# Patient Record
Sex: FEMALE | Race: WHITE | Employment: OTHER
[De-identification: names, ages, dates, MRNs, and addresses within clinical notes are randomized per-mention and may not be internally consistent; named-entity substitution may affect disease eponyms.]

---

## 2017-02-24 ENCOUNTER — HOSPITAL ENCOUNTER (OUTPATIENT)
Facility: MEDICAL CENTER | Age: 67
End: 2017-02-24
Payer: MEDICARE

## 2017-02-25 LAB
CHOLESTEROL, TOTAL: 167 MG/DL
CHOLESTEROL/HDL RATIO: 4.8
HBA1C MFR BLD: 9 %
HDLC SERPL-MCNC: 35 MG/DL (ref 35–70)
LDL CHOLESTEROL CALCULATED: 91 MG/DL (ref 0–160)
TRIGL SERPL-MCNC: 207 MG/DL
VLDLC SERPL CALC-MCNC: NORMAL MG/DL

## 2017-02-27 ENCOUNTER — OFFICE VISIT (OUTPATIENT)
Dept: ONCOLOGY | Facility: CLINIC | Age: 67
End: 2017-02-27

## 2017-02-27 ENCOUNTER — HOSPITAL ENCOUNTER (OUTPATIENT)
Age: 67
Discharge: HOME | End: 2017-02-27
Payer: MEDICARE

## 2017-02-27 ENCOUNTER — TELEPHONE (OUTPATIENT)
Dept: ONCOLOGY | Facility: CLINIC | Age: 67
End: 2017-02-27

## 2017-02-27 VITALS
TEMPERATURE: 98.2 F | RESPIRATION RATE: 20 BRPM | SYSTOLIC BLOOD PRESSURE: 135 MMHG | BODY MASS INDEX: 26.1 KG/M2 | HEART RATE: 81 BPM | DIASTOLIC BLOOD PRESSURE: 67 MMHG | WEIGHT: 174.2 LBS

## 2017-02-27 DIAGNOSIS — C50.411 MALIGNANT NEOPLASM OF UPPER-OUTER QUADRANT OF RIGHT FEMALE BREAST (HCC): Primary | ICD-10-CM

## 2017-02-27 PROCEDURE — 99212 OFFICE O/P EST SF 10 MIN: CPT | Performed by: INTERNAL MEDICINE

## 2017-02-27 PROCEDURE — 3017F COLORECTAL CA SCREEN DOC REV: CPT | Performed by: INTERNAL MEDICINE

## 2017-02-27 PROCEDURE — 1123F ACP DISCUSS/DSCN MKR DOCD: CPT | Performed by: INTERNAL MEDICINE

## 2017-02-27 PROCEDURE — 99214 OFFICE O/P EST MOD 30 MIN: CPT | Performed by: INTERNAL MEDICINE

## 2017-02-27 PROCEDURE — 1036F TOBACCO NON-USER: CPT | Performed by: INTERNAL MEDICINE

## 2017-02-27 PROCEDURE — 4040F PNEUMOC VAC/ADMIN/RCVD: CPT | Performed by: INTERNAL MEDICINE

## 2017-02-27 PROCEDURE — 3014F SCREEN MAMMO DOC REV: CPT | Performed by: INTERNAL MEDICINE

## 2017-02-27 RX ORDER — LETROZOLE 2.5 MG/1
2.5 TABLET, FILM COATED ORAL DAILY
Qty: 90 TABLET | Refills: 3 | Status: SHIPPED | OUTPATIENT
Start: 2017-02-27 | End: 2018-02-26 | Stop reason: SDUPTHER

## 2017-02-27 RX ORDER — LETROZOLE 2.5 MG/1
2.5 TABLET, FILM COATED ORAL DAILY
Qty: 90 TABLET | Refills: 3 | Status: SHIPPED | OUTPATIENT
Start: 2017-02-27 | End: 2017-02-27 | Stop reason: SDUPTHER

## 2017-03-27 ENCOUNTER — OFFICE VISIT (OUTPATIENT)
Dept: FAMILY MEDICINE CLINIC | Age: 67
End: 2017-03-27
Payer: MEDICARE

## 2017-03-27 VITALS
RESPIRATION RATE: 18 BRPM | SYSTOLIC BLOOD PRESSURE: 133 MMHG | HEART RATE: 83 BPM | HEIGHT: 68 IN | BODY MASS INDEX: 26.16 KG/M2 | WEIGHT: 172.6 LBS | DIASTOLIC BLOOD PRESSURE: 73 MMHG | OXYGEN SATURATION: 97 %

## 2017-03-27 DIAGNOSIS — Z12.11 SCREENING FOR COLON CANCER: ICD-10-CM

## 2017-03-27 DIAGNOSIS — G89.29 CHRONIC LEFT SHOULDER PAIN: Primary | ICD-10-CM

## 2017-03-27 DIAGNOSIS — E11.9 DIABETES MELLITUS TYPE 2 IN NONOBESE (HCC): ICD-10-CM

## 2017-03-27 DIAGNOSIS — M25.512 CHRONIC LEFT SHOULDER PAIN: Primary | ICD-10-CM

## 2017-03-27 PROCEDURE — 3046F HEMOGLOBIN A1C LEVEL >9.0%: CPT | Performed by: FAMILY MEDICINE

## 2017-03-27 PROCEDURE — G8427 DOCREV CUR MEDS BY ELIG CLIN: HCPCS | Performed by: FAMILY MEDICINE

## 2017-03-27 PROCEDURE — 3017F COLORECTAL CA SCREEN DOC REV: CPT | Performed by: FAMILY MEDICINE

## 2017-03-27 PROCEDURE — 99203 OFFICE O/P NEW LOW 30 MIN: CPT | Performed by: FAMILY MEDICINE

## 2017-03-27 PROCEDURE — G8399 PT W/DXA RESULTS DOCUMENT: HCPCS | Performed by: FAMILY MEDICINE

## 2017-03-27 PROCEDURE — G8420 CALC BMI NORM PARAMETERS: HCPCS | Performed by: FAMILY MEDICINE

## 2017-03-27 PROCEDURE — 4040F PNEUMOC VAC/ADMIN/RCVD: CPT | Performed by: FAMILY MEDICINE

## 2017-03-27 PROCEDURE — 1036F TOBACCO NON-USER: CPT | Performed by: FAMILY MEDICINE

## 2017-03-27 PROCEDURE — 3014F SCREEN MAMMO DOC REV: CPT | Performed by: FAMILY MEDICINE

## 2017-03-27 PROCEDURE — 1123F ACP DISCUSS/DSCN MKR DOCD: CPT | Performed by: FAMILY MEDICINE

## 2017-03-27 PROCEDURE — 1090F PRES/ABSN URINE INCON ASSESS: CPT | Performed by: FAMILY MEDICINE

## 2017-03-27 PROCEDURE — G8484 FLU IMMUNIZE NO ADMIN: HCPCS | Performed by: FAMILY MEDICINE

## 2017-03-27 RX ORDER — LIDOCAINE 50 MG/G
OINTMENT TOPICAL
Qty: 50 G | Refills: 3 | Status: SHIPPED | OUTPATIENT
Start: 2017-03-27 | End: 2019-08-30 | Stop reason: ALTCHOICE

## 2017-04-04 ENCOUNTER — TELEPHONE (OUTPATIENT)
Dept: FAMILY MEDICINE CLINIC | Age: 67
End: 2017-04-04

## 2017-04-25 ENCOUNTER — TELEPHONE (OUTPATIENT)
Dept: FAMILY MEDICINE CLINIC | Age: 67
End: 2017-04-25

## 2017-06-07 DIAGNOSIS — D05.12 DCIS (DUCTAL CARCINOMA IN SITU), LEFT: ICD-10-CM

## 2017-06-07 DIAGNOSIS — C50.411 MALIGNANT NEOPLASM OF UPPER-OUTER QUADRANT OF RIGHT FEMALE BREAST (HCC): Primary | ICD-10-CM

## 2017-06-13 ENCOUNTER — HOSPITAL ENCOUNTER (OUTPATIENT)
Dept: MRI IMAGING | Age: 67
Discharge: HOME | End: 2017-06-13
Payer: MEDICARE

## 2017-06-13 DIAGNOSIS — C50.411 MALIGNANT NEOPLASM OF UPPER-OUTER QUADRANT OF RIGHT FEMALE BREAST (HCC): ICD-10-CM

## 2017-06-13 LAB
BUN BLDV-MCNC: 15 MG/DL (ref 8–23)
CREAT SERPL-MCNC: 0.82 MG/DL (ref 0.5–0.9)
GFR AFRICAN AMERICAN: >60 ML/MIN
GFR NON-AFRICAN AMERICAN: >60 ML/MIN
GFR SERPL CREATININE-BSD FRML MDRD: NORMAL ML/MIN/{1.73_M2}
GFR SERPL CREATININE-BSD FRML MDRD: NORMAL ML/MIN/{1.73_M2}

## 2017-06-13 PROCEDURE — 84520 ASSAY OF UREA NITROGEN: CPT

## 2017-06-13 PROCEDURE — 82565 ASSAY OF CREATININE: CPT

## 2017-06-13 PROCEDURE — A9579 GAD-BASE MR CONTRAST NOS,1ML: HCPCS | Performed by: INTERNAL MEDICINE

## 2017-06-13 PROCEDURE — C8908 MRI W/O FOL W/CONT, BREAST,: HCPCS

## 2017-06-13 PROCEDURE — 6360000004 HC RX CONTRAST MEDICATION: Performed by: INTERNAL MEDICINE

## 2017-06-13 PROCEDURE — 36415 COLL VENOUS BLD VENIPUNCTURE: CPT

## 2017-06-13 RX ADMIN — GADOPENTETATE DIMEGLUMINE 20 ML: 469.01 INJECTION INTRAVENOUS at 11:03

## 2017-08-08 ENCOUNTER — HOSPITAL ENCOUNTER (OUTPATIENT)
Dept: NUCLEAR MEDICINE | Age: 67
Discharge: HOME | End: 2017-08-08
Payer: MEDICARE

## 2017-08-08 DIAGNOSIS — Z85.3 PERSONAL HISTORY OF MALIGNANT NEOPLASM OF BREAST: ICD-10-CM

## 2017-08-08 PROCEDURE — A9503 TC99M MEDRONATE: HCPCS | Performed by: SURGERY

## 2017-08-08 PROCEDURE — 78306 BONE IMAGING WHOLE BODY: CPT

## 2017-08-08 PROCEDURE — 3430000000 HC RX DIAGNOSTIC RADIOPHARMACEUTICAL: Performed by: SURGERY

## 2017-08-08 RX ORDER — TC 99M MEDRONATE 20 MG/10ML
23.9 INJECTION, POWDER, LYOPHILIZED, FOR SOLUTION INTRAVENOUS
Status: COMPLETED | OUTPATIENT
Start: 2017-08-08 | End: 2017-08-08

## 2017-08-08 RX ADMIN — Medication 23.9 MILLICURIE: at 08:15

## 2017-08-25 ENCOUNTER — HOSPITAL ENCOUNTER (OUTPATIENT)
Facility: MEDICAL CENTER | Age: 67
End: 2017-08-25
Payer: MEDICARE

## 2017-08-28 ENCOUNTER — TELEPHONE (OUTPATIENT)
Dept: ONCOLOGY | Age: 67
End: 2017-08-28

## 2017-08-28 ENCOUNTER — OFFICE VISIT (OUTPATIENT)
Dept: ONCOLOGY | Age: 67
End: 2017-08-28
Payer: MEDICARE

## 2017-08-28 ENCOUNTER — OFFICE VISIT (OUTPATIENT)
Dept: FAMILY MEDICINE CLINIC | Age: 67
End: 2017-08-28
Payer: MEDICARE

## 2017-08-28 ENCOUNTER — HOSPITAL ENCOUNTER (OUTPATIENT)
Dept: GENERAL RADIOLOGY | Age: 67
Discharge: HOME | End: 2017-08-28
Payer: MEDICARE

## 2017-08-28 ENCOUNTER — HOSPITAL ENCOUNTER (OUTPATIENT)
Age: 67
Discharge: HOME | End: 2017-08-28
Payer: MEDICARE

## 2017-08-28 VITALS
HEART RATE: 81 BPM | OXYGEN SATURATION: 97 % | DIASTOLIC BLOOD PRESSURE: 68 MMHG | SYSTOLIC BLOOD PRESSURE: 114 MMHG | HEIGHT: 68 IN | WEIGHT: 172 LBS | BODY MASS INDEX: 26.07 KG/M2

## 2017-08-28 VITALS
WEIGHT: 172.8 LBS | DIASTOLIC BLOOD PRESSURE: 68 MMHG | BODY MASS INDEX: 26.27 KG/M2 | TEMPERATURE: 98.4 F | SYSTOLIC BLOOD PRESSURE: 133 MMHG | RESPIRATION RATE: 18 BRPM | HEART RATE: 86 BPM

## 2017-08-28 DIAGNOSIS — Z12.11 COLON CANCER SCREENING: ICD-10-CM

## 2017-08-28 DIAGNOSIS — C50.411 MALIGNANT NEOPLASM OF UPPER-OUTER QUADRANT OF RIGHT FEMALE BREAST (HCC): Primary | ICD-10-CM

## 2017-08-28 DIAGNOSIS — Z11.59 NEED FOR HEPATITIS C SCREENING TEST: ICD-10-CM

## 2017-08-28 DIAGNOSIS — Z00.00 HEALTHCARE MAINTENANCE: ICD-10-CM

## 2017-08-28 DIAGNOSIS — G89.29 CHRONIC LEFT SHOULDER PAIN: ICD-10-CM

## 2017-08-28 DIAGNOSIS — M25.512 CHRONIC LEFT SHOULDER PAIN: ICD-10-CM

## 2017-08-28 DIAGNOSIS — M89.9 DISORDER OF BONE: ICD-10-CM

## 2017-08-28 DIAGNOSIS — Z23 NEED FOR PROPHYLACTIC VACCINATION AND INOCULATION AGAINST VARICELLA: ICD-10-CM

## 2017-08-28 DIAGNOSIS — E11.9 TYPE 2 DIABETES MELLITUS WITHOUT COMPLICATION, WITHOUT LONG-TERM CURRENT USE OF INSULIN (HCC): ICD-10-CM

## 2017-08-28 DIAGNOSIS — E11.9 DIABETES MELLITUS TYPE 2 IN NONOBESE (HCC): Primary | ICD-10-CM

## 2017-08-28 DIAGNOSIS — M67.912 DYSFUNCTION OF LEFT ROTATOR CUFF: ICD-10-CM

## 2017-08-28 LAB — HBA1C MFR BLD: 6.8 %

## 2017-08-28 PROCEDURE — 99214 OFFICE O/P EST MOD 30 MIN: CPT | Performed by: INTERNAL MEDICINE

## 2017-08-28 PROCEDURE — 3046F HEMOGLOBIN A1C LEVEL >9.0%: CPT | Performed by: INTERNAL MEDICINE

## 2017-08-28 PROCEDURE — 99214 OFFICE O/P EST MOD 30 MIN: CPT | Performed by: FAMILY MEDICINE

## 2017-08-28 PROCEDURE — 3014F SCREEN MAMMO DOC REV: CPT | Performed by: FAMILY MEDICINE

## 2017-08-28 PROCEDURE — G8399 PT W/DXA RESULTS DOCUMENT: HCPCS | Performed by: INTERNAL MEDICINE

## 2017-08-28 PROCEDURE — 1090F PRES/ABSN URINE INCON ASSESS: CPT | Performed by: INTERNAL MEDICINE

## 2017-08-28 PROCEDURE — G8419 CALC BMI OUT NRM PARAM NOF/U: HCPCS | Performed by: FAMILY MEDICINE

## 2017-08-28 PROCEDURE — 3017F COLORECTAL CA SCREEN DOC REV: CPT | Performed by: FAMILY MEDICINE

## 2017-08-28 PROCEDURE — 4040F PNEUMOC VAC/ADMIN/RCVD: CPT | Performed by: INTERNAL MEDICINE

## 2017-08-28 PROCEDURE — 83036 HEMOGLOBIN GLYCOSYLATED A1C: CPT | Performed by: FAMILY MEDICINE

## 2017-08-28 PROCEDURE — 1036F TOBACCO NON-USER: CPT | Performed by: FAMILY MEDICINE

## 2017-08-28 PROCEDURE — G8419 CALC BMI OUT NRM PARAM NOF/U: HCPCS | Performed by: INTERNAL MEDICINE

## 2017-08-28 PROCEDURE — G8427 DOCREV CUR MEDS BY ELIG CLIN: HCPCS | Performed by: FAMILY MEDICINE

## 2017-08-28 PROCEDURE — 1123F ACP DISCUSS/DSCN MKR DOCD: CPT | Performed by: INTERNAL MEDICINE

## 2017-08-28 PROCEDURE — 1090F PRES/ABSN URINE INCON ASSESS: CPT | Performed by: FAMILY MEDICINE

## 2017-08-28 PROCEDURE — 3017F COLORECTAL CA SCREEN DOC REV: CPT | Performed by: INTERNAL MEDICINE

## 2017-08-28 PROCEDURE — G8399 PT W/DXA RESULTS DOCUMENT: HCPCS | Performed by: FAMILY MEDICINE

## 2017-08-28 PROCEDURE — 3046F HEMOGLOBIN A1C LEVEL >9.0%: CPT | Performed by: FAMILY MEDICINE

## 2017-08-28 PROCEDURE — 4040F PNEUMOC VAC/ADMIN/RCVD: CPT | Performed by: FAMILY MEDICINE

## 2017-08-28 PROCEDURE — 3014F SCREEN MAMMO DOC REV: CPT | Performed by: INTERNAL MEDICINE

## 2017-08-28 PROCEDURE — 1123F ACP DISCUSS/DSCN MKR DOCD: CPT | Performed by: FAMILY MEDICINE

## 2017-08-28 PROCEDURE — 99211 OFF/OP EST MAY X REQ PHY/QHP: CPT

## 2017-08-28 PROCEDURE — 73030 X-RAY EXAM OF SHOULDER: CPT

## 2017-08-28 PROCEDURE — 1036F TOBACCO NON-USER: CPT | Performed by: INTERNAL MEDICINE

## 2017-08-28 PROCEDURE — G8427 DOCREV CUR MEDS BY ELIG CLIN: HCPCS | Performed by: INTERNAL MEDICINE

## 2017-08-28 ASSESSMENT — PATIENT HEALTH QUESTIONNAIRE - PHQ9
SUM OF ALL RESPONSES TO PHQ9 QUESTIONS 1 & 2: 0
SUM OF ALL RESPONSES TO PHQ QUESTIONS 1-9: 0
2. FEELING DOWN, DEPRESSED OR HOPELESS: 0
1. LITTLE INTEREST OR PLEASURE IN DOING THINGS: 0

## 2017-08-29 ENCOUNTER — TELEPHONE (OUTPATIENT)
Dept: ONCOLOGY | Age: 67
End: 2017-08-29

## 2017-09-01 ENCOUNTER — TELEPHONE (OUTPATIENT)
Dept: ONCOLOGY | Age: 67
End: 2017-09-01

## 2017-09-01 ENCOUNTER — HOSPITAL ENCOUNTER (OUTPATIENT)
Dept: MAMMOGRAPHY | Age: 67
Discharge: HOME | End: 2017-09-01
Payer: MEDICARE

## 2017-09-01 DIAGNOSIS — M89.9 DISORDER OF BONE: ICD-10-CM

## 2017-09-01 DIAGNOSIS — M25.512 CHRONIC LEFT SHOULDER PAIN: ICD-10-CM

## 2017-09-01 DIAGNOSIS — G89.29 CHRONIC LEFT SHOULDER PAIN: ICD-10-CM

## 2017-09-01 DIAGNOSIS — E11.9 TYPE 2 DIABETES MELLITUS WITHOUT COMPLICATION, WITHOUT LONG-TERM CURRENT USE OF INSULIN (HCC): ICD-10-CM

## 2017-09-01 DIAGNOSIS — C50.411 MALIGNANT NEOPLASM OF UPPER-OUTER QUADRANT OF RIGHT FEMALE BREAST (HCC): ICD-10-CM

## 2017-09-01 PROCEDURE — 77080 DXA BONE DENSITY AXIAL: CPT

## 2017-11-13 LAB
ANTIBODY: NORMAL
BILIRUBIN, URINE: NORMAL
BLOOD, URINE: NORMAL
CHOLESTEROL, TOTAL: 190 MG/DL
CHOLESTEROL/HDL RATIO: 4.6
CLARITY: NORMAL
COLOR: NORMAL
CREATININE URINE: NORMAL MG/DL
GLUCOSE URINE: NORMAL
HDLC SERPL-MCNC: 41 MG/DL (ref 35–70)
KETONES, URINE: NORMAL
LDL CHOLESTEROL CALCULATED: 96 MG/DL (ref 0–160)
LEUKOCYTE ESTERASE, URINE: NORMAL
MICROALBUMIN/CREAT 24H UR: NORMAL MG/G{CREAT}
NITRITE, URINE: NORMAL
PH UA: NORMAL (ref 4.5–8)
PROTEIN UA: NORMAL
SPECIFIC GRAVITY, URINE: NORMAL
T4 FREE: NORMAL
TRIGL SERPL-MCNC: 264 MG/DL
TSH SERPL DL<=0.05 MIU/L-ACNC: NORMAL UIU/ML
UROBILINOGEN, URINE: NORMAL
VLDLC SERPL CALC-MCNC: 53 MG/DL

## 2017-11-16 DIAGNOSIS — Z11.59 NEED FOR HEPATITIS C SCREENING TEST: ICD-10-CM

## 2017-11-16 DIAGNOSIS — E11.9 DIABETES MELLITUS TYPE 2 IN NONOBESE (HCC): ICD-10-CM

## 2017-11-16 DIAGNOSIS — Z00.00 HEALTHCARE MAINTENANCE: ICD-10-CM

## 2017-11-20 ENCOUNTER — OFFICE VISIT (OUTPATIENT)
Dept: FAMILY MEDICINE CLINIC | Age: 67
End: 2017-11-20
Payer: MEDICARE

## 2017-11-20 VITALS
DIASTOLIC BLOOD PRESSURE: 67 MMHG | WEIGHT: 170 LBS | HEIGHT: 68 IN | HEART RATE: 80 BPM | OXYGEN SATURATION: 98 % | RESPIRATION RATE: 12 BRPM | BODY MASS INDEX: 25.76 KG/M2 | SYSTOLIC BLOOD PRESSURE: 126 MMHG

## 2017-11-20 DIAGNOSIS — M25.512 CHRONIC LEFT SHOULDER PAIN: ICD-10-CM

## 2017-11-20 DIAGNOSIS — G89.29 CHRONIC LEFT SHOULDER PAIN: ICD-10-CM

## 2017-11-20 DIAGNOSIS — M67.912 ROTATOR CUFF DYSFUNCTION, LEFT: Primary | ICD-10-CM

## 2017-11-20 PROCEDURE — 99213 OFFICE O/P EST LOW 20 MIN: CPT | Performed by: FAMILY MEDICINE

## 2017-11-20 PROCEDURE — G8399 PT W/DXA RESULTS DOCUMENT: HCPCS | Performed by: FAMILY MEDICINE

## 2017-11-20 PROCEDURE — 3017F COLORECTAL CA SCREEN DOC REV: CPT | Performed by: FAMILY MEDICINE

## 2017-11-20 PROCEDURE — 1090F PRES/ABSN URINE INCON ASSESS: CPT | Performed by: FAMILY MEDICINE

## 2017-11-20 PROCEDURE — G8427 DOCREV CUR MEDS BY ELIG CLIN: HCPCS | Performed by: FAMILY MEDICINE

## 2017-11-20 PROCEDURE — 1036F TOBACCO NON-USER: CPT | Performed by: FAMILY MEDICINE

## 2017-11-20 PROCEDURE — G8482 FLU IMMUNIZE ORDER/ADMIN: HCPCS | Performed by: FAMILY MEDICINE

## 2017-11-20 PROCEDURE — 4040F PNEUMOC VAC/ADMIN/RCVD: CPT | Performed by: FAMILY MEDICINE

## 2017-11-20 PROCEDURE — G8419 CALC BMI OUT NRM PARAM NOF/U: HCPCS | Performed by: FAMILY MEDICINE

## 2017-11-20 PROCEDURE — 1123F ACP DISCUSS/DSCN MKR DOCD: CPT | Performed by: FAMILY MEDICINE

## 2017-11-20 PROCEDURE — 3044F HG A1C LEVEL LT 7.0%: CPT | Performed by: FAMILY MEDICINE

## 2017-11-20 PROCEDURE — 3014F SCREEN MAMMO DOC REV: CPT | Performed by: FAMILY MEDICINE

## 2017-11-20 RX ORDER — CALCIUM CITRATE/VITAMIN D3 200MG-6.25
TABLET ORAL
Refills: 3 | COMMUNITY
Start: 2017-10-28 | End: 2018-02-15 | Stop reason: SDUPTHER

## 2017-11-20 RX ORDER — PRAVASTATIN SODIUM 20 MG
20 TABLET ORAL DAILY
Qty: 30 TABLET | Refills: 3 | Status: SHIPPED | OUTPATIENT
Start: 2017-11-20 | End: 2019-02-27

## 2017-11-20 RX ORDER — INFLUENZA VIRUS VACCINE 15; 15; 15; 15 UG/.5ML; UG/.5ML; UG/.5ML; UG/.5ML
SUSPENSION INTRAMUSCULAR
Refills: 0 | COMMUNITY
Start: 2017-11-11 | End: 2019-08-30 | Stop reason: ALTCHOICE

## 2017-11-20 NOTE — PROGRESS NOTES
General FM note    Dayanna Paula is a 69 y.o. female who presents today for follow up on her  medical conditions as noted below.   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 ([facility]     Chemotherapy-induced neuropathy ([facility]     Paronychia of fifth toe, left     Chronic left shoulder pain     Past Medical History:   Diagnosis Date    Allergic rhinitis     Asthma     exercise induced    Breast cancer ([facility] 12/1993    Dr Ilana Mcfarland Lumpectomy and lymph nodes excised    Chemotherapy-induced neuropathy (HCC)     Frequent UTI     GERD (gastroesophageal reflux disease)     Heavy menstrual bleeding     History of bone density study 11/12    normal    Pap test, as part of routine gynecological examination 8/12    peds speculum    Type II or unspecified type diabetes mellitus without mention of complication, not stated as uncontrolled 2007    Urinary tract infection       Past Surgical History:   Procedure Laterality Date    BREAST LUMPECTOMY  1/1994    rt breast, cancer    BREAST RECONSTRUCTION Bilateral 06/30/15    BREAST RECONSTRUCTION Bilateral 4/21/16    expander removal, implants placed, fat injected, lipo    BREAST SURGERY Bilateral 04/21/2016    implants    COLONOSCOPY      DILATION AND CURETTAGE OF UTERUS      ENDOSCOPY, COLON, DIAGNOSTIC      HYSTERECTOMY, TOTAL ABDOMINAL      with BSO    MASTECTOMY Bilateral     OTHER SURGICAL HISTORY  09/27/2016    chest port removal    SINUS SURGERY      polyp    TONSILLECTOMY      TUNNELED VENOUS PORT PLACEMENT Left 7/30/2015     Family History   Problem Relation Age of Onset    Breast Cancer Mother     Other Mother      heart disease, pacemaker    Other Father      heart disease, thyroid Smokeless tobacco: Never Used      Comment: in 20's once a week    Alcohol use Yes      Comment: rare social drink      Body mass index is 25.76 kg/m². /67   Pulse 80   Resp 12   Ht 5' 8.11\" (1.73 m)   Wt 170 lb (77.1 kg)   SpO2 98%   BMI 25.76 kg/m²     Subjective:      HPI  68-year-old female coming today for recheck. Patient states that she did start taking farxiga just half a pill. Over the last week she continued to normal dosage. She also feels that the physical therapy, which was prescribed for left shoulder discomfort did not help. It made it actually worse. She has been in physical therapy now 6 weeks. She is not able to touch her back at all with her left arm. She feels discomfort, especially during the night, but now also during the day. The pain is more located at her lateral area of her shoulder. Review of Systems   Constitutional: Negative for fever and unexpected weight change. Musculoskeletal: Negative for back pain and gait problem. positive for progressing increased left shoulder pain. Skin: Negative for color change and rash. Objective:   Physical Exam  Constitutional: VS (see above). General appearance: normal development, habitus and attention, no deformities. Eyes: normal conjunctiva and lids. Skin: no rashes, lesions or ulcers. Musculoskeletal: normal gait. Nails: no clubbing or cyanosis. Decreased range of motion left shoulder. tenderness with internal rotation, abduction. Tenderness at her acromial humeral area. Psychiatric: alert and oriented to place, time and person. Normal mood and affect. Assessment:      1. Rotator cuff dysfunction, left  MRI SHOULDER LEFT WO CONTRAST   2. Uncontrolled type 2 diabetes mellitus without complication, without long-term current use of insulin ([facility])  TRUE METRIX BLOOD GLUCOSE TEST strip    pravastatin (PRAVACHOL) 20 MG tablet   3. Chronic left shoulder pain  MRI SHOULDER LEFT WO CONTRAST       Plan:   MRI ordered. Patient had 6 weeks of physical therapy. Did not improve her pain at all. Patient will continue current medication. Call or return to clinic prn if these symptoms worsen or fail to improve as anticipated. I have reviewed the instructions with the patient, answering all questions to her satisfaction. Return in about 6 months (around 5/20/2018), or if symptoms worsen or fail to improve, for DM II.   Orders Placed This Encounter   Procedures    MRI SHOULDER LEFT WO CONTRAST     Standing Status:   Future     Standing Expiration Date:   11/20/2018     Orders Placed This Encounter   Medications    pravastatin (PRAVACHOL) 20 MG tablet     Sig: Take 1 tablet by mouth daily     Dispense:  30 tablet     Refill:  3       Electronically signed by Guillermo Bojorquez MD on 11/20/2017 at 2:55 PM       (Please note that portions of this note were completed with a voice recognition program. Efforts were made to edit the dictations but occasionally words are mis-transcribed.)

## 2017-12-02 ENCOUNTER — HOSPITAL ENCOUNTER (OUTPATIENT)
Dept: MRI IMAGING | Age: 67
Discharge: HOME | End: 2017-12-02
Payer: MEDICARE

## 2017-12-02 DIAGNOSIS — G89.29 CHRONIC LEFT SHOULDER PAIN: ICD-10-CM

## 2017-12-02 DIAGNOSIS — M25.512 CHRONIC LEFT SHOULDER PAIN: ICD-10-CM

## 2017-12-02 DIAGNOSIS — M67.912 ROTATOR CUFF DYSFUNCTION, LEFT: ICD-10-CM

## 2017-12-02 PROCEDURE — 73221 MRI JOINT UPR EXTREM W/O DYE: CPT

## 2017-12-06 ENCOUNTER — HOSPITAL ENCOUNTER (OUTPATIENT)
Age: 67
Setting detail: SPECIMEN
Discharge: HOME | End: 2017-12-06
Payer: MEDICARE

## 2017-12-06 ENCOUNTER — OFFICE VISIT (OUTPATIENT)
Dept: FAMILY MEDICINE CLINIC | Age: 67
End: 2017-12-06
Payer: MEDICARE

## 2017-12-06 VITALS
HEART RATE: 78 BPM | BODY MASS INDEX: 25.73 KG/M2 | OXYGEN SATURATION: 98 % | WEIGHT: 169.75 LBS | TEMPERATURE: 98.1 F | SYSTOLIC BLOOD PRESSURE: 128 MMHG | HEIGHT: 68 IN | RESPIRATION RATE: 16 BRPM | DIASTOLIC BLOOD PRESSURE: 70 MMHG

## 2017-12-06 DIAGNOSIS — M67.912 TENDINOPATHY OF LEFT ROTATOR CUFF: ICD-10-CM

## 2017-12-06 DIAGNOSIS — E11.9 TYPE 2 DIABETES MELLITUS WITHOUT COMPLICATION, WITHOUT LONG-TERM CURRENT USE OF INSULIN (HCC): ICD-10-CM

## 2017-12-06 DIAGNOSIS — E11.9 TYPE 2 DIABETES MELLITUS WITHOUT COMPLICATION, WITHOUT LONG-TERM CURRENT USE OF INSULIN (HCC): Primary | ICD-10-CM

## 2017-12-06 LAB
ALBUMIN SERPL-MCNC: 4.2 G/DL (ref 3.5–5.2)
ALBUMIN/GLOBULIN RATIO: 1.4 (ref 1–2.5)
ALP BLD-CCNC: 91 U/L (ref 35–104)
ALT SERPL-CCNC: 25 U/L (ref 5–33)
ANION GAP SERPL CALCULATED.3IONS-SCNC: 18 MMOL/L (ref 9–17)
AST SERPL-CCNC: 22 U/L
BILIRUB SERPL-MCNC: 0.42 MG/DL (ref 0.3–1.2)
BUN BLDV-MCNC: 12 MG/DL (ref 8–23)
BUN/CREAT BLD: ABNORMAL (ref 9–20)
CALCIUM SERPL-MCNC: 9 MG/DL (ref 8.6–10.4)
CHLORIDE BLD-SCNC: 103 MMOL/L (ref 98–107)
CO2: 22 MMOL/L (ref 20–31)
CREAT SERPL-MCNC: 0.67 MG/DL (ref 0.5–0.9)
GFR AFRICAN AMERICAN: >60 ML/MIN
GFR NON-AFRICAN AMERICAN: >60 ML/MIN
GFR SERPL CREATININE-BSD FRML MDRD: ABNORMAL ML/MIN/{1.73_M2}
GFR SERPL CREATININE-BSD FRML MDRD: ABNORMAL ML/MIN/{1.73_M2}
GLUCOSE BLD-MCNC: 145 MG/DL (ref 70–99)
HBA1C MFR BLD: 7.5 %
POTASSIUM SERPL-SCNC: 4.1 MMOL/L (ref 3.7–5.3)
SODIUM BLD-SCNC: 143 MMOL/L (ref 135–144)
TOTAL PROTEIN: 7.2 G/DL (ref 6.4–8.3)

## 2017-12-06 PROCEDURE — 1036F TOBACCO NON-USER: CPT | Performed by: FAMILY MEDICINE

## 2017-12-06 PROCEDURE — 1090F PRES/ABSN URINE INCON ASSESS: CPT | Performed by: FAMILY MEDICINE

## 2017-12-06 PROCEDURE — 3017F COLORECTAL CA SCREEN DOC REV: CPT | Performed by: FAMILY MEDICINE

## 2017-12-06 PROCEDURE — G8482 FLU IMMUNIZE ORDER/ADMIN: HCPCS | Performed by: FAMILY MEDICINE

## 2017-12-06 PROCEDURE — 1123F ACP DISCUSS/DSCN MKR DOCD: CPT | Performed by: FAMILY MEDICINE

## 2017-12-06 PROCEDURE — G8399 PT W/DXA RESULTS DOCUMENT: HCPCS | Performed by: FAMILY MEDICINE

## 2017-12-06 PROCEDURE — 3044F HG A1C LEVEL LT 7.0%: CPT | Performed by: FAMILY MEDICINE

## 2017-12-06 PROCEDURE — 3014F SCREEN MAMMO DOC REV: CPT | Performed by: FAMILY MEDICINE

## 2017-12-06 PROCEDURE — 4040F PNEUMOC VAC/ADMIN/RCVD: CPT | Performed by: FAMILY MEDICINE

## 2017-12-06 PROCEDURE — 99213 OFFICE O/P EST LOW 20 MIN: CPT | Performed by: FAMILY MEDICINE

## 2017-12-06 PROCEDURE — 83036 HEMOGLOBIN GLYCOSYLATED A1C: CPT | Performed by: FAMILY MEDICINE

## 2017-12-06 PROCEDURE — G8419 CALC BMI OUT NRM PARAM NOF/U: HCPCS | Performed by: FAMILY MEDICINE

## 2017-12-06 PROCEDURE — G8427 DOCREV CUR MEDS BY ELIG CLIN: HCPCS | Performed by: FAMILY MEDICINE

## 2017-12-06 RX ORDER — GLIMEPIRIDE 4 MG/1
4 TABLET ORAL DAILY
Qty: 90 TABLET | Refills: 0 | Status: SHIPPED | OUTPATIENT
Start: 2017-12-06 | End: 2018-04-04 | Stop reason: SDUPTHER

## 2017-12-06 RX ORDER — METFORMIN HYDROCHLORIDE 500 MG/1
500 TABLET, EXTENDED RELEASE ORAL 4 TIMES DAILY
Qty: 360 TABLET | Refills: 0 | Status: SHIPPED | OUTPATIENT
Start: 2017-12-06 | End: 2018-04-04 | Stop reason: SDUPTHER

## 2017-12-06 NOTE — PROGRESS NOTES
General FM note    Antonietta Henson is a 69 y.o. female who presents today for follow up on her  medical conditions as noted below.   Antonietta Henson is c/o of   Chief Complaint   Patient presents with    Results     blood work, mri results       Patient Active Problem List:     Diabetes mellitus (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 ([address]     Chemotherapy-induced neuropathy ([address]     Paronychia of fifth toe, left     Chronic left shoulder pain     Past Medical History:   Diagnosis Date    Allergic rhinitis     Asthma     exercise induced    Breast cancer ([address] 12/1993    Dr Marly Barrientos Lumpectomy and lymph nodes excised    Chemotherapy-induced neuropathy ([address]     Frequent UTI     GERD (gastroesophageal reflux disease)     Heavy menstrual bleeding     History of bone density study 11/12    normal    Pap test, as part of routine gynecological examination 8/12    peds speculum    Type II or unspecified type diabetes mellitus without mention of complication, not stated as uncontrolled 2007    Urinary tract infection       Past Surgical History:   Procedure Laterality Date    BREAST LUMPECTOMY  1/1994    rt breast, cancer    BREAST RECONSTRUCTION Bilateral 06/30/15    BREAST RECONSTRUCTION Bilateral 4/21/16    expander removal, implants placed, fat injected, lipo    BREAST SURGERY Bilateral 04/21/2016    implants    COLONOSCOPY      DILATION AND CURETTAGE OF UTERUS      ENDOSCOPY, COLON, DIAGNOSTIC      HYSTERECTOMY, TOTAL ABDOMINAL      with BSO    MASTECTOMY Bilateral     OTHER SURGICAL HISTORY  09/27/2016    chest port removal    SINUS SURGERY      polyp    TONSILLECTOMY      TUNNELED VENOUS PORT PLACEMENT Left 7/30/2015     Family History   Problem Relation Age of Onset    Breast Cancer Mother     Other Mother      heart disease, pacemaker    Other Father      heart disease, thyroid disease    Breast Cancer Maternal Aunt     Breast Cancer Paternal Grandmother     Breast Cancer Maternal Aunt     Asthma Maternal Grandmother      Current Outpatient Prescriptions   Medication Sig Dispense Refill    dapagliflozin (FARXIGA) 10 MG tablet Take 1 tablet by mouth daily 90 tablet 0    glimepiride (AMARYL) 4 MG tablet Take 1 tablet by mouth daily 90 tablet 0    metFORMIN (GLUCOPHAGE-XR) 500 MG extended release tablet Take 1 tablet by mouth 4 times daily 360 tablet 0    TRUE METRIX BLOOD GLUCOSE TEST strip USE UTD TO TEST BLOOD SUGAR ONCE DAILY  3    FLUARIX QUADRIVALENT 0.5 ML injection ADM 0.5ML IM UTD  0    pravastatin (PRAVACHOL) 20 MG tablet Take 1 tablet by mouth daily 30 tablet 3    mupirocin (BACTROBAN) 2 % ointment Apply topically as needed Apply topically 3 times daily.  lidocaine (XYLOCAINE) 5 % ointment Apply topically as needed. 50 g 3    ranitidine (ZANTAC) 150 MG tablet Take 150 mg by mouth nightly      metFORMIN (GLUCOPHAGE) 500 MG tablet Take 500 mg by mouth 2 times daily (with meals) Indications: 2 PO BID ER      fluticasone (FLONASE) 50 MCG/ACT nasal spray 1 spray by Nasal route 2 times daily       montelukast (SINGULAIR) 10 MG tablet Take 10 mg by mouth nightly.  cetirizine (ZYRTEC) 10 MG tablet Take 10 mg by mouth daily.  lansoprazole (PREVACID) 30 MG capsule Take 15 mg by mouth daily as needed (15 MG daily)       budesonide-formoterol (SYMBICORT) 160-4.5 MCG/ACT AERO Inhale 2 puffs into the lungs 2 times daily.  albuterol (PROVENTIL HFA;VENTOLIN HFA) 108 (90 BASE) MCG/ACT inhaler Inhale 2 puffs into the lungs every 4 hours as needed       clobetasol (TEMOVATE) 0.05 % cream Apply topically 2 times daily as needed Apply topically 2 times daily.  letrozole (FEMARA) 2.5 MG tablet Take 1 tablet by mouth daily 90 tablet 3     No current facility-administered medications for this visit.       ALLERGIES:    Allergies   Allergen Reactions    Tape Julia Rudolph Tape]     Sulfa Antibiotics Itching and Rash       Social History   Substance Use Topics    Smoking status: Former Smoker    Smokeless tobacco: Never Used      Comment: in 20's once a week    Alcohol use Yes      Comment: rare social drink      Body mass index is 25.73 kg/m². /70   Pulse 78   Temp 98.1 °F (36.7 °C)   Resp 16   Ht 5' 8.11\" (1.73 m)   Wt 169 lb 12.1 oz (77 kg)   SpO2 98%   BMI 25.73 kg/m²     Subjective:      HPI  70-year-old female coming today for follow-up on her recent MRI of her left shoulder and also her A1c. A1c is worse from her last time. Patient states that she has been eating sure that she did not watch her glucose levels. That she did not take her medication, but now she is back to normal.  She did bring in the paperwork. Her fasting glucose readings which are around 130-140s. Review of Systems   Constitutional: Negative for fever and unexpected weight change. Musculoskeletal: Negative for back pain and gait problem. positive for acute on chronic left shoulder pains. Objective:   Physical Exam  Constitutional: VS (see above). General appearance: normal development, habitus and attention, no deformities. Eyes: normal conjunctiva and lids. CAV: RRR, no RMG. No edema lower extremities. Pulmo: CTA bilateral, no CWR. Skin: no rashes, lesions or ulcers. Musculoskeletal: normal gait. Nails: no clubbing or cyanosis. Psychiatric: alert and oriented to place, time and person. Normal mood and affect. Assessment:      1. Type 2 diabetes mellitus without complication, without long-term current use of insulin ([facility])  POCT glycosylated hemoglobin (Hb A1C)    Comprehensive Metabolic Panel    dapagliflozin (FARXIGA) 10 MG tablet    glimepiride (AMARYL) 4 MG tablet    metFORMIN (GLUCOPHAGE-XR) 500 MG extended release tablet   2. Tendinopathy of left rotator cuff  [facility] [address] [address]       Plan:   Patient will start watching her diet.   She will

## 2018-01-11 ENCOUNTER — HOSPITAL ENCOUNTER (OUTPATIENT)
Dept: PHYSICAL THERAPY | Facility: CLINIC | Age: 68
Setting detail: THERAPIES SERIES
Discharge: HOME | End: 2018-01-11
Payer: MEDICARE

## 2018-01-11 PROCEDURE — 97161 PT EVAL LOW COMPLEX 20 MIN: CPT

## 2018-01-11 PROCEDURE — G8984 CARRY CURRENT STATUS: HCPCS

## 2018-01-11 PROCEDURE — G8985 CARRY GOAL STATUS: HCPCS

## 2018-01-11 PROCEDURE — 97110 THERAPEUTIC EXERCISES: CPT

## 2018-01-11 NOTE — CONSULTS
[facility] Fall Risk Assessment    Patient Name:  Moose Zaragoza  : 1950        Risk Factor Scale  Score   History of Falls [] Yes  [x] No 25  0 0   Secondary Diagnosis [] Yes  [x] No 15  0 0   Ambulatory Aid [] Furniture  [] Crutches/cane/walker  [x] None/bedrest/wheelchair/nurse 30  15  0 0   IV/Heparin Lock [] Yes  [x] No 20  0 0   Gait/Transferring [] Impaired  [] Weak  [x] Normal/bedrest/immobile 20  10  0 0   Mental Status [] Forgets limitations  [x] Oriented to own ability 15  0 0      Total:0     Based on the Assessment score: check the appropriate box.     [x]  No intervention needed   Low =   Score of 0-24    []  Use standard prevention interventions Moderate =  Score of 24-44   [] Give patient handout and discuss fall prevention strategies   [] Establish goal of education for patient/family RE: fall prevention strategies    []  Use high risk prevention interventions High = Score of 45 and higher   [] Give patient handout and discuss fall prevention strategies   [] Establish goal of education for patient/family Re: fall prevention strategies   [] Discuss lifeline / other resources    Electronically signed by:   Elicia Spencer PT  Date: 2018
[] [facility]        Outpatient Physical                Therapy       [address]       Phone: [phone]       Fax: [phone] [x] [facility]       Promotion at [address] [facility]       Phone: [phone]       Fax: [phone] [] [facility]      for Health Promotion     [address] [facility]      Phone: [phone]      Fax:  [phone]     Physical Therapy Upper Extremity Evaluation    Date:  2018  Patient: Yamileth Miranda  : 1950  MRN: [id number]  Physician: Kimberly Hernandez D.O. Insurance: [employer]  Medical Diagnosis: Left shoulder adhesive capsulitis  Rehab Codes: M25.512, [id number], [id number]  Onset Date: 2016   Next 's appt: 2/15/2018    Subjective:   CC:Pt reports stiffness and pain in Left shoulder; she had previous physical therapy with limited success and was then  seen by Dr Leticia Ruggiero. She noted slight improvement from injection initially but states it did not last more than a few days. HPI: (onset date): Pt is a 69 y.o. female who reports a 2 year history Left shoulder dysfunction; PSHx s significant for double mastectomy in .      PMHx: [] Unremarkable [x] Diabetes [] HTN  [] Pacemaker   [] MI/Heart Problems [x] Cancer-breast  [] Arthritis [] Other:              [] Refer to full medical chart  In EPIC   Tests: [x] X-Ray: [] MRI:  [] Other:    Medications: [x] Refer to full medical record [] None [] Other:  Allergies:      [x] Refer to full medical record [] None [] Other:     Function:  Hand Dominance  [x] Right  [] Left  Working:  [] Normal Duty  [] Light Duty  [] Off D/T Condition  [x] Retired    [] Not Employed    []  Disability  [] Other:             Return to work:   Job/ADL Description: [employer]     Pain:  [x] Yes  [] No Location: Left shoulder Pain Rating: (0-10 scale)   4/10  Pain altered Tx:  [] Yes  [] No  Action:  Symptoms:  [] Improving [] Worsening [x] Same  Better:  []
be met in 16  treatments)  1. Improve overall Upper Extremity score to 90% max function or greater  2. Improve seated elevation ROM to 155°      Treatment Plan:  [x] Therapeutic Exercise    [] Modalities:  [] Therapeutic Activity     [] Ultrasound  [] Electrical Stimulation  [] Gait Training     [] Massage       [] Lumbar/Cervical Traction  [] Neuromuscular Re-education [x] Cold/hotpack [] Iontophoresis: 4 mg/mL  [x] Instruction in HEP             Dexamethasone Sodium  [x] Manual Therapy             Phosphate 40-80 mAmin  [] Aquatic Therapy    [] Other:     [x] Vasocompression/       Game Ready      []  Medication allergies reviewed for use of    Dexamethasone Sodium Phosphate 4mg/ml     with iontophoresis treatments. Pt is not allergic. Frequency:  3 x/week for 16 visits      Electronically signed by: Tadeo Baker PT        Physician Signature:________________________________Date:__________________  By signing above or cosigning this note, I have reviewed this plan of care and certify a need for medically necessary rehabilitation services.      *PLEASE SIGN ABOVE AND FAX BACK ALL PAGES*

## 2018-01-16 ENCOUNTER — HOSPITAL ENCOUNTER (OUTPATIENT)
Dept: PHYSICAL THERAPY | Facility: CLINIC | Age: 68
Setting detail: THERAPIES SERIES
Discharge: HOME | End: 2018-01-16
Payer: MEDICARE

## 2018-01-16 PROCEDURE — 97110 THERAPEUTIC EXERCISES: CPT

## 2018-01-16 PROCEDURE — 97016 VASOPNEUMATIC DEVICE THERAPY: CPT

## 2018-01-18 ENCOUNTER — HOSPITAL ENCOUNTER (OUTPATIENT)
Dept: PHYSICAL THERAPY | Facility: CLINIC | Age: 68
Setting detail: THERAPIES SERIES
Discharge: HOME | End: 2018-01-18
Payer: MEDICARE

## 2018-01-18 PROCEDURE — 97016 VASOPNEUMATIC DEVICE THERAPY: CPT

## 2018-01-18 PROCEDURE — 97110 THERAPEUTIC EXERCISES: CPT

## 2018-01-18 NOTE — FLOWSHEET NOTE
[] [facility]       Outpatient Physical        Therapy       [address]       Phone: [phone]       Fax: [phone] [x] [facility] at [address]       Phone: [phone]       Fax: [phone] [] [facility] [address] [place] [address] Promotion  [address] [place] [address]   Phone: [phone]   Fax:  [phone]     Physical Therapy Daily Treatment Note    Date:  2018  Patient Name:  Farhad Morgan    :  1950  MRN: [id number]  Physician: Rogerio Trammell D.O. Insurance: [employer]  Medical Diagnosis: Left shoulder adhesive capsulitis                     Rehab Codes: Q41.637, [id number], [id number]  Onset Date: 2016                        Next 's appt: 2/15/2018  Visit# / total visits: 3/16  Cancels/No Shows: 0/0    Subjective:    Pain:  [x] Yes  [] No Location: L shoulder  Pain Rating: (0-10 scale) aches/10  Pain altered Tx:  [x] No  [] Yes  Action:    Comments: pt reports Left shoulder remains sore    Objective:   Modalities: HP to R shoulder prior to stretching, vasocompression after exercises, min compression, 34 degrees, seated. Precautions:  Exercises:  Exercise Reps/Time Weight/Level Comments          UBE 3 fwd,  3 retro L2          SUPINE      PROM stretching with mobs x  With therapist    Flexion  20x Cane     Protraction 20x Cane     Horiz. Add 20x Cane     Triceps press             SIDE LYING      Abduction       ER      Horiz. Abd            PRONE      Retraction      Extension       Horiz.  Abd             STANDING      Extension  20x 1#Cane     IR/ADD 20x 1#Cane     Flexion       Abduction  20x 1#Cane     scaption  x15 1#    Biceps curls            T-Band      Extension  2x10 red    Depression       Retraction  2x10     IR      ER                   Other:    Specific Instructions for next treatment: ROM Ex's; capsular stretching; shoulder mob's;     Treatment Charges: Mins Units   [x]

## 2018-01-19 ENCOUNTER — HOSPITAL ENCOUNTER (OUTPATIENT)
Dept: PHYSICAL THERAPY | Facility: CLINIC | Age: 68
Setting detail: THERAPIES SERIES
Discharge: HOME | End: 2018-01-19
Payer: MEDICARE

## 2018-01-19 PROCEDURE — 97110 THERAPEUTIC EXERCISES: CPT

## 2018-01-19 PROCEDURE — 97016 VASOPNEUMATIC DEVICE THERAPY: CPT

## 2018-01-19 NOTE — FLOWSHEET NOTE
[] Esdras Recinos       Outpatient Physical        Therapy       [address]       Phone: [phone]       Fax: [phone] [x] [facility] at [address]       Phone: [phone]       Fax: [phone] [] [employer] [address] [place] [address] Health Promotion  [address] [place] [address]   Phone: [phone]   Fax:  [phone]     Physical Therapy Daily Treatment Note    Date:  2018  Patient Name:  Laci Reardon    :  1950  MRN: [id number]  Physician: Jaylan Hawk D.O. Insurance: [employer]  Medical Diagnosis: Left shoulder adhesive capsulitis                     Rehab Codes: I76.725, [id number], [id number]  Onset Date: 2016                        Next 's appt: 2/15/2018  Visit# / total visits:   Cancels/No Shows: 0/0    Subjective:    Pain:  [x] Yes  [] No Location: L shoulder  Pain Rating: (0-10 scale) aches/10  Pain altered Tx:  [x] No  [] Yes  Action:    Comments: pt reports mild soreness following last treatment    Objective:   Modalities: HP to R shoulder prior to stretching, vasocompression after exercises, min compression, 34 degrees, seated. Precautions:  Exercises: bolded  Exercise Reps/Time Weight/Level Comments          UBE 3.5fwd,  3.5 retro L3          SUPINE      PROM stretching with mobs x  With therapist emphasis on flex/scaption/ER   Flexion  20x Cane     Protraction 20x Cane     Horiz. Add 20x Cane     Triceps press             SIDE LYING      Abduction       ER      Horiz. Abd            PRONE      Retraction      Extension       Horiz.  Abd             STANDING      Extension  20x 1#Cane     Flexion       Abduction  20x 1#Cane     scaption  2x15 1#    Biceps curls            Towel stretch (IR/ADD) x5 10\" hold                T-Band      Extension  2x10 red    Depression       Retraction  2x10     IR      ER                   Other:    Specific Instructions for next treatment: ROM Ex's;

## 2018-01-22 ENCOUNTER — HOSPITAL ENCOUNTER (OUTPATIENT)
Dept: PHYSICAL THERAPY | Facility: CLINIC | Age: 68
Setting detail: THERAPIES SERIES
Discharge: HOME | End: 2018-01-22
Payer: MEDICARE

## 2018-01-22 PROCEDURE — 97110 THERAPEUTIC EXERCISES: CPT

## 2018-01-22 PROCEDURE — 97016 VASOPNEUMATIC DEVICE THERAPY: CPT

## 2018-01-22 NOTE — FLOWSHEET NOTE
[] Pam Marker       Outpatient Physical        Therapy       [address]       Phone: [phone]       Fax: [phone] [x] [facility] for [facility] Promotion at [address] [facility]       Phone: [phone]       Fax: [phone] [] [facility] for Health Promotion  [address] [place] [address]   Phone: [phone]   Fax:  [phone]      Physical Therapy Daily Treatment Note    Date:  2018  Patient Name:  Kalyani Vasquez    :    MRN: [id number]  Physician: Dyan Garcia D.O. Insurance: [employer]  Medical Diagnosis: Left shoulder adhesive capsulitis                     Rehab Codes: H98.404, [id number], [id number]  Onset Date: 2016                        Next 's appt: 2/15/2018  Visit# / total visits:   Cancels/No Shows: 0/0    Subjective:    Pain:  [x] Yes  [] No Location: L shoulder  Pain Rating: (0-10 scale) aches/10  Pain altered Tx:  [x] No  [] Yes  Action:    Comments: pt states she is sore today. There is a spot on the upper arm that is sore and gets more sore with activities. Objective:   Modalities: HP to R shoulder prior to stretching, vasocompression after exercises, min compression, 34 degrees, seated. Precautions:  Exercises: bolded  Exercise Reps/Time Weight/Level Comments          UBE 3.5fwd,  3.5 retro L3          SUPINE      PROM stretching with mobs x  With therapist emphasis on flex/scaption/ER   Flexion  30x Cane     Protraction 30x Cane     Horiz. Add 20x Cane     ER 20x Cane     Triceps press             SIDE LYING      Abduction  20x 1#    ER 20x 1#    Horiz. Abd 20x 1#          PRONE      Retraction      Extension       Horiz.  Abd             STANDING      Extension  20x 1#Cane     Flexion       Abduction  20x 1#Cane     scaption  2x15 1#    Biceps curls            Towel stretch (IR/ADD) x5 10\" hold                T-Band      Extension  20x red    Depression       Retraction  20x

## 2018-01-24 ENCOUNTER — HOSPITAL ENCOUNTER (OUTPATIENT)
Dept: PHYSICAL THERAPY | Facility: CLINIC | Age: 68
Setting detail: THERAPIES SERIES
Discharge: HOME | End: 2018-01-24
Payer: MEDICARE

## 2018-01-24 PROCEDURE — 97016 VASOPNEUMATIC DEVICE THERAPY: CPT

## 2018-01-24 PROCEDURE — 97110 THERAPEUTIC EXERCISES: CPT

## 2018-01-24 NOTE — FLOWSHEET NOTE
[] Pam Marker       Outpatient Physical        Therapy       [address]       Phone: [phone]       Fax: [phone] [x] [facility] for Health Promotion at [address] [facility]       Phone: [phone]       Fax: [phone] [] [facility] for Health Promotion  [address] [place] [address]   Phone: [phone]   Fax:  [phone]      Physical Therapy Daily Treatment Note    Date:  2018  Patient Name:  Kalyani Vasquez    :    MRN: [id number]  Physician: Dyan Garcia D.O. Insurance: [employer]  Medical Diagnosis: Left shoulder adhesive capsulitis                     Rehab Codes: S81.603, [id number], [id number]  Onset Date: 2016                        Next 's appt: 2/15/2018  Visit# / total visits:   Cancels/No Shows: 0/0    Subjective:    Pain:  [x] Yes  [] No Location: L shoulder  Pain Rating: (0-10 scale) aches/10  Pain altered Tx:  [x] No  [] Yes  Action:    Comments: Pt notes morning stiffness    Objective:   Modalities: HP to R shoulder prior to stretching, vasocompression after exercises, min compression, 34 degrees, seated. Precautions:  Exercises: bolded  Exercise Reps/Time Weight/Level Comments          UBE 3.5fwd,  3.5 retro L3          SUPINE      PROM stretching with mobs x  With therapist emphasis on flex/scaption/ER   Flexion  30x 1#Cane     Protraction 30x Cane     Horiz. Add 20x Cane     ER 20x 1#Cane     Triceps press             SIDE LYING      Abduction  20x 1#    ER 20x 1#    Horiz. Abd 20x 1#          PRONE      Retraction      Extension       Horiz.  Abd             STANDING      Extension  20x 1#Cane     Flexion       Abduction  20x 1#Cane     scaption  2x15 1#    Biceps curls            Towel stretch (IR/ADD)  x5 10\" hold                T-Band      Extension  20x green    Depression       Retraction  20x green    IR      ER  20x Yellow     scaption 20x yellow

## 2018-01-25 ENCOUNTER — HOSPITAL ENCOUNTER (OUTPATIENT)
Dept: PHYSICAL THERAPY | Facility: CLINIC | Age: 68
Setting detail: THERAPIES SERIES
Discharge: HOME | End: 2018-01-25
Payer: MEDICARE

## 2018-01-25 PROCEDURE — 97110 THERAPEUTIC EXERCISES: CPT

## 2018-01-25 PROCEDURE — 97016 VASOPNEUMATIC DEVICE THERAPY: CPT

## 2018-01-25 NOTE — FLOWSHEET NOTE
[] Dyan Chun       Outpatient Physical        Therapy       [address]       Phone: [phone]       Fax: [phone] [x] [facility] at [address] [facility]       Phone: [phone]       Fax: [phone] [] Kariecriss. [address] [place] [address] Health Promotion  [address] [place] [address]   Phone: [phone]   Fax:  [phone]      Physical Therapy Daily Treatment Note    Date:  2018  Patient Name:  Miguel Ángel Rogel    :    MRN: [id number]  Physician: Mati Mathis D.O. Insurance: [employer]  Medical Diagnosis: Left shoulder adhesive capsulitis                     Rehab Codes: H39.241, [id number], Q33.020  Onset Date: 2016                        Next 's appt: 2/15/2018  Visit# / total visits:   Cancels/No Shows: 0/0    Subjective:    Pain:  [x] Yes  [] No Location: L shoulder  Pain Rating: (0-10 scale)   2/10  Pain altered Tx:  [x] No  [] Yes  Action:    Comments: notes she had some posterior shoulder pain when getting up this morning    Objective:   Modalities: HP to R shoulder prior to stretching, vasocompression after exercises, min compression, 34 degrees, seated. Precautions:  Exercises: bolded  Exercise Reps/Time Weight/Level Comments          UBE 3.5fwd,  3.5 retro L3          SUPINE      PROM stretching with mobs x  With therapist emphasis on flex/scaption/ER   Flexion  30x 2#Cane     Protraction 30x 2#Cane     Horiz. Add 20x 2#Cane     ER 20x 2#Cane     Triceps press             SIDE LYING      Abduction  20x 1#    ER 20x 1#    Horiz. Abd 20x 1#          PRONE      Retraction      Extension       Horiz.  Abd             STANDING      Extension  20x 2#Cane     Flexion  20x 2#Cane    Abduction  20x 2#Cane    scaption  2x10 1#    Biceps curls            Towel stretch (IR/ADD)  x5 10\"-20\"hold                T-Band      Extension  20x green    Depression       Retraction  20x green    IR      ER

## 2018-01-29 ENCOUNTER — HOSPITAL ENCOUNTER (OUTPATIENT)
Dept: PHYSICAL THERAPY | Facility: CLINIC | Age: 68
Setting detail: THERAPIES SERIES
Discharge: HOME | End: 2018-01-29
Payer: MEDICARE

## 2018-01-29 PROCEDURE — 97530 THERAPEUTIC ACTIVITIES: CPT

## 2018-01-29 PROCEDURE — 97016 VASOPNEUMATIC DEVICE THERAPY: CPT

## 2018-01-29 NOTE — FLOWSHEET NOTE
[] Carmen Gonsales       Outpatient Physical        Therapy       [address]       Phone: [phone]       Fax: [phone] [x] [facility] at [address] [facility]       Phone: [phone]       Fax: [phone] [] Dianne. [address] [place] [address] Promotion  [address] [place] [address]   Phone: [phone]   Fax:  [phone]      Physical Therapy Daily Treatment Note    Date:  2018  Patient Name:  Tad Soto      :  1950  MRN: [id number]  Physician: Niurka Blanchard D.O. Insurance: [employer]  Medical Diagnosis: Left shoulder adhesive capsulitis                     Rehab Codes: N03.104, [id number], V32.825  Onset Date: 2016                             Next 's appt: 2/15/2018  Visit# / total visits:       Cancels/No Shows: 0/0    Subjective:    Pain:  [x] Yes  [] No Location: L shoulder  Pain Rating: (0-10 scale)    4-5/10             7-8/10 with exercises  Pain altered Tx:  [x] No  [] Yes  Action:    Comments: Patient reports right sided upper trapezius pain for 3 days from neck to superior scapula, left shoulder \"sore\"     Objective:   Modalities: HP to R shoulder prior to stretching, vasocompression after exercises, min compression, 34 degrees, seated. Precautions:  Exercises: bolded  Exercise Reps/Time Weight/Level Comments          UBE 3.5fwd,  3.5 retro L3          SUPINE      PROM stretching with mobs x  With therapist emphasis on flex/scaption/ER   Flexion  30x 2#Cane     Protraction 30x 2#Cane     Horiz. Add 20x 2#Cane     ER 20x 2#Cane     Triceps press             SIDE LYING      Abduction  20x 1#    ER 20x 1#    Horiz. Abd 20x 1#          PRONE      Retraction      Extension       Horiz.  Abd             STANDING      Extension  20x 2#Cane     Flexion  20x 2#Cane    Abduction  20x 2#Cane    scaption  2x10 1# hand wt    Biceps curls            Towel stretch (IR/ADD)  x5 10\"-20\"hold

## 2018-01-31 ENCOUNTER — HOSPITAL ENCOUNTER (OUTPATIENT)
Dept: PHYSICAL THERAPY | Facility: CLINIC | Age: 68
Setting detail: THERAPIES SERIES
Discharge: HOME | End: 2018-01-31
Payer: MEDICARE

## 2018-01-31 PROCEDURE — 97016 VASOPNEUMATIC DEVICE THERAPY: CPT

## 2018-01-31 PROCEDURE — 97110 THERAPEUTIC EXERCISES: CPT

## 2018-01-31 NOTE — FLOWSHEET NOTE
Assessment Used:  Upper extremity functional scale/clinical judgement  Current Status Modifier: CJ  Goal Status Modifier: [address]    Pt. Education:  [] Yes  [x] No  [] Reviewed Prior HEP/Ed  Method of Education: [] Verbal  [] Demo for new exercises  [] Written  Comprehension of Education:  [] Verbalizes understanding. [] Demonstrates understanding. [] Needs review. [] Demonstrates/verbalizes HEP/Ed previously given. Plan: [x] Continue per plan of care.    [] Other:      Time In: 10:15 pm           Time Out: 11:17 am    Electronically signed by:  Emily Du, PT

## 2018-02-02 ENCOUNTER — HOSPITAL ENCOUNTER (OUTPATIENT)
Dept: PHYSICAL THERAPY | Facility: CLINIC | Age: 68
Setting detail: THERAPIES SERIES
Discharge: HOME | End: 2018-02-02
Payer: MEDICARE

## 2018-02-02 PROCEDURE — 97110 THERAPEUTIC EXERCISES: CPT

## 2018-02-02 PROCEDURE — 97016 VASOPNEUMATIC DEVICE THERAPY: CPT

## 2018-02-02 NOTE — FLOWSHEET NOTE
[] [facility]       Outpatient Physical        Therapy       [address]       Phone: [phone]       Fax: [phone] [x] [facility] for [facility] at [address] [facility]       Phone: [phone]       Fax: [phone] [] [facility] for [facility] Promotion  [address] [facility]   Phone: [phone]   Fax:  [phone]      Physical Therapy Daily Treatment Note     Date:  2018  Patient Name:  Becky Elise      :    MRN: [id number]  Physician: Jewel Kim D.O. Insurance: [employer]  Medical Diagnosis: Left shoulder adhesive capsulitis                     Rehab Codes: K61.509, [id number], [id number]  Onset Date: 2016                             Next 's appt: 2/15/2018  Visit# / total visits: 10/16      Cancels/No Shows: 0/0    Subjective:    Pain:  [x] Yes  [] No Location: L shoulder  Pain Rating: (0-10 scale)    3/10  Pain altered Tx:  [x] No  [] Yes  Action:    Comments: Patient reports pain level of 3/10; pt. notes reaching behind her back has improved. Objective:   Modalities: HP to R shoulder prior to stretching, vasocompression after exercises, min compression, 34 degrees, seated. Precautions:  Exercises: bolded  Exercise Reps/Time Weight/Level Comments          UBE 3.5fwd,  3.5 retro L3          SUPINE      PROM stretching with mobs x  With therapist emphasis on flex/scaption/ER   Flexion  30x 2#Cane     Protraction 30x 2#Cane     Horiz. Add 20x 2#Cane     ER 20x 2#Cane     Triceps press             SIDE LYING      Abduction  20x 1#    ER 20x 1#    Horiz. Abd 20x 1#          PRONE      Retraction      Extension       Horiz.  Abd             STANDING      Extension  20x 2#Cane     Flexion  20x 2#Cane    Abduction  20x 2#Cane    scaption  2x10 1# hand wt    Biceps curls            Towel stretch (IR/ADD)  x5 10\"-20\"hold                T-Band      Extension  20x green    Depression Retraction  20x green    IR      ER  20x Yellow     scaption 20x yellow          Other:    Specific Instructions for next treatment: ROM Ex's; capsular stretching; shoulder mob's    Treatment Charges: Mins Units   [x]  Modalities HP 10 0   [x]  Ther Exercise 40 3   []  Manual Therapy     []  Ther Activities     []  Aquatics     [x]  Vasocompression 15 1   []  Other     Total Treatment time 55 4   [employer] cap total used to date: $ 877.80 (updated 2/2/18)    Assessment: [x] Progressing toward goals. IR/ADD (behind back) improved to 16 cm T7   [] No change. [] Other:    Problems:    [x] ? Pain: Left shoulder 4/10 at rest; up to 8/10 with movement  [x] ? ROM: capsular pattern of restriction  [x] ? Strength:mild weakness of scapular and RTC musculature  [x] ? Function: difficulty reaching above shoulder height and behind back; difficulty with some ADL's     STG: (to be met in 8 treatments)  1. ? Pain: reduce Left shoulder pain to 3/10 with movement to allow completion of ADL's, 1/29 if reaches up - only for a few seconds, \"aches\" more than \"hurts\", sometimes sitting will get a \"jab\" in her shoulder short duration  2. ? ROM:imporve sitting elevation to 140 with improved scapulohumeral rhythm, 114 AROM 1/29, continued tightness in scapula moving with shoulder  3. ? Strength:initiate RTC and scapular strengthening Ex's, met  4. ? Function: improve Upper extremity functional scale score for dressing (item j.) and grooming hair (item e.), met 1-29-18  1. 1/29 better but still difficulty getting coat on, less pain getting shirts up and over her head, pain reaching back with extension motions  5. Independent with Home Exercise Program     LTG: (to be met in 16  treatments)  1. Improve overall Upper Extremity score to 90% max function or greater  2.  Improve seated elevation ROM to 155°                    Patient goals:remove pain and return full range of motion       G-CODE  Functional Limitation:

## 2018-02-05 ENCOUNTER — HOSPITAL ENCOUNTER (OUTPATIENT)
Dept: PHYSICAL THERAPY | Facility: CLINIC | Age: 68
Setting detail: THERAPIES SERIES
Discharge: HOME | End: 2018-02-05
Payer: MEDICARE

## 2018-02-05 PROCEDURE — 97016 VASOPNEUMATIC DEVICE THERAPY: CPT

## 2018-02-05 PROCEDURE — 97110 THERAPEUTIC EXERCISES: CPT

## 2018-02-07 ENCOUNTER — HOSPITAL ENCOUNTER (OUTPATIENT)
Dept: PHYSICAL THERAPY | Facility: CLINIC | Age: 68
Setting detail: THERAPIES SERIES
Discharge: HOME | End: 2018-02-07
Payer: MEDICARE

## 2018-02-07 PROCEDURE — G8984 CARRY CURRENT STATUS: HCPCS

## 2018-02-07 PROCEDURE — 97110 THERAPEUTIC EXERCISES: CPT

## 2018-02-07 PROCEDURE — 97016 VASOPNEUMATIC DEVICE THERAPY: CPT

## 2018-02-07 PROCEDURE — G8985 CARRY GOAL STATUS: HCPCS

## 2018-02-07 NOTE — PROGRESS NOTES
[] Grant Baptiste       Outpatient Physical                Therapy         [address]       Phone: [phone]       Fax: [phone] [x] [facility]       Promotion at [address] [facility]       Phone: [phone]       Fax: [phone] [] [facility]      for Health Promotion     [address] [place] [address]     Phone: [phone]     Fax:  [phone]     Physical Therapy Progress Note    Date: 2018      Patient: Berny Hogue  : 1950  MRN: [id number]    Physician: TATO Osorio                                                      Insurance: [employer]  Medical Diagnosis: Left shoulder adhesive capsulitis           Rehab Codes: M25.512, [id number], [id number]  Onset Date: 2016                                                                 Next 's appt: 2/15/2018  Visit# / total visits:                                                             Cancels/No Shows: 0/0      Subjective:  Pain:  [x] Yes  [] No  Location: Left shoulder Pain Rating: (0-10 scale) 410  Pain altered Tx:  [] No  [] Yes  Action:    Comments: Pt notes moderate Left shoulder soreness from shoveling snow    Objective:   Test Measurements/Function:seated elevation improved to 127°(+12°); supine elevation 150°(+26°); External rotation improved to 60° (+25°); Pt demonstrates improved tolerance to reaching overhead and behind her back (IR/ADD 14cm from T7);  Upper extremity functional scale improved to 87.5% max function    Assessment:  STG: (to be met in 8 treatments)  Recheck 18  1. ? Pain: reduce Left shoulder pain to 3/10 with movement to allow completion of ADL's,  if reaches up - only for a few seconds, \"aches\" more than \"hurts\", sometimes sitting will get a \"jab\" in her shoulder short duration  18- decreased pain at end range elevation  2. ? ROM:improve  sitting elevation to 140 with improved scapulohumeral rhythm, 114 AROM , continued tightness in scapula moving with shoulder  2/7/18-seated elevation improved to 127°-however patient continues to have excessive trunk lean with poor SH rhythm  3. ? Strength:initiate RTC and scapular strengthening Ex's, met 2/7/18-progress made with RTC/periscapular strengthening  4. ? Function: improve Upper extremity functional scale score for dressing (item j.) and grooming hair (item e.), met 1-29-18 2/7/18- UEFS improved to 87.5%  1. 1/29 better but still difficulty getting coat on, less pain getting shirts up and over her head, pain reaching back with extension motions  5. Independent with Home Exercise Program  2/7/18-      LTG: (to be met in 16  treatments)  1. Improve overall Upper Extremity score to 90% max function or greater  2/7/18- progress made-UEFS improved to 87.5%  2. Improve seated elevation ROM to 155°  2/7/18- progress made-127°        Patient goals:remove pain and return full range of motion        G-CODE  Updated 2/7/18  Functional Limitation: lifting/carrying  Functional Assessment Used:  Upper extremity functional scale/clinical judgement  Current Status Modifier: CI  Goal Status Modifier: CH     Treatment to Date:  [x] Therapeutic Exercise    [] Modalities:  [] Therapeutic Activity    [] Ultrasound  [] Electrical Stimulation  [] Gait Training     [] Massage       [] Lumbar/Cervical Traction  [] Neuromuscular Re-education [] Cold/hotpack [] Iontophoresis: 4 mg/mL  [x] Instruction in Home Exercise Program                     Dexamethasone Sodium  [x] Manual Therapy             Phosphate 40-80 mAmin  [] Aquatic Therapy                   [x] Vasocompression/   [] Other:  [] Dry Needling                                           Game Ready        Patient Status:         [x] Additional visits necessary. Follow up with Dr Jaimie Varghese 2/15/18      Requested Frequency/Duration:   2-3times per week for 12 treatments.         Electronically signed by Emily Du PT on 2/7/2018 at 9:45 AM      If

## 2018-02-09 ENCOUNTER — HOSPITAL ENCOUNTER (OUTPATIENT)
Dept: PHYSICAL THERAPY | Facility: CLINIC | Age: 68
Setting detail: THERAPIES SERIES
Discharge: HOME | End: 2018-02-09
Payer: MEDICARE

## 2018-02-09 NOTE — FLOWSHEET NOTE
[] 57 [facility]        Outpatient Physical                Therapy       [address]       Phone: [phone]       Fax: [phone] [x] [facility]       Promotion at [address] [facility]       Phone: [phone]       Fax: [phone] [] KariecrissMeg  [place]      for Health Promotion     [address] [place] [address]      Phone: [phone]      Fax:  [phone]     Physical Therapy Cancel/No Show note    Date: 2018  Patient: Malgorzata De Los Santos  : 1950  MRN: [id number]    Cancels/No Shows to date:     For today's appointment patient:  [x]  Cancelled  []  Rescheduled appointment  []  No-show     Reason given by patient:  [x]  Patient ill  []  Conflicting appointment  []  No transportation    []  Conflict with work  []  No reason given  []  Weather related  []  Other:      Comments:      [x]  Next appointment was confirmed    Electronically signed by: Jonas Olszewski, PT

## 2018-02-12 ENCOUNTER — HOSPITAL ENCOUNTER (OUTPATIENT)
Dept: PHYSICAL THERAPY | Facility: CLINIC | Age: 68
Setting detail: THERAPIES SERIES
Discharge: HOME | End: 2018-02-12
Payer: MEDICARE

## 2018-02-12 PROCEDURE — 97016 VASOPNEUMATIC DEVICE THERAPY: CPT

## 2018-02-12 PROCEDURE — 97110 THERAPEUTIC EXERCISES: CPT

## 2018-02-12 NOTE — FLOWSHEET NOTE
RTC/periscapular strengthening  4. ? Function: improve Upper extremity functional scale score for dressing (item j.) and grooming hair (item e.), met 1-29-18 2/7/18- UEFS improved to 87.5%  1. 1/29 better but still difficulty getting coat on, less pain getting shirts up and over her head, pain reaching back with extension motions  5. Independent with Home Exercise Program  2/7/18-      LTG: (to be met in 16  treatments)  1. Improve overall Upper Extremity score to 90% max function or greater  2/7/18- progress made-UEFS improved to 87.5%  2. Improve seated elevation ROM to 155°  2/7/18- progress made-127°        Patient goals:remove pain and return full range of motion        G-CODE  Updated 2/7/18  Functional Limitation: lifting/carrying  Functional Assessment Used:  Upper extremity functional scale/clinical judgement  Current Status Modifier: CI  Goal Status Modifier: CH    Pt. Education:  [] Yes  [x] No  [] Reviewed Prior HEP/Ed  Method of Education: [] Verbal   [] Demo   [] Written  Comprehension of Education:  [] Verbalizes understanding. [] Demonstrates understanding. [] Needs review. [x] Demonstrates/verbalizes HEP/Ed previously given. Plan: [x] Continue per plan of care.    [] Other:      Time In: 12:55 pm           Time Out: 1:05 pm    Electronically signed by:  Paris Rice PTA

## 2018-02-14 ENCOUNTER — HOSPITAL ENCOUNTER (OUTPATIENT)
Dept: PHYSICAL THERAPY | Facility: CLINIC | Age: 68
Setting detail: THERAPIES SERIES
Discharge: HOME | End: 2018-02-14
Payer: MEDICARE

## 2018-02-14 PROCEDURE — 97110 THERAPEUTIC EXERCISES: CPT

## 2018-02-14 PROCEDURE — 97016 VASOPNEUMATIC DEVICE THERAPY: CPT

## 2018-02-14 NOTE — FLOWSHEET NOTE
[] [facility]       Outpatient Physical        Therapy       [address]       Phone: [phone]       Fax: [phone] [x] [facility] at [address]       Phone: [phone]       Fax: [phone] [] Dianne. [address] [place] [address] Health Promotion  [address] [place] [address]   Phone: [phone]   Fax:  [phone]      Physical Therapy Daily Treatment Note     Date:  2018  Patient Name:  Moose Zaragoza      :  1950  MRN: [id number]  Physician: Malena Choe D.O. Insurance: [employer]  Medical Diagnosis: Left shoulder adhesive capsulitis                     Rehab Codes: T04.462, [id number], [id number]  Onset Date: 2016                             Next 's appt: 2/15/2018  Visit# / total visits:       Cancels/No Shows: 1/0    Subjective:    Pain:  [x] Yes  [] No Location: L shoulder  Pain Rating: (0-10 scale)    Sore /10  Pain altered Tx:  [x] No  [] Yes  Action:    Comments: Patient reports some increased soreness from using the weights and shoveling snow. Objective:   Modalities: HP to R shoulder prior to stretching-not today 2/7, vasocompression after exercises, min compression, 34 degrees, seated  Precautions:  Exercises: bolded  Exercise Reps/Time Weight/Level Comments          UBE 3.5fwd,  3.5 retro L3          SUPINE      PROM stretching with mobs x  With therapist emphasis on flex/scaption/ER   Flexion  25x 2# DB     Protraction 25x 2# DB    Horiz. Add 25x 2# DB    ER 20x 2#Cane     Triceps press             SIDE LYING   Increased weight 2/5   Abduction  25x 2#    ER 25x 2#    Horiz. Abd 25x 2#          PRONE   Added 2/7   Retraction 25x 3#    Extension  25x 3#    Horiz.  Abd  25x 2#          STANDING      Extension  20x 2#Cane     Flexion  20x 2#Cane    Abduction  20x 2#Cane    scaption  2x10 2# hand wt    Abduction  20x 2# hand wt    Biceps curls            Towel stretch (IR/ADD)

## 2018-02-16 RX ORDER — CALCIUM CITRATE/VITAMIN D3 200MG-6.25
TABLET ORAL
Qty: 100 EACH | Refills: 3 | Status: SHIPPED | OUTPATIENT
Start: 2018-02-16 | End: 2018-10-31 | Stop reason: SDUPTHER

## 2018-02-19 ENCOUNTER — HOSPITAL ENCOUNTER (OUTPATIENT)
Dept: PHYSICAL THERAPY | Facility: CLINIC | Age: 68
Setting detail: THERAPIES SERIES
Discharge: HOME | End: 2018-02-19
Payer: MEDICARE

## 2018-02-19 PROCEDURE — 97110 THERAPEUTIC EXERCISES: CPT

## 2018-02-19 PROCEDURE — 97016 VASOPNEUMATIC DEVICE THERAPY: CPT

## 2018-02-19 NOTE — FLOWSHEET NOTE
RTC and scapular strengthening Ex's, met 2/7/18-progress made with RTC/periscapular strengthening  4. ? Function: improve Upper extremity functional scale score for dressing (item j.) and grooming hair (item e.), met 1-29-18 2/7/18- UEFS improved to 87.5%  1. 1/29 better but still difficulty getting coat on, less pain getting shirts up and over her head, pain reaching back with extension motions  5. Independent with Home Exercise Program  2/7/18-      LTG: (to be met in 16  treatments)  1. Improve overall Upper Extremity score to 90% max function or greater  2/7/18- progress made-UEFS improved to 87.5%  2. Improve seated elevation ROM to 155°  2/7/18- progress made-127°        Patient goals:remove pain and return full range of motion        G-CODE  Updated 2/7/18  Functional Limitation: lifting/carrying  Functional Assessment Used:  Upper extremity functional scale/clinical judgement  Current Status Modifier: CI  Goal Status Modifier: CH    Pt. Education:  [] Yes  [x] No  [] Reviewed Prior HEP/Ed  Method of Education: [] Verbal   [] Demo   [] Written  Comprehension of Education:  [] Verbalizes understanding. [] Demonstrates understanding. [] Needs review. [x] Demonstrates/verbalizes HEP/Ed previously given. Plan: [x] Continue per plan of care.    [] Other:      Time In: 11 am           Time Out: 12:10 pm    Electronically signed by:  Yasmin Lombardi, PT

## 2018-02-22 ENCOUNTER — HOSPITAL ENCOUNTER (OUTPATIENT)
Dept: PHYSICAL THERAPY | Facility: CLINIC | Age: 68
Setting detail: THERAPIES SERIES
Discharge: HOME | End: 2018-02-22
Payer: MEDICARE

## 2018-02-22 PROCEDURE — 97110 THERAPEUTIC EXERCISES: CPT

## 2018-02-22 PROCEDURE — 97016 VASOPNEUMATIC DEVICE THERAPY: CPT

## 2018-02-22 PROCEDURE — 97140 MANUAL THERAPY 1/> REGIONS: CPT

## 2018-02-22 NOTE — FLOWSHEET NOTE
T-Band      Extension  20x green    Depression       Retraction  20x green    IR 20x green    ER  20x green    scaption 20x green          Other: 2/7/18  Test Measurements/Function:seated elevation improved to 127°(+12°); supine elevation 150°(+26°); External rotation improved to 60° (+25°); Pt demonstrates improved tolerance to reaching overhead and behind her back (IR/ADD 14cm from T7); Upper extremity functional scale improved to 87.5% max function    Specific Instructions for next treatment: ROM Ex's; capsular stretching; shoulder mob's    Treatment Charges: Mins Units   []  Modalities HP     [x]  Ther Exercise 30 2   [x]  Manual Therapy 10 1   []  Ther Activities     []  Aquatics     [x]  Vasocompression 15 1   []  Other     Total Treatment time 55 4   [employer] cap total used to date: $ 1,329.03 (updated 2/22/18)    Assessment: [x] Progressing toward goals. Still has capsular tightness with ER but shoulder strength improving      [] No change. [] Other:    Problems:    [x] ? Pain: Left shoulder 4/10 at rest; up to 8/10 with movement  [x] ? ROM: capsular pattern of restriction  [x] ? Strength:mild weakness of scapular and RTC musculature  [x] ?  Function: difficulty reaching above shoulder height and behind back; difficulty with some ADL's     STG: (to be met in 8 treatments)  Recheck 2/7/18  1. ? Pain: reduce Left shoulder pain to 3/10 with movement to allow completion of ADL's, 1/29 if reaches up - only for a few seconds, \"aches\" more than \"hurts\", sometimes sitting will get a \"jab\" in her shoulder short duration  2/7/18- decreased pain at end range elevation  2. ? ROM:improve  sitting elevation to 140 with improved scapulohumeral rhythm, 114 AROM 1/29, continued tightness in scapula moving with shoulder  2/7/18-seated elevation improved to 127°-however patient continues to have excessive trunk lean with poor SH rhythm  3. ? Strength:initiate RTC and scapular strengthening Ex's, met 2/7/18-progress made with RTC/periscapular strengthening  4. ? Function: improve Upper extremity functional scale score for dressing (item j.) and grooming hair (item e.), met 1-29-18 2/7/18- UEFS improved to 87.5%  1. 1/29 better but still difficulty getting coat on, less pain getting shirts up and over her head, pain reaching back with extension motions  5. Independent with Home Exercise Program  2/7/18-      LTG: (to be met in 16  treatments)  1. Improve overall Upper Extremity score to 90% max function or greater  2/7/18- progress made-UEFS improved to 87.5%  2. Improve seated elevation ROM to 155°  2/7/18- progress made-127°        Patient goals:remove pain and return full range of motion        G-CODE  Updated 2/7/18  Functional Limitation: lifting/carrying  Functional Assessment Used:  Upper extremity functional scale/clinical judgement  Current Status Modifier: CI  Goal Status Modifier: CH    Pt. Education:  [] Yes  [x] No  [] Reviewed Prior HEP/Ed  Method of Education: [] Verbal   [] Demo   [] Written  Comprehension of Education:  [] Verbalizes understanding. [] Demonstrates understanding. [] Needs review. [x] Demonstrates/verbalizes HEP/Ed previously given. Plan: [x] Continue per plan of care.    [] Other:      Time In: 10:45  am           Time Out: 12:10 pm    Electronically signed by:  Nikhil Moreno, PT

## 2018-02-23 ENCOUNTER — HOSPITAL ENCOUNTER (OUTPATIENT)
Facility: MEDICAL CENTER | Age: 68
End: 2018-02-23
Payer: MEDICARE

## 2018-02-26 ENCOUNTER — TELEPHONE (OUTPATIENT)
Dept: ONCOLOGY | Age: 68
End: 2018-02-26

## 2018-02-26 ENCOUNTER — OFFICE VISIT (OUTPATIENT)
Dept: ONCOLOGY | Age: 68
End: 2018-02-26
Payer: MEDICARE

## 2018-02-26 ENCOUNTER — HOSPITAL ENCOUNTER (OUTPATIENT)
Age: 68
Discharge: HOME | End: 2018-02-26
Payer: MEDICARE

## 2018-02-26 VITALS
SYSTOLIC BLOOD PRESSURE: 128 MMHG | DIASTOLIC BLOOD PRESSURE: 65 MMHG | BODY MASS INDEX: 25.76 KG/M2 | TEMPERATURE: 97.7 F | WEIGHT: 170 LBS | HEART RATE: 76 BPM | RESPIRATION RATE: 18 BRPM

## 2018-02-26 DIAGNOSIS — Z17.0 MALIGNANT NEOPLASM OF UPPER-OUTER QUADRANT OF RIGHT BREAST IN FEMALE, ESTROGEN RECEPTOR POSITIVE (HCC): Primary | ICD-10-CM

## 2018-02-26 DIAGNOSIS — C50.411 MALIGNANT NEOPLASM OF UPPER-OUTER QUADRANT OF RIGHT BREAST IN FEMALE, ESTROGEN RECEPTOR POSITIVE (HCC): Primary | ICD-10-CM

## 2018-02-26 DIAGNOSIS — M25.512 CHRONIC LEFT SHOULDER PAIN: ICD-10-CM

## 2018-02-26 DIAGNOSIS — M89.9 DISORDER OF BONE: ICD-10-CM

## 2018-02-26 DIAGNOSIS — E11.9 TYPE 2 DIABETES MELLITUS WITHOUT COMPLICATION, WITHOUT LONG-TERM CURRENT USE OF INSULIN (HCC): ICD-10-CM

## 2018-02-26 DIAGNOSIS — C50.411 MALIGNANT NEOPLASM OF UPPER-OUTER QUADRANT OF RIGHT FEMALE BREAST (HCC): ICD-10-CM

## 2018-02-26 DIAGNOSIS — G89.29 CHRONIC LEFT SHOULDER PAIN: ICD-10-CM

## 2018-02-26 LAB
ABSOLUTE EOS #: 0 K/UL (ref 0–0.4)
ABSOLUTE IMMATURE GRANULOCYTE: ABNORMAL K/UL (ref 0–0.3)
ABSOLUTE LYMPH #: 0.8 K/UL (ref 1–4.8)
ABSOLUTE MONO #: 0.2 K/UL (ref 0.2–0.8)
ALBUMIN SERPL-MCNC: 4.2 G/DL (ref 3.5–5.2)
ALBUMIN/GLOBULIN RATIO: ABNORMAL (ref 1–2.5)
ALP BLD-CCNC: 96 U/L (ref 35–104)
ALT SERPL-CCNC: 23 U/L (ref 5–33)
ANION GAP SERPL CALCULATED.3IONS-SCNC: 12 MMOL/L (ref 9–17)
AST SERPL-CCNC: 16 U/L
BASOPHILS # BLD: 2 % (ref 0–2)
BASOPHILS ABSOLUTE: 0 K/UL (ref 0–0.2)
BILIRUB SERPL-MCNC: 0.45 MG/DL (ref 0.3–1.2)
BUN BLDV-MCNC: 13 MG/DL (ref 8–23)
BUN/CREAT BLD: 19 (ref 9–20)
CALCIUM SERPL-MCNC: 9.1 MG/DL (ref 8.6–10.4)
CHLORIDE BLD-SCNC: 104 MMOL/L (ref 98–107)
CO2: 26 MMOL/L (ref 20–31)
CREAT SERPL-MCNC: 0.7 MG/DL (ref 0.5–0.9)
DIFFERENTIAL TYPE: ABNORMAL
EOSINOPHILS RELATIVE PERCENT: 1 % (ref 1–4)
ESTIMATED AVERAGE GLUCOSE: 151 MG/DL
GFR AFRICAN AMERICAN: >60 ML/MIN
GFR NON-AFRICAN AMERICAN: >60 ML/MIN
GFR SERPL CREATININE-BSD FRML MDRD: ABNORMAL ML/MIN/{1.73_M2}
GFR SERPL CREATININE-BSD FRML MDRD: ABNORMAL ML/MIN/{1.73_M2}
GLUCOSE BLD-MCNC: 153 MG/DL (ref 70–99)
HBA1C MFR BLD: 6.9 % (ref 4–6)
HCT VFR BLD CALC: 37.5 % (ref 36–46)
HEMOGLOBIN: 12.7 G/DL (ref 12–16)
IMMATURE GRANULOCYTES: ABNORMAL %
LYMPHOCYTES # BLD: 24 % (ref 24–44)
MCH RBC QN AUTO: 28.7 PG (ref 26–34)
MCHC RBC AUTO-ENTMCNC: 33.9 G/DL (ref 31–37)
MCV RBC AUTO: 84.7 FL (ref 80–100)
MONOCYTES # BLD: 6 % (ref 1–7)
NRBC AUTOMATED: ABNORMAL PER 100 WBC
PDW BLD-RTO: 14.9 % (ref 11.5–14.5)
PLATELET # BLD: 116 K/UL (ref 130–400)
PLATELET ESTIMATE: ABNORMAL
PMV BLD AUTO: ABNORMAL FL (ref 6–12)
POTASSIUM SERPL-SCNC: 4.2 MMOL/L (ref 3.7–5.3)
RBC # BLD: 4.43 M/UL (ref 4–5.2)
RBC # BLD: ABNORMAL 10*6/UL
SEG NEUTROPHILS: 67 % (ref 36–66)
SEGMENTED NEUTROPHILS ABSOLUTE COUNT: 2.3 K/UL (ref 1.8–7.7)
SODIUM BLD-SCNC: 142 MMOL/L (ref 135–144)
TOTAL PROTEIN: 6.8 G/DL (ref 6.4–8.3)
WBC # BLD: 3.3 K/UL (ref 3.5–11)
WBC # BLD: ABNORMAL 10*3/UL

## 2018-02-26 PROCEDURE — G8427 DOCREV CUR MEDS BY ELIG CLIN: HCPCS | Performed by: INTERNAL MEDICINE

## 2018-02-26 PROCEDURE — 3014F SCREEN MAMMO DOC REV: CPT | Performed by: INTERNAL MEDICINE

## 2018-02-26 PROCEDURE — 99211 OFF/OP EST MAY X REQ PHY/QHP: CPT

## 2018-02-26 PROCEDURE — 1090F PRES/ABSN URINE INCON ASSESS: CPT | Performed by: INTERNAL MEDICINE

## 2018-02-26 PROCEDURE — G8399 PT W/DXA RESULTS DOCUMENT: HCPCS | Performed by: INTERNAL MEDICINE

## 2018-02-26 PROCEDURE — 1036F TOBACCO NON-USER: CPT | Performed by: INTERNAL MEDICINE

## 2018-02-26 PROCEDURE — G8482 FLU IMMUNIZE ORDER/ADMIN: HCPCS | Performed by: INTERNAL MEDICINE

## 2018-02-26 PROCEDURE — 80053 COMPREHEN METABOLIC PANEL: CPT

## 2018-02-26 PROCEDURE — 1123F ACP DISCUSS/DSCN MKR DOCD: CPT | Performed by: INTERNAL MEDICINE

## 2018-02-26 PROCEDURE — 99214 OFFICE O/P EST MOD 30 MIN: CPT | Performed by: INTERNAL MEDICINE

## 2018-02-26 PROCEDURE — G8419 CALC BMI OUT NRM PARAM NOF/U: HCPCS | Performed by: INTERNAL MEDICINE

## 2018-02-26 PROCEDURE — 36415 COLL VENOUS BLD VENIPUNCTURE: CPT

## 2018-02-26 PROCEDURE — 4040F PNEUMOC VAC/ADMIN/RCVD: CPT | Performed by: INTERNAL MEDICINE

## 2018-02-26 PROCEDURE — 3017F COLORECTAL CA SCREEN DOC REV: CPT | Performed by: INTERNAL MEDICINE

## 2018-02-26 PROCEDURE — 83036 HEMOGLOBIN GLYCOSYLATED A1C: CPT

## 2018-02-26 PROCEDURE — 85025 COMPLETE CBC W/AUTO DIFF WBC: CPT

## 2018-02-26 RX ORDER — LETROZOLE 2.5 MG/1
2.5 TABLET, FILM COATED ORAL DAILY
Qty: 90 TABLET | Refills: 3 | Status: SHIPPED | OUTPATIENT
Start: 2018-02-26 | End: 2019-02-27 | Stop reason: SDUPTHER

## 2018-02-26 NOTE — LETTER
Mega Escamilla MD    2/26/2018     Skylar Ledesma MD   [address] [facility] [address]    Dear Dino Chung : Thank you for referring Hossein Marino, 1950, to me for evaluation. Below are the relevant portions of my assessment and plan of care. Reason for the visit:   Chief Complaint   Patient presents with    Follow-up     Patient would like to review status of Disease    Medication Refill     Letrozole       DIAGNOSIS:   1- Right breast invasive ductal carcinoma Grade 2. ER/MA positive 95% and HER2 positive Ratio 3.5. Associated with high grade DCIS comedo type' biopsy 2/26/2015. 2- final pathology is T2 N0 M0 invasive ductal carcinoma with surrounding DCIS, margins were negative, tumor is triple positive. 3- h/o right breast DCIS in 1994 s/p BCS/EBRT and 7 years of Tamoxifen until 2001  4- incidentally, left-sided DCIS was found during bilateral mastectomy. Removed with negative margins. CURRENT THERAPY:  1. Right-sided lumpectomy, in 1994. followed by radiation and 7 years of tamoxifen, completed 2001  2. Underwent bilateral mastectomy   3. Adjuvant chemotherapy with [facility], started August, 2015    4. Maintenance hercptin , completed 7/2016   5. Adjuvant  AI     BRIEF CASE HISTORY:  This is a 69 y.o. woman with history of right breast DCIS in 1994 treated with BCS / EBRT and Tamoxifen for 7 years until 2001. In February 2015, she had a screening mammogram that showed suspicious findings in the right breast. She underwent a core biopsy that showed infiltrating ductal carcinoma, grade 2, ER/MA positive and HER-2/beth positive with fish ratio 3.5. Because of previous radiation, breast conservation is not an option so the patient underwent mastectomy and axillary sampling.   Surgery occurred on 6/30 and pathology showed 2.2 cm moderately differentiated carcinoma, associated with intermediate and high-grade heart disease, pacemaker    Other Father      heart disease, thyroid disease    Breast Cancer Maternal Aunt     Breast Cancer Paternal Grandmother     Breast Cancer Maternal Aunt     Asthma Maternal Grandmother        Home Medications  has a current medication list which includes the following prescription(s): true metrix blood glucose test, glimepiride, metformin, pravastatin, mupirocin, lidocaine, letrozole, ranitidine, fluticasone, montelukast, cetirizine, lansoprazole, budesonide-formoterol, albuterol sulfate hfa, clobetasol, dapagliflozin, fluarix quadrivalent, and metformin. Allergies:  Tape [adhesive tape] and Sulfa antibiotics        Review of Systems :    Constitutional: feels much better . No fever or chills. No night sweats, no weight loss   Eyes: No eye discharge, double vision, or eye pain   HEENT: negative for sore mouth or throat, no hoarseness and voice change . Respiratory: negative for cough , sputum, dyspnea, wheezing, hemoptysis, chest pain   Cardiovascular: negative for chest pain, dyspnea, palpitations, orthopnea, PND   Gastrointestinal: negative for nausea, vomiting, no diarrhea, no abdominal pain, dysphagia, hematemesis and hematochezia   Genitourinary: negative for frequency, dysuria, nocturia, urinary incontinence, and hematuria   Integument: negative for rash, skin lesions, bruises. Hematologic/Lymphatic: negative for easy bruising, bleeding, lymphadenopathy, petechiae and swelling/edema   Endocrine: negative for heat or cold intolerance, tremor, weight changes, change in bowel habits and hair loss   Musculoskeletal: negative for myalgias, arthralgias, pain, joint swelling,and bone pain.  Left shoulder pain - as noted above   Neurological: negative for headaches, dizziness, seizures, weakness, numbness      Physical Examination :       /65 (Site: Left Arm, Position: Sitting)   Pulse 76   Temp 97.7 °F (36.5 °C) (Oral)   Resp 18   Wt 170 lb (77.1 kg)   BMI 25.76 kg/m²      Performance Status:Estimated performance status is ECOG 0    General appearance - well appearing, no in pain or distress   Mental status - alert and cooperative   Eyes - pupils equal and reactive, extraocular eye movements intact   Ears - bilateral TM's and external ear canals normal   Mouth - mucous membranes moist, pharynx normal without lesions   Neck - supple, no significant adenopathy ,trach is central   Lymphatics - no palpable lymphadenopathy, no hepatosplenomegaly   Chest - clear to auscultation, no wheezes, rales or rhonchi, symmetric air entry   Heart - normal rate, regular rhythm, normal S1, S2, no murmurs, rubs, clicks or gallops   Abdomen - tender and swelling from surgery, nondistended, no masses or organomegaly   Neurological - alert, oriented, normal speech, no focal findings or movement disorder noted. Mild numbness on the bottom of foots bilaterally. Musculoskeletal - no joint tenderness, deformity or swelling   Extremities - peripheral pulses normal, trace pedal edema, no clubbing or cyanosis . Skin - normal coloration and turgor, no rashes, no suspicious skin lesions noted  Breast: S/P bilateral mastectomy, nicely healed, no lumps, no adenopathy; implants in place; Tissue thickening in medial aspect of right breast where soft tissue flap has been done, no masses.        REVIEW OF LABORATORY DATA:  Lab Results   Component Value Date    WBC 3.3 (L) 02/26/2018    HGB 12.7 02/26/2018    HCT 37.5 02/26/2018    MCV 84.7 02/26/2018     (L) 02/26/2018       Chemistry        Component Value Date/Time     02/26/2018 1101    K 4.2 02/26/2018 1101     02/26/2018 1101    CO2 26 02/26/2018 1101    BUN 13 02/26/2018 1101    CREATININE 0.70 02/26/2018 1101        Component Value Date/Time    CALCIUM 9.1 02/26/2018 1101    ALKPHOS 96 02/26/2018 1101    AST 16 02/26/2018 1101    ALT 23 02/26/2018 1101

## 2018-02-26 NOTE — PROGRESS NOTES
Reason for the visit:   Chief Complaint   Patient presents with    Follow-up     Patient would like to review status of Disease    Medication Refill     Letrozole       DIAGNOSIS:   1- Right breast invasive ductal carcinoma Grade 2. ER/SD positive 95% and HER2 positive Ratio 3.5. Associated with high grade DCIS comedo type' biopsy 2/26/2015. 2- final pathology is T2 N0 M0 invasive ductal carcinoma with surrounding DCIS, margins were negative, tumor is triple positive. 3- h/o right breast DCIS in 1994 s/p BCS/EBRT and 7 years of Tamoxifen until 2001  4- incidentally, left-sided DCIS was found during bilateral mastectomy. Removed with negative margins. CURRENT THERAPY:  1. Right-sided lumpectomy, in 1994. followed by radiation and 7 years of tamoxifen, completed 2001  2. Underwent bilateral mastectomy   3. Adjuvant chemotherapy with [facility], started August, 2015    4. Maintenance hercptin , completed 7/2016   5. Adjuvant  AI     BRIEF CASE HISTORY:  This is a 69 y.o. woman with history of right breast DCIS in 1994 treated with BCS / EBRT and Tamoxifen for 7 years until 2001. In February 2015, she had a screening mammogram that showed suspicious findings in the right breast. She underwent a core biopsy that showed infiltrating ductal carcinoma, grade 2, ER/SD positive and HER-2/beth positive with fish ratio 3.5. Because of previous radiation, breast conservation is not an option so the patient underwent mastectomy and axillary sampling. Surgery occurred on 6/30 and pathology showed 2.2 cm moderately differentiated carcinoma, associated with intermediate and high-grade ductal carcinoma in situ. Margins were negative. Attempt for axillary sampling was not successful because of previous surgery and radiation. No lymph nodes were appreciated in the specimen. Contralateral mastectomy was done at the same time and showed high-grade ductal carcinoma in situ, margins were negative.    Genetic testing was without lesions   Neck - supple, no significant adenopathy ,trach is central   Lymphatics - no palpable lymphadenopathy, no hepatosplenomegaly   Chest - clear to auscultation, no wheezes, rales or rhonchi, symmetric air entry   Heart - normal rate, regular rhythm, normal S1, S2, no murmurs, rubs, clicks or gallops   Abdomen - tender and swelling from surgery, nondistended, no masses or organomegaly   Neurological - alert, oriented, normal speech, no focal findings or movement disorder noted. Mild numbness on the bottom of foots bilaterally. Musculoskeletal - no joint tenderness, deformity or swelling   Extremities - peripheral pulses normal, trace pedal edema, no clubbing or cyanosis . Skin - normal coloration and turgor, no rashes, no suspicious skin lesions noted  Breast: S/P bilateral mastectomy, nicely healed, no lumps, no adenopathy; implants in place; Tissue thickening in medial aspect of right breast where soft tissue flap has been done, no masses. REVIEW OF LABORATORY DATA:  Lab Results   Component Value Date    WBC 3.3 (L) 02/26/2018    HGB 12.7 02/26/2018    HCT 37.5 02/26/2018    MCV 84.7 02/26/2018     (L) 02/26/2018       Chemistry        Component Value Date/Time     02/26/2018 1101    K 4.2 02/26/2018 1101     02/26/2018 1101    CO2 26 02/26/2018 1101    BUN 13 02/26/2018 1101    CREATININE 0.70 02/26/2018 1101        Component Value Date/Time    CALCIUM 9.1 02/26/2018 1101    ALKPHOS 96 02/26/2018 1101    AST 16 02/26/2018 1101    ALT 23 02/26/2018 1101    BILITOT 0.45 02/26/2018 1101        REVIEW OF RADIOLOGICAL RESULTS:            ASSESSMENT  1. h/o right breast DCIS in 1994 s/p BCS/EBRT and 7 years of Tamoxifen until 2001  2. Ipsilateral recurrence, invasive ductal carcinoma that's triple positive, pathological staging is T2 N0 M0.  3. Contralateral DCIS, found incidentally on bilateral mastectomy  4.  Received 6 cycles of adjuvant chemotherapy with [facility], well

## 2018-02-28 ENCOUNTER — HOSPITAL ENCOUNTER (OUTPATIENT)
Dept: PHYSICAL THERAPY | Facility: CLINIC | Age: 68
Setting detail: THERAPIES SERIES
Discharge: HOME | End: 2018-02-28
Payer: MEDICARE

## 2018-02-28 PROCEDURE — 97140 MANUAL THERAPY 1/> REGIONS: CPT

## 2018-02-28 PROCEDURE — 97016 VASOPNEUMATIC DEVICE THERAPY: CPT

## 2018-02-28 PROCEDURE — 97110 THERAPEUTIC EXERCISES: CPT

## 2018-02-28 NOTE — FLOWSHEET NOTE
2/7/18-progress made with RTC/periscapular strengthening  4. ? Function: improve Upper extremity functional scale score for dressing (item j.) and grooming hair (item e.), met 1-29-18 2/7/18- UEFS improved to 87.5%  1. 1/29 better but still difficulty getting coat on, less pain getting shirts up and over her head, pain reaching back with extension motions  5. Independent with Home Exercise Program  2/7/18-      LTG: (to be met in 16  treatments)  1. Improve overall Upper Extremity score to 90% max function or greater  2/7/18- progress made-UEFS improved to 87.5%  2. Improve seated elevation ROM to 155°  2/7/18- progress made-127°        Patient goals:remove pain and return full range of motion        G-CODE  Updated 2/7/18  Functional Limitation: lifting/carrying  Functional Assessment Used:  Upper extremity functional scale/clinical judgement  Current Status Modifier: CI  Goal Status Modifier: CH    Pt. Education:  [x] Yes  [] No  [] Reviewed Prior HEP/Ed  Method of Education: [x] Verbal  For charted exercises  [] Demo   [] Written  Comprehension of Education:  [x] Verbalizes understanding. [x] Demonstrates understanding. [] Needs review. [x] Demonstrates/verbalizes HEP/Ed previously given. Plan: [x] Continue per plan of care.    [] Other:      Time In: 1:00  pm           Time Out: 2:00 pm    Electronically signed by:  Johnie Langston PTA

## 2018-03-02 ENCOUNTER — HOSPITAL ENCOUNTER (OUTPATIENT)
Dept: PHYSICAL THERAPY | Facility: CLINIC | Age: 68
Setting detail: THERAPIES SERIES
Discharge: HOME | End: 2018-03-02
Payer: MEDICARE

## 2018-03-02 PROCEDURE — 97016 VASOPNEUMATIC DEVICE THERAPY: CPT

## 2018-03-02 PROCEDURE — 97140 MANUAL THERAPY 1/> REGIONS: CPT

## 2018-03-02 PROCEDURE — 97110 THERAPEUTIC EXERCISES: CPT

## 2018-03-02 NOTE — FLOWSHEET NOTE
rhythm  3. ? Strength:initiate RTC and scapular strengthening Ex's, met 2/7/18-progress made with RTC/periscapular strengthening  4. ? Function: improve Upper extremity functional scale score for dressing (item j.) and grooming hair (item e.), met 1-29-18 2/7/18- UEFS improved to 87.5%  1. 1/29 better but still difficulty getting coat on, less pain getting shirts up and over her head, pain reaching back with extension motions  5. Independent with Home Exercise Program  2/7/18-      LTG: (to be met in 16  treatments)  1. Improve overall Upper Extremity score to 90% max function or greater  2/7/18- progress made-UEFS improved to 87.5%  2. Improve seated elevation ROM to 155°  2/7/18- progress made-127°        Patient goals:remove pain and return full range of motion        G-CODE  Updated 2/7/18  Functional Limitation: lifting/carrying  Functional Assessment Used:  Upper extremity functional scale/clinical judgement  Current Status Modifier: CI  Goal Status Modifier: CH    Pt. Education:  [x] Yes  [] No  [] Reviewed Prior HEP/Ed  Method of Education: [x] Verbal  For charted exercises  [] Demo   [] Written  Comprehension of Education:  [x] Verbalizes understanding. [x] Demonstrates understanding. [] Needs review. [x] Demonstrates/verbalizes HEP/Ed previously given. Plan: [x] Continue per plan of care.    [] Other:      Time In: 10 a         Time Out: 11:15 a    Electronically signed by:  Jamey Peralta PT

## 2018-03-05 ENCOUNTER — HOSPITAL ENCOUNTER (OUTPATIENT)
Dept: PHYSICAL THERAPY | Facility: CLINIC | Age: 68
Setting detail: THERAPIES SERIES
Discharge: HOME | End: 2018-03-05
Payer: MEDICARE

## 2018-03-05 PROCEDURE — 97140 MANUAL THERAPY 1/> REGIONS: CPT

## 2018-03-05 PROCEDURE — 97016 VASOPNEUMATIC DEVICE THERAPY: CPT

## 2018-03-05 PROCEDURE — 97110 THERAPEUTIC EXERCISES: CPT

## 2018-03-05 NOTE — FLOWSHEET NOTE
Pulleys  x           T-Band                            Extension  25x green    Depression       Retraction  25x green    IR 25x green    ER  25x green    scaption 20x green          Other: 2/7/18  Test Measurements/Function:seated elevation improved to 127°(+12°); supine elevation 150°(+26°); External rotation improved to 60° (+25°); Pt demonstrates improved tolerance to reaching overhead and behind her back (IR/ADD 14cm from T7); Upper extremity functional scale improved to 87.5% max function    Specific Instructions for next treatment: ROM Ex's; capsular stretching; shoulder mob's    Treatment Charges: Mins Units   []  Modalities HP     [x]  Ther Exercise 30 2   [x]  Manual Therapy 10 1   []  Ther Activities     []  Aquatics     [x]  Vasocompression 15 1   []  Other     Total Treatment time 55 4   [employer] cap total used to date: $ 1,673.59  (updated 3/5/18)     Assessment: [x] Progressing toward goals. Good tolerance to exercises, able to increase weight as charted. Pt fatigued at the end.      [] No change. [] Other:  3/2- Supine ER: 65a/70p; supine elevation 150°a    Problems:    [x] ? Pain: Left shoulder 4/10 at rest; up to 8/10 with movement  [x] ? ROM: capsular pattern of restriction  [x] ? Strength:mild weakness of scapular and RTC musculature  [x] ?  Function: difficulty reaching above shoulder height and behind back; difficulty with some ADL's     STG: (to be met in 8 treatments)  Recheck 2/7/18  1. ? Pain: reduce Left shoulder pain to 3/10 with movement to allow completion of ADL's, 1/29 if reaches up - only for a few seconds, \"aches\" more than \"hurts\", sometimes sitting will get a \"jab\" in her shoulder short duration  2/7/18- decreased pain at end range elevation  2. ? ROM:improve  sitting elevation to 140 with improved scapulohumeral rhythm, 114 AROM 1/29, continued tightness in scapula moving with shoulder  2/7/18-seated elevation improved to 127°-however patient continues to have excessive

## 2018-03-07 ENCOUNTER — HOSPITAL ENCOUNTER (OUTPATIENT)
Dept: PHYSICAL THERAPY | Facility: CLINIC | Age: 68
Setting detail: THERAPIES SERIES
Discharge: HOME | End: 2018-03-07
Payer: MEDICARE

## 2018-03-07 PROCEDURE — 97110 THERAPEUTIC EXERCISES: CPT

## 2018-03-07 PROCEDURE — 97016 VASOPNEUMATIC DEVICE THERAPY: CPT

## 2018-03-07 PROCEDURE — 97140 MANUAL THERAPY 1/> REGIONS: CPT

## 2018-03-07 PROCEDURE — G8985 CARRY GOAL STATUS: HCPCS

## 2018-03-07 PROCEDURE — G8984 CARRY CURRENT STATUS: HCPCS

## 2018-03-07 NOTE — FLOWSHEET NOTE
Extension  25x green    Depression       Retraction  25x green    IR 25x green    ER  25x green    scaption 20x green          Other: 2/7/18  Test Measurements/Function:seated elevation improved to 127°(+12°); supine elevation 150°(+26°); External rotation improved to 60° (+25°); Pt demonstrates improved tolerance to reaching overhead and behind her back (IR/ADD 14cm from T7); Upper extremity functional scale improved to 87.5% max function    3/7/18- Upper extremity functional scale rated 78.75% max function (21.25% deficit)     Specific Instructions for next treatment: ROM Ex's; capsular stretching; shoulder mob's    Treatment Charges: Mins Units   []  Modalities HP     [x]  Ther Exercise 30 2   [x]  Manual Therapy 10 1   []  Ther Activities     []  Aquatics     [x]  Vasocompression 15 1   []  Other     Total Treatment time 55 4   [employer] cap total used to date: $ 1,759.73  (updated 3/7/18)     Assessment: [] Progressing toward goals. [x] No change. Pt had more difficulty with exercises today, states she is feeling weak for some reason. She was able to complete exercises as charted. [] Other:  3/2- Supine ER: 65a/70p; supine elevation 150°a    Problems:    [x] ? Pain: Left shoulder 4/10 at rest; up to 8/10 with movement  [x] ? ROM: capsular pattern of restriction  [x] ? Strength:mild weakness of scapular and RTC musculature  [x] ?  Function: difficulty reaching above shoulder height and behind back; difficulty with some ADL's     STG: (to be met in 8 treatments)  Recheck 2/7/18  1. ? Pain: reduce Left shoulder pain to 3/10 with movement to allow completion of ADL's, 1/29 if reaches up - only for a few seconds, \"aches\" more than \"hurts\", sometimes sitting will get a \"jab\" in her shoulder short duration  2/7/18- decreased pain at end range elevation  2. ? ROM:improve  sitting elevation to 140 with improved scapulohumeral rhythm, 114 AROM 1/29, continued tightness in scapula moving with shoulder  2/7/18-seated elevation improved to 127°-however patient continues to have excessive trunk lean with poor SH rhythm  3. ? Strength:initiate RTC and scapular strengthening Ex's, met 2/7/18-progress made with RTC/periscapular strengthening  4. ? Function: improve Upper extremity functional scale score for dressing (item j.) and grooming hair (item e.), met 1-29-18 2/7/18- UEFS improved to 87.5%  1. 1/29 better but still difficulty getting coat on, less pain getting shirts up and over her head, pain reaching back with extension motions  5. Independent with Home Exercise Program  2/7/18-      LTG: (to be met in 16  treatments)  1. Improve overall Upper Extremity score to 90% max function or greater  2/7/18- progress made-UEFS improved to 87.5%  2. Improve seated elevation ROM to 155°  2/7/18- progress made-127°        Patient goals:remove pain and return full range of motion        G-CODE  Updated 3/7/18  Functional Limitation: lifting/carrying  Functional Assessment Used:  Upper extremity functional scale/clinical judgement  Current Status Modifier: CJ  Goal Status Modifier: CH     Pt. Education:  [] Yes  [x] No  [] Reviewed Prior HEP/Ed  Method of Education: [] Verbal  For charted exercises  [] Demo   [] Written  Comprehension of Education:  [] Verbalizes understanding. [] Demonstrates understanding. [] Needs review. [x] Demonstrates/verbalizes HEP/Ed previously given. Plan: [x] Continue per plan of care.    [] Other:      Time In: 1:00p         Time Out: 2:05p    Electronically signed by:  Anurag Gregory PTA

## 2018-03-09 ENCOUNTER — HOSPITAL ENCOUNTER (OUTPATIENT)
Dept: PHYSICAL THERAPY | Facility: CLINIC | Age: 68
Setting detail: THERAPIES SERIES
Discharge: HOME | End: 2018-03-09
Payer: MEDICARE

## 2018-03-09 PROCEDURE — 97110 THERAPEUTIC EXERCISES: CPT

## 2018-03-09 PROCEDURE — 97016 VASOPNEUMATIC DEVICE THERAPY: CPT

## 2018-03-09 PROCEDURE — 97140 MANUAL THERAPY 1/> REGIONS: CPT

## 2018-03-12 ENCOUNTER — HOSPITAL ENCOUNTER (OUTPATIENT)
Dept: PHYSICAL THERAPY | Facility: CLINIC | Age: 68
Setting detail: THERAPIES SERIES
Discharge: HOME | End: 2018-03-12
Payer: MEDICARE

## 2018-03-12 PROCEDURE — 97016 VASOPNEUMATIC DEVICE THERAPY: CPT

## 2018-03-12 PROCEDURE — 97110 THERAPEUTIC EXERCISES: CPT

## 2018-03-12 PROCEDURE — 97140 MANUAL THERAPY 1/> REGIONS: CPT

## 2018-03-14 ENCOUNTER — HOSPITAL ENCOUNTER (OUTPATIENT)
Dept: PHYSICAL THERAPY | Facility: CLINIC | Age: 68
Setting detail: THERAPIES SERIES
Discharge: HOME | End: 2018-03-14
Payer: MEDICARE

## 2018-03-14 PROCEDURE — 97016 VASOPNEUMATIC DEVICE THERAPY: CPT

## 2018-03-14 PROCEDURE — 97110 THERAPEUTIC EXERCISES: CPT

## 2018-03-14 PROCEDURE — 97140 MANUAL THERAPY 1/> REGIONS: CPT

## 2018-03-14 NOTE — FLOWSHEET NOTE
[] Raissa Ashley       Outpatient Physical        Therapy       [address]       Phone: [phone]       Fax: [phone] [x] [facility] at [address]       Phone: [phone]       Fax: [phone] [] [facility] [address] [place] [address] [facility]  [address] [place] [address]   Phone: [phone]   Fax:  [phone]      Physical Therapy Daily Treatment Note     Date:  3/14/2018  Patient Name:  Khushbu Muniz      :  1950  MRN: [id number]  Physician: Lela Connor D.O. Insurance: [employer]  Medical Diagnosis: Left shoulder adhesive capsulitis                     Rehab Codes: L10.002, [id number], [id number]  Onset Date: 2016                             Next 's appt:   Visit# / total visits:  (new order-updated POC)   Cancels/No Shows: 1/0    Subjective:    Pain:  [x] Yes  [] No Location: L shoulder    Pain Rating: (0-10 scale)  Minimal/10  Pain altered Tx:  [x] No  [] Yes  Action:    Comments: Patient states she is a bit achy today, partly because she slept on the shoulder. Objective:   Modalities:  vasocompression after exercises, min compression, 34 degrees, seated  Precautions:  Exercises: bolded  Exercise Reps/Time Weight/Level Comments          UBE 4 fwd,  4 retro L3          SUPINE      PROM stretching with mobs x  With therapist emphasis on flex/scaption/ER   Flexion  25x 3# DB     Protraction 25x 3# DB    Horiz. Add 25x 3# DB    ER 20x 3#Cane     Triceps press             SIDE LYING   Increased weight 3/5   Abduction  25x 3#    ER 25x 3# Resume 3lb 3/9/18   Horiz. Abd 25x 3#          PRONE   Added 2/7   Retraction 25x 3#    Extension  25x 3#    Horiz.  Abd  25x 2#          STANDING      Extension  20x 3#Cane     Flexion  20x 3#Cane    Abduction  20x 3#Cane    scaption  2x10 2# hand wt    Abduction  20x 2# hand wt    Biceps curls            Towel stretch (IR/ADD)  11x 10\"-20\"hold          Pulleys  x           T-Band                            Extension  25x green    Depression       Retraction  25x green    IR 25x green    ER  25x green    scaption 20x green          Other:     Specific Instructions for next treatment: ROM Ex's; capsular stretching; shoulder mob's    Treatment Charges: Mins Units   []  Modalities HP     [x]  Ther Exercise 30 2   [x]  Manual Therapy 15 1   []  Ther Activities     []  Aquatics     [x]  Vasocompression 15 1   []  Other     Total Treatment time 60 4   [employer] cap total used to date: $ 2018.15 add KX (updated 3/14/18)     Assessment: [x] Progressing toward goals. Continued with charted exercises with good tolerance. [] No change. [] Other:      Problems:    [x] ? Pain: Left shoulder 4/10 at rest; up to 8/10 with movement  [x] ? ROM: capsular pattern of restriction  [x] ? Strength:mild weakness of scapular and RTC musculature  [x] ?  Function: difficulty reaching above shoulder height and behind back; difficulty with some ADL's     STG: (to be met in 8 treatments)  Recheck 2/7/18  1. ? Pain: reduce Left shoulder pain to 3/10 with movement to allow completion of ADL's, 1/29 if reaches up - only for a few seconds, \"aches\" more than \"hurts\", sometimes sitting will get a \"jab\" in her shoulder short duration  2/7/18- decreased pain at end range elevation  2. ? ROM:improve  sitting elevation to 140 with improved scapulohumeral rhythm, 114 AROM 1/29, continued tightness in scapula moving with shoulder  2/7/18-seated elevation improved to 127°-however patient continues to have excessive trunk lean with poor SH rhythm  3. ? Strength:initiate RTC and scapular strengthening Ex's, met 2/7/18-progress made with RTC/periscapular strengthening  4. ? Function: improve Upper extremity functional scale score for dressing (item j.) and grooming hair (item e.), met 1-29-18 2/7/18- UEFS improved to 87.5%  1. 1/29 better but still difficulty getting coat on,

## 2018-03-16 ENCOUNTER — HOSPITAL ENCOUNTER (OUTPATIENT)
Dept: PHYSICAL THERAPY | Facility: CLINIC | Age: 68
Setting detail: THERAPIES SERIES
Discharge: HOME | End: 2018-03-16
Payer: MEDICARE

## 2018-03-16 NOTE — FLOWSHEET NOTE
[] Blanquita Olson        Outpatient Physical                Therapy       [address]       Phone: [phone]       Fax: [phone] [x] [facility]       [facility] at [address] [facility]       Phone: [phone]       Fax: [phone] [] Deepika  [facility]     [address] [place] [address]      Phone: [phone]      Fax:  [phone]     Physical Therapy Cancel/No Show note    Date: 3/16/2018  Patient: Elda Hodges  : 1950  MRN: [id number]    Cancels/No Shows to date:     For today's appointment patient:  [x]  Cancelled  []  Rescheduled appointment  []  No-show     Reason given by patient:  [x]  Patient ill  []  Conflicting appointment  []  No transportation    []  Conflict with work  []  No reason given  []  Weather related  []  Other:      Comments:      [x]  Next appointment was confirmed    Electronically signed by: Alexandro Patterson PT

## 2018-03-19 ENCOUNTER — HOSPITAL ENCOUNTER (OUTPATIENT)
Dept: PHYSICAL THERAPY | Facility: CLINIC | Age: 68
Setting detail: THERAPIES SERIES
Discharge: HOME | End: 2018-03-19
Payer: MEDICARE

## 2018-03-19 PROCEDURE — 97140 MANUAL THERAPY 1/> REGIONS: CPT

## 2018-03-19 PROCEDURE — 97016 VASOPNEUMATIC DEVICE THERAPY: CPT

## 2018-03-19 PROCEDURE — 97110 THERAPEUTIC EXERCISES: CPT

## 2018-03-21 ENCOUNTER — HOSPITAL ENCOUNTER (OUTPATIENT)
Dept: PHYSICAL THERAPY | Facility: CLINIC | Age: 68
Setting detail: THERAPIES SERIES
Discharge: HOME | End: 2018-03-21
Payer: MEDICARE

## 2018-03-21 PROCEDURE — 97110 THERAPEUTIC EXERCISES: CPT

## 2018-03-21 PROCEDURE — 97016 VASOPNEUMATIC DEVICE THERAPY: CPT

## 2018-03-21 PROCEDURE — 97140 MANUAL THERAPY 1/> REGIONS: CPT

## 2018-03-21 NOTE — FLOWSHEET NOTE
better but still difficulty getting coat on, less pain getting shirts up and over her head, pain reaching back with extension motions  5. Independent with Home Exercise Program  2/7/18-      LTG: (to be met in 16  treatments)  1. Improve overall Upper Extremity score to 90% max function or greater  2/7/18- progress made-UEFS improved to 87.5%  2. Improve seated elevation ROM to 155°  2/7/18- progress made-127°        Patient goals:remove pain and return full range of motion        G-CODE  Updated 3/7/18  Functional Limitation: lifting/carrying  Functional Assessment Used:  Upper extremity functional scale/clinical judgement  Current Status Modifier: CJ  Goal Status Modifier: CH     Pt. Education:  [x] Yes  [] No  [] Reviewed Prior HEP/Ed  Method of Education: [x] Verbal  For charted exercises  [] Demo   [] Written  Comprehension of Education:  [x] Verbalizes understanding. [x] Demonstrates understanding. [] Needs review. [x] Demonstrates/verbalizes HEP/Ed previously given. Plan: [x] Continue per plan of care.    [] Other:      Time In: 7:55am        Time Out: 9:00am    Electronically signed by:  Kath Alvarez PTA

## 2018-03-23 ENCOUNTER — HOSPITAL ENCOUNTER (OUTPATIENT)
Dept: PHYSICAL THERAPY | Facility: CLINIC | Age: 68
Setting detail: THERAPIES SERIES
Discharge: HOME | End: 2018-03-23
Payer: MEDICARE

## 2018-03-23 PROCEDURE — 97140 MANUAL THERAPY 1/> REGIONS: CPT

## 2018-03-23 PROCEDURE — 97110 THERAPEUTIC EXERCISES: CPT

## 2018-03-23 PROCEDURE — 97016 VASOPNEUMATIC DEVICE THERAPY: CPT

## 2018-03-23 NOTE — FLOWSHEET NOTE
[] Nathaly Moseley       Outpatient Physical        Therapy       [address]       Phone: [phone]       Fax: [phone] [x] [facility] at [address]       Phone: [phone]       Fax: [phone] [] [place]  [address] [place] [address]   Phone: [phone]   Fax:  [phone]      Physical Therapy Daily Treatment Note     Date:  3/23/2018  Patient Name:  Vanessa Baig      :    MRN: [id number]  Physician: Heather Toledo D.O. Insurance: [employer]  Medical Diagnosis: Left shoulder adhesive capsulitis                     Rehab Codes: H30.826, [id number], [id number]  Onset Date: 2016                             Next 's appt:   Visit# / total visits: 34 (new order-updated POC)   Cancels/No Shows: 1/0    Subjective:    Pain:  [x] Yes  [] No Location: L shoulder    Pain Rating: (0-10 scale)  2/10  Pain altered Tx:  [x] No  [] Yes  Action:    Comments: Patient noting some increase in discomfort this morning, stating she feels she slept on it wrong. pn increased when waking up but has become achy since waking up. Pt stated she is struggling with reaching behind back and out the side. Objective:   Modalities:  vasocompression after exercises, min compression, 34 degrees, seated  Precautions:  Exercises: bolded  Exercise Reps/Time Weight/Level Comments          UBE 4 fwd,  4 retro L3          SUPINE      PROM stretching with mobs x  With therapist emphasis on flex/scaption/ER   Flexion  25x 3# DB     Protraction 25x 3# DB    Horiz. Add 25x 3# DB    ER 20x 3#Cane     Triceps press             SIDE LYING   Increased weight 3/5   On L, sleeper stretch  x  With therapist   Abduction  25x 3#    ER 25x 2# Pt struggling with 3# 3/23   Horiz. Abd 25x 3#    flexion 25x 2#          PRONE   Added 2/7   Retraction 25x 3#    Extension  25x 3#    Horiz.  Abd 25x 2#          STANDING      Extension  20x 3#Cane     Flexion  20x 3#Cane    Abduction  20x 3#Cane    scaption  2x10 2# hand wt    Abduction  20x 2# hand wt    Biceps curls            Towel stretch (IR/ADD)  11x   10\"-20\"hold          Pulleys  x           T-Band                            Extension  25x green    Depression       Retraction  25x green    IR 25x green    ER  25x green    scaption 20x green          Other:     Specific Instructions for next treatment: ROM Ex's; capsular stretching; shoulder mob's    Treatment Charges: Mins Units   []  Modalities HP     [x]  Ther Exercise 30 2   [x]  Manual Therapy 10 1   []  Ther Activities     []  Aquatics     [x]  Vasocompression 15 1   []  Other     Total Treatment time 55 4   [employer] cap total used to date: $ 2276.57 add KX (updated 3/23/18)     Assessment: [x] Progressing toward goals. Progressed with adding sidelying flexion with good joy. Continued with exercises as stated in chart, with decreasing weight for sidelying ER due to pt complaining of increased pn. Pt showing improvements in ROM with decreased motion in abduction, some guarding noted with manual therapy. [] No change. [] Other:      Problems:    [x] ? Pain: Left shoulder 4/10 at rest; up to 8/10 with movement  [x] ? ROM: capsular pattern of restriction  [x] ? Strength:mild weakness of scapular and RTC musculature  [x] ?  Function: difficulty reaching above shoulder height and behind back; difficulty with some ADL's     STG: (to be met in 8 treatments)  Recheck 2/7/18  1. ? Pain: reduce Left shoulder pain to 3/10 with movement to allow completion of ADL's, 1/29 if reaches up - only for a few seconds, \"aches\" more than \"hurts\", sometimes sitting will get a \"jab\" in her shoulder short duration  2/7/18- decreased pain at end range elevation  2. ? ROM:improve  sitting elevation to 140 with improved scapulohumeral rhythm, 114 AROM 1/29, continued tightness in scapula moving with shoulder 2/7/18-seated elevation improved to 127°-however patient continues to have excessive trunk lean with poor SH rhythm  3. ? Strength:initiate RTC and scapular strengthening Ex's, met 2/7/18-progress made with RTC/periscapular strengthening  4. ? Function: improve Upper extremity functional scale score for dressing (item j.) and grooming hair (item e.), met 1-29-18 2/7/18- UEFS improved to 87.5%  1. 1/29 better but still difficulty getting coat on, less pain getting shirts up and over her head, pain reaching back with extension motions  5. Independent with Home Exercise Program  2/7/18-      LTG: (to be met in 16  treatments)  1. Improve overall Upper Extremity score to 90% max function or greater  2/7/18- progress made-UEFS improved to 87.5%  2. Improve seated elevation ROM to 155°  2/7/18- progress made-127°        Patient goals:remove pain and return full range of motion        G-CODE  Updated 3/7/18  Functional Limitation: lifting/carrying   Functional Assessment Used:  Upper extremity functional scale/clinical judgement  Current Status Modifier: CJ  Goal Status Modifier: CH     Pt. Education:  [x] Yes  [] No  [x] Reviewed Prior HEP/Ed  Method of Education: [x] Verbal  For charted exercises  [] Demo   [] Written  Comprehension of Education:  [x] Verbalizes understanding. [] Demonstrates understanding. [] Needs review. [x] Demonstrates/verbalizes HEP/Ed previously given. Plan: [x] Continue per plan of care.    [] Other:      Time In: 9:47am        Time Out: 10:55am    Electronically signed by:  Francisco Bee PTA

## 2018-03-26 ENCOUNTER — HOSPITAL ENCOUNTER (OUTPATIENT)
Dept: PHYSICAL THERAPY | Facility: CLINIC | Age: 68
Setting detail: THERAPIES SERIES
Discharge: HOME | End: 2018-03-26
Payer: MEDICARE

## 2018-03-26 PROCEDURE — G8984 CARRY CURRENT STATUS: HCPCS

## 2018-03-26 PROCEDURE — 97016 VASOPNEUMATIC DEVICE THERAPY: CPT

## 2018-03-26 PROCEDURE — 97140 MANUAL THERAPY 1/> REGIONS: CPT

## 2018-03-26 PROCEDURE — 97110 THERAPEUTIC EXERCISES: CPT

## 2018-03-26 PROCEDURE — G8985 CARRY GOAL STATUS: HCPCS

## 2018-03-26 NOTE — PROGRESS NOTES
improve Upper extremity functional scale score for dressing (item j.) and grooming hair (item e.), 3/26-rated little difficulty and moderate difficulty respectively- overall score 76.25% max function  5. Independent with Home Exercise Program  3/26- met     LTG: (to be met in 16  treatments)  1. Improve overall Upper Extremity score to 90% max function or greater  3/26- not met-minimal change since last update on 2/7/18  2. Improve seated elevation ROM to 155°  3/26- progress made        Patient goals:remove pain and return full range of motion        G-CODE  Updated 3/26/18  Functional Limitation: lifting/carrying   Functional Assessment Used:  Upper extremity functional scale/clinical judgement  Current Status Modifier: CJ (61/80- 76.25% function)  Goal Status Modifier:     Treatment to Date:  [x] Therapeutic Exercise    [] Modalities:  [] Therapeutic Activity    [] Ultrasound  [] Electrical Stimulation  [] Gait Training     [] Massage       [] Lumbar/Cervical Traction  [] Neuromuscular Re-education [] Cold/hotpack [] Iontophoresis: 4 mg/mL  [x] Instruction in Home Exercise Program                     Dexamethasone Sodium  [x] Manual Therapy             Phosphate 40-80 mAmin  [] Aquatic Therapy                   [x] Vasocompression/   [] Other:  [] Dry Needling                                           Game Ready        Patient Status:       [x] Other:Follow up with Dr Mittie Collet 3/28; discuss additional treatment options     Electronically signed by Natalia Swanson PT on 3/26/2018 at 9:17 AM      If you have any questions or concerns, please don't hesitate to call. Thank you for your referral.    Physician Signature:________________________________Date:__________________  By signing above or cosigning this note, I have reviewed this plan of care and certify a need for medically necessary rehabilitation services.      *PLEASE SIGN ABOVE AND FAX BACK ALL PAGES*

## 2018-03-26 NOTE — FLOWSHEET NOTE
[] [facility]       Outpatient Physical        Therapy       [address]       Phone: [phone]       Fax: [phone] [x] [facility] at [address] [facility]       Phone: [phone]       Fax: [phone] [] [facility]  [address] [facility]   Phone: [phone]   Fax:  [phone]      Physical Therapy Daily Treatment Note     Date:  3/26/2018  Patient Name:  Jaylan Tijerina      :    MRN: [id number]  Physician: Nola Neal D.O. Insurance: [employer]  Medical Diagnosis: Left shoulder adhesive capsulitis                     Rehab Codes: Z20.401, J394481, [id number]  Onset Date: 2016                             Next 's appt:   Visit# / total visits:  (new order-updated POC)   Cancels/No Shows: 2/0    Subjective:    Pain:  [x] Yes  [] No Location: L shoulder    Pain Rating: (0-10 scale)  3-4/10  Pain altered Tx:  [x] No  [] Yes  Action:    Comments: Patient states she was laying on the shoulder so it is sore again today. Objective:   Modalities:  vasocompression after exercises, min compression, 34 degrees, seated  Precautions:  Exercises: bolded  Exercise Reps/Time Weight/Level Comments          UBE 4 fwd,  4 retro L3          SUPINE      PROM stretching with mobs x  With therapist emphasis on flex/scaption/ER   Flexion  25x 3# DB     Protraction 25x 3# DB    Horiz. Add 25x 3# DB    ER 20x 3#Cane     Triceps press             SIDE LYING   Increased weight 3/5   On L, sleeper stretch  x  With therapist   Abduction  25x 3#    ER 25x 3#    Horiz. Abd 25x 3#    flexion 25x 2#          PRONE   Added 2/7   Retraction 25x 3#    Extension  25x 3#    Horiz.  Abd  25x 2#          STANDING      Extension  20x 3#Cane     Flexion  20x 3#Cane    Abduction  20x 3#Cane    scaption  2x10 2# hand wt    Abduction  20x 2# hand wt    Biceps curls Towel stretch (IR/ADD)  11x   10\"-20\"hold          Pulleys  x           T-Band                         Not today 3/26   Extension  25x green    Depression       Retraction  25x green    IR 25x green    ER  25x green    scaption 20x green          Other:     Specific Instructions for next treatment: ROM Ex's; capsular stretching; shoulder mob's    Treatment Charges: Mins Units   []  Modalities HP     [x]  Ther Exercise 30 2   [x]  Manual Therapy 10 1   []  Ther Activities     []  Aquatics     [x]  Vasocompression 15 1   []  Other     Total Treatment time 55 4   [employer] cap total used to date: $ 2362.71 add KX (updated 3/26/18)     Assessment: [x] Progressing toward goals. Progressed with adding sidelying flexion with good joy. Continued with exercises as stated in chart, with decreasing weight for sidelying ER due to pt complaining of increased pn. Pt showing improvements in ROM with decreased motion in abduction, some guarding noted with manual therapy. [] No change. [] Other:      Problems:    [x] ? Pain: Left shoulder 4/10 at rest; up to 8/10 with movement  [x] ? ROM: capsular pattern of restriction  [x] ? Strength:mild weakness of scapular and RTC musculature  [x] ? Function: difficulty reaching above shoulder height and behind back; difficulty with some ADL's     STG: (to be met in 8 treatments)  Recheck 3/26  1. ? Pain: reduce Left shoulder pain to 3/10 with movement to allow completion of ADL's, 3/26- 3-4/10  2. ? ROM:improve  sitting elevation to 140 with improved scapulohumeral rhythm 3/26- seated elevation- 150 with improved scapulo-humeral rhythm; ER 57/62 capsular end feel-essentially unchanged from last upate; supine elevation 160/167;    3. ? Strength:initiate RTC and scapular strengthening Ex's, 3/26- shoulder elevation and ER  4/5  4. ? Function: improve Upper extremity functional scale score for dressing (item j.) and grooming hair (item e.), 3/26-rated little difficulty and

## 2018-03-29 ENCOUNTER — HOSPITAL ENCOUNTER (OUTPATIENT)
Dept: PHYSICAL THERAPY | Facility: CLINIC | Age: 68
Setting detail: THERAPIES SERIES
Discharge: HOME | End: 2018-03-29
Payer: MEDICARE

## 2018-03-29 PROCEDURE — 97110 THERAPEUTIC EXERCISES: CPT

## 2018-03-29 PROCEDURE — 97016 VASOPNEUMATIC DEVICE THERAPY: CPT

## 2018-03-29 PROCEDURE — 97140 MANUAL THERAPY 1/> REGIONS: CPT

## 2018-03-29 NOTE — FLOWSHEET NOTE
3#Cane    scaption  2x10 2# hand wt    Abduction  20x 2# hand wt    Biceps curls            Towel stretch (IR/ADD)  11x   10\"-20\"hold          Pulleys  x           T-Band                         Not today 3/26   Extension  25x green    Depression       Retraction  25x green    IR 25x green    ER  25x green    scaption 20x green          Other:     Specific Instructions for next treatment: ROM Ex's; capsular stretching; shoulder mob's    Treatment Charges: Mins Units   []  Modalities HP     [x]  Ther Exercise 30 2   [x]  Manual Therapy 10 1   []  Ther Activities     []  Aquatics     [x]  Vasocompression 15 1   []  Other     Total Treatment time 55 4   [employer] cap total used to date: $ 2448. 85 add KX (updated 3/29/18)     Assessment: [x] Progressing toward goals. Good tolerance to charted exercisers. Pt to do T-band at home. [] No change. [] Other:      Problems:    [x] ? Pain: Left shoulder 4/10 at rest; up to 8/10 with movement  [x] ? ROM: capsular pattern of restriction  [x] ? Strength:mild weakness of scapular and RTC musculature  [x] ? Function: difficulty reaching above shoulder height and behind back; difficulty with some ADL's     STG: (to be met in 8 treatments)  Recheck 3/26  1. ? Pain: reduce Left shoulder pain to 3/10 with movement to allow completion of ADL's, 3/26- 3-4/10  2. ? ROM:improve  sitting elevation to 140 with improved scapulohumeral rhythm 3/26- seated elevation- 150 with improved scapulo-humeral rhythm; ER 57/62 capsular end feel-essentially unchanged from last upate; supine elevation 160/167;   3. ? Strength:initiate RTC and scapular strengthening Ex's, 3/26- shoulder elevation and ER  4/5  4. ? Function: improve Upper extremity functional scale score for dressing (item j.) and grooming hair (item e.), 3/26-rated little difficulty and moderate difficulty respectively- overall score 76.25% max function  5.  Independent with Home Exercise Program  3/26- met     LTG: (to be met in 16  treatments)  1. Improve overall Upper Extremity score to 90% max function or greater  3/26- not met-minimal change since last update on 2/7/18  2. Improve seated elevation ROM to 155°  3/26- progress made        Patient goals:remove pain and return full range of motion        G-CODE  Updated 3/7/18  Functional Limitation: lifting/carrying   Functional Assessment Used:  Upper extremity functional scale/clinical judgement  Current Status Modifier:   Goal Status Modifier:      Pt. Education:  [x] Yes  [] No  [x] Reviewed Prior HEP/Ed  Method of Education: [x] Verbal  For charted exercises  [] Demo   [] Written  Comprehension of Education:  [x] Verbalizes understanding. [] Demonstrates understanding. [] Needs review. [x] Demonstrates/verbalizes HEP/Ed previously given. Plan: [x] Continue per plan of care.    [] Other:      Time In: 9:00am        Time Out: 10:05am    Electronically signed by:  Paulette Mckenna PTA

## 2018-04-02 ENCOUNTER — HOSPITAL ENCOUNTER (OUTPATIENT)
Dept: PHYSICAL THERAPY | Facility: CLINIC | Age: 68
Setting detail: THERAPIES SERIES
Discharge: HOME | End: 2018-04-02
Payer: MEDICARE

## 2018-04-02 PROCEDURE — 97016 VASOPNEUMATIC DEVICE THERAPY: CPT

## 2018-04-02 PROCEDURE — 97110 THERAPEUTIC EXERCISES: CPT

## 2018-04-04 ENCOUNTER — HOSPITAL ENCOUNTER (OUTPATIENT)
Age: 68
Setting detail: SPECIMEN
Discharge: HOME | End: 2018-04-04
Payer: MEDICARE

## 2018-04-04 DIAGNOSIS — E11.9 TYPE 2 DIABETES MELLITUS WITHOUT COMPLICATION, WITHOUT LONG-TERM CURRENT USE OF INSULIN (HCC): ICD-10-CM

## 2018-04-04 DIAGNOSIS — R35.0 URINARY FREQUENCY: ICD-10-CM

## 2018-04-04 LAB
-: ABNORMAL
AMORPHOUS: ABNORMAL
BACTERIA: ABNORMAL
BILIRUBIN URINE: NEGATIVE
CASTS UA: ABNORMAL /LPF (ref 0–2)
COLOR: YELLOW
COMMENT UA: ABNORMAL
CRYSTALS, UA: ABNORMAL /HPF
EPITHELIAL CELLS UA: ABNORMAL /HPF (ref 0–5)
GLUCOSE URINE: NEGATIVE
KETONES, URINE: NEGATIVE
LEUKOCYTE ESTERASE, URINE: ABNORMAL
MUCUS: ABNORMAL
NITRITE, URINE: NEGATIVE
OTHER OBSERVATIONS UA: ABNORMAL
PH UA: 5 (ref 5–8)
PROTEIN UA: NEGATIVE
RBC UA: ABNORMAL /HPF (ref 0–2)
RENAL EPITHELIAL, UA: ABNORMAL /HPF
SPECIFIC GRAVITY UA: 1.01 (ref 1–1.03)
TRICHOMONAS: ABNORMAL
TURBIDITY: CLEAR
URINE HGB: NEGATIVE
UROBILINOGEN, URINE: NORMAL
WBC UA: ABNORMAL /HPF (ref 0–5)
YEAST: ABNORMAL

## 2018-04-04 RX ORDER — METFORMIN HYDROCHLORIDE 500 MG/1
500 TABLET, EXTENDED RELEASE ORAL 4 TIMES DAILY
Qty: 360 TABLET | Refills: 0 | Status: CANCELLED | OUTPATIENT
Start: 2018-04-04 | End: 2018-07-03

## 2018-04-04 RX ORDER — METFORMIN HYDROCHLORIDE 500 MG/1
500 TABLET, EXTENDED RELEASE ORAL 4 TIMES DAILY
Qty: 360 TABLET | Refills: 0 | Status: SHIPPED | OUTPATIENT
Start: 2018-04-04 | End: 2018-09-19 | Stop reason: SDUPTHER

## 2018-04-04 RX ORDER — GLIMEPIRIDE 4 MG/1
4 TABLET ORAL DAILY
Qty: 90 TABLET | Refills: 0 | Status: SHIPPED | OUTPATIENT
Start: 2018-04-04 | End: 2018-06-22 | Stop reason: SDUPTHER

## 2018-04-05 ENCOUNTER — HOSPITAL ENCOUNTER (OUTPATIENT)
Dept: PHYSICAL THERAPY | Facility: CLINIC | Age: 68
Setting detail: THERAPIES SERIES
Discharge: HOME | End: 2018-04-05
Payer: MEDICARE

## 2018-04-05 PROCEDURE — 97110 THERAPEUTIC EXERCISES: CPT

## 2018-04-05 PROCEDURE — 97016 VASOPNEUMATIC DEVICE THERAPY: CPT

## 2018-04-06 LAB
CULTURE: NO GROWTH
CULTURE: NORMAL
Lab: NORMAL
SPECIMEN DESCRIPTION: NORMAL
STATUS: NORMAL

## 2018-04-09 ENCOUNTER — HOSPITAL ENCOUNTER (OUTPATIENT)
Dept: PHYSICAL THERAPY | Facility: CLINIC | Age: 68
Setting detail: THERAPIES SERIES
Discharge: HOME | End: 2018-04-09
Payer: MEDICARE

## 2018-04-09 PROCEDURE — 97110 THERAPEUTIC EXERCISES: CPT

## 2018-04-09 PROCEDURE — 97016 VASOPNEUMATIC DEVICE THERAPY: CPT

## 2018-04-11 ENCOUNTER — HOSPITAL ENCOUNTER (OUTPATIENT)
Dept: PHYSICAL THERAPY | Facility: CLINIC | Age: 68
Setting detail: THERAPIES SERIES
Discharge: HOME | End: 2018-04-11
Payer: MEDICARE

## 2018-04-11 PROCEDURE — 97110 THERAPEUTIC EXERCISES: CPT

## 2018-04-11 PROCEDURE — 97016 VASOPNEUMATIC DEVICE THERAPY: CPT

## 2018-04-19 ENCOUNTER — HOSPITAL ENCOUNTER (OUTPATIENT)
Dept: PHYSICAL THERAPY | Facility: CLINIC | Age: 68
Setting detail: THERAPIES SERIES
Discharge: HOME | End: 2018-04-19
Payer: MEDICARE

## 2018-04-19 PROCEDURE — 97016 VASOPNEUMATIC DEVICE THERAPY: CPT

## 2018-04-19 PROCEDURE — G8985 CARRY GOAL STATUS: HCPCS

## 2018-04-19 PROCEDURE — G8986 CARRY D/C STATUS: HCPCS

## 2018-04-19 PROCEDURE — 97110 THERAPEUTIC EXERCISES: CPT

## 2018-06-22 DIAGNOSIS — E11.9 TYPE 2 DIABETES MELLITUS WITHOUT COMPLICATION, WITHOUT LONG-TERM CURRENT USE OF INSULIN (HCC): ICD-10-CM

## 2018-06-22 RX ORDER — GLIMEPIRIDE 4 MG/1
TABLET ORAL
Qty: 90 TABLET | Refills: 0 | Status: SHIPPED | OUTPATIENT
Start: 2018-06-22 | End: 2018-10-26 | Stop reason: SDUPTHER

## 2018-06-27 ENCOUNTER — OFFICE VISIT (OUTPATIENT)
Dept: FAMILY MEDICINE CLINIC | Age: 68
End: 2018-06-27
Payer: MEDICARE

## 2018-06-27 ENCOUNTER — HOSPITAL ENCOUNTER (OUTPATIENT)
Age: 68
Setting detail: SPECIMEN
Discharge: HOME | End: 2018-06-27
Payer: MEDICARE

## 2018-06-27 VITALS
WEIGHT: 170 LBS | HEART RATE: 89 BPM | DIASTOLIC BLOOD PRESSURE: 74 MMHG | RESPIRATION RATE: 14 BRPM | SYSTOLIC BLOOD PRESSURE: 131 MMHG | HEIGHT: 68 IN | BODY MASS INDEX: 25.76 KG/M2 | OXYGEN SATURATION: 97 %

## 2018-06-27 DIAGNOSIS — E11.9 TYPE 2 DIABETES MELLITUS WITHOUT COMPLICATION, WITHOUT LONG-TERM CURRENT USE OF INSULIN (HCC): Primary | ICD-10-CM

## 2018-06-27 DIAGNOSIS — R30.0 DYSURIA: ICD-10-CM

## 2018-06-27 DIAGNOSIS — G62.0 CHEMOTHERAPY-INDUCED NEUROPATHY (HCC): ICD-10-CM

## 2018-06-27 DIAGNOSIS — R19.7 DIARRHEA, UNSPECIFIED TYPE: ICD-10-CM

## 2018-06-27 DIAGNOSIS — T45.1X5A CHEMOTHERAPY-INDUCED NEUROPATHY (HCC): ICD-10-CM

## 2018-06-27 LAB
BILIRUBIN, POC: NEGATIVE
BLOOD URINE, POC: ABNORMAL
CLARITY, POC: CLEAR
COLOR, POC: YELLOW
GLUCOSE URINE, POC: ABNORMAL
HBA1C MFR BLD: 7.6 %
KETONES, POC: NEGATIVE
LEUKOCYTE EST, POC: ABNORMAL
NITRITE, POC: NEGATIVE
PH, POC: 5
PROTEIN, POC: ABNORMAL
SPECIFIC GRAVITY, POC: 1.01
UROBILINOGEN, POC: 0.2

## 2018-06-27 PROCEDURE — 99213 OFFICE O/P EST LOW 20 MIN: CPT | Performed by: FAMILY MEDICINE

## 2018-06-27 PROCEDURE — 3045F PR MOST RECENT HEMOGLOBIN A1C LEVEL 7.0-9.0%: CPT | Performed by: FAMILY MEDICINE

## 2018-06-27 PROCEDURE — 81002 URINALYSIS NONAUTO W/O SCOPE: CPT | Performed by: FAMILY MEDICINE

## 2018-06-27 PROCEDURE — 2022F DILAT RTA XM EVC RTNOPTHY: CPT | Performed by: FAMILY MEDICINE

## 2018-06-27 PROCEDURE — 1123F ACP DISCUSS/DSCN MKR DOCD: CPT | Performed by: FAMILY MEDICINE

## 2018-06-27 PROCEDURE — 83036 HEMOGLOBIN GLYCOSYLATED A1C: CPT | Performed by: FAMILY MEDICINE

## 2018-06-27 PROCEDURE — 1036F TOBACCO NON-USER: CPT | Performed by: FAMILY MEDICINE

## 2018-06-27 PROCEDURE — G8427 DOCREV CUR MEDS BY ELIG CLIN: HCPCS | Performed by: FAMILY MEDICINE

## 2018-06-27 PROCEDURE — 4040F PNEUMOC VAC/ADMIN/RCVD: CPT | Performed by: FAMILY MEDICINE

## 2018-06-27 PROCEDURE — G8399 PT W/DXA RESULTS DOCUMENT: HCPCS | Performed by: FAMILY MEDICINE

## 2018-06-27 PROCEDURE — 1090F PRES/ABSN URINE INCON ASSESS: CPT | Performed by: FAMILY MEDICINE

## 2018-06-27 PROCEDURE — G8419 CALC BMI OUT NRM PARAM NOF/U: HCPCS | Performed by: FAMILY MEDICINE

## 2018-06-27 PROCEDURE — 3017F COLORECTAL CA SCREEN DOC REV: CPT | Performed by: FAMILY MEDICINE

## 2018-06-27 RX ORDER — POLYETHYLENE GLYCOL 3350 17 G/17G
17 POWDER, FOR SOLUTION ORAL DAILY
Qty: 510 G | Refills: 0 | Status: SHIPPED | OUTPATIENT
Start: 2018-06-27 | End: 2018-07-27

## 2018-06-27 RX ORDER — GREEN TEA/HOODIA GORDONII 315-12.5MG
1 CAPSULE ORAL DAILY
Qty: 90 TABLET | Refills: 1 | Status: SHIPPED | OUTPATIENT
Start: 2018-06-27 | End: 2018-10-31 | Stop reason: SDUPTHER

## 2018-06-27 RX ORDER — NITROFURANTOIN 25; 75 MG/1; MG/1
100 CAPSULE ORAL 2 TIMES DAILY
Qty: 20 CAPSULE | Refills: 0 | Status: SHIPPED | OUTPATIENT
Start: 2018-06-27 | End: 2018-07-07

## 2018-06-29 ENCOUNTER — HOSPITAL ENCOUNTER (OUTPATIENT)
Age: 68
Setting detail: SPECIMEN
Discharge: HOME | End: 2018-06-29
Payer: MEDICARE

## 2018-06-29 DIAGNOSIS — R19.7 DIARRHEA, UNSPECIFIED TYPE: ICD-10-CM

## 2018-06-29 LAB
CULTURE: ABNORMAL
Lab: ABNORMAL
Lab: NORMAL
MICRO OVA & PARASITES: NORMAL
ORGANISM: ABNORMAL
SPECIMEN DESCRIPTION: ABNORMAL
SPECIMEN DESCRIPTION: NORMAL
STATUS: ABNORMAL
STATUS: NORMAL

## 2018-06-30 LAB
C DIFFICILE TOXINS, PCR: NORMAL
DIRECT EXAM: NORMAL
Lab: NORMAL
SPECIMEN DESCRIPTION: NORMAL
SPECIMEN DESCRIPTION: NORMAL
STATUS: NORMAL

## 2018-07-02 ENCOUNTER — TELEPHONE (OUTPATIENT)
Dept: FAMILY MEDICINE CLINIC | Age: 68
End: 2018-07-02

## 2018-07-02 NOTE — TELEPHONE ENCOUNTER
gage has faxed over a diabetic supply form for her testing supplies. Please look to see if you received this form she is almost out of supplies.

## 2018-07-24 DIAGNOSIS — E11.9 TYPE 2 DIABETES MELLITUS WITHOUT COMPLICATION, WITHOUT LONG-TERM CURRENT USE OF INSULIN (HCC): ICD-10-CM

## 2018-07-24 NOTE — TELEPHONE ENCOUNTER
Next Visit Date:  Future Appointments  Date Time Provider Leatha Stuarti   8/27/2018 10:00 AM Morena Ramos MD [address] [facility] Maintenance   Topic Date Due    Diabetic foot exam  10/18/1960    Diabetic retinal exam  [birth year]18/1958    Shingles Vaccine (1 of 2 - 2 Dose Series) 10/18/1998    Flu vaccine (1) 09/01/2018    Diabetic microalbuminuria test  11/13/2018    Lipid screen  11/13/2018    A1C test (Diabetic or Prediabetic)  06/27/2019    Colon cancer screen colonoscopy  05/01/2023    DTaP/Tdap/Td vaccine (2 - Td) 02/27/2025    DEXA (modify frequency per FRAX score)  Completed    Pneumococcal low/med risk  Completed    Hepatitis C screen  Completed       Hemoglobin A1C (%)   Date Value   06/27/2018 7.6   02/26/2018 6.9 (H)   12/06/2017 7.5             ( goal A1C is < 7)   No results found for: LABMICR  LDL Cholesterol (mg/dL)   Date Value   07/18/2013 120 (H)   05/16/2013 119 (H)     LDL Calculated (mg/dL)   Date Value   11/13/2017 96   02/25/2017 91       (goal LDL is <100)   AST (U/L)   Date Value   02/26/2018 16     ALT (U/L)   Date Value   02/26/2018 23     BUN (mg/dL)   Date Value   02/26/2018 13     BP Readings from Last 3 Encounters:   06/27/18 131/74   02/26/18 128/65   12/06/17 128/70          (goal 120/80)    All Future Testing planned in [facility]  Lab Frequency Next Occurrence   CBC Auto Differential     Comprehensive Metabolic Panel     Hemoglobin A1C                 Patient Active Problem List:     Diabetes mellitus ([facility] [address]     HX: breast cancer     DCIS (ductal carcinoma in situ)     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

## 2018-08-21 ENCOUNTER — HOSPITAL ENCOUNTER (OUTPATIENT)
Facility: MEDICAL CENTER | Age: 68
End: 2018-08-21
Payer: MEDICARE

## 2018-08-27 ENCOUNTER — OFFICE VISIT (OUTPATIENT)
Dept: ONCOLOGY | Age: 68
End: 2018-08-27
Payer: MEDICARE

## 2018-08-27 ENCOUNTER — TELEPHONE (OUTPATIENT)
Dept: ONCOLOGY | Age: 68
End: 2018-08-27

## 2018-08-27 VITALS
HEART RATE: 80 BPM | WEIGHT: 170 LBS | RESPIRATION RATE: 18 BRPM | BODY MASS INDEX: 25.85 KG/M2 | DIASTOLIC BLOOD PRESSURE: 62 MMHG | TEMPERATURE: 98.5 F | SYSTOLIC BLOOD PRESSURE: 130 MMHG

## 2018-08-27 DIAGNOSIS — Z17.0 MALIGNANT NEOPLASM OF UPPER-OUTER QUADRANT OF RIGHT BREAST IN FEMALE, ESTROGEN RECEPTOR POSITIVE (HCC): Primary | ICD-10-CM

## 2018-08-27 DIAGNOSIS — C50.411 MALIGNANT NEOPLASM OF UPPER-OUTER QUADRANT OF RIGHT BREAST IN FEMALE, ESTROGEN RECEPTOR POSITIVE (HCC): Primary | ICD-10-CM

## 2018-08-27 PROCEDURE — 3017F COLORECTAL CA SCREEN DOC REV: CPT | Performed by: INTERNAL MEDICINE

## 2018-08-27 PROCEDURE — 1123F ACP DISCUSS/DSCN MKR DOCD: CPT | Performed by: INTERNAL MEDICINE

## 2018-08-27 PROCEDURE — G8427 DOCREV CUR MEDS BY ELIG CLIN: HCPCS | Performed by: INTERNAL MEDICINE

## 2018-08-27 PROCEDURE — 99211 OFF/OP EST MAY X REQ PHY/QHP: CPT | Performed by: INTERNAL MEDICINE

## 2018-08-27 PROCEDURE — G8399 PT W/DXA RESULTS DOCUMENT: HCPCS | Performed by: INTERNAL MEDICINE

## 2018-08-27 PROCEDURE — 1090F PRES/ABSN URINE INCON ASSESS: CPT | Performed by: INTERNAL MEDICINE

## 2018-08-27 PROCEDURE — 99214 OFFICE O/P EST MOD 30 MIN: CPT | Performed by: INTERNAL MEDICINE

## 2018-08-27 PROCEDURE — 1036F TOBACCO NON-USER: CPT | Performed by: INTERNAL MEDICINE

## 2018-08-27 PROCEDURE — 1101F PT FALLS ASSESS-DOCD LE1/YR: CPT | Performed by: INTERNAL MEDICINE

## 2018-08-27 PROCEDURE — G8419 CALC BMI OUT NRM PARAM NOF/U: HCPCS | Performed by: INTERNAL MEDICINE

## 2018-08-27 PROCEDURE — 4040F PNEUMOC VAC/ADMIN/RCVD: CPT | Performed by: INTERNAL MEDICINE

## 2018-08-27 NOTE — LETTER
Attempt for axillary sampling was not successful because of previous surgery and radiation. No lymph nodes were appreciated in the specimen. Contralateral mastectomy was done at the same time and showed high-grade ductal carcinoma in situ, margins were negative. Genetic testing was negative  We decided to proceed with adjuvant chemotherapy with TCH. She tolerated that fairly well. Plan 6 cycles of chemotherapy followed by maintenance Herceptin and hormone therapy. Completed 6 cycles of chemotherapy without problems  After that she finished one year of herceptin, completed in July/2016. Diagnosed with frozen shoulder, left, 12/2017, underwent PT, range of motion improved, pain persisted. INTERIM HISTORY: Patient presents today for follow up. She has rash on right shoulder - she reports it is recurrent eczema and has cream she uses to treat. She denies any lumps, masses. She has completed PT for frozen shoulder and injection, her range of motion is improved but pain persists. Past Medical History   has a past medical history of Allergic rhinitis; Asthma; Breast cancer ([address] Chemotherapy-induced neuropathy ([address] Frequent UTI; GERD (gastroesophageal reflux disease); Heavy menstrual bleeding; History of bone density study; Pap test, as part of routine gynecological examination; Type II or unspecified type diabetes mellitus without mention of complication, not stated as uncontrolled; and Urinary tract infection. Surgical History   has a past surgical history that includes Dilation and curettage of uterus; sinus surgery; Tonsillectomy; Hysterectomy, total abdominal; Tunneled venous port placement (Left, 7/30/2015); Mastectomy (Bilateral); Breast lumpectomy (1/1994); Breast reconstruction (Bilateral, 06/30/15); Breast reconstruction (Bilateral, 4/21/16); Breast surgery (Bilateral, 04/21/2016); Colonoscopy; Endoscopy, colon, diagnostic; and other surgical history (09/27/2016).     Family History /62 (Site: Left Arm, Position: Sitting)   Pulse 80   Temp 98.5 °F (36.9 °C) (Oral)   Resp 18   Wt 170 lb (77.1 kg)   BMI 25.85 kg/m²      Performance Status:Estimated performance status is ECOG 0    General appearance - well appearing, no in pain or distress   Mental status - alert and cooperative   Eyes - pupils equal and reactive, extraocular eye movements intact   Ears - bilateral TM's and external ear canals normal   Mouth - mucous membranes moist, pharynx normal without lesions   Neck - supple, no significant adenopathy ,trach is central   Lymphatics - no palpable lymphadenopathy, no hepatosplenomegaly   Chest - clear to auscultation, no wheezes, rales or rhonchi, symmetric air entry   Heart - normal rate, regular rhythm, normal S1, S2, no murmurs, rubs, clicks or gallops   Abdomen - tender and swelling from surgery, nondistended, no masses or organomegaly   Neurological - alert, oriented, normal speech, no focal findings or movement disorder noted. Mild numbness on the bottom of foots bilaterally. Musculoskeletal - no joint tenderness, deformity or swelling   Extremities - peripheral pulses normal, trace pedal edema, no clubbing or cyanosis . Skin - normal coloration and turgor, no rashes, no suspicious skin lesions noted  Breast: S/P bilateral mastectomy with reconstruction, nicely healed, no lumps, no adenopathy, no masses.        REVIEW OF LABORATORY DATA:  Lab Results   Component Value Date    WBC 3.3 (L) 02/26/2018    HGB 12.7 02/26/2018    HCT 37.5 02/26/2018    MCV 84.7 02/26/2018     (L) 02/26/2018       Chemistry        Component Value Date/Time     02/26/2018 1101    K 4.2 02/26/2018 1101     02/26/2018 1101    CO2 26 02/26/2018 1101    BUN 13 02/26/2018 1101    CREATININE 0.70 02/26/2018 1101        Component Value Date/Time    CALCIUM 9.1 02/26/2018 1101    ALKPHOS 96 02/26/2018 1101    AST 16 02/26/2018 1101    ALT 23 02/26/2018 1101

## 2018-08-27 NOTE — PROGRESS NOTES
adjuvant chemotherapy with TCH. She tolerated that fairly well. Plan 6 cycles of chemotherapy followed by maintenance Herceptin and hormone therapy. Completed 6 cycles of chemotherapy without problems  After that she finished one year of herceptin, completed in July/2016. Diagnosed with frozen shoulder, left, 12/2017, underwent PT, range of motion improved, pain persisted. INTERIM HISTORY: Patient presents today for follow up. She has rash on right shoulder - she reports it is recurrent eczema and has cream she uses to treat. She denies any lumps, masses. She has completed PT for frozen shoulder and injection, her range of motion is improved but pain persists. Past Medical History   has a past medical history of Allergic rhinitis; Asthma; Breast cancer ([address] Chemotherapy-induced neuropathy ([facility] [address] Frequent UTI; GERD (gastroesophageal reflux disease); Heavy menstrual bleeding; History of bone density study; Pap test, as part of routine gynecological examination; Type II or unspecified type diabetes mellitus without mention of complication, not stated as uncontrolled; and Urinary tract infection. Surgical History   has a past surgical history that includes Dilation and curettage of uterus; sinus surgery; Tonsillectomy; Hysterectomy, total abdominal; Tunneled venous port placement (Left, 7/30/2015); Mastectomy (Bilateral); Breast lumpectomy (1/1994); Breast reconstruction (Bilateral, 06/30/15); Breast reconstruction (Bilateral, 4/21/16); Breast surgery (Bilateral, 04/21/2016); Colonoscopy; Endoscopy, colon, diagnostic; and other surgical history (09/27/2016).     Family History  Family History   Problem Relation Age of Onset    Breast Cancer Mother     Other Mother         heart disease, pacemaker    Other Father         heart disease, thyroid disease    Breast Cancer Maternal Aunt     Breast Cancer Paternal Grandmother     Breast Cancer Maternal Aunt     Asthma Maternal Grandmother        Home Medications  has a current medication list which includes the following prescription(s): dapagliflozin, probiotic acidophilus, glimepiride, true metrix blood glucose test, fluarix quadrivalent, pravastatin, mupirocin, lidocaine, ranitidine, fluticasone, montelukast, cetirizine, lansoprazole, budesonide-formoterol, albuterol sulfate hfa, clobetasol, metformin, and letrozole. Allergies:  Tape [adhesive tape] and Sulfa antibiotics        Review of Systems :    Constitutional: feels much better . No fever or chills. No night sweats, no weight loss   Eyes: No eye discharge, double vision, or eye pain   HEENT: negative for sore mouth or throat, no hoarseness and voice change . Respiratory: negative for cough , sputum, dyspnea, wheezing, hemoptysis, chest pain   Cardiovascular: negative for chest pain, dyspnea, palpitations, orthopnea, PND   Gastrointestinal: negative for nausea, vomiting, no diarrhea, no abdominal pain, dysphagia, hematemesis and hematochezia   Genitourinary: negative for frequency, dysuria, nocturia, urinary incontinence, and hematuria   Integument: Eczema on right shoulder  Hematologic/Lymphatic: negative for easy bruising, bleeding, lymphadenopathy, petechiae and swelling/edema   Endocrine: negative for heat or cold intolerance, tremor, weight changes, change in bowel habits and hair loss   Musculoskeletal: negative for myalgias, arthralgias, pain, joint swelling,and bone pain.  Left shoulder pain - as noted above   Neurological: negative for headaches, dizziness, seizures, weakness, numbness      Physical Examination :       /62 (Site: Left Arm, Position: Sitting)   Pulse 80   Temp 98.5 °F (36.9 °C) (Oral)   Resp 18   Wt 170 lb (77.1 kg)   BMI 25.85 kg/m²     Performance Status:Estimated performance status is ECOG 0    General appearance - well appearing, no in pain or distress   Mental status - alert and cooperative   Eyes - pupils equal and reactive, extraocular eye movements intact Ears - bilateral TM's and external ear canals normal   Mouth - mucous membranes moist, pharynx normal without lesions   Neck - supple, no significant adenopathy ,trach is central   Lymphatics - no palpable lymphadenopathy, no hepatosplenomegaly   Chest - clear to auscultation, no wheezes, rales or rhonchi, symmetric air entry   Heart - normal rate, regular rhythm, normal S1, S2, no murmurs, rubs, clicks or gallops   Abdomen - tender and swelling from surgery, nondistended, no masses or organomegaly   Neurological - alert, oriented, normal speech, no focal findings or movement disorder noted. Mild numbness on the bottom of foots bilaterally. Musculoskeletal - no joint tenderness, deformity or swelling   Extremities - peripheral pulses normal, trace pedal edema, no clubbing or cyanosis . Skin - normal coloration and turgor, no rashes, no suspicious skin lesions noted  Breast: S/P bilateral mastectomy with reconstruction, nicely healed, no lumps, no adenopathy, no masses. REVIEW OF LABORATORY DATA:  Lab Results   Component Value Date    WBC 3.3 (L) 02/26/2018    HGB 12.7 02/26/2018    HCT 37.5 02/26/2018    MCV 84.7 02/26/2018     (L) 02/26/2018       Chemistry        Component Value Date/Time     02/26/2018 1101    K 4.2 02/26/2018 1101     02/26/2018 1101    CO2 26 02/26/2018 1101    BUN 13 02/26/2018 1101    CREATININE 0.70 02/26/2018 1101        Component Value Date/Time    CALCIUM 9.1 02/26/2018 1101    ALKPHOS 96 02/26/2018 1101    AST 16 02/26/2018 1101    ALT 23 02/26/2018 1101    BILITOT 0.45 02/26/2018 1101        REVIEW OF RADIOLOGICAL RESULTS:  09/01/2017 DEXA :  T-SCORE:  LUMBAR:   -0.1  LEFT HIP:  0.0  FEMORAL NECK:  -0.8        ASSESSMENT  1. h/o right breast DCIS in 1994 s/p BCS/EBRT and 7 years of Tamoxifen until 2001  2.  Ipsilateral recurrence, invasive ductal carcinoma that's triple positive, pathological staging is T2 N0 M0.  3. Contralateral DCIS, found incidentally

## 2018-09-19 DIAGNOSIS — E11.9 TYPE 2 DIABETES MELLITUS WITHOUT COMPLICATION, WITHOUT LONG-TERM CURRENT USE OF INSULIN (HCC): ICD-10-CM

## 2018-09-20 RX ORDER — METFORMIN HYDROCHLORIDE 500 MG/1
500 TABLET, EXTENDED RELEASE ORAL 4 TIMES DAILY
Qty: 360 TABLET | Refills: 0 | Status: SHIPPED | OUTPATIENT
Start: 2018-09-20 | End: 2018-12-20 | Stop reason: SDUPTHER

## 2018-09-20 RX ORDER — DAPAGLIFLOZIN 10 MG/1
TABLET, FILM COATED ORAL
Qty: 90 TABLET | Refills: 0 | Status: SHIPPED | OUTPATIENT
Start: 2018-09-20 | End: 2019-01-26 | Stop reason: SDUPTHER

## 2018-10-26 DIAGNOSIS — E11.9 TYPE 2 DIABETES MELLITUS WITHOUT COMPLICATION, WITHOUT LONG-TERM CURRENT USE OF INSULIN (HCC): ICD-10-CM

## 2018-10-30 RX ORDER — GLIMEPIRIDE 4 MG/1
TABLET ORAL
Qty: 90 TABLET | Refills: 0 | Status: SHIPPED | OUTPATIENT
Start: 2018-10-30 | End: 2019-01-26 | Stop reason: SDUPTHER

## 2018-10-31 ENCOUNTER — OFFICE VISIT (OUTPATIENT)
Dept: FAMILY MEDICINE CLINIC | Age: 68
End: 2018-10-31
Payer: MEDICARE

## 2018-10-31 VITALS
OXYGEN SATURATION: 94 % | DIASTOLIC BLOOD PRESSURE: 64 MMHG | RESPIRATION RATE: 14 BRPM | HEART RATE: 79 BPM | SYSTOLIC BLOOD PRESSURE: 119 MMHG | HEIGHT: 68 IN | WEIGHT: 174 LBS | TEMPERATURE: 97 F | BODY MASS INDEX: 26.37 KG/M2

## 2018-10-31 DIAGNOSIS — Z23 NEED FOR SHINGLES VACCINE: ICD-10-CM

## 2018-10-31 DIAGNOSIS — E11.9 TYPE 2 DIABETES MELLITUS WITHOUT COMPLICATION, WITHOUT LONG-TERM CURRENT USE OF INSULIN (HCC): Primary | ICD-10-CM

## 2018-10-31 PROCEDURE — 1101F PT FALLS ASSESS-DOCD LE1/YR: CPT | Performed by: FAMILY MEDICINE

## 2018-10-31 PROCEDURE — 3045F PR MOST RECENT HEMOGLOBIN A1C LEVEL 7.0-9.0%: CPT | Performed by: FAMILY MEDICINE

## 2018-10-31 PROCEDURE — G8427 DOCREV CUR MEDS BY ELIG CLIN: HCPCS | Performed by: FAMILY MEDICINE

## 2018-10-31 PROCEDURE — 3017F COLORECTAL CA SCREEN DOC REV: CPT | Performed by: FAMILY MEDICINE

## 2018-10-31 PROCEDURE — G8482 FLU IMMUNIZE ORDER/ADMIN: HCPCS | Performed by: FAMILY MEDICINE

## 2018-10-31 PROCEDURE — 90662 IIV NO PRSV INCREASED AG IM: CPT | Performed by: FAMILY MEDICINE

## 2018-10-31 PROCEDURE — 1036F TOBACCO NON-USER: CPT | Performed by: FAMILY MEDICINE

## 2018-10-31 PROCEDURE — 1123F ACP DISCUSS/DSCN MKR DOCD: CPT | Performed by: FAMILY MEDICINE

## 2018-10-31 PROCEDURE — G8419 CALC BMI OUT NRM PARAM NOF/U: HCPCS | Performed by: FAMILY MEDICINE

## 2018-10-31 PROCEDURE — G0008 ADMIN INFLUENZA VIRUS VAC: HCPCS | Performed by: FAMILY MEDICINE

## 2018-10-31 PROCEDURE — 4040F PNEUMOC VAC/ADMIN/RCVD: CPT | Performed by: FAMILY MEDICINE

## 2018-10-31 PROCEDURE — G8399 PT W/DXA RESULTS DOCUMENT: HCPCS | Performed by: FAMILY MEDICINE

## 2018-10-31 PROCEDURE — 2022F DILAT RTA XM EVC RTNOPTHY: CPT | Performed by: FAMILY MEDICINE

## 2018-10-31 PROCEDURE — 1090F PRES/ABSN URINE INCON ASSESS: CPT | Performed by: FAMILY MEDICINE

## 2018-10-31 PROCEDURE — 99213 OFFICE O/P EST LOW 20 MIN: CPT | Performed by: FAMILY MEDICINE

## 2018-10-31 RX ORDER — CALCIUM CITRATE/VITAMIN D3 200MG-6.25
TABLET ORAL
Qty: 200 EACH | Refills: 1 | Status: SHIPPED | OUTPATIENT
Start: 2018-10-31 | End: 2019-10-22 | Stop reason: SDUPTHER

## 2018-10-31 RX ORDER — GREEN TEA/HOODIA GORDONII 315-12.5MG
1 CAPSULE ORAL DAILY
Qty: 90 TABLET | Refills: 3 | Status: SHIPPED | OUTPATIENT
Start: 2018-10-31 | End: 2019-02-27

## 2018-10-31 ASSESSMENT — PATIENT HEALTH QUESTIONNAIRE - PHQ9
SUM OF ALL RESPONSES TO PHQ QUESTIONS 1-9: 0
2. FEELING DOWN, DEPRESSED OR HOPELESS: 0
1. LITTLE INTEREST OR PLEASURE IN DOING THINGS: 0
SUM OF ALL RESPONSES TO PHQ9 QUESTIONS 1 & 2: 0
SUM OF ALL RESPONSES TO PHQ QUESTIONS 1-9: 0

## 2018-10-31 NOTE — PATIENT INSTRUCTIONS
Patient Education          levocetirizine  Pronunciation:  MARILYN mcnally pato  Brand:  Xyzal  What is the most important information I should know about levocetirizine? Follow all directions on your medicine label and package. Tell each of your healthcare providers about all your medical conditions, allergies, and all medicines you use. What is levocetirizine? Levocetirizine is an antihistamine that reduces the effects of natural chemical histamine in the body. Histamine can produce symptoms of sneezing, itching, watery eyes, and runny nose. Levocetirizine is used to treat symptoms of year-round (perennial) allergies in children who are at least 7 months old. Levocetirizine is also used to treat itching and swelling caused by chronic urticaria (hives) in adults and children who are at least 6 months old. Levocetirizine may also be used for purposes not listed in this medication guide. What should I discuss with my healthcare provider before taking levocetirizine? You should not use this medicine if you are allergic to levocetirizine or cetirizine (Zyrtec). You should not take levocetirizine if you have end-stage kidney disease or if you are on dialysis. Any child younger than 15years old with kidney disease should not take levocetirizine. To make sure levocetirizine is safe for you, tell your doctor if you have:  · kidney disease;  · liver disease;  · urination problems (caused by conditions such as enlarged prostate or spinal cord lesion); or  · gallbladder problems. It is not known whether this medicine will harm an unborn baby. Tell your doctor if you are pregnant or plan to become pregnant. Levocetirizine can pass into breast milk and may harm a nursing baby. You should not breast-feed while using this medicine. Levocetirizine is not approved for use by anyone younger than 7 months old. How should I take levocetirizine? Follow the directions on your prescription label.  Do not take this effects may include:  · drowsiness, weakness;  · feeling tired;  · fever;  · stuffy nose, sinus pain, sore throat, cough;  · nosebleed;  · ear infection;  · vomiting, diarrhea, constipation;  · dry mouth; or  · weight gain. This is not a complete list of side effects and others may occur. Call your doctor for medical advice about side effects. You may report side effects to FDA at 2-080-LPZ-5788. What other drugs will affect levocetirizine? Taking this medicine with other drugs that make you sleepy can worsen this effect. Ask your doctor before taking levocetirizine with a sleeping pill, narcotic pain medicine, muscle relaxer, or medicine for anxiety, depression, or seizures. Tell your doctor about all your current medicines and any you start or stop using, especially:  · ritonavir (Norvir, Kaletra); or  · theophylline (Aquaphyllin, Asmalix, Elixophyllin, Theolair, Theosol). This list is not complete. Other drugs may interact with levocetirizine, including prescription and over-the-counter medicines, vitamins, and herbal products. Not all possible interactions are listed in this medication guide. Where can I get more information? Your pharmacist can provide more information about levocetirizine. Remember, keep this and all other medicines out of the reach of children, never share your medicines with others, and use this medication only for the indication prescribed. Every effort has been made to ensure that the information provided by [address] is accurate, up-to-date, and complete, but no guarantee is made to that effect. Drug information contained herein may be time sensitive. [facility] information has been compiled for use by healthcare practitioners and consumers in the [place] and therefore [facility] does not warrant that uses outside of the [place] are appropriate, unless specifically indicated otherwise.  [facility]'s drug information does not endorse drugs, diagnose patients or recommend therapy. [facility] drug information is an informational resource designed to assist licensed healthcare practitioners in caring for their patients and/or to serve consumers viewing this service as a supplement to, and not a substitute for, the expertise, skill, knowledge and judgment of healthcare practitioners. The absence of a warning for a given drug or drug combination in no way should be construed to indicate that the drug or drug combination is safe, effective or appropriate for any given patient. [facility] does not assume any responsibility for any aspect of healthcare administered with the aid of information [facility] provides. The information contained herein is not intended to cover all possible uses, directions, precautions, warnings, drug interactions, allergic reactions, or adverse effects. If you have questions about the drugs you are taking, check with your doctor, nurse or pharmacist.  Copyright 1010-1106 [address]. Version: 4.01. Revision date: 4/24/2017. Care instructions adapted under license by [facility] ([facility]). If you have questions about a medical condition or this instruction, always ask your healthcare professional. [place] [address] any warranty or liability for your use of this information. Patient Education        Ankle Sprain: Rehab Exercises  Your Care Instructions  Here are some examples of typical rehabilitation exercises for your condition. Start each exercise slowly. Ease off the exercise if you start to have pain. Your doctor or physical therapist will tell you when you can start these exercises and which ones will work best for you. How to do the exercises  \"Alphabet\" exercise    1. Trace the alphabet with your toe. This helps your ankle move in all directions. Side-to-side knee swing exercise    1. Sit in a chair with your foot flat on the floor. 2. Slowly move your knee from side to side. Keep your foot pressed flat.   3. Continue this exercise for 2 to 3 minutes. Towel curl    1. While sitting, place your foot on a towel on the floor. Scrunch the towel toward you with your toes. 2. Then use your toes to push the towel away from you. 3. To make this exercise more challenging you can put something on the other end of the towel. A can of soup is about the right weight for this. Towel stretch    1. Sit with your legs extended and knees straight. 2. Place a towel around your foot just under the toes. 3. Hold each end of the towel in each hand, with your hands above your knees. 4. Pull back with the towel so that your foot stretches toward you. 5. Hold the position for at least 15 to 30 seconds. 6. Repeat 2 to 4 times a session. Do up to 5 sessions a day. Ankle eversion exercise    1. Start by sitting with your foot flat on the floor. Push your foot outward against a wall or a piece of furniture that doesn't move. Hold for about 6 seconds, and relax. Repeat 8 to 12 times. 2. After you feel comfortable with this, try using rubber tubing looped around the outside of your feet for resistance. Push your foot out to the side against the tubing, and then count to 10 as you slowly bring your foot back to the middle. Repeat 8 to 12 times. Isometric opposition exercises    1. While sitting, put your feet together flat on the floor. 2. Press your injured foot inward against your other foot. Hold for about 6 seconds, and relax. Repeat 8 to 12 times. 3. Then place the heel of your other foot on top of the injured one. Push down with the top heel while trying to push up with your injured foot. Hold for about 6 seconds, and relax. Repeat 8 to 12 times. Resisted ankle inversion    1. Sit on the floor with your good leg crossed over your other leg. 2. Hold both ends of an exercise band and loop the band around the inside of your affected foot. Then press your other foot against the band.   3. Keeping your legs crossed, slowly push your affected foot against the good idea to know your test results and keep a list of the medicines you take. Where can you learn more? Go to [email or web address] and sign in to your [employer] account. Enter Lucie Francis in the [facility] box to learn more about \"Ankle Sprain: Rehab Exercises. \"     If you do not have an account, please click on the \"Sign Up Now\" link. Current as of: November 29, 2017  Content Version: 11.7  © 8790-6185 for[MD], Incorporated. Care instructions adapted under license by [facility] ([facility]). If you have questions about a medical condition or this instruction, always ask your healthcare professional. [address] any warranty or liability for your use of this information.

## 2018-12-20 DIAGNOSIS — E11.9 TYPE 2 DIABETES MELLITUS WITHOUT COMPLICATION, WITHOUT LONG-TERM CURRENT USE OF INSULIN (HCC): ICD-10-CM

## 2018-12-21 RX ORDER — METFORMIN HYDROCHLORIDE 500 MG/1
TABLET, EXTENDED RELEASE ORAL
Qty: 360 TABLET | Refills: 0 | Status: SHIPPED | OUTPATIENT
Start: 2018-12-21 | End: 2019-03-25 | Stop reason: SDUPTHER

## 2019-02-20 DIAGNOSIS — C50.419 MALIGNANT NEOPLASM OF UPPER-OUTER QUADRANT OF FEMALE BREAST, UNSPECIFIED ESTROGEN RECEPTOR STATUS, UNSPECIFIED LATERALITY (HCC): ICD-10-CM

## 2019-02-21 ENCOUNTER — HOSPITAL ENCOUNTER (OUTPATIENT)
Facility: MEDICAL CENTER | Age: 69
End: 2019-02-21
Payer: MEDICARE

## 2019-02-25 ENCOUNTER — HOSPITAL ENCOUNTER (OUTPATIENT)
Facility: MEDICAL CENTER | Age: 69
Discharge: HOME | End: 2019-02-25
Payer: MEDICARE

## 2019-02-25 DIAGNOSIS — C50.419 MALIGNANT NEOPLASM OF UPPER-OUTER QUADRANT OF FEMALE BREAST, UNSPECIFIED ESTROGEN RECEPTOR STATUS, UNSPECIFIED LATERALITY (HCC): ICD-10-CM

## 2019-02-25 LAB
ABSOLUTE EOS #: 0.1 K/UL (ref 0–0.4)
ABSOLUTE IMMATURE GRANULOCYTE: ABNORMAL K/UL (ref 0–0.3)
ABSOLUTE LYMPH #: 1 K/UL (ref 1–4.8)
ABSOLUTE MONO #: 0.2 K/UL (ref 0.2–0.8)
ALBUMIN SERPL-MCNC: 4.4 G/DL (ref 3.5–5.2)
ALBUMIN/GLOBULIN RATIO: ABNORMAL (ref 1–2.5)
ALP BLD-CCNC: 96 U/L (ref 35–104)
ALT SERPL-CCNC: 31 U/L (ref 5–33)
ANION GAP SERPL CALCULATED.3IONS-SCNC: 12 MMOL/L (ref 9–17)
AST SERPL-CCNC: 24 U/L
BASOPHILS # BLD: 1 % (ref 0–2)
BASOPHILS ABSOLUTE: 0 K/UL (ref 0–0.2)
BILIRUB SERPL-MCNC: 0.53 MG/DL (ref 0.3–1.2)
BUN BLDV-MCNC: 14 MG/DL (ref 8–23)
BUN/CREAT BLD: 18 (ref 9–20)
CALCIUM SERPL-MCNC: 9.2 MG/DL (ref 8.6–10.4)
CHLORIDE BLD-SCNC: 100 MMOL/L (ref 98–107)
CO2: 27 MMOL/L (ref 20–31)
CREAT SERPL-MCNC: 0.79 MG/DL (ref 0.5–0.9)
DIFFERENTIAL TYPE: ABNORMAL
EOSINOPHILS RELATIVE PERCENT: 3 % (ref 1–4)
ESTIMATED AVERAGE GLUCOSE: 146 MG/DL
GFR AFRICAN AMERICAN: >60 ML/MIN
GFR NON-AFRICAN AMERICAN: >60 ML/MIN
GFR SERPL CREATININE-BSD FRML MDRD: ABNORMAL ML/MIN/{1.73_M2}
GFR SERPL CREATININE-BSD FRML MDRD: ABNORMAL ML/MIN/{1.73_M2}
GLUCOSE BLD-MCNC: 158 MG/DL (ref 70–99)
HBA1C MFR BLD: 6.7 % (ref 4–6)
HCT VFR BLD CALC: 41.6 % (ref 36–46)
HEMOGLOBIN: 13.8 G/DL (ref 12–16)
IMMATURE GRANULOCYTES: ABNORMAL %
LYMPHOCYTES # BLD: 25 % (ref 24–44)
MCH RBC QN AUTO: 27.6 PG (ref 26–34)
MCHC RBC AUTO-ENTMCNC: 33.2 G/DL (ref 31–37)
MCV RBC AUTO: 83.1 FL (ref 80–100)
MONOCYTES # BLD: 6 % (ref 1–7)
NRBC AUTOMATED: ABNORMAL PER 100 WBC
PDW BLD-RTO: 15.1 % (ref 11.5–14.5)
PLATELET # BLD: 135 K/UL (ref 130–400)
PLATELET ESTIMATE: ABNORMAL
PMV BLD AUTO: 9.1 FL (ref 6–12)
POTASSIUM SERPL-SCNC: 4.3 MMOL/L (ref 3.7–5.3)
RBC # BLD: 5.01 M/UL (ref 4–5.2)
RBC # BLD: ABNORMAL 10*6/UL
SEG NEUTROPHILS: 65 % (ref 36–66)
SEGMENTED NEUTROPHILS ABSOLUTE COUNT: 2.5 K/UL (ref 1.8–7.7)
SODIUM BLD-SCNC: 139 MMOL/L (ref 135–144)
TOTAL PROTEIN: 7.1 G/DL (ref 6.4–8.3)
WBC # BLD: 3.9 K/UL (ref 3.5–11)
WBC # BLD: ABNORMAL 10*3/UL

## 2019-02-25 PROCEDURE — 80053 COMPREHEN METABOLIC PANEL: CPT

## 2019-02-25 PROCEDURE — 85025 COMPLETE CBC W/AUTO DIFF WBC: CPT

## 2019-02-25 PROCEDURE — 83036 HEMOGLOBIN GLYCOSYLATED A1C: CPT

## 2019-02-25 PROCEDURE — 36415 COLL VENOUS BLD VENIPUNCTURE: CPT

## 2019-02-27 ENCOUNTER — OFFICE VISIT (OUTPATIENT)
Dept: ONCOLOGY | Age: 69
End: 2019-02-27
Payer: MEDICARE

## 2019-02-27 ENCOUNTER — TELEPHONE (OUTPATIENT)
Dept: ONCOLOGY | Age: 69
End: 2019-02-27

## 2019-02-27 VITALS
RESPIRATION RATE: 18 BRPM | HEART RATE: 77 BPM | SYSTOLIC BLOOD PRESSURE: 113 MMHG | TEMPERATURE: 97.7 F | DIASTOLIC BLOOD PRESSURE: 59 MMHG | WEIGHT: 171.9 LBS | BODY MASS INDEX: 26.14 KG/M2

## 2019-02-27 DIAGNOSIS — D05.12 DUCTAL CARCINOMA IN SITU (DCIS) OF LEFT BREAST: ICD-10-CM

## 2019-02-27 DIAGNOSIS — C50.419 MALIGNANT NEOPLASM OF UPPER-OUTER QUADRANT OF FEMALE BREAST, UNSPECIFIED ESTROGEN RECEPTOR STATUS, UNSPECIFIED LATERALITY (HCC): Primary | ICD-10-CM

## 2019-02-27 PROCEDURE — 99212 OFFICE O/P EST SF 10 MIN: CPT

## 2019-02-27 PROCEDURE — 1101F PT FALLS ASSESS-DOCD LE1/YR: CPT | Performed by: INTERNAL MEDICINE

## 2019-02-27 PROCEDURE — 4040F PNEUMOC VAC/ADMIN/RCVD: CPT | Performed by: INTERNAL MEDICINE

## 2019-02-27 PROCEDURE — 99214 OFFICE O/P EST MOD 30 MIN: CPT | Performed by: INTERNAL MEDICINE

## 2019-02-27 PROCEDURE — 1090F PRES/ABSN URINE INCON ASSESS: CPT | Performed by: INTERNAL MEDICINE

## 2019-02-27 PROCEDURE — G8427 DOCREV CUR MEDS BY ELIG CLIN: HCPCS | Performed by: INTERNAL MEDICINE

## 2019-02-27 PROCEDURE — 1036F TOBACCO NON-USER: CPT | Performed by: INTERNAL MEDICINE

## 2019-02-27 PROCEDURE — G8419 CALC BMI OUT NRM PARAM NOF/U: HCPCS | Performed by: INTERNAL MEDICINE

## 2019-02-27 PROCEDURE — 1123F ACP DISCUSS/DSCN MKR DOCD: CPT | Performed by: INTERNAL MEDICINE

## 2019-02-27 PROCEDURE — G8482 FLU IMMUNIZE ORDER/ADMIN: HCPCS | Performed by: INTERNAL MEDICINE

## 2019-02-27 PROCEDURE — G8399 PT W/DXA RESULTS DOCUMENT: HCPCS | Performed by: INTERNAL MEDICINE

## 2019-02-27 PROCEDURE — 3017F COLORECTAL CA SCREEN DOC REV: CPT | Performed by: INTERNAL MEDICINE

## 2019-02-27 RX ORDER — LETROZOLE 2.5 MG/1
2.5 TABLET, FILM COATED ORAL DAILY
Qty: 90 TABLET | Refills: 3 | Status: SHIPPED | OUTPATIENT
Start: 2019-02-27 | End: 2020-02-28 | Stop reason: SDUPTHER

## 2019-03-25 DIAGNOSIS — E11.9 TYPE 2 DIABETES MELLITUS WITHOUT COMPLICATION, WITHOUT LONG-TERM CURRENT USE OF INSULIN (HCC): ICD-10-CM

## 2019-03-25 RX ORDER — METFORMIN HYDROCHLORIDE 500 MG/1
TABLET, EXTENDED RELEASE ORAL
Qty: 360 TABLET | Refills: 0 | Status: SHIPPED | OUTPATIENT
Start: 2019-03-25 | End: 2019-06-25 | Stop reason: SDUPTHER

## 2019-04-19 DIAGNOSIS — E11.9 TYPE 2 DIABETES MELLITUS WITHOUT COMPLICATION, WITHOUT LONG-TERM CURRENT USE OF INSULIN (HCC): ICD-10-CM

## 2019-04-22 RX ORDER — DAPAGLIFLOZIN 10 MG/1
TABLET, FILM COATED ORAL
Qty: 90 TABLET | Refills: 0 | Status: SHIPPED | OUTPATIENT
Start: 2019-04-22 | End: 2019-07-28 | Stop reason: SDUPTHER

## 2019-04-22 RX ORDER — GLIMEPIRIDE 4 MG/1
TABLET ORAL
Qty: 90 TABLET | Refills: 0 | Status: SHIPPED | OUTPATIENT
Start: 2019-04-22 | End: 2019-07-28 | Stop reason: SDUPTHER

## 2019-06-25 DIAGNOSIS — E11.9 TYPE 2 DIABETES MELLITUS WITHOUT COMPLICATION, WITHOUT LONG-TERM CURRENT USE OF INSULIN (HCC): ICD-10-CM

## 2019-06-25 RX ORDER — METFORMIN HYDROCHLORIDE 500 MG/1
TABLET, EXTENDED RELEASE ORAL
Qty: 360 TABLET | Refills: 0 | Status: SHIPPED | OUTPATIENT
Start: 2019-06-25 | End: 2019-09-24 | Stop reason: SDUPTHER

## 2019-07-28 DIAGNOSIS — E11.9 TYPE 2 DIABETES MELLITUS WITHOUT COMPLICATION, WITHOUT LONG-TERM CURRENT USE OF INSULIN (HCC): ICD-10-CM

## 2019-07-29 RX ORDER — DAPAGLIFLOZIN 10 MG/1
TABLET, FILM COATED ORAL
Qty: 90 TABLET | Refills: 0 | Status: SHIPPED | OUTPATIENT
Start: 2019-07-29 | End: 2019-11-26 | Stop reason: SDUPTHER

## 2019-07-29 RX ORDER — GLIMEPIRIDE 4 MG/1
TABLET ORAL
Qty: 90 TABLET | Refills: 0 | Status: SHIPPED | OUTPATIENT
Start: 2019-07-29 | End: 2019-10-28 | Stop reason: SDUPTHER

## 2019-08-07 ENCOUNTER — HOSPITAL ENCOUNTER (OUTPATIENT)
Facility: MEDICAL CENTER | Age: 69
Discharge: HOME | End: 2019-08-07
Payer: MEDICARE

## 2019-08-07 ENCOUNTER — HOSPITAL ENCOUNTER (OUTPATIENT)
Dept: MRI IMAGING | Age: 69
Discharge: HOME | End: 2019-08-09
Payer: MEDICARE

## 2019-08-07 DIAGNOSIS — C50.419 MALIGNANT NEOPLASM OF UPPER-OUTER QUADRANT OF FEMALE BREAST, UNSPECIFIED ESTROGEN RECEPTOR STATUS, UNSPECIFIED LATERALITY (HCC): ICD-10-CM

## 2019-08-07 DIAGNOSIS — D05.12 DUCTAL CARCINOMA IN SITU (DCIS) OF LEFT BREAST: ICD-10-CM

## 2019-08-07 LAB
ABSOLUTE EOS #: 0.13 K/UL (ref 0–0.44)
ABSOLUTE IMMATURE GRANULOCYTE: 0.01 K/UL (ref 0–0.3)
ABSOLUTE LYMPH #: 1.12 K/UL (ref 1.1–3.7)
ABSOLUTE MONO #: 0.24 K/UL (ref 0.1–1.2)
ALBUMIN SERPL-MCNC: 4.6 G/DL (ref 3.5–5.2)
ALBUMIN/GLOBULIN RATIO: ABNORMAL (ref 1–2.5)
ALP BLD-CCNC: 97 U/L (ref 35–104)
ALT SERPL-CCNC: 30 U/L (ref 5–33)
ANION GAP SERPL CALCULATED.3IONS-SCNC: 12 MMOL/L (ref 9–17)
AST SERPL-CCNC: 26 U/L
BASOPHILS # BLD: 1 % (ref 0–2)
BASOPHILS ABSOLUTE: <0.03 K/UL (ref 0–0.2)
BILIRUB SERPL-MCNC: 0.47 MG/DL (ref 0.3–1.2)
BUN BLDV-MCNC: 17 MG/DL (ref 8–23)
BUN/CREAT BLD: 20 (ref 9–20)
CALCIUM SERPL-MCNC: 9.8 MG/DL (ref 8.6–10.4)
CHLORIDE BLD-SCNC: 100 MMOL/L (ref 98–107)
CO2: 25 MMOL/L (ref 20–31)
CREAT SERPL-MCNC: 0.77 MG/DL (ref 0.5–0.9)
CREAT SERPL-MCNC: 0.83 MG/DL (ref 0.5–0.9)
DIFFERENTIAL TYPE: ABNORMAL
EOSINOPHILS RELATIVE PERCENT: 3 % (ref 1–4)
GFR AFRICAN AMERICAN: >60 ML/MIN
GFR AFRICAN AMERICAN: >60 ML/MIN
GFR NON-AFRICAN AMERICAN: >60 ML/MIN
GFR NON-AFRICAN AMERICAN: >60 ML/MIN
GFR SERPL CREATININE-BSD FRML MDRD: ABNORMAL ML/MIN/{1.73_M2}
GFR SERPL CREATININE-BSD FRML MDRD: ABNORMAL ML/MIN/{1.73_M2}
GFR SERPL CREATININE-BSD FRML MDRD: NORMAL ML/MIN/{1.73_M2}
GFR SERPL CREATININE-BSD FRML MDRD: NORMAL ML/MIN/{1.73_M2}
GLUCOSE BLD-MCNC: 152 MG/DL (ref 70–99)
HCT VFR BLD CALC: 45.5 % (ref 36.3–47.1)
HEMOGLOBIN: 14.2 G/DL (ref 11.9–15.1)
IMMATURE GRANULOCYTES: 0 %
LYMPHOCYTES # BLD: 27 % (ref 24–43)
MCH RBC QN AUTO: 27.4 PG (ref 25.2–33.5)
MCHC RBC AUTO-ENTMCNC: 31.2 G/DL (ref 28.4–34.8)
MCV RBC AUTO: 87.8 FL (ref 82.6–102.9)
MONOCYTES # BLD: 6 % (ref 3–12)
NRBC AUTOMATED: 0 PER 100 WBC
PDW BLD-RTO: 14.5 % (ref 11.8–14.4)
PLATELET # BLD: 142 K/UL (ref 138–453)
PLATELET ESTIMATE: ABNORMAL
PMV BLD AUTO: 11.3 FL (ref 8.1–13.5)
POTASSIUM SERPL-SCNC: 4.2 MMOL/L (ref 3.7–5.3)
RBC # BLD: 5.18 M/UL (ref 3.95–5.11)
RBC # BLD: ABNORMAL 10*6/UL
SEG NEUTROPHILS: 63 % (ref 36–65)
SEGMENTED NEUTROPHILS ABSOLUTE COUNT: 2.63 K/UL (ref 1.5–8.1)
SODIUM BLD-SCNC: 137 MMOL/L (ref 135–144)
TOTAL PROTEIN: 7.5 G/DL (ref 6.4–8.3)
WBC # BLD: 4.2 K/UL (ref 3.5–11.3)
WBC # BLD: ABNORMAL 10*3/UL

## 2019-08-07 PROCEDURE — 6360000004 HC RX CONTRAST MEDICATION: Performed by: INTERNAL MEDICINE

## 2019-08-07 PROCEDURE — 36415 COLL VENOUS BLD VENIPUNCTURE: CPT

## 2019-08-07 PROCEDURE — A9579 GAD-BASE MR CONTRAST NOS,1ML: HCPCS | Performed by: INTERNAL MEDICINE

## 2019-08-07 PROCEDURE — 77049 MRI BREAST C-+ W/CAD BI: CPT

## 2019-08-07 PROCEDURE — 85025 COMPLETE CBC W/AUTO DIFF WBC: CPT

## 2019-08-07 PROCEDURE — 80053 COMPREHEN METABOLIC PANEL: CPT

## 2019-08-07 PROCEDURE — 82565 ASSAY OF CREATININE: CPT

## 2019-08-07 PROCEDURE — 2580000003 HC RX 258: Performed by: INTERNAL MEDICINE

## 2019-08-07 PROCEDURE — 2500000003 HC RX 250 WO HCPCS: Performed by: INTERNAL MEDICINE

## 2019-08-07 RX ORDER — SODIUM CHLORIDE 0.9 % (FLUSH) 0.9 %
10 SYRINGE (ML) INJECTION
Status: COMPLETED | OUTPATIENT
Start: 2019-08-07 | End: 2019-08-07

## 2019-08-07 RX ORDER — BACTERIOSTATIC SODIUM CHLORIDE 0.9 %
20 VIAL (ML) INJECTION
Status: COMPLETED | OUTPATIENT
Start: 2019-08-07 | End: 2019-08-07

## 2019-08-07 RX ADMIN — SODIUM CHLORIDE 20 ML: 9 INJECTION INTRAMUSCULAR; INTRAVENOUS; SUBCUTANEOUS at 11:51

## 2019-08-07 RX ADMIN — GADOTERIDOL 17 ML: 279.3 INJECTION, SOLUTION INTRAVENOUS at 11:51

## 2019-08-07 RX ADMIN — Medication 10 ML: at 11:51

## 2019-08-22 DIAGNOSIS — M25.511 RIGHT SHOULDER PAIN, UNSPECIFIED CHRONICITY: Primary | ICD-10-CM

## 2019-08-26 ENCOUNTER — OFFICE VISIT (OUTPATIENT)
Dept: FAMILY MEDICINE CLINIC | Age: 69
End: 2019-08-26
Payer: MEDICARE

## 2019-08-26 ENCOUNTER — OFFICE VISIT (OUTPATIENT)
Dept: ORTHOPEDIC SURGERY | Age: 69
End: 2019-08-26
Payer: MEDICARE

## 2019-08-26 VITALS
SYSTOLIC BLOOD PRESSURE: 115 MMHG | WEIGHT: 166 LBS | HEART RATE: 81 BPM | HEIGHT: 68 IN | DIASTOLIC BLOOD PRESSURE: 69 MMHG | BODY MASS INDEX: 25.16 KG/M2

## 2019-08-26 VITALS
BODY MASS INDEX: 25.4 KG/M2 | OXYGEN SATURATION: 100 % | TEMPERATURE: 96.7 F | SYSTOLIC BLOOD PRESSURE: 129 MMHG | WEIGHT: 167 LBS | DIASTOLIC BLOOD PRESSURE: 75 MMHG | HEART RATE: 88 BPM

## 2019-08-26 DIAGNOSIS — E11.9 TYPE 2 DIABETES MELLITUS WITHOUT COMPLICATION, WITHOUT LONG-TERM CURRENT USE OF INSULIN (HCC): ICD-10-CM

## 2019-08-26 DIAGNOSIS — M75.42 ROTATOR CUFF IMPINGEMENT SYNDROME OF LEFT SHOULDER: Primary | ICD-10-CM

## 2019-08-26 DIAGNOSIS — M25.511 RIGHT SHOULDER PAIN, UNSPECIFIED CHRONICITY: Primary | ICD-10-CM

## 2019-08-26 DIAGNOSIS — G62.0 CHEMOTHERAPY-INDUCED NEUROPATHY (HCC): ICD-10-CM

## 2019-08-26 DIAGNOSIS — T45.1X5A CHEMOTHERAPY-INDUCED NEUROPATHY (HCC): ICD-10-CM

## 2019-08-26 DIAGNOSIS — Z23 NEED FOR SHINGLES VACCINE: ICD-10-CM

## 2019-08-26 DIAGNOSIS — R05.3 PERSISTENT COUGH FOR 3 WEEKS OR LONGER: Primary | ICD-10-CM

## 2019-08-26 PROCEDURE — 2022F DILAT RTA XM EVC RTNOPTHY: CPT | Performed by: FAMILY MEDICINE

## 2019-08-26 PROCEDURE — 99214 OFFICE O/P EST MOD 30 MIN: CPT | Performed by: FAMILY MEDICINE

## 2019-08-26 PROCEDURE — G8399 PT W/DXA RESULTS DOCUMENT: HCPCS | Performed by: FAMILY MEDICINE

## 2019-08-26 PROCEDURE — G8427 DOCREV CUR MEDS BY ELIG CLIN: HCPCS | Performed by: FAMILY MEDICINE

## 2019-08-26 PROCEDURE — 20610 DRAIN/INJ JOINT/BURSA W/O US: CPT | Performed by: PHYSICIAN ASSISTANT

## 2019-08-26 PROCEDURE — G8419 CALC BMI OUT NRM PARAM NOF/U: HCPCS | Performed by: FAMILY MEDICINE

## 2019-08-26 PROCEDURE — 99213 OFFICE O/P EST LOW 20 MIN: CPT | Performed by: PHYSICIAN ASSISTANT

## 2019-08-26 PROCEDURE — 1090F PRES/ABSN URINE INCON ASSESS: CPT | Performed by: FAMILY MEDICINE

## 2019-08-26 PROCEDURE — 3017F COLORECTAL CA SCREEN DOC REV: CPT | Performed by: FAMILY MEDICINE

## 2019-08-26 PROCEDURE — 3044F HG A1C LEVEL LT 7.0%: CPT | Performed by: FAMILY MEDICINE

## 2019-08-26 PROCEDURE — 1036F TOBACCO NON-USER: CPT | Performed by: FAMILY MEDICINE

## 2019-08-26 PROCEDURE — 4040F PNEUMOC VAC/ADMIN/RCVD: CPT | Performed by: FAMILY MEDICINE

## 2019-08-26 PROCEDURE — 1123F ACP DISCUSS/DSCN MKR DOCD: CPT | Performed by: FAMILY MEDICINE

## 2019-08-26 RX ORDER — LIDOCAINE HYDROCHLORIDE 10 MG/ML
4 INJECTION, SOLUTION INFILTRATION; PERINEURAL ONCE
Status: COMPLETED | OUTPATIENT
Start: 2019-08-26 | End: 2019-08-26

## 2019-08-26 RX ORDER — METHYLPREDNISOLONE ACETATE 40 MG/ML
40 INJECTION, SUSPENSION INTRA-ARTICULAR; INTRALESIONAL; INTRAMUSCULAR; SOFT TISSUE ONCE
Status: COMPLETED | OUTPATIENT
Start: 2019-08-26 | End: 2019-08-26

## 2019-08-26 RX ORDER — PREDNISONE 20 MG/1
20 TABLET ORAL DAILY
Qty: 3 TABLET | Refills: 0 | Status: SHIPPED | OUTPATIENT
Start: 2019-08-26 | End: 2019-08-29

## 2019-08-26 RX ORDER — CLARITHROMYCIN 500 MG/1
250 TABLET, COATED ORAL 2 TIMES DAILY
Qty: 10 TABLET | Refills: 0 | Status: SHIPPED | OUTPATIENT
Start: 2019-08-26 | End: 2019-09-05

## 2019-08-26 RX ADMIN — METHYLPREDNISOLONE ACETATE 40 MG: 40 INJECTION, SUSPENSION INTRA-ARTICULAR; INTRALESIONAL; INTRAMUSCULAR; SOFT TISSUE at 12:06

## 2019-08-26 RX ADMIN — LIDOCAINE HYDROCHLORIDE 4 ML: 10 INJECTION, SOLUTION INFILTRATION; PERINEURAL at 12:05

## 2019-08-26 ASSESSMENT — ENCOUNTER SYMPTOMS
VOMITING: 0
CONSTIPATION: 0
SHORTNESS OF BREATH: 0
ABDOMINAL PAIN: 0
CHEST TIGHTNESS: 0
DIARRHEA: 0
COLOR CHANGE: 0
NAUSEA: 0
ABDOMINAL DISTENTION: 0
APNEA: 0
COUGH: 0

## 2019-08-26 NOTE — PROGRESS NOTES
[address] [facility]  [address] [place] [address]  Dept: [phone]  Dept Fax: [phone]                          Left Shoulder - Follow-Up    Chief Complaint:     Chief Complaint   Patient presents with    Shoulder Pain     LT shoulder pain. Pt states that PT has helped her range of motion, but pain has continued. HPI:     Jose Krishnamurthy is a 77y.o. year old right hand dominant female that has had pain in the left shoulder for 5 years. The patient does not recall any direct injuries or trauma to the left shoulder. The pain is worse at night and when doing overhead activities. Weakness of the shoulder has been noted. The pain is now described as Achy. The pain restricts activities such as nothing. She is able to do everything that she wants to, but it just hurts. The pain does not seem to improve with time. The following medications have been tried Aspirin, Motrin, ice, heat, and physical therapy with mild improvement. The patient has had a corticosteroid injection. She reports that her last cortisone injection was sometimes in 2018. The patient has tried physical therapy. The patient has not has surgery. The opposite shoulder is okay. Neck pain has not been present. The patient was diagnosed with adhesive capsulitis of the left shoulder in 2017, and attended formal physical therapy which made her motion better. Review of Systems   Constitutional: Positive for activity change. Negative for appetite change, fatigue and fever. Respiratory: Negative for apnea, cough, chest tightness and shortness of breath. Cardiovascular: Negative for chest pain, palpitations and leg swelling. Gastrointestinal: Negative for abdominal distention, abdominal pain, constipation, diarrhea, nausea and vomiting. Genitourinary: Negative for difficulty urinating, dysuria and hematuria. Musculoskeletal: Positive for arthralgias. Negative for gait problem, joint swelling and myalgias. Skin: Negative for color change and rash. Neurological: Negative for dizziness, weakness, numbness and headaches. Psychiatric/Behavioral: Positive for sleep disturbance.        Past Medical History:    Past Medical History:   Diagnosis Date    Allergic rhinitis     Asthma     exercise induced    Breast cancer ([facility] [address] 12/1993    Dr Noah Cardenas Lumpectomy and lymph nodes excised    Chemotherapy-induced neuropathy (HCC)     Frequent UTI     GERD (gastroesophageal reflux disease)     Heavy menstrual bleeding     History of bone density study 11/12    normal    Pap test, as part of routine gynecological examination 8/12    peds speculum    Type II or unspecified type diabetes mellitus without mention of complication, not stated as uncontrolled 2007    Urinary tract infection        Past Surgical History:    Past Surgical History:   Procedure Laterality Date    BREAST LUMPECTOMY  1/1994    rt breast, cancer    BREAST RECONSTRUCTION Bilateral 06/30/15    BREAST RECONSTRUCTION Bilateral 4/21/16    expander removal, implants placed, fat injected, lipo    BREAST SURGERY Bilateral 04/21/2016    implants    COLONOSCOPY      DILATION AND CURETTAGE OF UTERUS      ENDOSCOPY, COLON, DIAGNOSTIC      HYSTERECTOMY, TOTAL ABDOMINAL      with BSO    MASTECTOMY Bilateral     OTHER SURGICAL HISTORY  09/27/2016    chest port removal    SINUS SURGERY      polyp    TONSILLECTOMY      TUNNELED VENOUS PORT PLACEMENT Left 7/30/2015       CurrentMedications:   Current Outpatient Medications   Medication Sig Dispense Refill    glimepiride (AMARYL) 4 MG tablet TAKE 1 TABLET BY MOUTH ONE TIME A DAY 90 tablet 0    FARXIGA 10 MG tablet TAKE 1 TABLET BY MOUTH ONE TIME A DAY 90 tablet 0    metFORMIN (GLUCOPHAGE-XR) 500 MG extended release tablet TAKE 1 TABLET BY MOUTH 4 TIMES A  tablet 0    Lactobacillus (FLORANEX PO) Floranex 1 million cell tablet   TK 1 T PO QD  letrozole (FEMARA) 2.5 MG tablet Take 1 tablet by mouth daily 90 tablet 3    TRUE METRIX BLOOD GLUCOSE TEST strip USE UTD TO TEST BLOOD SUGAR ONCE DAILY 200 each 1    FLUARIX QUADRIVALENT 0.5 ML injection ADM 0.5ML IM UTD  0    mupirocin (BACTROBAN) 2 % ointment Apply topically as needed Apply topically 3 times daily.  lidocaine (XYLOCAINE) 5 % ointment Apply topically as needed. 50 g 3    ranitidine (ZANTAC) 150 MG tablet Take 150 mg by mouth nightly      fluticasone (FLONASE) 50 MCG/ACT nasal spray 1 spray by Nasal route 2 times daily       montelukast (SINGULAIR) 10 MG tablet Take 10 mg by mouth nightly.  cetirizine (ZYRTEC) 10 MG tablet Take 10 mg by mouth daily.  lansoprazole (PREVACID) 30 MG capsule Take 15 mg by mouth daily as needed (15 MG daily)       budesonide-formoterol (SYMBICORT) 160-4.5 MCG/ACT AERO Inhale 2 puffs into the lungs 2 times daily.  albuterol (PROVENTIL HFA;VENTOLIN HFA) 108 (90 BASE) MCG/ACT inhaler Inhale 2 puffs into the lungs every 4 hours as needed       clobetasol (TEMOVATE) 0.05 % cream Apply topically 2 times daily as needed Apply topically 2 times daily. No current facility-administered medications for this visit.         Allergies:    Tape [adhesive tape] and Sulfa antibiotics    Social History:   Social History     Socioeconomic History    Marital status: Single     Spouse name: Not on file    Number of children: 0    Years of education: 12    Highest education level: Not on file   Occupational History    Not on file   Social Needs    Financial resource strain: Not on file    Food insecurity:     Worry: Not on file     Inability: Not on file    Transportation needs:     Medical: Not on file     Non-medical: Not on file   Tobacco Use    Smoking status: Former Smoker     Packs/day: 0.25     Years: 0.50     Pack years: 0.12    Smokeless tobacco: Never Used    Tobacco comment: in 20's once a week   Substance and Sexual Activity patient. Previous MRI of the left shoulder taken on 12/02/2017 was reviewed with the patient. Impression   Mild rotator cuff tendinopathy.  No discrete rotator cuff or labral tear. Plan:   Treatment : I reviewed the X-ray and MRI with the patient. We discussed the etiologies and natural histories of rotator cuff syndrome of the left shoulder. We discussed the various treatment alternatives including anti-inflammatory medications, physical therapy, injections, further imaging studies and as a last result surgery. During today's visit, I explained to the patient that I can do a subacromial bursa injection into the left shoulder to give her good relief. I explained to her that if the injection wears off in 6 weeks then we can do another injection into the [facility] joint. I discussed with the patient to continue her therapy exercises to maintain motion and strength, but I told her if there is no way she is going to do it on her own then I can write her a physical therapy script. The patient has opted and verbally consented to a subacromial bursa injection into the left shoulder, and going back to physical therapy since she does not have motivation to do the exercises on her own. The injection site should never get red, hot, or swollen and if it does the patient will contact our office right away. The patient may experience a increase in soreness the first 24-48 hours due to a cortisone flair and can take anti-inflammatories for a short period of time to reduce that soreness. The patient should not submerge the injection site in water for a minimum of 24 hours to avoid infection. This means no lakes, pools, ponds, or hot tubs for 24 hours. If the patient is diabetic the injection may increase their blood sugar for up to one week. The patient can do this cortisone injection once every 3 months as needed. A physical therapy prescription was given.  Patient should return to the clinic in 6 weeks to follow up with  [facility]

## 2019-08-27 ENCOUNTER — HOSPITAL ENCOUNTER (OUTPATIENT)
Dept: GENERAL RADIOLOGY | Age: 69
Discharge: HOME | End: 2019-08-29
Payer: MEDICARE

## 2019-08-27 ENCOUNTER — HOSPITAL ENCOUNTER (OUTPATIENT)
Age: 69
Discharge: HOME | End: 2019-08-29
Payer: MEDICARE

## 2019-08-27 DIAGNOSIS — R05.3 PERSISTENT COUGH FOR 3 WEEKS OR LONGER: ICD-10-CM

## 2019-08-27 PROCEDURE — 71046 X-RAY EXAM CHEST 2 VIEWS: CPT

## 2019-08-28 ENCOUNTER — HOSPITAL ENCOUNTER (OUTPATIENT)
Facility: MEDICAL CENTER | Age: 69
End: 2019-08-28
Payer: MEDICARE

## 2019-08-28 ENCOUNTER — HOSPITAL ENCOUNTER (OUTPATIENT)
Dept: PHYSICAL THERAPY | Facility: CLINIC | Age: 69
Setting detail: THERAPIES SERIES
Discharge: HOME | End: 2019-08-28
Payer: MEDICARE

## 2019-08-28 DIAGNOSIS — M25.512 LEFT SHOULDER PAIN, UNSPECIFIED CHRONICITY: Primary | ICD-10-CM

## 2019-08-28 PROCEDURE — 97161 PT EVAL LOW COMPLEX 20 MIN: CPT

## 2019-08-28 NOTE — CONSULTS
[] [facility]) [facility] &  Therapy  [address]  [phone]  F: [phone] [x] [address]  [place] [address]   [address]  P: [phone]  F: [phone] [] [address] &  Therapy  [address]  P: [phone]  F: [phone] [] [address]  [facility]   [address]   [place]: [phone]  F: [phone]     Physical Therapy Upper Extremity Evaluation     Date:  2019  Patient: Richard Hall  : [birth year]  MRN: [id number]  Physician: Cele Ordonez PA-C  Insurance: Medicare; [facility]  Medical Diagnosis: Left shoulder pain; impingement syndrome Rehab Codes: M25.512  Onset Date:      Next 's appt: 10/08/19    Subjective:   CC/HPI: (onset date): Pt is a 77 y.o female with a history of Left shoulder adhesive capsulitis; more recently she had noted pain in the Left shoulder that is described as a constant dull ache. She notes considerable decrease in shoulder pain since recent subacromial bursa injection on 19. Her current pain rating is 4/10; notes sleep is disturbed at times. Aggravating factors include reaching overhead and sleeping on the left side.      PMHx: [] Unremarkable [x] Diabetes [] HTN  [] Pacemaker   [] MI/Heart Problems [x] Cancer [x] Arthritis [x] Other: double mastectomy in .                  [x] Refer to full medical chart  In EPIC   Tests: [] X-Ray: [] MRI:  [] Other:    Medications: [x] Refer to full medical record [] None [] Other:  Allergies:      [x] Refer to full medical record [] None [] Other:    Function:  Hand Dominance  [x] Right  [] Left  Working:  [] Normal Duty  [] Light Duty  [] Off D/T Condition  [x] Retired    [] Not Employed    []  Disability  [] Other:             Return to work:   Job/ADL Description: [employer]    Pain:  [x] Yes visits    Todays Treatment:  Modalities:   Precautions:  Exercises:next session  Exercise Reps/ Time Weight/ Level Comments         UBE 4min  Warm up                     Seated scaption to 90      Wall walks-lateral      Seated inferior glide                        tband      ER      scap rows      scap pulldowns                        Other:    Specific Instructions for next treatment: see above      Evaluation Complexity:  History (Personal factors, comorbidities) [x] 0 [] 1-2 [] 3+   Exam (limitations, restrictions) [x] 1-2 [] 3 [] 4+   Clinical presentation (progression) [x] Stable [] Evolving  [] Unstable   Decision Making [x] Low [] Moderate [] High    [x] Low Complexity [] Moderate Complexity [] High Complexity          Treatment Charges: Mins Units   [x] Evaluation       [x]  Low       []  Moderate       []  High 50 1   []  Modalities     []  Ther Exercise     []  Manual Therapy     []  Ther Activities     []  Aquatics     []  Vasocompression     []  Other     Medicare cap used to date: $ 81.73    TOTAL TREATMENT TIME: 50min    Time in: 4:05 p    Time Out: 4:55 p    Electronically signed by: Nai Madrid PT        Physician Signature:________________________________Date:__________________  By signing above or cosigning this note, I have reviewed this plan of care and certify a need for medically necessary rehabilitation services.      *PLEASE SIGN ABOVE AND FAX BACK ALL PAGES*

## 2019-08-28 NOTE — CONSULTS
[facility] Fall Risk Assessment    Patient Name:  Antolin Everett  : 1950        Risk Factor Scale  Score   History of Falls [] Yes  [x] No 25  0 0   Secondary Diagnosis [] Yes  [x] No 15  0 0   Ambulatory Aid [] Furniture  [] Crutches/cane/walker  [x] None/bedrest/wheelchair/nurse 30  15  0 0   IV/Heparin Lock [] Yes  [x] No 20  0 0   Gait/Transferring [] Impaired  [] Weak  [x] Normal/bedrest/immobile 20  10  0 0   Mental Status [] Forgets limitations  [x] Oriented to own ability 15  0 0      Total:0     Based on the Assessment score: check the appropriate box.     []  No intervention needed   Low =   Score of 0-24    []  Use standard prevention interventions Moderate =  Score of 24-44   [] Give patient handout and discuss fall prevention strategies   [] Establish goal of education for patient/family RE: fall prevention strategies    []  Use high risk prevention interventions High = Score of 45 and higher   [] Give patient handout and discuss fall prevention strategies   [] Establish goal of education for patient/family Re: fall prevention strategies   [] Discuss lifeline / other resources    Electronically signed by:   Keane Ganser, PT  Date: 2019

## 2019-08-30 ENCOUNTER — TELEPHONE (OUTPATIENT)
Dept: ONCOLOGY | Age: 69
End: 2019-08-30

## 2019-08-30 ENCOUNTER — OFFICE VISIT (OUTPATIENT)
Dept: ONCOLOGY | Age: 69
End: 2019-08-30
Payer: MEDICARE

## 2019-08-30 VITALS
WEIGHT: 168.5 LBS | SYSTOLIC BLOOD PRESSURE: 102 MMHG | TEMPERATURE: 97.8 F | BODY MASS INDEX: 25.63 KG/M2 | DIASTOLIC BLOOD PRESSURE: 58 MMHG | HEART RATE: 65 BPM

## 2019-08-30 DIAGNOSIS — D05.10 DUCTAL CARCINOMA IN SITU (DCIS) OF BREAST, UNSPECIFIED LATERALITY: ICD-10-CM

## 2019-08-30 PROCEDURE — 1123F ACP DISCUSS/DSCN MKR DOCD: CPT | Performed by: INTERNAL MEDICINE

## 2019-08-30 PROCEDURE — 99211 OFF/OP EST MAY X REQ PHY/QHP: CPT | Performed by: INTERNAL MEDICINE

## 2019-08-30 PROCEDURE — 1090F PRES/ABSN URINE INCON ASSESS: CPT | Performed by: INTERNAL MEDICINE

## 2019-08-30 PROCEDURE — 3017F COLORECTAL CA SCREEN DOC REV: CPT | Performed by: INTERNAL MEDICINE

## 2019-08-30 PROCEDURE — G8419 CALC BMI OUT NRM PARAM NOF/U: HCPCS | Performed by: INTERNAL MEDICINE

## 2019-08-30 PROCEDURE — 1036F TOBACCO NON-USER: CPT | Performed by: INTERNAL MEDICINE

## 2019-08-30 PROCEDURE — 4040F PNEUMOC VAC/ADMIN/RCVD: CPT | Performed by: INTERNAL MEDICINE

## 2019-08-30 PROCEDURE — G8427 DOCREV CUR MEDS BY ELIG CLIN: HCPCS | Performed by: INTERNAL MEDICINE

## 2019-08-30 PROCEDURE — 99214 OFFICE O/P EST MOD 30 MIN: CPT | Performed by: INTERNAL MEDICINE

## 2019-08-30 PROCEDURE — G8399 PT W/DXA RESULTS DOCUMENT: HCPCS | Performed by: INTERNAL MEDICINE

## 2019-09-05 ENCOUNTER — HOSPITAL ENCOUNTER (OUTPATIENT)
Dept: PHYSICAL THERAPY | Facility: CLINIC | Age: 69
Setting detail: THERAPIES SERIES
Discharge: HOME | End: 2019-09-05
Payer: MEDICARE

## 2019-09-05 PROCEDURE — 97110 THERAPEUTIC EXERCISES: CPT

## 2019-09-09 ENCOUNTER — HOSPITAL ENCOUNTER (OUTPATIENT)
Dept: PHYSICAL THERAPY | Facility: CLINIC | Age: 69
Setting detail: THERAPIES SERIES
Discharge: HOME | End: 2019-09-09
Payer: MEDICARE

## 2019-09-09 PROCEDURE — 97110 THERAPEUTIC EXERCISES: CPT

## 2019-09-09 NOTE — FLOWSHEET NOTE
[] [facility]) [facility] &  Therapy  [address]  [phone]  F: [phone] [x] [address]  [place] [address]   [address]  P: [phone]  F: [phone] [] [address] &  Therapy  [address] [facility]  P: [phone]  F: [phone] [] [address]  [facility]   [address]  [place]: [phone]  F: [phone]     Physical Therapy Daily Treatment Note    Date:  2019  Patient Name:  Filiberto Everett    :  1950  MRN: [id number]  Physician: Venessa Landon PA-C                   Insurance: Medicare; [facility]  Medical Diagnosis: Left shoulder pain; impingement syndrome     Rehab Codes: M25.512  Onset Date:                                            Next 's appt: 10/08/19    Visit# / total visits: 3/8  Cancels/No Shows: 0    Subjective:    Pain:  [] Yes  [x] No Location: Left  shoulder Pain Rating: (0-10 scale) 0/10  Pain altered Tx:  [] No  [] Yes  Action:    Comments: Pt can tell a big difference since having the injection. Denies the need for medication for the shoulder. No issues without use of CP following her prior PT appointment. Had a \"jolt\" of pain last night when she changed position in bed. Pt had some wrist pain following her previous PT appointment and questioned if it was due to the use of a hand weight during mat activities.      Objective:  Modalities: none  Precautions:  Exercises:next session  Exercise Reps/ Time Weight/ Level Comments             UBE 5min  L2.5 Warm up                                 Seated scaption to 90  2x15  1#     Wall walks-scapular plane  x10    end range stretch   Seated inferior glide  x10    self mob         supine      SA \"punch\" 2x15 3#          Rhythmic stabs at 90 x3 15\" Holding 3# weight             side lying        ER 2x10 1# Used ankle weight around wrist   ABD  0-90 2x10 1#    horiz ABD 2x10 1#                    tband         IR 2x10 blue    ER  2x10  yellow     scap rows  2x15  blue     scap pulldowns  2x15  blue     scaption 0-90  2x10 blue                         Other: Pt demonstrates good understanding of exercise routine.     Specific Instructions for next treatment:       Treatment Charges: Mins Units   []  Modalities     [x]  Ther Exercise 35 2   []  Manual Therapy     []  Ther Activities     []  Aquatics     []  Vasocompression     []  Other     Total Treatment time 35 2    Medicare cap total used thru 9/9/19: $ 187.43    Assessment: [x] Progressing toward goals. [] No change. [] Other:    STG/LTG  STG: (to be met in 8 treatments)  1. ? Pain: reduce pain to 2/10 or less for ADL's  2. ? Strength: improve strength of infraspinatus and supraspinatus to 4+/5  3. ? Function: improve sleep per UEFS score; less pain with ADL's   4. Independent with Home Exercise Programs       Pt. Education:  [] Yes  [] No  [x] Reviewed Prior HEP/Ed  Method of Education: [] Verbal  [] Demo  [] Written  Comprehension of Education:  [] Verbalizes understanding. [] Demonstrates understanding. [] Needs review. [] Demonstrates/verbalizes HEP/Ed previously given. Plan: [x] Continue per plan of care.    [] Other:      Time In: 10:04 a            Time Out: 10:39 a    Electronically signed by:  Cosme Oconnell PTA

## 2019-09-11 ENCOUNTER — HOSPITAL ENCOUNTER (OUTPATIENT)
Dept: PHYSICAL THERAPY | Facility: CLINIC | Age: 69
Setting detail: THERAPIES SERIES
Discharge: HOME | End: 2019-09-11
Payer: MEDICARE

## 2019-09-11 PROCEDURE — 97110 THERAPEUTIC EXERCISES: CPT

## 2019-09-16 ENCOUNTER — HOSPITAL ENCOUNTER (OUTPATIENT)
Dept: PHYSICAL THERAPY | Facility: CLINIC | Age: 69
Setting detail: THERAPIES SERIES
Discharge: HOME | End: 2019-09-16
Payer: MEDICARE

## 2019-09-16 PROCEDURE — 97110 THERAPEUTIC EXERCISES: CPT

## 2019-09-16 NOTE — FLOWSHEET NOTE
plane 2x10 1# cuff                Wall walks w tband x5ea Yellow tband on forearm Flexion and lateral/medial 5x each  HELD 9/16                             Other: Pt demonstrates good understanding of exercise routine.     Specific Instructions for next treatment:       Treatment Charges: Mins Units   []  Modalities     [x]  Ther Exercise 28 2   []  Manual Therapy     []  Ther Activities     []  Aquatics                    []  Other     Total Treatment time 28 2    Medicare cap total used thru 9/16/19: $ 293.13    Assessment: [x] Progressing toward goals. Pt noted some mid back soreness with supine positioning and when doing wall crawls. She notes she has had this for quite some time following breast cancer surgery    [] No change. [] Other:    STG/LTG  STG: (to be met in 8 treatments)  1. ? Pain: reduce pain to 2/10 or less for ADL's  2. ? Strength: improve strength of infraspinatus and supraspinatus to 4+/5  3. ? Function: improve sleep per UEFS score; less pain with ADL's   4. Independent with Home Exercise Program 9/16 goal met       Pt. Education:  [] Yes  [] No  [x] Reviewed Prior HEP/Ed  Method of Education: [] Verbal  [] Demo  [] Written  Comprehension of Education:  [] Verbalizes understanding. [] Demonstrates understanding. [] Needs review. [] Demonstrates/verbalizes HEP/Ed previously given. Plan: [x] Continue per plan of care.    [] Other:      Time In: 10 a            Time Out: 10:40 a    Electronically signed by:  Mathew Reynolds, PT

## 2019-09-18 ENCOUNTER — HOSPITAL ENCOUNTER (OUTPATIENT)
Dept: PHYSICAL THERAPY | Facility: CLINIC | Age: 69
Setting detail: THERAPIES SERIES
Discharge: HOME | End: 2019-09-18
Payer: MEDICARE

## 2019-09-18 PROCEDURE — 97110 THERAPEUTIC EXERCISES: CPT

## 2019-09-23 ENCOUNTER — HOSPITAL ENCOUNTER (OUTPATIENT)
Dept: PHYSICAL THERAPY | Facility: CLINIC | Age: 69
Setting detail: THERAPIES SERIES
Discharge: HOME | End: 2019-09-23
Payer: MEDICARE

## 2019-09-23 PROCEDURE — 97110 THERAPEUTIC EXERCISES: CPT

## 2019-09-23 NOTE — FLOWSHEET NOTE
[] [facility]) - [facility] &  Therapy  [address]  [phone]  F: [phone] [x] [address]  [place] [address]   [address]  P: [phone]  F: [phone] [] [address] &  Therapy  [address]  P: [phone]  F: [phone] [] [address]  [facility]   [address]  [place]: [phone]  F: [phone]     Physical Therapy Daily Treatment Note    Date:  2019  Patient Name:  Rafaela Hnedricks    :  1950  MRN: [id number]  Physician: Bora Esteban PA-C                   Insurance: Medicare; [facility]  Medical Diagnosis: Left shoulder pain; impingement syndrome     Rehab Codes: M25.512  Onset Date:                                            Next 's appt: 10/08/19    Visit# / total visits: 7/8  Cancels/No Shows: 0    Subjective:    Pain:  [] Yes  [x] No Location: Left  shoulder Pain Rating: (0-10 scale) 1-2/10  Pain altered Tx:  [x] No  [] Yes  Action:    Comments: Pt reports she had a few episodes of pain (positioning arm on pew in [employer] and rolling over in bed) and wonders if this is a result of the injection wearing off.       Objective:  Modalities: none  Precautions:  Exercises:next session  Exercise Reps/ Time Weight/ Level Comments             UBE 5min  L2.5 Warm up                                 Seated scaption to 90  2x15  1#     Wall crawls-scapular plane  x10    end range stretch   Seated inferior glide  x10    self mob         supine      SA \"punch\" 2x15 3#          Rhythmic stabs at 90 x3 15\" Holding 3# weight             side lying        ER 2x10 2# Used ankle weight around wrist, increased weight 9/18   ABD  0-90 2x10 2# increased weight 9/18   horiz ABD 2x10 2# increased weight 9/18                   tband         IR 2x10 blue    ER 2x10 yellow     scap rows  2x15 blue

## 2019-09-24 DIAGNOSIS — E11.9 TYPE 2 DIABETES MELLITUS WITHOUT COMPLICATION, WITHOUT LONG-TERM CURRENT USE OF INSULIN (HCC): ICD-10-CM

## 2019-09-24 RX ORDER — METFORMIN HYDROCHLORIDE 500 MG/1
TABLET, EXTENDED RELEASE ORAL
Qty: 360 TABLET | Refills: 0 | Status: SHIPPED | OUTPATIENT
Start: 2019-09-24 | End: 2019-12-27

## 2019-09-25 ENCOUNTER — HOSPITAL ENCOUNTER (OUTPATIENT)
Dept: PHYSICAL THERAPY | Facility: CLINIC | Age: 69
Setting detail: THERAPIES SERIES
Discharge: HOME | End: 2019-09-25
Payer: MEDICARE

## 2019-09-25 PROCEDURE — 97110 THERAPEUTIC EXERCISES: CPT

## 2019-09-25 NOTE — FLOWSHEET NOTE
0-90  2x15 blue           Wall walks-scapular plane 2x10 2# cuff                Wall walks w tband x5ea Yellow tband on forearm Flexion and lateral/medial 5x each  HELD 9/25                             Other: Pt demonstrates good understanding of exercise routine.     Specific Instructions for next treatment: .       Treatment Charges: Mins Units   []  Modalities     [x]  Ther Exercise 30 2   []  Manual Therapy     []  Ther Activities     []  Aquatics                    []  OtherTotal Treatment time      30 2    Medicare cap total used thru 9/25/19: $451.68    Assessment: [x] Progressing toward goals. AROM Left shoulder: supine elevation:165°; ER 68°; standing elevation 140°; ER 4+/5; IR 5/5; All goals met per eval; Patient demonstrates good understanding of home exercise program    [] No change. [] Other:      STG: (to be met in 8 treatments) recheck on 9/25/19  1. ? Pain: reduce pain to 2/10 or less for ADL's goal met  2. ? Strength: improve strength of infraspinatus and supraspinatus to 4+/5 goal met: ER 4+/5; IR 5/5  3. ? Function: improve sleep per UEFS score; less pain with ADL's goal met per patient report  4. Independent with Home Exercise Program 9/16 goal met       Pt. Education:  [] Yes  [] No  [x] Reviewed Prior HEP/Ed  Method of Education: [] Verbal  [] Demo  [] Written  Comprehension of Education:  [] Verbalizes understanding. [] Demonstrates understanding. [] Needs review. [] Demonstrates/verbalizes HEP/Ed previously given. Plan: [] Continue per plan of care.    [x] Other: Discharge with continuation of home program      Time In: 9:50 a            Time Out:   10:30am    Electronically signed by:  Ailin Castro, PT

## 2019-09-30 ENCOUNTER — APPOINTMENT (OUTPATIENT)
Dept: PHYSICAL THERAPY | Facility: CLINIC | Age: 69
End: 2019-09-30
Payer: MEDICARE

## 2019-10-08 ENCOUNTER — OFFICE VISIT (OUTPATIENT)
Dept: ORTHOPEDIC SURGERY | Age: 69
End: 2019-10-08
Payer: MEDICARE

## 2019-10-08 VITALS
WEIGHT: 174 LBS | DIASTOLIC BLOOD PRESSURE: 75 MMHG | HEART RATE: 79 BPM | BODY MASS INDEX: 25.77 KG/M2 | HEIGHT: 69 IN | SYSTOLIC BLOOD PRESSURE: 137 MMHG

## 2019-10-08 DIAGNOSIS — M75.42 ROTATOR CUFF IMPINGEMENT SYNDROME OF LEFT SHOULDER: Primary | ICD-10-CM

## 2019-10-08 PROCEDURE — 20611 DRAIN/INJ JOINT/BURSA W/US: CPT | Performed by: PHYSICIAN ASSISTANT

## 2019-10-08 RX ORDER — METHYLPREDNISOLONE ACETATE 40 MG/ML
40 INJECTION, SUSPENSION INTRA-ARTICULAR; INTRALESIONAL; INTRAMUSCULAR; SOFT TISSUE ONCE
Status: COMPLETED | OUTPATIENT
Start: 2019-10-08 | End: 2019-10-08

## 2019-10-08 RX ORDER — LIDOCAINE HYDROCHLORIDE 10 MG/ML
4 INJECTION, SOLUTION INFILTRATION; PERINEURAL ONCE
Status: COMPLETED | OUTPATIENT
Start: 2019-10-08 | End: 2019-10-08

## 2019-10-08 RX ADMIN — METHYLPREDNISOLONE ACETATE 40 MG: 40 INJECTION, SUSPENSION INTRA-ARTICULAR; INTRALESIONAL; INTRAMUSCULAR; SOFT TISSUE at 13:29

## 2019-10-08 RX ADMIN — LIDOCAINE HYDROCHLORIDE 4 ML: 10 INJECTION, SOLUTION INFILTRATION; PERINEURAL at 13:28

## 2019-10-08 ASSESSMENT — ENCOUNTER SYMPTOMS
SHORTNESS OF BREATH: 0
ABDOMINAL DISTENTION: 0
COUGH: 0
CONSTIPATION: 0
COLOR CHANGE: 0
ABDOMINAL PAIN: 0
APNEA: 0
DIARRHEA: 0
NAUSEA: 0
VOMITING: 0
CHEST TIGHTNESS: 0

## 2019-10-23 RX ORDER — CALCIUM CITRATE/VITAMIN D3 200MG-6.25
TABLET ORAL
Qty: 200 STRIP | Refills: 0 | Status: SHIPPED | OUTPATIENT
Start: 2019-10-23 | End: 2020-04-21

## 2019-10-28 DIAGNOSIS — E11.9 TYPE 2 DIABETES MELLITUS WITHOUT COMPLICATION, WITHOUT LONG-TERM CURRENT USE OF INSULIN (HCC): ICD-10-CM

## 2019-10-29 RX ORDER — GLIMEPIRIDE 4 MG/1
TABLET ORAL
Qty: 90 TABLET | Refills: 0 | Status: SHIPPED | OUTPATIENT
Start: 2019-10-29 | End: 2020-02-10

## 2019-11-18 ENCOUNTER — OFFICE VISIT (OUTPATIENT)
Dept: ORTHOPEDIC SURGERY | Age: 69
End: 2019-11-18
Payer: MEDICARE

## 2019-11-18 VITALS
HEIGHT: 69 IN | SYSTOLIC BLOOD PRESSURE: 117 MMHG | DIASTOLIC BLOOD PRESSURE: 70 MMHG | HEART RATE: 79 BPM | WEIGHT: 170 LBS | BODY MASS INDEX: 25.18 KG/M2

## 2019-11-18 DIAGNOSIS — M75.42 ROTATOR CUFF IMPINGEMENT SYNDROME OF LEFT SHOULDER: Primary | ICD-10-CM

## 2019-11-18 PROCEDURE — 3017F COLORECTAL CA SCREEN DOC REV: CPT | Performed by: ORTHOPAEDIC SURGERY

## 2019-11-18 PROCEDURE — 1123F ACP DISCUSS/DSCN MKR DOCD: CPT | Performed by: ORTHOPAEDIC SURGERY

## 2019-11-18 PROCEDURE — G8417 CALC BMI ABV UP PARAM F/U: HCPCS | Performed by: ORTHOPAEDIC SURGERY

## 2019-11-18 PROCEDURE — G8399 PT W/DXA RESULTS DOCUMENT: HCPCS | Performed by: ORTHOPAEDIC SURGERY

## 2019-11-18 PROCEDURE — 1036F TOBACCO NON-USER: CPT | Performed by: ORTHOPAEDIC SURGERY

## 2019-11-18 PROCEDURE — 4040F PNEUMOC VAC/ADMIN/RCVD: CPT | Performed by: ORTHOPAEDIC SURGERY

## 2019-11-18 PROCEDURE — 1090F PRES/ABSN URINE INCON ASSESS: CPT | Performed by: ORTHOPAEDIC SURGERY

## 2019-11-18 PROCEDURE — G8427 DOCREV CUR MEDS BY ELIG CLIN: HCPCS | Performed by: ORTHOPAEDIC SURGERY

## 2019-11-18 PROCEDURE — G8484 FLU IMMUNIZE NO ADMIN: HCPCS | Performed by: ORTHOPAEDIC SURGERY

## 2019-11-18 PROCEDURE — 99213 OFFICE O/P EST LOW 20 MIN: CPT | Performed by: ORTHOPAEDIC SURGERY

## 2019-11-18 ASSESSMENT — ENCOUNTER SYMPTOMS
ABDOMINAL DISTENTION: 0
ABDOMINAL PAIN: 0
SHORTNESS OF BREATH: 0
COUGH: 0
DIARRHEA: 0
NAUSEA: 0
APNEA: 0
CONSTIPATION: 0
CHEST TIGHTNESS: 0
VOMITING: 0
COLOR CHANGE: 0

## 2019-11-26 DIAGNOSIS — E11.9 TYPE 2 DIABETES MELLITUS WITHOUT COMPLICATION, WITHOUT LONG-TERM CURRENT USE OF INSULIN (HCC): ICD-10-CM

## 2019-11-26 RX ORDER — DAPAGLIFLOZIN 10 MG/1
TABLET, FILM COATED ORAL
Qty: 90 TABLET | Refills: 0 | Status: SHIPPED | OUTPATIENT
Start: 2019-11-26 | End: 2020-02-20 | Stop reason: SDUPTHER

## 2019-12-02 ENCOUNTER — HOSPITAL ENCOUNTER (OUTPATIENT)
Dept: MRI IMAGING | Age: 69
Discharge: HOME | End: 2019-12-04
Payer: MEDICARE

## 2019-12-02 DIAGNOSIS — M75.42 ROTATOR CUFF IMPINGEMENT SYNDROME OF LEFT SHOULDER: ICD-10-CM

## 2019-12-02 PROCEDURE — 73221 MRI JOINT UPR EXTREM W/O DYE: CPT

## 2019-12-26 DIAGNOSIS — E11.9 TYPE 2 DIABETES MELLITUS WITHOUT COMPLICATION, WITHOUT LONG-TERM CURRENT USE OF INSULIN (HCC): ICD-10-CM

## 2019-12-27 RX ORDER — METFORMIN HYDROCHLORIDE 500 MG/1
TABLET, EXTENDED RELEASE ORAL
Qty: 360 TABLET | Refills: 0 | Status: SHIPPED | OUTPATIENT
Start: 2019-12-27 | End: 2020-02-20 | Stop reason: SDUPTHER

## 2020-01-13 ENCOUNTER — OFFICE VISIT (OUTPATIENT)
Dept: ORTHOPEDIC SURGERY | Age: 70
End: 2020-01-13
Payer: MEDICARE

## 2020-01-13 VITALS
DIASTOLIC BLOOD PRESSURE: 72 MMHG | BODY MASS INDEX: 24.73 KG/M2 | HEIGHT: 69 IN | WEIGHT: 167 LBS | HEART RATE: 80 BPM | SYSTOLIC BLOOD PRESSURE: 122 MMHG

## 2020-01-13 PROCEDURE — 99213 OFFICE O/P EST LOW 20 MIN: CPT | Performed by: ORTHOPAEDIC SURGERY

## 2020-01-13 PROCEDURE — G8427 DOCREV CUR MEDS BY ELIG CLIN: HCPCS | Performed by: ORTHOPAEDIC SURGERY

## 2020-01-13 PROCEDURE — G8417 CALC BMI ABV UP PARAM F/U: HCPCS | Performed by: ORTHOPAEDIC SURGERY

## 2020-01-13 PROCEDURE — 1123F ACP DISCUSS/DSCN MKR DOCD: CPT | Performed by: ORTHOPAEDIC SURGERY

## 2020-01-13 PROCEDURE — G8484 FLU IMMUNIZE NO ADMIN: HCPCS | Performed by: ORTHOPAEDIC SURGERY

## 2020-01-13 PROCEDURE — G8399 PT W/DXA RESULTS DOCUMENT: HCPCS | Performed by: ORTHOPAEDIC SURGERY

## 2020-01-13 PROCEDURE — 1036F TOBACCO NON-USER: CPT | Performed by: ORTHOPAEDIC SURGERY

## 2020-01-13 PROCEDURE — 1090F PRES/ABSN URINE INCON ASSESS: CPT | Performed by: ORTHOPAEDIC SURGERY

## 2020-01-13 PROCEDURE — 4040F PNEUMOC VAC/ADMIN/RCVD: CPT | Performed by: ORTHOPAEDIC SURGERY

## 2020-01-13 PROCEDURE — 3017F COLORECTAL CA SCREEN DOC REV: CPT | Performed by: ORTHOPAEDIC SURGERY

## 2020-01-13 ASSESSMENT — ENCOUNTER SYMPTOMS
CHEST TIGHTNESS: 0
NAUSEA: 0
ABDOMINAL PAIN: 0
COLOR CHANGE: 0
ABDOMINAL DISTENTION: 0
CONSTIPATION: 0
VOMITING: 0
SHORTNESS OF BREATH: 0
APNEA: 0
DIARRHEA: 0
COUGH: 0

## 2020-01-13 NOTE — PROGRESS NOTES
[facility]  [address] [place] [id number]  Dept: [phone]  Dept Fax: [phone]                Left Shoulder - Follow Up     Chief Complaint:     Chief Complaint   Patient presents with    Shoulder Pain     Patient is here today to follow up on her left shoulder pain, and to review her MRI. HPI:     Constanza Paris is a 68y.o. year old right hand dominant female that has had pain in the left shoulder for 5.5 years. As far as any trauma to the shoulder, the patient indicates there was no trauma. The pain is worse at night and when doing overhead activities. Weakness of the shoulder has been noted. The pain restricts activities such as sleeping on her side and lifting heavy obejcts. The pain does not seem to improve with time. The following medications have been tried: Aspirin and motrin. The patient has had a corticosteroid injection into the subacromial bursa on 08/26/19 with good pain relief immediately and into the glenohumeral joint space on 10/08/19 with minimal pain relief. However, the patient states that her pain relief is temporary and only last a couple of weeks. The patient has tried physical therapy. The patient has not had surgery. The opposite shoulder is okay. Neck pain has not been present. Patient is here today to follow up because she is still having pain after her injection on 10/08/19. Review of Systems   Constitutional: Positive for activity change. Negative for appetite change, fatigue and fever. Respiratory: Negative for apnea, cough, chest tightness and shortness of breath. Cardiovascular: Negative for chest pain, palpitations and leg swelling. Gastrointestinal: Negative for abdominal distention, abdominal pain, constipation, diarrhea, nausea and vomiting. Genitourinary: Negative for difficulty urinating, dysuria and hematuria. Musculoskeletal: Positive for arthralgias. Negative for gait problem, joint swelling and myalgias. Skin: Negative for color change and rash. Neurological: Negative for dizziness, weakness, numbness and headaches. Psychiatric/Behavioral: Negative for sleep disturbance.        Past Medical History:    Past Medical History:   Diagnosis Date    Allergic rhinitis     Asthma     exercise induced    Breast cancer ([facility] [address] 12/1993    Dr Cristina Martinez Lumpectomy and lymph nodes excised    Chemotherapy-induced neuropathy (HCC)     Frequent UTI     GERD (gastroesophageal reflux disease)     Heavy menstrual bleeding     History of bone density study 11/12    normal    Pap test, as part of routine gynecological examination 8/12    peds speculum    Type II or unspecified type diabetes mellitus without mention of complication, not stated as uncontrolled 2007    Urinary tract infection        Past Surgical History:    Past Surgical History:   Procedure Laterality Date    BREAST LUMPECTOMY  1/1994    rt breast, cancer    BREAST RECONSTRUCTION Bilateral 06/30/15    BREAST RECONSTRUCTION Bilateral 4/21/16    expander removal, implants placed, fat injected, lipo    BREAST SURGERY Bilateral 04/21/2016    implants    COLONOSCOPY      DILATION AND CURETTAGE OF UTERUS      ENDOSCOPY, COLON, DIAGNOSTIC      HYSTERECTOMY, TOTAL ABDOMINAL      with BSO    MASTECTOMY Bilateral     OTHER SURGICAL HISTORY  09/27/2016    chest port removal    SINUS SURGERY      polyp    TONSILLECTOMY      TUNNELED VENOUS PORT PLACEMENT Left 7/30/2015       CurrentMedications:   Current Outpatient Medications   Medication Sig Dispense Refill    metFORMIN (GLUCOPHAGE-XR) 500 MG extended release tablet TAKE 1 TABLET BY MOUTH 4 TIMES A  tablet 0    FARXIGA 10 MG tablet TAKE 1 TABLET BY MOUTH ONE TIME A DAY 90 tablet 0    glimepiride (AMARYL) 4 MG tablet TAKE 1 TABLET BY MOUTH ONE TIME A DAY 90 tablet 0    TRUE METRIX BLOOD GLUCOSE TEST strip TEST BLOOD SUGAR ONCE DAILY 200 strip 0  zoster recombinant adjuvanted vaccine [facility]) 50 MCG/0.5ML SUSR injection Inject 0.5 mLs into the muscle See Admin Instructions 1 dose now and repeat in 2-6 months (Patient not taking: Reported on 8/30/2019) 0.5 mL 1    Lactobacillus (FLORANEX PO) Floranex 1 million cell tablet   TK 1 T PO QD      letrozole (FEMARA) 2.5 MG tablet Take 1 tablet by mouth daily 90 tablet 3    fluticasone (FLONASE) 50 MCG/ACT nasal spray 1 spray by Nasal route 2 times daily       montelukast (SINGULAIR) 10 MG tablet Take 10 mg by mouth nightly.  cetirizine (ZYRTEC) 10 MG tablet Take 10 mg by mouth daily.  lansoprazole (PREVACID) 30 MG capsule Take 15 mg by mouth daily as needed (15 MG daily)       budesonide-formoterol (SYMBICORT) 160-4.5 MCG/ACT AERO Inhale 2 puffs into the lungs 2 times daily.  albuterol (PROVENTIL HFA;VENTOLIN HFA) 108 (90 BASE) MCG/ACT inhaler Inhale 2 puffs into the lungs every 4 hours as needed       clobetasol (TEMOVATE) 0.05 % cream Apply topically 2 times daily as needed Apply topically 2 times daily. No current facility-administered medications for this visit.         Allergies:    Tape [adhesive tape] and Sulfa antibiotics    Social History:   Social History     Socioeconomic History    Marital status: Single     Spouse name: None    Number of children: 0    Years of education: 12    Highest education level: None   Occupational History    None   Social Needs    Financial resource strain: None    Food insecurity:     Worry: None     Inability: None    Transportation needs:     Medical: None     Non-medical: None   Tobacco Use    Smoking status: Former Smoker     Packs/day: 0.25     Years: 0.50     Pack years: 0.12    Smokeless tobacco: Never Used    Tobacco comment: in 20's once a week   Substance and Sexual Activity    Alcohol use: Yes     Comment: rare social drink    Drug use: No    Sexual activity: Never   Lifestyle    Physical activity:     Days per 170 degrees with the right shoulder, 30 degrees of external rotation in neutral, 85 degrees of external rotation in abduction, internal rotation to T7. STRENGTH: Supraspinatus 4+/5, external rotators 4+/5. MUSC: No atrophy, negative subscap lift off or belly press test.  IMP: (+) Neer's sign, (+) Hawkin's sign, (-) painful arc, (-) pain with cross body abduction. PALP: (-) pain over anterolateral acromion, (-) pain over AC joint, (-) pain over traps/rhomboids. INST: (+) Bassett's test, (Mild) Speed's test, (-) sulcus in ext. rot, (-) apprehension, (-) relocation, (-) load and shift, (-) crank test.    Assessment:     1. Rotator cuff syndrome of left shoulder        Procedures:    Procedure no    Radiology:   X-rays taken on 08/26/2019 were reviewed with the patient. Also, previous MRI taken on 12/02/2019 was reviewed and compared to another MRI taken on 12/02/2017.      Narrative   SHOULDER X-RAY       Two views of the left shoulder and 2 views of the scapula, including AP,    scapular Y, outlet and axillary views reveal: The glenohumeral joint is    well reduced with mild arthritic changes.  Proximal migration of the    humeral head has not occurred.  Acromion is a type I.  The    acromioclavicular joint shows mild degenerative changes.          Impression: Mild arthritic changes of the left shoulder      Narrative   EXAMINATION:   MRI OF THE LEFT SHOULDER WITHOUT CONTRAST   12/2/2019 5:17 pm       TECHNIQUE:   Multiplanar multisequence MRI of the left shoulder was performed without the   administration of intravenous contrast.       COMPARISON:   Left shoulder plain radiographs from 08/26/2019       HISTORY:   ORDERING SYSTEM PROVIDED HISTORY: Rotator cuff impingement syndrome of left   shoulder   TECHNOLOGIST PROVIDED HISTORY:   Reason for Exam: chronic left shoulder pain   Acuity: Chronic   Type of Exam: Subsequent/Follow-up   Additional signs and symptoms: no specific injury to left shoulder   Relevant superior infraspinatus along the footplate.  Mild underlying infraspinatus   tendinopathy. 3. Mild subscapularis tendinopathy. 4. Mild diffuse labral degeneration.  Mild glenohumeral chondromalacia. 5. Type 4 acromion.  This can be seen in the setting of subacromial   impingement syndrome. 6. Mild degenerative change of the left AC joint. Narrative   EXAMINATION:   MRI OF THE LEFT SHOULDER WITHOUT CONTRAST   12/2/2017 10:25 am       TECHNIQUE:   Multiplanar multisequence MRI of the left shoulder was performed without the   administration of intravenous contrast.       COMPARISON:   None.       HISTORY:   ORDERING SYSTEM PROVIDED HISTORY: Chronic left shoulder pain       PAIN AND DECREASE ROM FOR YEARS, WORSE SINCE RECENT PHYSICAL THERAPY. NO   KNOWN TRAUMA. NO SURGERY.       FINDINGS:   Mild supraspinatus and infraspinatus tendinopathy noted without discrete tear   or retraction identified.  Teres minor muscle and tendon are unremarkable. The rotator cuff musculature is normal in bulk and signal intensity, without   evidence for atrophy.       The biceps tendon is normally located within the bicipital groove of the   proximal humerus.  The intra-articular portion of the biceps tendon is also   normal.  Biceps labral anchor remains intact.  No discrete labral tear   identified although evaluation is somewhat suboptimal without intra-articular   contrast.  The subscapularis muscle and tendon are intact.  Glenohumeral   articulation is normal without significant chondromalacia or osteophyte   formation.       Acromioclavicular joint is normal without significant arthrosis or osteophyte   formation.  Marrow signal is normal for age.  No significant joint effusion   identified.           Impression   Mild rotator cuff tendinopathy.  No discrete rotator cuff or labral tear. Plan:   Treatment : I reviewed the X-ray and MRI with the patient.  We discussed the etiologies and natural histories of rotator cuff syndrome of the left shoulder. We discussed the various treatment alternatives including anti-inflammatory medications, physical therapy, injections, further imaging studies and as a last result surgery. During today's visit, I explained to the patient that she has a couple options moving forward regarding her left shoulder. She can either live with how it is right now because she is not doing additional damage, have more injections, or have a surgery. I told her that she probably does not want any further injections because they do not seem to help, and only gave her some relief for a couple of weeks. I then said that I do not feel as though she wanted to live with how her shoulder is currently because she is frustrated and unhappy with it. I also said that since the pain has last 6+ months then it may be in her best interest to start thinking about surgery. I explained to the patient that the procedure would be a shoulder arthroscopy where I go in with a lighted telescope and fix any damage to the rotator cuff or biceps tendon, shave off any bone spurs, and cut out any inflamed bursa sacs or capsule of the shoulder. The patient stated that she would like to proceed with surgery since she is frustrated with how her shoulder is, and she does not see it improving on its own any time soon. Therefore, the patient has elected to proceed with surgery. The risks/benefits/alternatives and potential complications have been discussed with the patient. We also had a long discussion regarding the procedure itself and expectation of the recovery. All questions and concerns with addressed. Patient was instructed to call our office with any question or concerns prior to the procedure occurs. A physical therapy prescription was not given. Patient should return to the clinic one week before surgery for her pre-operative discussion to follow up with Santhosh Billingsley D.O. . The patient will call the office immediately with any

## 2020-02-10 RX ORDER — GLIMEPIRIDE 4 MG/1
TABLET ORAL
Qty: 90 TABLET | Refills: 0 | Status: SHIPPED | OUTPATIENT
Start: 2020-02-10 | End: 2020-02-20 | Stop reason: SDUPTHER

## 2020-02-20 ENCOUNTER — OFFICE VISIT (OUTPATIENT)
Dept: FAMILY MEDICINE CLINIC | Age: 70
End: 2020-02-20
Payer: MEDICARE

## 2020-02-20 ENCOUNTER — HOSPITAL ENCOUNTER (OUTPATIENT)
Dept: GENERAL RADIOLOGY | Age: 70
Discharge: HOME | End: 2020-02-22
Payer: MEDICARE

## 2020-02-20 ENCOUNTER — HOSPITAL ENCOUNTER (OUTPATIENT)
Age: 70
Discharge: HOME | End: 2020-02-22
Payer: MEDICARE

## 2020-02-20 VITALS
SYSTOLIC BLOOD PRESSURE: 124 MMHG | DIASTOLIC BLOOD PRESSURE: 69 MMHG | RESPIRATION RATE: 18 BRPM | OXYGEN SATURATION: 99 % | HEART RATE: 80 BPM | WEIGHT: 172 LBS | TEMPERATURE: 97.1 F | BODY MASS INDEX: 25.77 KG/M2

## 2020-02-20 LAB — HBA1C MFR BLD: 6.8 %

## 2020-02-20 PROCEDURE — 4040F PNEUMOC VAC/ADMIN/RCVD: CPT | Performed by: FAMILY MEDICINE

## 2020-02-20 PROCEDURE — G8417 CALC BMI ABV UP PARAM F/U: HCPCS | Performed by: FAMILY MEDICINE

## 2020-02-20 PROCEDURE — 1090F PRES/ABSN URINE INCON ASSESS: CPT | Performed by: FAMILY MEDICINE

## 2020-02-20 PROCEDURE — 3017F COLORECTAL CA SCREEN DOC REV: CPT | Performed by: FAMILY MEDICINE

## 2020-02-20 PROCEDURE — G8399 PT W/DXA RESULTS DOCUMENT: HCPCS | Performed by: FAMILY MEDICINE

## 2020-02-20 PROCEDURE — 3044F HG A1C LEVEL LT 7.0%: CPT | Performed by: FAMILY MEDICINE

## 2020-02-20 PROCEDURE — 72070 X-RAY EXAM THORAC SPINE 2VWS: CPT

## 2020-02-20 PROCEDURE — G8484 FLU IMMUNIZE NO ADMIN: HCPCS | Performed by: FAMILY MEDICINE

## 2020-02-20 PROCEDURE — 2022F DILAT RTA XM EVC RTNOPTHY: CPT | Performed by: FAMILY MEDICINE

## 2020-02-20 PROCEDURE — G8427 DOCREV CUR MEDS BY ELIG CLIN: HCPCS | Performed by: FAMILY MEDICINE

## 2020-02-20 PROCEDURE — 1036F TOBACCO NON-USER: CPT | Performed by: FAMILY MEDICINE

## 2020-02-20 PROCEDURE — 99213 OFFICE O/P EST LOW 20 MIN: CPT | Performed by: FAMILY MEDICINE

## 2020-02-20 PROCEDURE — 83036 HEMOGLOBIN GLYCOSYLATED A1C: CPT | Performed by: FAMILY MEDICINE

## 2020-02-20 PROCEDURE — 1123F ACP DISCUSS/DSCN MKR DOCD: CPT | Performed by: FAMILY MEDICINE

## 2020-02-20 RX ORDER — METFORMIN HYDROCHLORIDE 500 MG/1
TABLET, EXTENDED RELEASE ORAL
Qty: 360 TABLET | Refills: 3 | Status: SHIPPED | OUTPATIENT
Start: 2020-02-20 | End: 2021-04-12

## 2020-02-20 RX ORDER — GLIMEPIRIDE 4 MG/1
TABLET ORAL
Qty: 90 TABLET | Refills: 3 | Status: SHIPPED | OUTPATIENT
Start: 2020-02-20 | End: 2020-04-23

## 2020-02-20 ASSESSMENT — PATIENT HEALTH QUESTIONNAIRE - PHQ9
2. FEELING DOWN, DEPRESSED OR HOPELESS: 0
SUM OF ALL RESPONSES TO PHQ9 QUESTIONS 1 & 2: 0
SUM OF ALL RESPONSES TO PHQ QUESTIONS 1-9: 0
1. LITTLE INTEREST OR PLEASURE IN DOING THINGS: 0
SUM OF ALL RESPONSES TO PHQ QUESTIONS 1-9: 0

## 2020-02-20 NOTE — PROGRESS NOTES
heart disease, pacemaker    Other Father         heart disease, thyroid disease    Breast Cancer Maternal Aunt     Breast Cancer Paternal Grandmother     Breast Cancer Maternal Aunt     Asthma Maternal Grandmother      Current Outpatient Medications   Medication Sig Dispense Refill    glimepiride (AMARYL) 4 MG tablet TAKE 1 TABLET BY MOUTH ONE TIME A DAY 90 tablet 3    metFORMIN (GLUCOPHAGE-XR) 500 MG extended release tablet TAKE 1 TABLET BY MOUTH 4 TIMES A  tablet 3    dapagliflozin (FARXIGA) 10 MG tablet TAKE 1 TABLET BY MOUTH ONE TIME A DAY 90 tablet 3    TRUE METRIX BLOOD GLUCOSE TEST strip TEST BLOOD SUGAR ONCE DAILY 200 strip 0    zoster recombinant adjuvanted vaccine (SHINGRIX) 50 MCG/0.5ML SUSR injection Inject 0.5 mLs into the muscle See Admin Instructions 1 dose now and repeat in 2-6 months (Patient not taking: Reported on 8/30/2019) 0.5 mL 1    Lactobacillus (FLORANEX PO) Floranex 1 million cell tablet   TK 1 T PO QD      letrozole (FEMARA) 2.5 MG tablet Take 1 tablet by mouth daily 90 tablet 3    fluticasone (FLONASE) 50 MCG/ACT nasal spray 1 spray by Nasal route 2 times daily       montelukast (SINGULAIR) 10 MG tablet Take 10 mg by mouth nightly.  cetirizine (ZYRTEC) 10 MG tablet Take 10 mg by mouth daily.  lansoprazole (PREVACID) 30 MG capsule Take 15 mg by mouth daily as needed (15 MG daily)       budesonide-formoterol (SYMBICORT) 160-4.5 MCG/ACT AERO Inhale 2 puffs into the lungs 2 times daily.  albuterol (PROVENTIL HFA;VENTOLIN HFA) 108 (90 BASE) MCG/ACT inhaler Inhale 2 puffs into the lungs every 4 hours as needed       clobetasol (TEMOVATE) 0.05 % cream Apply topically 2 times daily as needed Apply topically 2 times daily. No current facility-administered medications for this visit.       ALLERGIES:    Allergies   Allergen Reactions    Amoxicillin     Plantain Other (See Comments)    Statins     Tape Clemetine Sandifer Tape]     Sulfa Antibiotics to clinic prn if these symptoms worsen or fail to improve as anticipated. I have reviewed the instructions with the patient, answering all questions to her satisfaction. Return in about 4 weeks (around 3/19/2020), or if symptoms worsen or fail to improve, for medicare visit. Orders Placed This Encounter   Procedures    XR THORACIC SPINE (2 VIEWS)     Standing Status:   Future     Number of Occurrences:   1     Standing Expiration Date:   2/20/2021    POCT glycosylated hemoglobin (Hb A1C)     Orders Placed This Encounter   Medications    glimepiride (AMARYL) 4 MG tablet     Sig: TAKE 1 TABLET BY MOUTH ONE TIME A DAY     Dispense:  90 tablet     Refill:  3    metFORMIN (GLUCOPHAGE-XR) 500 MG extended release tablet     Sig: TAKE 1 TABLET BY MOUTH 4 TIMES A DAY     Dispense:  360 tablet     Refill:  3    dapagliflozin (FARXIGA) 10 MG tablet     Sig: TAKE 1 TABLET BY MOUTH ONE TIME A DAY     Dispense:  90 tablet     Refill:  3       Cora received counseling on the following healthy behaviors: nutrition, exercise and medication adherence  Reviewed prior labs and health maintenance. Continue current medications, diet and exercise. Discussed use, benefit, and side effects of prescribed medications. Barriers to medication compliance addressed. Patient given educational materials - see patient instructions. All patient questions answered. Patient voiced understanding.       Electronically signed by Montrell Lopez MD on 2/20/2020 at 12:18 PM       (Please note that portions of this note were completed with a voice recognition program. Efforts were made to edit the dictations but occasionally words are mis-transcribed.)

## 2020-02-27 ENCOUNTER — HOSPITAL ENCOUNTER (OUTPATIENT)
Facility: MEDICAL CENTER | Age: 70
End: 2020-02-27
Payer: MEDICARE

## 2020-02-28 ENCOUNTER — OFFICE VISIT (OUTPATIENT)
Dept: ONCOLOGY | Age: 70
End: 2020-02-28
Payer: MEDICARE

## 2020-02-28 ENCOUNTER — TELEPHONE (OUTPATIENT)
Dept: ONCOLOGY | Age: 70
End: 2020-02-28

## 2020-02-28 VITALS
BODY MASS INDEX: 25.52 KG/M2 | SYSTOLIC BLOOD PRESSURE: 123 MMHG | RESPIRATION RATE: 18 BRPM | DIASTOLIC BLOOD PRESSURE: 60 MMHG | WEIGHT: 170.3 LBS | HEART RATE: 75 BPM | TEMPERATURE: 97.8 F

## 2020-02-28 PROCEDURE — 1036F TOBACCO NON-USER: CPT | Performed by: INTERNAL MEDICINE

## 2020-02-28 PROCEDURE — G8484 FLU IMMUNIZE NO ADMIN: HCPCS | Performed by: INTERNAL MEDICINE

## 2020-02-28 PROCEDURE — G8399 PT W/DXA RESULTS DOCUMENT: HCPCS | Performed by: INTERNAL MEDICINE

## 2020-02-28 PROCEDURE — 4040F PNEUMOC VAC/ADMIN/RCVD: CPT | Performed by: INTERNAL MEDICINE

## 2020-02-28 PROCEDURE — 1090F PRES/ABSN URINE INCON ASSESS: CPT | Performed by: INTERNAL MEDICINE

## 2020-02-28 PROCEDURE — 99214 OFFICE O/P EST MOD 30 MIN: CPT | Performed by: INTERNAL MEDICINE

## 2020-02-28 PROCEDURE — G8427 DOCREV CUR MEDS BY ELIG CLIN: HCPCS | Performed by: INTERNAL MEDICINE

## 2020-02-28 PROCEDURE — 1123F ACP DISCUSS/DSCN MKR DOCD: CPT | Performed by: INTERNAL MEDICINE

## 2020-02-28 PROCEDURE — 99212 OFFICE O/P EST SF 10 MIN: CPT

## 2020-02-28 PROCEDURE — G8417 CALC BMI ABV UP PARAM F/U: HCPCS | Performed by: INTERNAL MEDICINE

## 2020-02-28 PROCEDURE — 3017F COLORECTAL CA SCREEN DOC REV: CPT | Performed by: INTERNAL MEDICINE

## 2020-02-28 RX ORDER — LETROZOLE 2.5 MG/1
2.5 TABLET, FILM COATED ORAL DAILY
Qty: 90 TABLET | Refills: 3 | Status: SHIPPED | OUTPATIENT
Start: 2020-02-28 | End: 2021-02-26 | Stop reason: SDUPTHER

## 2020-02-28 NOTE — PROGRESS NOTES
Reason for the visit:   Chief Complaint   Patient presents with    Follow-up     review status of disease       DIAGNOSIS:   1- Right breast invasive ductal carcinoma Grade 2. ER/CT positive 95% and HER2 positive Ratio 3.5. Associated with high grade DCIS comedo type' biopsy 2/26/2015. 2- final pathology is T2 N0 M0 invasive ductal carcinoma with surrounding DCIS, margins were negative, tumor is triple positive. 3- h/o right breast DCIS in 1994 s/p BCS/EBRT and 7 years of Tamoxifen until 2001  4- incidentally, left-sided DCIS was found during bilateral mastectomy. Removed with negative margins. CURRENT THERAPY:  1. Right-sided lumpectomy, in 1994. followed by radiation and 7 years of tamoxifen, completed 2001  2. Underwent bilateral mastectomy   3. Adjuvant chemotherapy with [address], started August, 2015    4. Maintenance hercptin, completed 7/2016   5. Adjuvant  AI     BRIEF CASE HISTORY:  This is a 68 y.o. woman with history of right breast DCIS in 1994 treated with BCS / EBRT and Tamoxifen for 7 years until 2001. In February 2015, she had a screening mammogram that showed suspicious findings in the right breast. She underwent a core biopsy that showed infiltrating ductal carcinoma, grade 2, ER/CT positive and HER-2/beth positive with fish ratio 3.5. Because of previous radiation, breast conservation is not an option so the patient underwent mastectomy and axillary sampling. Surgery occurred on 6/30 and pathology showed 2.2 cm moderately differentiated carcinoma, associated with intermediate and high-grade ductal carcinoma in situ. Margins were negative. Attempt for axillary sampling was not successful because of previous surgery and radiation. No lymph nodes were appreciated in the specimen. Contralateral mastectomy was done at the same time and showed high-grade ductal carcinoma in situ, margins were negative.    Genetic testing was negative  We decided to proceed with adjuvant chemotherapy with DELGADOBRANDO FERRARA. She tolerated that fairly well. Plan 6 cycles of chemotherapy followed by maintenance Herceptin and hormone therapy. Completed 6 cycles of chemotherapy without problems  After that she finished one year of herceptin, completed in July/2016. Diagnosed with frozen shoulder, left, 12/2017, underwent PT, range of motion improved, pain persisted. INTERIM HISTORY: Patient presents today for follow up for breast cancer. She continues with Femara. She has been having pain in the mid thoracic that sometimes wraps around to her chest, it is not painful with cough, she has xray yesterday. She has arthroscopic surgery planned for her left shoulder over the summer. Past Medical History   has a past medical history of Allergic rhinitis, Asthma, Breast cancer ([address] Chemotherapy-induced neuropathy ([address] Frequent UTI, GERD (gastroesophageal reflux disease), Heavy menstrual bleeding, History of bone density study, Pap test, as part of routine gynecological examination, Type II or unspecified type diabetes mellitus without mention of complication, not stated as uncontrolled, and Urinary tract infection. Surgical History   has a past surgical history that includes Dilation and curettage of uterus; sinus surgery; Tonsillectomy; Hysterectomy, total abdominal; Tunneled venous port placement (Left, 7/30/2015); Mastectomy (Bilateral); Breast lumpectomy (1/1994); Breast reconstruction (Bilateral, 06/30/15); Breast reconstruction (Bilateral, 4/21/16); Breast surgery (Bilateral, 04/21/2016); Colonoscopy; Endoscopy, colon, diagnostic; and other surgical history (09/27/2016).     Family History  Family History   Problem Relation Age of Onset    Breast Cancer Mother     Other Mother         heart disease, pacemaker    Other Father         heart disease, thyroid disease    Breast Cancer Maternal Aunt     Breast Cancer Paternal Grandmother     Breast Cancer Maternal Aunt     Asthma Maternal Grandmother        Home Medications  has a current medication list which includes the following prescription(s): glimepiride, metformin, dapagliflozin, true metrix blood glucose test, lactobacillus, letrozole, fluticasone, montelukast, cetirizine, lansoprazole, budesonide-formoterol, albuterol sulfate hfa, and clobetasol. Allergies:  Amoxicillin; Plantain; Statins; Tape [adhesive tape]; and Sulfa antibiotics        Review of Systems :    Constitutional: feels much better . No fever or chills. No night sweats, no weight loss   Eyes: No eye discharge, double vision, or eye pain   HEENT: negative for sore mouth or throat, no hoarseness and voice change . Respiratory: negative for cough , sputum, dyspnea, wheezing, hemoptysis, chest pain   Cardiovascular: negative for chest pain, dyspnea, palpitations, orthopnea, PND +cough  Gastrointestinal: negative for nausea, vomiting, no diarrhea, no abdominal pain, dysphagia, hematemesis and hematochezia   Genitourinary: negative for frequency, dysuria, nocturia, urinary incontinence, and hematuria   Integument: Eczema on right shoulder  Hematologic/Lymphatic: negative for easy bruising, bleeding, lymphadenopathy, petechiae and swelling/edema   Endocrine: negative for heat or cold intolerance, tremor, weight changes, change in bowel habits and hair loss   Musculoskeletal: negative for myalgias, arthralgias, pain, joint swelling,and bone pain.  Left shoulder pain - as noted above; back pain as noted above  Neurological: negative for headaches, dizziness, seizures, weakness, numbness      Physical Examination :       /60   Pulse 75   Temp 97.8 °F (36.6 °C) (Temporal)   Resp 18   Wt 170 lb 4.8 oz (77.2 kg)   BMI 25.52 kg/m²     Performance Status:Estimated performance status is ECOG 0    General appearance - well appearing, no in pain or distress   Mental status - alert and cooperative   Eyes - pupils equal and reactive, extraocular eye movements intact   Ears - bilateral TM's and external ear

## 2020-02-28 NOTE — TELEPHONE ENCOUNTER
[facility] ARRIVES AMBULATORY FOR MD VISIT  DR Kaleb Samano IN TO SEE PATIENT  ORDERS RECEIVED  MRI IN 2 WEEKS  RV 6 MONTHS   MRI THORACIC SPINE SCHEDULED Celi Tai FOR 03/13/20 @9:15AM  ARRIVE BY 8:30AM [facility] REG, MRI SCREENING FORM SCANNED TO PATIENT'S CHART.   MD VISIT 08/28/20 @11AM  AVS PRINTED AND GIVEN TO PATIENT WITH INSTRUCTIONS  PATIENT DISCHARGED AMBULATORY

## 2020-03-09 ENCOUNTER — TELEPHONE (OUTPATIENT)
Dept: ORTHOPEDIC SURGERY | Age: 70
End: 2020-03-09

## 2020-03-13 ENCOUNTER — HOSPITAL ENCOUNTER (OUTPATIENT)
Dept: MRI IMAGING | Age: 70
Discharge: HOME | End: 2020-03-15
Payer: MEDICARE

## 2020-03-13 LAB
CREAT SERPL-MCNC: 0.72 MG/DL (ref 0.5–0.9)
GFR AFRICAN AMERICAN: >60 ML/MIN
GFR NON-AFRICAN AMERICAN: >60 ML/MIN
GFR SERPL CREATININE-BSD FRML MDRD: NORMAL ML/MIN/{1.73_M2}
GFR SERPL CREATININE-BSD FRML MDRD: NORMAL ML/MIN/{1.73_M2}

## 2020-03-13 PROCEDURE — 72157 MRI CHEST SPINE W/O & W/DYE: CPT

## 2020-03-13 PROCEDURE — 36415 COLL VENOUS BLD VENIPUNCTURE: CPT

## 2020-03-13 PROCEDURE — 6360000004 HC RX CONTRAST MEDICATION: Performed by: INTERNAL MEDICINE

## 2020-03-13 PROCEDURE — 82565 ASSAY OF CREATININE: CPT

## 2020-03-13 PROCEDURE — A9579 GAD-BASE MR CONTRAST NOS,1ML: HCPCS | Performed by: INTERNAL MEDICINE

## 2020-03-13 RX ADMIN — GADOTERIDOL 17 ML: 279.3 INJECTION, SOLUTION INTRAVENOUS at 09:29

## 2020-04-21 RX ORDER — CALCIUM CITRATE/VITAMIN D3 200MG-6.25
TABLET ORAL
Qty: 200 STRIP | Refills: 0 | Status: SHIPPED | OUTPATIENT
Start: 2020-04-21 | End: 2020-10-19

## 2020-04-23 RX ORDER — GLIMEPIRIDE 4 MG/1
TABLET ORAL
Qty: 90 TABLET | Refills: 0 | Status: SHIPPED | OUTPATIENT
Start: 2020-04-23 | End: 2020-08-10 | Stop reason: SDUPTHER

## 2020-05-19 ENCOUNTER — TELEPHONE (OUTPATIENT)
Dept: FAMILY MEDICINE CLINIC | Age: 70
End: 2020-05-19

## 2020-05-20 ENCOUNTER — PATIENT MESSAGE (OUTPATIENT)
Dept: FAMILY MEDICINE CLINIC | Age: 70
End: 2020-05-20

## 2020-05-22 ENCOUNTER — HOSPITAL ENCOUNTER (OUTPATIENT)
Dept: PREADMISSION TESTING | Age: 70
Discharge: HOME | End: 2020-05-26
Payer: MEDICARE

## 2020-05-22 ENCOUNTER — PATIENT MESSAGE (OUTPATIENT)
Dept: FAMILY MEDICINE CLINIC | Age: 70
End: 2020-05-22

## 2020-05-22 VITALS
RESPIRATION RATE: 16 BRPM | TEMPERATURE: 96.8 F | HEIGHT: 69 IN | HEART RATE: 80 BPM | WEIGHT: 170 LBS | OXYGEN SATURATION: 99 % | DIASTOLIC BLOOD PRESSURE: 52 MMHG | BODY MASS INDEX: 25.18 KG/M2 | SYSTOLIC BLOOD PRESSURE: 163 MMHG

## 2020-05-22 LAB
ANION GAP SERPL CALCULATED.3IONS-SCNC: 14 MMOL/L (ref 9–17)
BUN BLDV-MCNC: 14 MG/DL (ref 8–23)
CHLORIDE BLD-SCNC: 100 MMOL/L (ref 98–107)
CO2: 23 MMOL/L (ref 20–31)
CREAT SERPL-MCNC: 0.8 MG/DL (ref 0.5–0.9)
ESTIMATED AVERAGE GLUCOSE: 169 MG/DL
GFR AFRICAN AMERICAN: >60 ML/MIN
GFR NON-AFRICAN AMERICAN: >60 ML/MIN
GFR SERPL CREATININE-BSD FRML MDRD: NORMAL ML/MIN/{1.73_M2}
GFR SERPL CREATININE-BSD FRML MDRD: NORMAL ML/MIN/{1.73_M2}
HBA1C MFR BLD: 7.5 % (ref 4–6)
HCT VFR BLD CALC: 37.3 % (ref 36.3–47.1)
HEMOGLOBIN: 11.8 G/DL (ref 11.9–15.1)
MCH RBC QN AUTO: 28 PG (ref 25.2–33.5)
MCHC RBC AUTO-ENTMCNC: 31.6 G/DL (ref 28.4–34.8)
MCV RBC AUTO: 88.6 FL (ref 82.6–102.9)
NRBC AUTOMATED: 0 PER 100 WBC
PDW BLD-RTO: 14.3 % (ref 11.8–14.4)
PLATELET # BLD: 106 K/UL (ref 138–453)
PMV BLD AUTO: 11.3 FL (ref 8.1–13.5)
POTASSIUM SERPL-SCNC: 4.1 MMOL/L (ref 3.7–5.3)
RBC # BLD: 4.21 M/UL (ref 3.95–5.11)
SODIUM BLD-SCNC: 137 MMOL/L (ref 135–144)
WBC # BLD: 2.7 K/UL (ref 3.5–11.3)

## 2020-05-22 PROCEDURE — 36415 COLL VENOUS BLD VENIPUNCTURE: CPT

## 2020-05-22 PROCEDURE — 84520 ASSAY OF UREA NITROGEN: CPT

## 2020-05-22 PROCEDURE — 83036 HEMOGLOBIN GLYCOSYLATED A1C: CPT

## 2020-05-22 PROCEDURE — 85027 COMPLETE CBC AUTOMATED: CPT

## 2020-05-22 PROCEDURE — 93005 ELECTROCARDIOGRAM TRACING: CPT | Performed by: ANESTHESIOLOGY

## 2020-05-22 PROCEDURE — 82565 ASSAY OF CREATININE: CPT

## 2020-05-22 PROCEDURE — 80051 ELECTROLYTE PANEL: CPT

## 2020-05-22 RX ORDER — CEFAZOLIN SODIUM 2 G/50ML
2 SOLUTION INTRAVENOUS ONCE
Status: CANCELLED | OUTPATIENT
Start: 2020-06-12

## 2020-05-22 NOTE — PRE-PROCEDURE INSTRUCTIONS
[facility] ON Friday, June 12,2020  at 0530 AM    Once you enter the hospital lobby, take the elevators to the second floor. Check-In is at the surgery registration desk. Continue to take your home medications as you normally do up to and including the night before surgery with the exception of any blood thinning medications. Please stop any blood thinning medications as directed by your surgeon or prescribing physician. Failure to stop certain medications may interfere with your scheduled surgery. These may include:  Aspirin, Warfarin (Coumadin), Clopidogrel (Plavix), Ibuprofen (Motrin, Advil), Naproxen (Aleve), Meloxicam (Mobic), Celecoxib (Celebrex), Eliquis, Pradaxa, Xarelto, Effient, Fish Oil, Herbal supplements. If you are diabetic, do not take any of your diabetic medications by mouth the morning of surgery. If you are taking insulin contact the doctor that manages your diabetes for instructions about any changes to your insulin dosages the day before surgery. Do not inject insulin or other injectable diabetic medications the morning of surgery unless otherwise instructed by the doctor who manages your diabetes. Please take the following medication(s) the day of surgery with a small sip of water:  prevacid      Please use your inhaler(s) if needed and bring your inhaler(s) from home the day of surgery. PREPARING FOR YOUR SURGERY:     Before surgery, you can play an important role in your own health. Because skin is not sterile, we need to be sure that your skin is as free of germs as possible before surgery by carefully washing before surgery. Preparing or prepping skin before surgery can reduce the risk of a surgical site infection.   Do not shave the area of your body where your surgery will be performed unless you received specific permission from your physician.     You will need to shower at home the night before surgery and the morning of surgery with a special soap called chlorhexidine gluconate (CHG*). *Not to be used by people allergic to Chlorhexidine Gluconate (CHG). Following these instructions will help you be sure that your skin is clean before surgery. Instructions on cleaning your skin before surgery: The night before your surgery:      You will need to shower with warm water (not hot) and the CHG soap.  Use a clean wash cloth and a clean towel. Have clean clothes available to put on after the shower.   First wash your hair with regular shampoo. Rinse your hair and body thoroughly to remove the shampoo.  Wash your face and genital area (private parts) with your regular soap or water only. Thoroughly rinse your body with warm water from the neck down.  Turn water off to prevent rinsing the soap off too soon.  With a clean wet washcloth and half of the CHG soap in the bottle, lather your entire body from the neck down. Do not use CHG soap near your eyes or ears to avoid injury to those areas.  Wash thoroughly, paying special attention to the area where your surgery will be performed.  Wash your body gently for five (5) minutes. Avoid scrubbing your skin too hard.  Turn the water back on and rinse your body thoroughly.  Pat yourself dry with a clean, soft towel. Do not apply lotion, cream or powder.  Dress with clean freshly washed clothes. The morning of surgery:     Repeat shower following steps above - using remaining half of CHG soap in bottle. Patient Instructions:    [facility] If you are having any type of anesthesia you are to have nothing to eat or drink after midnight the night before your surgery. This includes gum, hard candy, mints, water or smoking or chewing tobacco.  The only exception to this is a small sip of water to take with any morning dose of heart, blood pressure, or seizure medications. No alcoholic beverages for 24 hours prior to surgery.      Brush your teeth but do

## 2020-05-22 NOTE — H&P
History and Physical Service   [facility] [place] - INPATIENT    HISTORY AND PHYSICAL EXAMINATION            Date of Evaluation: 5/22/2020  Patient name:  Ronn Hubbard  MRN:   [id number]  YOB: 1950  PCP:    Kehinde Gunter MD    History Obtained From:     Patient    History of Present Illness: This is Ronn Hubbard a 68 y.o. female who presents for a pre-admission testing appointment for an upcoming left shoulder arthroscopy with SAD and possible open bicep tenodesis by Dr. Hector Staton scheduled on 06/12/2020 at [address] [place] [address] due to left shoulder rotator cuff tear. The patient's chief complaint is intermittent, 2-7/10 left shoulder pain that has progressively worsened over the past 5-6 years. Left shoulder pain is aggravated by extending the left arm and by lying on the left shoulder. The pain is minimally relieved by taking ASA and applying heat or ice to the left shoulder. The left shoulder has decreased range of motion and weakness. Prior treatment includes physical therapy. Denies recent falls and injuries. Functional Capacity per pt:   1) Pt is able to walk 2 city blocks on level ground without SOB. 2) Pt is able to climb 2 flights of stairs without SOB. 3) Pt is not able to walk up a hill for 1-2 city blocks without SOB.     Past Medical History:     Past Medical History:   Diagnosis Date    Allergic rhinitis     Arthritis     osteoarthritis    Asthma     exercise induced    Breast cancer ([address] 12/1994    Dr Bennett Valenzuela Lumpectomy and lymph nodes excised    Breast cancer ([address] 2015    bilatereal breast cancer    Chemotherapy-induced neuropathy ([address]     Colon polyps     Frequent UTI     GERD (gastroesophageal reflux disease)     Heavy menstrual bleeding     History of bone density study 11/12    normal    Pap test, as part of routine gynecological examination 8/12    peds speculum    PONV (postoperative nausea and vomiting)     Type II or unspecified type diabetes mellitus

## 2020-05-22 NOTE — TELEPHONE ENCOUNTER
From: Marina Thompson  To: Dillon Frye MD  Sent: 5/22/2020 8:20 AM EDT  Subject: Visit Follow-Up Question    Good morning, Dr. Melvi Trinidad,  Re name of medication I used; I have no idea. It was well over 20 years ago. Sorry, that was no help.      ----- Message -----   Savannah Cannon MD   Sent:5/20/2020 12:06 PM EDT   To:Cora Steward   Subject:RE: Visit Follow-Up Question    Thank you for letting me know. What is the medication called for the ulcers? Thank you.      ----- Message -----   From:Cora Steward   Sent:5/20/2020 9:14 AM EDT   Josephine Alonso MD   Subject:Visit Follow-Up Question    Good morning, Dr. Melvi Trinidad,  Am having pre-op on this Friday, May 22nd for my exploratory shoulder surgery which is scheduled for Friday, June 12th. The surgery scheduler is faxing over the approval form where you need to approve. She was doing that yesterday, Tuesday. Just wanted you to be aware that it was coming. Also, I have a new issue I need help with. I have burning in my back around approximately my bra line. It's worse at night. Have been using Pepto Bismol for temporary relief. This has been going on for a few months. I have had past esophageal ulcers. Dr. Castro Bound had me take a pill that I had to crush to take. Is it possible for you to call in a script to [facility] that would help me? Thanks!

## 2020-05-23 LAB
EKG ATRIAL RATE: 76 BPM
EKG P AXIS: 76 DEGREES
EKG P-R INTERVAL: 162 MS
EKG Q-T INTERVAL: 406 MS
EKG QRS DURATION: 88 MS
EKG QTC CALCULATION (BAZETT): 456 MS
EKG R AXIS: -7 DEGREES
EKG T AXIS: 76 DEGREES
EKG VENTRICULAR RATE: 76 BPM

## 2020-05-23 PROCEDURE — 93010 ELECTROCARDIOGRAM REPORT: CPT | Performed by: INTERNAL MEDICINE

## 2020-05-26 ENCOUNTER — PATIENT MESSAGE (OUTPATIENT)
Dept: FAMILY MEDICINE CLINIC | Age: 70
End: 2020-05-26

## 2020-05-26 RX ORDER — SUCRALFATE ORAL 1 G/10ML
1 SUSPENSION ORAL 4 TIMES DAILY
Qty: 1200 ML | Refills: 3 | Status: SHIPPED | OUTPATIENT
Start: 2020-05-26

## 2020-06-01 ENCOUNTER — TELEPHONE (OUTPATIENT)
Dept: FAMILY MEDICINE CLINIC | Age: 70
End: 2020-06-01

## 2020-06-04 ENCOUNTER — OFFICE VISIT (OUTPATIENT)
Dept: ORTHOPEDIC SURGERY | Age: 70
End: 2020-06-04
Payer: MEDICARE

## 2020-06-04 VITALS
HEIGHT: 69 IN | DIASTOLIC BLOOD PRESSURE: 66 MMHG | TEMPERATURE: 97.6 F | WEIGHT: 169.97 LBS | BODY MASS INDEX: 25.18 KG/M2 | SYSTOLIC BLOOD PRESSURE: 133 MMHG | HEART RATE: 79 BPM

## 2020-06-04 PROCEDURE — 99213 OFFICE O/P EST LOW 20 MIN: CPT | Performed by: ORTHOPAEDIC SURGERY

## 2020-06-04 PROCEDURE — 3017F COLORECTAL CA SCREEN DOC REV: CPT | Performed by: ORTHOPAEDIC SURGERY

## 2020-06-04 PROCEDURE — G8417 CALC BMI ABV UP PARAM F/U: HCPCS | Performed by: ORTHOPAEDIC SURGERY

## 2020-06-04 PROCEDURE — 4040F PNEUMOC VAC/ADMIN/RCVD: CPT | Performed by: ORTHOPAEDIC SURGERY

## 2020-06-04 PROCEDURE — G8399 PT W/DXA RESULTS DOCUMENT: HCPCS | Performed by: ORTHOPAEDIC SURGERY

## 2020-06-04 PROCEDURE — 1123F ACP DISCUSS/DSCN MKR DOCD: CPT | Performed by: ORTHOPAEDIC SURGERY

## 2020-06-04 PROCEDURE — G8427 DOCREV CUR MEDS BY ELIG CLIN: HCPCS | Performed by: ORTHOPAEDIC SURGERY

## 2020-06-04 PROCEDURE — 1036F TOBACCO NON-USER: CPT | Performed by: ORTHOPAEDIC SURGERY

## 2020-06-04 PROCEDURE — 1090F PRES/ABSN URINE INCON ASSESS: CPT | Performed by: ORTHOPAEDIC SURGERY

## 2020-06-04 ASSESSMENT — ENCOUNTER SYMPTOMS
COLOR CHANGE: 0
SHORTNESS OF BREATH: 0
NAUSEA: 0
DIARRHEA: 0
VOMITING: 0
ABDOMINAL PAIN: 0
CHEST TIGHTNESS: 0
COUGH: 0
ABDOMINAL DISTENTION: 0
CONSTIPATION: 0
APNEA: 0

## 2020-06-04 NOTE — PROGRESS NOTES
or belly press test.  IMP: (+) Neer's sign, (+) Hawkin's sign, (-) Coracoid impingement, no painful arc, no pain with cross body abduction. PALP: no pain over anterolateral acromion, no pain over AC joint, no pain over traps/rhomboids. INST: (+) Torrington's test recreates her pain, mild Speed's test.  Assessment:     1. Rotator cuff syndrome of left shoulder      Procedures:    Procedure: no  Radiology:   X-ray was reviewed from 08/26/2019. MRI was reviewed from 12/02/2019. Plan:   Surgical Plan: We discussed the natural etiologies and histories of Rotator cuff syndrome of the left shoulder. We also discussed the various alternatives of medical treatment including physical therapy, injections, anti-inflammatory drugs and as a last resort surgery. During today's face to face encounter, we discussed the details of undergoing a left shoulder arthrocopic surgery with rotator cuff repair, SAD, possible open biceps tenodesis. I explained to the patient that She should not eat anything after midnight the night before surgery. She should wash her body with the Hibiclens soap that She was given to reduce the risks of infection. I also instructed the patient not to shave at all this week because we do not want any nicks to develop on the skin for it will increase the risk of infection. In addition, I explained to the patient that like any other surgery, there are risks, such as: infection, pain, nerve and artery damage, bleeding, lack of muscle stimulus, stiffness, and lastly, a need for further surgery. I also explained to the patient that she will be offered a nerve block and I highly recommend it to patients because it helps reduce the pain during surgery and after surgery. However, it is up to the patient to make that choice for the nerve block and the patient was made aware of that. At this time, the patient has opted for and has decided to undergo a left shoulder arthroscopic surgery.  A consent form was signed by the

## 2020-06-08 ENCOUNTER — HOSPITAL ENCOUNTER (OUTPATIENT)
Dept: PREADMISSION TESTING | Age: 70
Setting detail: SPECIMEN
Discharge: HOME | End: 2020-06-12
Payer: MEDICARE

## 2020-06-08 PROCEDURE — U0004 COV-19 TEST NON-CDC HGH THRU: HCPCS

## 2020-06-09 LAB
SARS-COV-2, PCR: NOT DETECTED
SARS-COV-2, RAPID: NORMAL
SARS-COV-2: NORMAL
SOURCE: NORMAL

## 2020-06-10 ENCOUNTER — TELEPHONE (OUTPATIENT)
Dept: PRIMARY CARE CLINIC | Age: 70
End: 2020-06-10

## 2020-06-11 ENCOUNTER — ANESTHESIA EVENT (OUTPATIENT)
Dept: OPERATING ROOM | Age: 70
End: 2020-06-11
Payer: MEDICARE

## 2020-06-12 ENCOUNTER — HOSPITAL ENCOUNTER (OUTPATIENT)
Age: 70
Setting detail: OUTPATIENT SURGERY
Discharge: HOME | End: 2020-06-12
Attending: ORTHOPAEDIC SURGERY | Admitting: ORTHOPAEDIC SURGERY
Payer: MEDICARE

## 2020-06-12 ENCOUNTER — ANESTHESIA (OUTPATIENT)
Dept: OPERATING ROOM | Age: 70
End: 2020-06-12
Payer: MEDICARE

## 2020-06-12 VITALS
OXYGEN SATURATION: 93 % | SYSTOLIC BLOOD PRESSURE: 152 MMHG | DIASTOLIC BLOOD PRESSURE: 69 MMHG | WEIGHT: 170 LBS | TEMPERATURE: 97.2 F | HEART RATE: 92 BPM | HEIGHT: 69 IN | BODY MASS INDEX: 25.18 KG/M2 | RESPIRATION RATE: 17 BRPM

## 2020-06-12 VITALS — TEMPERATURE: 97.3 F | DIASTOLIC BLOOD PRESSURE: 78 MMHG | SYSTOLIC BLOOD PRESSURE: 176 MMHG | OXYGEN SATURATION: 100 %

## 2020-06-12 LAB
GLUCOSE BLD-MCNC: 130 MG/DL (ref 65–105)
GLUCOSE BLD-MCNC: 154 MG/DL (ref 65–105)

## 2020-06-12 PROCEDURE — 6360000002 HC RX W HCPCS: Performed by: ORTHOPAEDIC SURGERY

## 2020-06-12 PROCEDURE — 6360000002 HC RX W HCPCS: Performed by: ANESTHESIOLOGY

## 2020-06-12 PROCEDURE — 82947 ASSAY GLUCOSE BLOOD QUANT: CPT

## 2020-06-12 PROCEDURE — 2500000003 HC RX 250 WO HCPCS: Performed by: ORTHOPAEDIC SURGERY

## 2020-06-12 PROCEDURE — 3700000000 HC ANESTHESIA ATTENDED CARE: Performed by: ORTHOPAEDIC SURGERY

## 2020-06-12 PROCEDURE — 29825 SHO ARTHRS SRG LSS&RESCJ ADS: CPT | Performed by: ORTHOPAEDIC SURGERY

## 2020-06-12 PROCEDURE — 6360000002 HC RX W HCPCS: Performed by: NURSE ANESTHETIST, CERTIFIED REGISTERED

## 2020-06-12 PROCEDURE — 2580000003 HC RX 258: Performed by: ANESTHESIOLOGY

## 2020-06-12 PROCEDURE — 7100000011 HC PHASE II RECOVERY - ADDTL 15 MIN: Performed by: ORTHOPAEDIC SURGERY

## 2020-06-12 PROCEDURE — 2709999900 HC NON-CHARGEABLE SUPPLY: Performed by: ORTHOPAEDIC SURGERY

## 2020-06-12 PROCEDURE — 3600000003 HC SURGERY LEVEL 3 BASE: Performed by: ORTHOPAEDIC SURGERY

## 2020-06-12 PROCEDURE — 6360000002 HC RX W HCPCS

## 2020-06-12 PROCEDURE — 29826 SHO ARTHRS SRG DECOMPRESSION: CPT | Performed by: ORTHOPAEDIC SURGERY

## 2020-06-12 PROCEDURE — 2720000010 HC SURG SUPPLY STERILE: Performed by: ORTHOPAEDIC SURGERY

## 2020-06-12 PROCEDURE — 64415 NJX AA&/STRD BRCH PLXS IMG: CPT | Performed by: ANESTHESIOLOGY

## 2020-06-12 PROCEDURE — C9290 INJ, BUPIVACAINE LIPOSOME: HCPCS | Performed by: ANESTHESIOLOGY

## 2020-06-12 PROCEDURE — 7100000001 HC PACU RECOVERY - ADDTL 15 MIN: Performed by: ORTHOPAEDIC SURGERY

## 2020-06-12 PROCEDURE — 2500000003 HC RX 250 WO HCPCS: Performed by: ANESTHESIOLOGY

## 2020-06-12 PROCEDURE — 3600000013 HC SURGERY LEVEL 3 ADDTL 15MIN: Performed by: ORTHOPAEDIC SURGERY

## 2020-06-12 PROCEDURE — 2580000003 HC RX 258: Performed by: ORTHOPAEDIC SURGERY

## 2020-06-12 PROCEDURE — 7100000010 HC PHASE II RECOVERY - FIRST 15 MIN: Performed by: ORTHOPAEDIC SURGERY

## 2020-06-12 PROCEDURE — 3700000001 HC ADD 15 MINUTES (ANESTHESIA): Performed by: ORTHOPAEDIC SURGERY

## 2020-06-12 PROCEDURE — 7100000000 HC PACU RECOVERY - FIRST 15 MIN: Performed by: ORTHOPAEDIC SURGERY

## 2020-06-12 PROCEDURE — 2500000003 HC RX 250 WO HCPCS: Performed by: NURSE ANESTHETIST, CERTIFIED REGISTERED

## 2020-06-12 RX ORDER — METHYLPREDNISOLONE ACETATE 40 MG/ML
INJECTION, SUSPENSION INTRA-ARTICULAR; INTRALESIONAL; INTRAMUSCULAR; SOFT TISSUE
Status: DISCONTINUED
Start: 2020-06-12 | End: 2020-06-12 | Stop reason: HOSPADM

## 2020-06-12 RX ORDER — SODIUM CHLORIDE 0.9 % (FLUSH) 0.9 %
10 SYRINGE (ML) INJECTION EVERY 12 HOURS SCHEDULED
Status: DISCONTINUED | OUTPATIENT
Start: 2020-06-12 | End: 2020-06-12 | Stop reason: HOSPADM

## 2020-06-12 RX ORDER — OXYCODONE HYDROCHLORIDE AND ACETAMINOPHEN 5; 325 MG/1; MG/1
1 TABLET ORAL EVERY 6 HOURS PRN
Qty: 28 TABLET | Refills: 0 | Status: SHIPPED | OUTPATIENT
Start: 2020-06-12 | End: 2020-06-19

## 2020-06-12 RX ORDER — SODIUM CHLORIDE, SODIUM LACTATE, POTASSIUM CHLORIDE, CALCIUM CHLORIDE 600; 310; 30; 20 MG/100ML; MG/100ML; MG/100ML; MG/100ML
INJECTION, SOLUTION INTRAVENOUS CONTINUOUS
Status: DISCONTINUED | OUTPATIENT
Start: 2020-06-13 | End: 2020-06-12 | Stop reason: HOSPADM

## 2020-06-12 RX ORDER — HYDROMORPHONE HCL 110MG/55ML
0.25 PATIENT CONTROLLED ANALGESIA SYRINGE INTRAVENOUS EVERY 5 MIN PRN
Status: DISCONTINUED | OUTPATIENT
Start: 2020-06-12 | End: 2020-06-12 | Stop reason: HOSPADM

## 2020-06-12 RX ORDER — SODIUM CHLORIDE 9 MG/ML
INJECTION, SOLUTION INTRAVENOUS CONTINUOUS
Status: DISCONTINUED | OUTPATIENT
Start: 2020-06-13 | End: 2020-06-12

## 2020-06-12 RX ORDER — BUPIVACAINE HYDROCHLORIDE 5 MG/ML
INJECTION, SOLUTION EPIDURAL; INTRACAUDAL
Status: DISCONTINUED
Start: 2020-06-12 | End: 2020-06-12 | Stop reason: WASHOUT

## 2020-06-12 RX ORDER — FENTANYL CITRATE 50 UG/ML
25 INJECTION, SOLUTION INTRAMUSCULAR; INTRAVENOUS EVERY 5 MIN PRN
Status: DISCONTINUED | OUTPATIENT
Start: 2020-06-12 | End: 2020-06-12 | Stop reason: HOSPADM

## 2020-06-12 RX ORDER — ONDANSETRON 2 MG/ML
INJECTION INTRAMUSCULAR; INTRAVENOUS PRN
Status: DISCONTINUED | OUTPATIENT
Start: 2020-06-12 | End: 2020-06-12 | Stop reason: SDUPTHER

## 2020-06-12 RX ORDER — SODIUM CHLORIDE 0.9 % (FLUSH) 0.9 %
10 SYRINGE (ML) INJECTION PRN
Status: DISCONTINUED | OUTPATIENT
Start: 2020-06-12 | End: 2020-06-12 | Stop reason: HOSPADM

## 2020-06-12 RX ORDER — MIDAZOLAM HYDROCHLORIDE 1 MG/ML
2 INJECTION INTRAMUSCULAR; INTRAVENOUS ONCE
Status: COMPLETED | OUTPATIENT
Start: 2020-06-12 | End: 2020-06-12

## 2020-06-12 RX ORDER — BUPIVACAINE HYDROCHLORIDE 5 MG/ML
INJECTION, SOLUTION EPIDURAL; INTRACAUDAL
Status: COMPLETED | OUTPATIENT
Start: 2020-06-12 | End: 2020-06-12

## 2020-06-12 RX ORDER — EPINEPHRINE 1 MG/ML
INJECTION INTRAMUSCULAR; INTRAVENOUS; SUBCUTANEOUS
Status: DISCONTINUED
Start: 2020-06-12 | End: 2020-06-12 | Stop reason: HOSPADM

## 2020-06-12 RX ORDER — DEXAMETHASONE SODIUM PHOSPHATE 10 MG/ML
INJECTION INTRAMUSCULAR; INTRAVENOUS PRN
Status: DISCONTINUED | OUTPATIENT
Start: 2020-06-12 | End: 2020-06-12 | Stop reason: SDUPTHER

## 2020-06-12 RX ORDER — ROCURONIUM BROMIDE 10 MG/ML
INJECTION, SOLUTION INTRAVENOUS PRN
Status: DISCONTINUED | OUTPATIENT
Start: 2020-06-12 | End: 2020-06-12 | Stop reason: SDUPTHER

## 2020-06-12 RX ORDER — LIDOCAINE HYDROCHLORIDE 20 MG/ML
INJECTION, SOLUTION INFILTRATION; PERINEURAL PRN
Status: DISCONTINUED | OUTPATIENT
Start: 2020-06-12 | End: 2020-06-12 | Stop reason: SDUPTHER

## 2020-06-12 RX ORDER — ASPIRIN 81 MG/1
81 TABLET ORAL DAILY
Qty: 30 TABLET | Refills: 0 | Status: SHIPPED | OUTPATIENT
Start: 2020-06-12 | End: 2020-09-03

## 2020-06-12 RX ORDER — CEFAZOLIN SODIUM 1 G/3ML
INJECTION, POWDER, FOR SOLUTION INTRAMUSCULAR; INTRAVENOUS PRN
Status: DISCONTINUED | OUTPATIENT
Start: 2020-06-12 | End: 2020-06-12 | Stop reason: SDUPTHER

## 2020-06-12 RX ORDER — PROPOFOL 10 MG/ML
INJECTION, EMULSION INTRAVENOUS PRN
Status: DISCONTINUED | OUTPATIENT
Start: 2020-06-12 | End: 2020-06-12 | Stop reason: SDUPTHER

## 2020-06-12 RX ORDER — LIDOCAINE HYDROCHLORIDE 10 MG/ML
INJECTION, SOLUTION INFILTRATION; PERINEURAL
Status: DISCONTINUED
Start: 2020-06-12 | End: 2020-06-12 | Stop reason: HOSPADM

## 2020-06-12 RX ORDER — ONDANSETRON 2 MG/ML
4 INJECTION INTRAMUSCULAR; INTRAVENOUS
Status: DISCONTINUED | OUTPATIENT
Start: 2020-06-12 | End: 2020-06-12 | Stop reason: HOSPADM

## 2020-06-12 RX ORDER — EPHEDRINE SULFATE/0.9% NACL/PF 50 MG/5 ML
SYRINGE (ML) INTRAVENOUS PRN
Status: DISCONTINUED | OUTPATIENT
Start: 2020-06-12 | End: 2020-06-12 | Stop reason: SDUPTHER

## 2020-06-12 RX ORDER — CEFAZOLIN SODIUM 2 G/50ML
2 SOLUTION INTRAVENOUS ONCE
Status: DISCONTINUED | OUTPATIENT
Start: 2020-06-12 | End: 2020-06-12 | Stop reason: HOSPADM

## 2020-06-12 RX ORDER — LIDOCAINE HYDROCHLORIDE 10 MG/ML
1 INJECTION, SOLUTION EPIDURAL; INFILTRATION; INTRACAUDAL; PERINEURAL
Status: DISCONTINUED | OUTPATIENT
Start: 2020-06-13 | End: 2020-06-12 | Stop reason: HOSPADM

## 2020-06-12 RX ORDER — NEOSTIGMINE METHYLSULFATE 5 MG/5 ML
SYRINGE (ML) INTRAVENOUS PRN
Status: DISCONTINUED | OUTPATIENT
Start: 2020-06-12 | End: 2020-06-12 | Stop reason: SDUPTHER

## 2020-06-12 RX ORDER — GLYCOPYRROLATE 1 MG/5 ML
SYRINGE (ML) INTRAVENOUS PRN
Status: DISCONTINUED | OUTPATIENT
Start: 2020-06-12 | End: 2020-06-12 | Stop reason: SDUPTHER

## 2020-06-12 RX ORDER — FENTANYL CITRATE 50 UG/ML
INJECTION, SOLUTION INTRAMUSCULAR; INTRAVENOUS PRN
Status: DISCONTINUED | OUTPATIENT
Start: 2020-06-12 | End: 2020-06-12 | Stop reason: SDUPTHER

## 2020-06-12 RX ADMIN — Medication 5 MG: at 09:05

## 2020-06-12 RX ADMIN — LIDOCAINE HYDROCHLORIDE 80 MG: 20 INJECTION, SOLUTION INFILTRATION; PERINEURAL at 08:07

## 2020-06-12 RX ADMIN — BUPIVACAINE HYDROCHLORIDE 10 ML: 5 INJECTION, SOLUTION EPIDURAL; INTRACAUDAL; PERINEURAL at 07:17

## 2020-06-12 RX ADMIN — PROPOFOL 150 MG: 10 INJECTION, EMULSION INTRAVENOUS at 08:07

## 2020-06-12 RX ADMIN — Medication 0.2 MG: at 08:17

## 2020-06-12 RX ADMIN — Medication 10 MG: at 08:42

## 2020-06-12 RX ADMIN — CEFAZOLIN 2000 MG: 1 INJECTION, POWDER, FOR SOLUTION INTRAMUSCULAR; INTRAVENOUS at 08:40

## 2020-06-12 RX ADMIN — Medication 5 MG: at 09:17

## 2020-06-12 RX ADMIN — SODIUM CHLORIDE, POTASSIUM CHLORIDE, SODIUM LACTATE AND CALCIUM CHLORIDE: 600; 310; 30; 20 INJECTION, SOLUTION INTRAVENOUS at 07:00

## 2020-06-12 RX ADMIN — Medication 50 MCG: at 08:07

## 2020-06-12 RX ADMIN — Medication 3 MG: at 09:41

## 2020-06-12 RX ADMIN — ONDANSETRON 4 MG: 2 INJECTION, SOLUTION INTRAMUSCULAR; INTRAVENOUS at 09:41

## 2020-06-12 RX ADMIN — DEXAMETHASONE SODIUM PHOSPHATE 10 MG: 10 INJECTION INTRAMUSCULAR; INTRAVENOUS at 08:11

## 2020-06-12 RX ADMIN — Medication 50 MCG: at 08:50

## 2020-06-12 RX ADMIN — MIDAZOLAM 1 MG: 1 INJECTION INTRAMUSCULAR; INTRAVENOUS at 06:54

## 2020-06-12 RX ADMIN — BUPIVACAINE 10 ML: 13.3 INJECTION, SUSPENSION, LIPOSOMAL INFILTRATION at 07:17

## 2020-06-12 RX ADMIN — Medication 0.6 MG: at 09:41

## 2020-06-12 RX ADMIN — ROCURONIUM BROMIDE 50 MG: 10 INJECTION, SOLUTION INTRAVENOUS at 08:07

## 2020-06-12 ASSESSMENT — PULMONARY FUNCTION TESTS
PIF_VALUE: 1
PIF_VALUE: 17
PIF_VALUE: 18
PIF_VALUE: 0
PIF_VALUE: 4
PIF_VALUE: 24
PIF_VALUE: 15
PIF_VALUE: 17
PIF_VALUE: 17
PIF_VALUE: 18
PIF_VALUE: 15
PIF_VALUE: 17
PIF_VALUE: 5
PIF_VALUE: 17
PIF_VALUE: 17
PIF_VALUE: 18
PIF_VALUE: 11
PIF_VALUE: 16
PIF_VALUE: 17
PIF_VALUE: 15
PIF_VALUE: 17
PIF_VALUE: 18
PIF_VALUE: 17
PIF_VALUE: 18
PIF_VALUE: 5
PIF_VALUE: 16
PIF_VALUE: 17
PIF_VALUE: 16
PIF_VALUE: 17
PIF_VALUE: 15
PIF_VALUE: 4
PIF_VALUE: 17
PIF_VALUE: 16
PIF_VALUE: 17
PIF_VALUE: 18
PIF_VALUE: 17
PIF_VALUE: 2
PIF_VALUE: 17
PIF_VALUE: 17
PIF_VALUE: 10
PIF_VALUE: 17
PIF_VALUE: 16
PIF_VALUE: 3
PIF_VALUE: 5
PIF_VALUE: 0
PIF_VALUE: 17
PIF_VALUE: 25
PIF_VALUE: 15
PIF_VALUE: 17
PIF_VALUE: 12
PIF_VALUE: 19
PIF_VALUE: 17
PIF_VALUE: 16
PIF_VALUE: 16
PIF_VALUE: 18
PIF_VALUE: 4
PIF_VALUE: 17
PIF_VALUE: 1
PIF_VALUE: 17
PIF_VALUE: 17
PIF_VALUE: 12
PIF_VALUE: 0
PIF_VALUE: 15
PIF_VALUE: 20
PIF_VALUE: 17
PIF_VALUE: 16
PIF_VALUE: 17
PIF_VALUE: 0
PIF_VALUE: 17
PIF_VALUE: 3
PIF_VALUE: 17
PIF_VALUE: 1
PIF_VALUE: 17
PIF_VALUE: 17
PIF_VALUE: 18
PIF_VALUE: 18
PIF_VALUE: 16
PIF_VALUE: 19
PIF_VALUE: 6
PIF_VALUE: 17
PIF_VALUE: 2
PIF_VALUE: 17
PIF_VALUE: 0
PIF_VALUE: 17

## 2020-06-12 ASSESSMENT — PAIN SCALES - GENERAL
PAINLEVEL_OUTOF10: 0

## 2020-06-12 ASSESSMENT — PAIN DESCRIPTION - DESCRIPTORS: DESCRIPTORS: ACHING

## 2020-06-12 ASSESSMENT — PAIN - FUNCTIONAL ASSESSMENT: PAIN_FUNCTIONAL_ASSESSMENT: 0-10

## 2020-06-12 NOTE — ANESTHESIA PRE PROCEDURE
AND CURETTAGE OF UTERUS      ENDOSCOPY, COLON, DIAGNOSTIC      HYSTERECTOMY, TOTAL ABDOMINAL      with BSO    MASTECTOMY Bilateral     OTHER SURGICAL HISTORY  09/27/2016    chest port removal    SHOULDER ARTHROSCOPY Left 6/12/2020    LEFT SHOULDER ARTHROSCOPY WITH SAD, DEBRIDEMENT, CAPSULAR RELEASE, MANIPULATION UNDER ANESTHESIA performed by Conrad Aranda DO at / [place] [id number] TUNNELED VENOUS PORT PLACEMENT Left 7/30/2015       Social History:    Social History     Tobacco Use    Smoking status: Former Smoker     Packs/day: 0.25     Years: 0.50     Pack years: 0.12    Smokeless tobacco: Never Used    Tobacco comment: in 20's once a week   Substance Use Topics    Alcohol use: Yes     Comment: rare social drink                                Counseling given: Not Answered  Comment: in 20's once a week      Vital Signs (Current):   Vitals:    06/12/20 1045 06/12/20 1100 06/12/20 1115 06/12/20 1145   BP: (!) 153/65 (!) 152/69 (!) 152/69    Pulse: 69 73 76 92   Resp: 15 17 17    Temp:   97.2 °F (36.2 °C)    TempSrc:   Temporal    SpO2: 98% 93% 93%    Weight:       Height:                                                  BP Readings from Last 3 Encounters:   06/12/20 (!) 152/69   06/04/20 133/66   05/22/20 (!) 163/52       NPO Status: Time of last liquid consumption: 2359                        Time of last solid consumption: 1800                        Date of last liquid consumption: 06/11/20                        Date of last solid food consumption: 06/11/20    BMI:   Wt Readings from Last 3 Encounters:   06/12/20 170 lb (77.1 kg)   06/04/20 169 lb 15.6 oz (77.1 kg)   05/22/20 170 lb (77.1 kg)     Body mass index is 25.47 kg/m².     CBC:   Lab Results   Component Value Date    WBC 2.7 05/22/2020    RBC 4.21 05/22/2020    HGB 11.8 05/22/2020    HCT 37.3 05/22/2020    MCV 88.6 05/22/2020    RDW 14.3 05/22/2020     05/22/2020       CMP:   Lab Results   Component Value Date     05/22/2020    K 4.1 05/22/2020     05/22/2020    CO2 23 05/22/2020    BUN 14 05/22/2020    CREATININE 0.80 05/22/2020    GFRAA >60 05/22/2020    LABGLOM >60 05/22/2020    GLUCOSE 152 08/07/2019    GLUCOSE 171 12/22/2011    PROT 7.5 08/07/2019    CALCIUM 9.8 08/07/2019    BILITOT 0.47 08/07/2019    ALKPHOS 97 08/07/2019    AST 26 08/07/2019    ALT 30 08/07/2019       POC Tests:   Recent Labs     06/12/20  1007   POCGLU 154*       Coags: No results found for: PROTIME, INR, APTT    HCG (If Applicable): No results found for: PREGTESTUR, PREGSERUM, HCG, HCGQUANT     ABGs: No results found for: PHART, PO2ART, UVV2RPL, CQJ0EGS, BEART, C6PRIVWA     Type & Screen (If Applicable):  No results found for: LABABO, LABRH    Drug/Infectious Status (If Applicable):  No results found for: HIV, HEPCAB    COVID-19 Screening (If Applicable):   Lab Results   Component Value Date    COVID19 Not Detected 06/08/2020         Anesthesia Evaluation     history of anesthetic complications: PONV. Airway: Mallampati: II  TM distance: >3 FB   Neck ROM: full  Mouth opening: > = 3 FB Dental:          Pulmonary:normal exam    (+) asthma:                            Cardiovascular:  Exercise tolerance: good (>4 METS),       (-)  angina       Beta Blocker:  Order written         Neuro/Psych:               GI/Hepatic/Renal:   (+) GERD:,           Endo/Other:    (+) Diabetes, . Abdominal:           Vascular:                                        Anesthesia Plan      general and regional     ASA 2       Induction: intravenous. MIPS: Postoperative opioids intended and Prophylactic antiemetics administered. Anesthetic plan and risks discussed with patient. Plan discussed with CRNA.     Attending anesthesiologist reviewed and agrees with Rahat Gilmore MD   6/12/2020

## 2020-06-12 NOTE — INTERVAL H&P NOTE
needed (15 MG daily)     Historical Provider, MD   budesonide-formoterol (SYMBICORT) 160-4.5 MCG/ACT AERO Inhale 2 puffs into the lungs 2 times daily. Historical Provider, MD   albuterol (PROVENTIL HFA;VENTOLIN HFA) 108 (90 BASE) MCG/ACT inhaler Inhale 2 puffs into the lungs every 4 hours as needed     Historical Provider, MD   clobetasol (TEMOVATE) 0.05 % cream Apply topically 2 times daily as needed Apply topically 2 times daily. Historical Provider, MD         This is a 68 y.o. female who is pleasant, cooperative, alert and oriented x3, in no acute distress. Heart: Heart sounds are normal.  HR 80 regular rate and rhythm without murmur, gallop or rub. Lungs: Normal respiratory effort with good air exchange, unlabored and clear to auscultation without wheezes or rales bilaterally   Abdomen: round with diastasis rectus, soft, nontender, nondistended with bowel sounds.        Labs:  Recent Labs     05/22/20  0929   HGB 11.8*   HCT 37.3   WBC 2.7*   MCV 88.6   *      K 4.1      CO2 23   BUN 14   CREATININE 0.80       Recent Labs     06/08/20  0950   COVID19 Not Detected                   Jeana Nguyen, ANP-BC  Electronically signed 6/12/2020 at 6:03 AM

## 2020-06-12 NOTE — ANESTHESIA POSTPROCEDURE EVALUATION
Department of Anesthesiology  Postprocedure Note    Patient: Melissa Ash  MRN: [id number]  YOB: 1950  Date of evaluation: 6/12/2020  Time:  4:16 PM     Procedure Summary     Date:  06/12/20 Room / Location:  [facility] / [address]    Anesthesia Start:  0802 Anesthesia Stop:  1010    Procedure:  LEFT SHOULDER ARTHROSCOPY WITH SAD, DEBRIDEMENT, CAPSULAR RELEASE, MANIPULATION UNDER ANESTHESIA (Left Shoulder) Diagnosis:  (DX LEFT SHOULDER ROTATOR CUFF TEAR)    Surgeon:  Gisele France DO Responsible Provider:  João Blair MD    Anesthesia Type:  general, regional ASA Status:  2          Anesthesia Type: No value filed. Laney Phase I: Laney Score: 10    Laney Phase II: Laney Score: 10    Last vitals: Reviewed and per EMR flowsheets.        Anesthesia Post Evaluation    Patient location during evaluation: PACU  Patient participation: complete - patient participated  Level of consciousness: awake  Airway patency: patent  Nausea & Vomiting: no nausea  Complications: no  Cardiovascular status: blood pressure returned to baseline  Respiratory status: acceptable  Hydration status: euvolemic

## 2020-06-13 NOTE — OP NOTE
ligament. The scope was now placed  in the subacromial space. Lateral portal was established. There was  fraying of the CA ligament. A bursectomy was performed. The  undersurface of the acromion was exposed with ArthroCare device  releasing the CA ligament, which appeared to be impinging. There was  fraying of the bursal surface of the rotator cuff, but no jose tearing  that was present. This was verified from scoping from the established  lateral portal for the subacromial space and the anterior portal also. At this time, manipulation under anesthesia revealed audible and  palpable lysis of adhesions and increased range of motion to 170 degrees  of forward elevation, 90 degrees of external rotation and abduction, 45  degrees of external rotation and abduction and full internal rotation. The joint was then injected with 20 mg of Depo-Medrol and 5 mL of 1%  lidocaine as well as the subacromial space. The portals were closed  with 3-0 nylon suture. Sterile dressing was placed. Simple sling was  placed. The patient was awakened by the Department of Anesthesia and  transferred to the postanesthesia care unit in stable condition.         Yu Hogue    D: 06/12/2020 11:02:29       T: 06/12/2020 12:27:48     [email or web address]  Job#: [id number]     Doc#: [id number]    CC:

## 2020-06-15 ENCOUNTER — HOSPITAL ENCOUNTER (OUTPATIENT)
Dept: PHYSICAL THERAPY | Facility: CLINIC | Age: 70
Setting detail: THERAPIES SERIES
Discharge: HOME | End: 2020-06-15
Payer: MEDICARE

## 2020-06-15 PROCEDURE — 97161 PT EVAL LOW COMPLEX 20 MIN: CPT

## 2020-06-15 PROCEDURE — 97110 THERAPEUTIC EXERCISES: CPT

## 2020-06-15 PROCEDURE — 97016 VASOPNEUMATIC DEVICE THERAPY: CPT

## 2020-06-15 NOTE — CONSULTS
[] Dariela Gudino        Outpatient Physical                Therapy       [address]       Phone: [phone]       Fax: [phone] [x] [facility]       Promotion at [address] [facility]       Phone: [phone]       Fax: [phone] [] Deepika Sullivan      for [facility] Promotion     [address] [address]      Phone: [phone]      Fax:  [phone]     Physical Therapy Upper Extremity Evaluation    Date:  6/15/2020  Patient: Jeffery Room  : 1950  MRN: [id number]  Physician: Dr. Palacios Bounds: Medicare/[facility]  Medical Diagnosis: Z98.890 (ICD-10-CM) - S/P arthroscopy of left shoulder  Rehab Codes: M25.512, M25.612, R29.3, M62.512  Onset Date: 2020   Next 's appt: 2020    Subjective:   CC: left shoulder pain  HPI: (onset date): Pt is a R handed,  68y.o.  year old female who is 3 days s/p left shoulder arthroscopy with capsular release, CESAR, left shoulder arthroscopic soft tissue bursa and coracoacromial ligament subacromial decompression, and debridement of humeral head. Pt notes since surgery, she has been wearing her sling pretty consistently and her pain ranges from a 2-3/10. Pt notes some burning in the left shoulder along with a dull ache. Pt notes her pain worsens with movement. Pt states she has not had to take any pain medication as of yet. Pt states she did have a nerve block in the LUE and it was \"deadweight\" until . Pt notes some residual numbness in the left thumb and forefinger. Pt notes she has not done any exercises as of yet secondary to her LUE being numb. Pt states she was able to shower and get dressed, however, noted some pain with these activities. Pt states she is currently staying with her sister who has been helping her with driving and opening things. Pt states she has been able to sleep in a bed and does not tend to wake up. Pt notes she is not driving.  Pt notes prior to 1-2/10 left shoulder pain with therapeutic ROM and strengthening exercises in order to improve tolerance to completion of ADLs. Ongoing   ROM: Pt will demonstrate Left shoulder AROM to greater than or equal to 150 degrees elevation with minimal compensations and IR to T7 with 1-2/10 left shoulder pain  in order to improve ability to complete ADLs overhead and behind the back. Ongoing   Strength: Pt will demonstrate 3/5 left shoulder strength with elevation to assist with raising and lowering the LUE to assist with ADLs. Ongoing   Outcome Score: Pt will score greater than or equal to 50% on the UEFS in order to demonstrate improved function Ongoing    Home Exercise Program: Pt will demonstrate independence with HEP. Ongoing   Longer term goals: MEET IN 12 VISITS    Pain: Pt will report less than or equal to 0-1/10 left shoulder pain with raising/lowering the LUE, reaching behind the back or behind the head, and lifting objects in order to assist with ADL completion. Ongoing   ROM: Pt will be able to demonstrate left shoulder AROM to greater than or equal to 160 degrees elevation, IR to T5, and ER to T4 without compensations in order to assist with raising/lowering the LUE to complete ADLs and lift objects. Ongoing   Strength: Pt will demonstrate greater than or equal to 4+/5 LUE strength and 4/5 scapular strength in order to assist with shoulder strength and stability when lifting objects to complete ADLs.   Ongoing   Outcome Score: Pt will score greater than or equal to 65% on the UEFS in order to demonstrate improved function Ongoing                    Patient goals: improve use of LUE    Rehab Potential:  [x] Good  [] Fair  [] Poor   Suggested Professional Referral:  [x] No  [] Yes:  Barriers to Goal Achievement[de-identified]  [x] No  [] Yes:  Domestic Concerns:  [x] No  [] Yes:    Pt. Education:  [x] Plans/Goals, Risks/Benefits discussed  [x] Home exercise program  Method of Education: [x] Verbal  [x] Demo  [x] Vasocompression 15 1   []  Other       Time in: 100    Time Out: 200    Electronically signed by: Sheila Cornell PT        Physician Signature:________________________________Date:__________________  By signing above or cosigning this note, I have reviewed this plan of care and certify a need for medically necessary rehabilitation services.      *PLEASE SIGN ABOVE AND FAX BACK ALL PAGES*

## 2020-06-16 NOTE — PLAN OF CARE
things. Pt states she has been able to sleep in a bed and does not tend to wake up. Pt notes she is not driving. Pt notes prior to the sx she was independent with all ADLs. Pt reports her recreational activities include reading, walking, crocheting, and coloring. Assessment: Scott Lam  is a R handed,  68y.o.  year old female who is 3 days s/p left shoulder arthroscopy with capsular release, CESAR, left shoulder arthroscopic soft tissue bursa and coracoacromial ligament subacromial decompression, and debridement of humeral head. Pt is currently present with signs and symptoms consistent with this surgical procedure. Pt demonstrates increased pain/swelling in the left shoulder, decreased A/PROM, and strength of the LUE. Pt demonstrates decreased ability to complete previous level of activity due to impairments. Pt will benefit from skilled therapeutic interventions including therapeutic exercise, manual therapy, and vasocompression, in order to improve left shoulder pain, A/PROM, and strength in order to return to prior level of activity. Problems:    [x]? ? Pain: 2-3/10  [x]? ? ROM: decreased left shoulder AROM/PROM  [x]? ? Strength: decreased left shoulder strength  [x]? ? Function: UEFS: 30/80, 37.5% max function  []? Other:        Short term goals: MEET IN 6 VISITS Status   Pain: Pt will report less than or equal to 1-2/10 left shoulder pain with therapeutic ROM and strengthening exercises in order to improve tolerance to completion of ADLs. Ongoing   ROM: Pt will demonstrate Left shoulder AROM to greater than or equal to 150 degrees elevation with minimal compensations and IR to T7 with 1-2/10 left shoulder pain  in order to improve ability to complete ADLs overhead and behind the back. Ongoing   Strength: Pt will demonstrate 3/5 left shoulder strength with elevation to assist with raising and lowering the LUE to assist with ADLs.   Ongoing   Outcome Score: Pt will score greater than or equal to 50% on the UEFS in order to demonstrate improved function Ongoing    Home Exercise Program: Pt will demonstrate independence with HEP. Ongoing   Longer term goals: MEET IN 12 VISITS     Pain: Pt will report less than or equal to 0-1/10 left shoulder pain with raising/lowering the LUE, reaching behind the back or behind the head, and lifting objects in order to assist with ADL completion. Ongoing   ROM: Pt will be able to demonstrate left shoulder AROM to greater than or equal to 160 degrees elevation, IR to T5, and ER to T4 without compensations in order to assist with raising/lowering the LUE to complete ADLs and lift objects. Ongoing   Strength: Pt will demonstrate greater than or equal to 4+/5 LUE strength and 4/5 scapular strength in order to assist with shoulder strength and stability when lifting objects to complete ADLs. Ongoing   Outcome Score: Pt will score greater than or equal to 65% on the UEFS in order to demonstrate improved function Ongoing                     Patient goals: improve use of LUE     Rehab Potential:  [x]? Good  []? Fair  []? Poor    Suggested Professional Referral:  [x]? No  []? Yes:  Barriers to Goal Achievement[de-identified]  [x]? No  []? Yes:  Domestic Concerns:  [x]? No  []? Yes:     Pt. Education:  [x]? Plans/Goals, Risks/Benefits discussed  [x]? Home exercise program  Method of Education: [x]? Verbal  [x]? Demo  [x]? Written  Comprehension of Education:  [x]? Verbalizes understanding. [x]? Demonstrates understanding. [x]? Needs Review. []? Demonstrates/verbalizes understanding of HEP/Ed previously given.     Treatment Plan:  [x]? Therapeutic Exercise                     []? Modalities:  []? Therapeutic Activity                       []? Ultrasound              []? Electrical Stimulation  []? Gait Training                                  []? Massage                []? Lumbar/Cervical Traction  [x]? Neuromuscular Re-education        [x]? Cold/hotpack          []?  Iontophoresis: 4 mg/mL  [x]? Instruction in HEP                                                                   Dexamethasone Sodium  [x]? Manual Therapy                                                                      Phosphate 40-80 mAmin  []? Aquatic Therapy                            [x]? Vasocompression/               []? Other:                                                                   Game Ready            []? Medication allergies reviewed for use of               Dexamethasone Sodium Phosphate 4mg/ml                with iontophoresis treatments. Pt is not allergic.     Frequency: 2 x/week for 12 visits      Electronically signed by: Juliet Ferrer PT        Physician Signature:________________________________Date:__________________  By signing above or cosigning this note, I have reviewed this plan of care and certify a need for medically necessary rehabilitation services.      *PLEASE SIGN ABOVE AND FAX BACK ALL PAGES*

## 2020-06-16 NOTE — FLOWSHEET NOTE
X  10 min Some discomfort in end ranges          Standing      3 way shoulder 10x ea  Added 6/17               Other:     Specific Instructions for next treatment: improve left shoulder A/PROM and strength      Treatment Charges: Mins Units   []  Modalities     [x]  Ther Exercise 35 2   []  Manual Therapy     []  Ther Activities     []  Aquatics     [x]  Vasocompression 15 1   []  Other     Total Treatment time 50 3   Medicare total as of 6/17/2020: $123.36    Assessment: [x] Progressing toward goals. Continued with progressing AROM and PROM. Patient did well with all charted exercises. [] No change. [] Other:    [x] Patient will continue to benefit from skilled therapeutic interventions including therapeutic exercise, manual therapy, and    vasocompression, in order to improve left shoulder pain, A/PROM, and strength in order to return to prior level of activity.        Short term goals: MEET IN 6 VISITS Status   Pain: Pt will report less than or equal to 1-2/10 left shoulder pain with therapeutic ROM and strengthening exercises in order to improve tolerance to completion of ADLs. Ongoing   ROM: Pt will demonstrate Left shoulder AROM to greater than or equal to 150 degrees elevation with minimal compensations and IR to T7 with 1-2/10 left shoulder pain  in order to improve ability to complete ADLs overhead and behind the back. Ongoing   Strength: Pt will demonstrate 3/5 left shoulder strength with elevation to assist with raising and lowering the LUE to assist with ADLs. Ongoing   Outcome Score: Pt will score greater than or equal to 50% on the UEFS in order to demonstrate improved function Ongoing    Home Exercise Program: Pt will demonstrate independence with HEP.  Ongoing   Longer term goals: MEET IN 12 VISITS     Pain: Pt will report less than or equal to 0-1/10 left shoulder pain with raising/lowering the LUE, reaching behind the back or behind the head, and lifting objects in order to assist with ADL completion. Ongoing   ROM: Pt will be able to demonstrate left shoulder AROM to greater than or equal to 160 degrees elevation, IR to T5, and ER to T4 without compensations in order to assist with raising/lowering the LUE to complete ADLs and lift objects. Ongoing   Strength: Pt will demonstrate greater than or equal to 4+/5 LUE strength and 4/5 scapular strength in order to assist with shoulder strength and stability when lifting objects to complete ADLs. Ongoing   Outcome Score: Pt will score greater than or equal to 65% on the UEFS in order to demonstrate improved function Ongoing                     Patient goals: improve use of LUE      Pt. Education:  [] Yes  [] No  [x] Reviewed Prior HEP/Ed  Method of Education: [x] Verbal  [] Demo  [] Written  Comprehension of Education:  [x] Verbalizes understanding. [] Demonstrates understanding. [] Needs review. [x] Demonstrates/verbalizes HEP/Ed previously given. Plan: [x] Continue current frequency toward long and short term goals.     [x] Specific Instructions for subsequent treatments:Continue to progress PROM and AROM      Time In: 8:00 am           Time Out: 8:55 am    Electronically signed by:  Amee Brown PTA

## 2020-06-16 NOTE — FLOWSHEET NOTE
[facility] Fall Risk Assessment    Patient Name:  Carolyn Jones  : 1950        Risk Factor Scale  Score   History of Falls [] Yes  [x] No 25  0 0   Secondary Diagnosis [] Yes  [x] No 15  0 0   Ambulatory Aid [] Furniture  [] Crutches/cane/walker  [x] None/bedrest/wheelchair/nurse 30  15  0 0   IV/Heparin Lock [] Yes  [x] No 20  0 0   Gait/Transferring [] Impaired  [] Weak  [x] Normal/bedrest/immobile 20  10  0 0   Mental Status [] Forgets limitations  [x] Oriented to own ability 15  0 0      Total: 0     Based on the Assessment score: check the appropriate box.     [x]  No intervention needed   Low =   Score of 0-24    []  Use standard prevention interventions Moderate =  Score of 24-44   [] Give patient handout and discuss fall prevention strategies   [] Establish goal of education for patient/family RE: fall prevention strategies    []  Use high risk prevention interventions High = Score of 45 and higher   [] Give patient handout and discuss fall prevention strategies   [] Establish goal of education for patient/family Re: fall prevention strategies   [] Discuss lifeline / other resources    Electronically signed by:   Mely Murray PT  Date: 6/15/2020

## 2020-06-17 ENCOUNTER — HOSPITAL ENCOUNTER (OUTPATIENT)
Dept: PHYSICAL THERAPY | Facility: CLINIC | Age: 70
Setting detail: THERAPIES SERIES
Discharge: HOME | End: 2020-06-17
Payer: MEDICARE

## 2020-06-17 PROCEDURE — 97016 VASOPNEUMATIC DEVICE THERAPY: CPT

## 2020-06-17 PROCEDURE — 97110 THERAPEUTIC EXERCISES: CPT

## 2020-06-19 ENCOUNTER — OFFICE VISIT (OUTPATIENT)
Dept: ORTHOPEDIC SURGERY | Age: 70
End: 2020-06-19

## 2020-06-19 VITALS
BODY MASS INDEX: 24.59 KG/M2 | HEIGHT: 69 IN | DIASTOLIC BLOOD PRESSURE: 67 MMHG | TEMPERATURE: 97.9 F | SYSTOLIC BLOOD PRESSURE: 131 MMHG | WEIGHT: 166 LBS | HEART RATE: 82 BPM

## 2020-06-19 PROCEDURE — 99024 POSTOP FOLLOW-UP VISIT: CPT | Performed by: PHYSICIAN ASSISTANT

## 2020-06-19 ASSESSMENT — ENCOUNTER SYMPTOMS
ABDOMINAL PAIN: 0
VOMITING: 0
CHEST TIGHTNESS: 0
SHORTNESS OF BREATH: 0
NAUSEA: 0
COLOR CHANGE: 0
APNEA: 0
CONSTIPATION: 0
DIARRHEA: 0
COUGH: 0
ABDOMINAL DISTENTION: 0

## 2020-06-19 NOTE — PROGRESS NOTES
[address] [facility]  [address] [place] [id number]  Dept: [phone]  Dept Fax: [phone]        Post Operative Follow Up    Subjective:     Chief Complaint   Patient presents with    Post-Op Check     First post op, left shoulder scope. DOS:6/12/20     Post Op Surgery:     The patient is here for a follow up after having a Left shoulder arthroscopy with capsular release and manipulation under anesthesia, left shoulder arthroscopic soft tissue bursa and coracoacromial ligament subacromial decompression, debridement of humeral head. The date of surgery was on 06/12/2020. Therefore we are 1 week post op. Currently the patient's pain is controlled with percocet. Physical therapy was started. Patient presents today with no assisting devices. Patient states that she is doing well and her shoulder pain has not been really bad. She states that she has only take one percocet total and it was a half of one that she took on two different days. Review of Systems   Constitutional: Positive for activity change. Negative for appetite change, fatigue and fever. Respiratory: Negative for apnea, cough, chest tightness and shortness of breath. Cardiovascular: Negative for chest pain, palpitations and leg swelling. Gastrointestinal: Negative for abdominal distention, abdominal pain, constipation, diarrhea, nausea and vomiting. Genitourinary: Negative for difficulty urinating, dysuria and hematuria. Musculoskeletal: Positive for arthralgias. Negative for gait problem, joint swelling and myalgias. Skin: Negative for color change and rash. Neurological: Negative for dizziness, weakness, numbness and headaches. Psychiatric/Behavioral: Negative for sleep disturbance. I have reviewed the CC, HPI, ROS, PMH, FHX, Social History, and if not present in this note, I have reviewed in the patient's chart.  I agree with the documentation

## 2020-06-22 ENCOUNTER — OFFICE VISIT (OUTPATIENT)
Dept: FAMILY MEDICINE CLINIC | Age: 70
End: 2020-06-22
Payer: MEDICARE

## 2020-06-22 VITALS
WEIGHT: 166 LBS | DIASTOLIC BLOOD PRESSURE: 76 MMHG | SYSTOLIC BLOOD PRESSURE: 133 MMHG | OXYGEN SATURATION: 98 % | BODY MASS INDEX: 24.87 KG/M2 | HEART RATE: 85 BPM | TEMPERATURE: 97.2 F

## 2020-06-22 LAB — HBA1C MFR BLD: 7.5 %

## 2020-06-22 PROCEDURE — G8427 DOCREV CUR MEDS BY ELIG CLIN: HCPCS | Performed by: FAMILY MEDICINE

## 2020-06-22 PROCEDURE — 1090F PRES/ABSN URINE INCON ASSESS: CPT | Performed by: FAMILY MEDICINE

## 2020-06-22 PROCEDURE — 99214 OFFICE O/P EST MOD 30 MIN: CPT | Performed by: FAMILY MEDICINE

## 2020-06-22 PROCEDURE — 2022F DILAT RTA XM EVC RTNOPTHY: CPT | Performed by: FAMILY MEDICINE

## 2020-06-22 PROCEDURE — 1036F TOBACCO NON-USER: CPT | Performed by: FAMILY MEDICINE

## 2020-06-22 PROCEDURE — G8399 PT W/DXA RESULTS DOCUMENT: HCPCS | Performed by: FAMILY MEDICINE

## 2020-06-22 PROCEDURE — 3051F HG A1C>EQUAL 7.0%<8.0%: CPT | Performed by: FAMILY MEDICINE

## 2020-06-22 PROCEDURE — G8420 CALC BMI NORM PARAMETERS: HCPCS | Performed by: FAMILY MEDICINE

## 2020-06-22 PROCEDURE — 4040F PNEUMOC VAC/ADMIN/RCVD: CPT | Performed by: FAMILY MEDICINE

## 2020-06-22 PROCEDURE — 3017F COLORECTAL CA SCREEN DOC REV: CPT | Performed by: FAMILY MEDICINE

## 2020-06-22 PROCEDURE — 1123F ACP DISCUSS/DSCN MKR DOCD: CPT | Performed by: FAMILY MEDICINE

## 2020-06-22 RX ORDER — TIZANIDINE 2 MG/1
2 TABLET ORAL NIGHTLY PRN
Qty: 10 TABLET | Refills: 0 | Status: SHIPPED | OUTPATIENT
Start: 2020-06-22 | End: 2020-08-28 | Stop reason: ALTCHOICE

## 2020-06-22 ASSESSMENT — PATIENT HEALTH QUESTIONNAIRE - PHQ9
1. LITTLE INTEREST OR PLEASURE IN DOING THINGS: 0
SUM OF ALL RESPONSES TO PHQ9 QUESTIONS 1 & 2: 0
2. FEELING DOWN, DEPRESSED OR HOPELESS: 0
SUM OF ALL RESPONSES TO PHQ QUESTIONS 1-9: 0
SUM OF ALL RESPONSES TO PHQ QUESTIONS 1-9: 0

## 2020-06-22 NOTE — PROGRESS NOTES
 SHOULDER ARTHROSCOPY Left 6/12/2020    LEFT SHOULDER ARTHROSCOPY WITH SAD, DEBRIDEMENT, CAPSULAR RELEASE, MANIPULATION UNDER ANESTHESIA performed by Osmin Hsu DO at [address]      polyp    TONSILLECTOMY      TUNNELED VENOUS PORT PLACEMENT Left 7/30/2015     Family History   Problem Relation Age of Onset    Breast Cancer Mother     Other Mother         heart disease, pacemaker    Other Father         heart disease, thyroid disease    Breast Cancer Maternal Aunt     Breast Cancer Paternal Grandmother     Breast Cancer Maternal Aunt     Asthma Maternal Grandmother      Current Outpatient Medications   Medication Sig Dispense Refill    tiZANidine (ZANAFLEX) 2 MG tablet Take 1 tablet by mouth nightly as needed (muscle strain) 10 tablet 0    aspirin EC 81 MG EC tablet Take 1 tablet by mouth daily 30 tablet 0    sucralfate (CARAFATE) 1 GM/10ML suspension Take 10 mLs by mouth 4 times daily 1200 mL 3    glimepiride (AMARYL) 4 MG tablet TAKE 1 TABLET BY MOUTH ONE TIME A DAY 90 tablet 0    TRUE METRIX BLOOD GLUCOSE TEST strip TEST BLOOD SUGAR ONCE DAILY 200 strip 0    letrozole (FEMARA) 2.5 MG tablet Take 1 tablet by mouth daily 90 tablet 3    metFORMIN (GLUCOPHAGE-XR) 500 MG extended release tablet TAKE 1 TABLET BY MOUTH 4 TIMES A  tablet 3    dapagliflozin (FARXIGA) 10 MG tablet TAKE 1 TABLET BY MOUTH ONE TIME A DAY 90 tablet 3    fluticasone (FLONASE) 50 MCG/ACT nasal spray 1 spray by Nasal route 2 times daily       montelukast (SINGULAIR) 10 MG tablet Take 10 mg by mouth nightly.  cetirizine (ZYRTEC) 10 MG tablet Take 10 mg by mouth daily.  lansoprazole (PREVACID) 30 MG capsule Take 15 mg by mouth daily as needed (15 MG daily)       budesonide-formoterol (SYMBICORT) 160-4.5 MCG/ACT AERO Inhale 2 puffs into the lungs 2 times daily.         albuterol (PROVENTIL HFA;VENTOLIN HFA) 108 (90 BASE) MCG/ACT inhaler Inhale 2 puffs into the lungs every 4 hours as needed  clobetasol (TEMOVATE) 0.05 % cream Apply topically 2 times daily as needed Apply topically 2 times daily. No current facility-administered medications for this visit. ALLERGIES:    Allergies   Allergen Reactions    Amoxicillin     Plantain Other (See Comments)     Pt denies    Statins     Tape Erling Mutton Tape]     Sulfa Antibiotics Itching and Rash       Social History     Tobacco Use    Smoking status: Former Smoker     Packs/day: 0.25     Years: 0.50     Pack years: 0.12    Smokeless tobacco: Never Used    Tobacco comment: in 20's once a week   Substance Use Topics    Alcohol use: Yes     Comment: rare social drink      Body mass index is 24.87 kg/m². /76   Pulse 85   Temp 97.2 °F (36.2 °C) (Temporal)   Wt 166 lb (75.3 kg)   SpO2 98%   BMI 24.87 kg/m²     Subjective:      HPI    68-year-old female coming today for follow-up of her diabetes mellitus type 2. Previous A1c in February this year was 6.8. Recent A1c was 7.5. Patient is still on metformin 2 g a day Amaryl 4 mg daily. Joshua Nielson has been taking every other day. Did have a right rotator cuff repair L.    R knee over looked a step - happens some months ago. Some discomfort at her r knee but also in her R inguinal area - pain  With stretching her R leg in bed. The patient tells me that she probably will ask her orthopedic specialist.  She denies any pains with standing movement she says the pain comes on not all the time when getting up. Neck pain since months - both side - since starting to go to the PT she started exercises. Patient tells me it feels like some stiffness in her neck and she says this started when the covid 19 pandemia started. Review of Systems   Constitutional: Negative for fever and unexpected weight change. Respiratory: Negative for cough and shortness of breath. Cardiovascular: Negative for chest pain and leg swelling.    Gastrointestinal: Negative for diarrhea, constipation and blood

## 2020-06-23 ENCOUNTER — HOSPITAL ENCOUNTER (OUTPATIENT)
Dept: PHYSICAL THERAPY | Facility: CLINIC | Age: 70
Setting detail: THERAPIES SERIES
Discharge: HOME | End: 2020-06-23
Payer: MEDICARE

## 2020-06-23 PROCEDURE — 97110 THERAPEUTIC EXERCISES: CPT

## 2020-06-23 PROCEDURE — 97016 VASOPNEUMATIC DEVICE THERAPY: CPT

## 2020-06-23 NOTE — FLOWSHEET NOTE
treatments: cervical retractions      Time In: 1205pm           Time Out: 110 pm    Electronically signed by:  Juliet Ferrer PT

## 2020-06-25 ENCOUNTER — HOSPITAL ENCOUNTER (OUTPATIENT)
Dept: PHYSICAL THERAPY | Facility: CLINIC | Age: 70
Setting detail: THERAPIES SERIES
Discharge: HOME | End: 2020-06-25
Payer: MEDICARE

## 2020-06-25 PROCEDURE — 97016 VASOPNEUMATIC DEVICE THERAPY: CPT

## 2020-06-25 PROCEDURE — 97110 THERAPEUTIC EXERCISES: CPT

## 2020-06-25 NOTE — FLOWSHEET NOTE
[] [facility]) - [facility] &  Therapy  [address]  [phone]  F: [phone] [x] [address]  [place]   [address]  P: [phone]  F: [phone] [] [address] &  Therapy  [address] [facility]  P: [phone]  F: [phone] [] [address]  [facility]   [address]   [place]: [phone]  F: [phone]      Physical Therapy Daily Treatment Note    Date:  2020  Patient Name:  Donnell Wild    :  [birth year]  MRN: [id number]  Physician: Dr. Damon Can: Medicare/[facility]  Diagnosis: S/P Arthroscopy of left shoulder  Onset Date: 20   Next Dr. Dariel Christensen:   Visit# / total visits: ; Progress note for Medicare patient due at visit 10    Cancels/No Shows: 0/0    Subjective:    Pain:  [x] Yes  [] No Location: left shoulder  Pain Rating: (0-10 scale) 2/10  Pain altered Tx:  [x] No  [] Yes  Action:  Comments:Pt reports no new changes at this time. She missed her exercises yesterday due to a friend needed to go the ER. Objective:  Modalities: GAMEREADY, min compression, left shoulder 10 min  Precautions:  S/p Left shoulder arthroscopy, capsular release. Ok for active and passive ROM, strengthening. No restrictions;  Adhesive tape, NO BP ON R ARM  Exercises: Bolded completed 20   Exercise Reps/ Time Weight/ Level Comments   UBE 2/2 lv1          Sitting      Upper trap stretch x     Levator scap stretch x     Cervical retraction 10x5\"  Added          STANDING      Cane extension 15x 1#    IR stretch 10x towel Cues at scap   Wall push ups 15x     Wall slides 15x ea  Flex/abd   Shoulder ext 15x blue Tactile cues to decrease upper trap comp   rows 15x blue          Supine      PROM 8'     CANE EXERCISES  1#    Chest press 15x  Added    Protraction 15x  Added

## 2020-06-29 ENCOUNTER — HOSPITAL ENCOUNTER (OUTPATIENT)
Dept: PHYSICAL THERAPY | Facility: CLINIC | Age: 70
Setting detail: THERAPIES SERIES
Discharge: HOME | End: 2020-06-29
Payer: MEDICARE

## 2020-06-29 PROCEDURE — 97016 VASOPNEUMATIC DEVICE THERAPY: CPT

## 2020-06-29 PROCEDURE — 97110 THERAPEUTIC EXERCISES: CPT

## 2020-06-29 PROCEDURE — 97140 MANUAL THERAPY 1/> REGIONS: CPT

## 2020-06-29 PROCEDURE — 97161 PT EVAL LOW COMPLEX 20 MIN: CPT

## 2020-06-29 NOTE — PLAN OF CARE
Stimulation Unattended  54964   [x] Manual Therapy  64084 [] Electrical Stimulation Attended  [id number]   [x] Instruction in HEP  [] Lumbar/Cervical Traction  [id number]   [] Aquatic Therapy   [id number] [x] Cold/hotpack    [] Massage   [id number]      [] Dry Needling, 1 or 2 muscles  06542   [] Biofeedback, first 15 minutes   26251  [] Biofeedback, additional 15 minutes   19739 [] Dry Needling, 3 or more muscles  97965     []  Medication allergies reviewed for use of    Dexamethasone Sodium Phosphate 4mg/ml     with iontophoresis treatments. Pt is not allergic. Frequency:  2 x/week for 6 visits    Electronically signed by: Juliet Ferrer PT        Physician Signature:________________________________Date:__________________  By signing above or cosigning this note, I have reviewed this plan of care and certify a need for medically necessary rehabilitation services.      *PLEASE SIGN ABOVE AND FAX BACK ALL PAGES*

## 2020-06-29 NOTE — FLOWSHEET NOTE
[] [facility]) - [facility] &  Therapy  [address]  [phone]  F: [phone] [x] [address]  [place] [address]   [address]  P: [phone]  F: [phone] [] [address] &  Therapy  [address] [facility]  P: [phone]  F: [phone] [] [address]  [facility]   [address]   [place]: [phone]  F: [phone]      Physical Therapy Daily Treatment Note    Date:  2020  Patient Name:  Martín Huertas    :  1950  MRN: [id number]  Physician: Dr. Linda Escobar: Medicare/[facility]  Diagnosis: S/P Arthroscopy of left shoulder  Onset Date: 20   Next Dr. Otto Romero:   Visit# / total visits: ; Progress note for Medicare patient due at visit 10    Cancels/No Shows: 0/0    Subjective:    Pain:  [x] Yes  [] No Location: left shoulder  Pain Rating: (0-10 scale) 3/10  Pain altered Tx:  [x] No  [] Yes  Action:  Comments:Pt reports the neck stretches are helping her but she feels she needs to do them a little more. Pt notes she is taking muscle relaxers at night but they make her very groggy. Objective:  Modalities: GAMEREADY, min compression, left shoulder 10 min  Precautions:  S/p Left shoulder arthroscopy, capsular release. Ok for active and passive ROM, strengthening. No restrictions;  Adhesive tape, NO BP ON R ARM  Exercises: Bolded completed 20   Exercise Reps/ Time Weight/ Level Comments   UBE 2/2 lv1          Sitting      Upper trap stretch x     Levator scap stretch x     Cervical retraction 10x5\"  Added          STANDING      Cane extension 15x 1#    IR stretch 10x towel Cues at scap   Wall push ups 15x     Wall slides 15x ea  Flex/abd   Shoulder ext 15x blue Tactile cues to decrease upper trap comp   rows 15x blue          Supine      Neck manual 12'     PROM 9' Restrictions at flexion end range;pain when moving from IR to ER at 90; anterior translated humeral head with IR; tenderness over the anterior humeral head   CANE EXERCISES  1#    Chest press 15x  Added 6/17   Protraction 15x  Added 6/17   Flexion 15x  Added 6/17   Abduction 15x  Added 6/17   ER 15x  Added 6/17   Horizontal abd 15x  Added 6/17   Rhythmic stabilization 20\"x3  Added 6/25         Sidelying      ER 15x A    H. abd 15x A    Abduction  15x A          Prone      Extention 15  Added 6/25   Horizontal abd 12  Added 6/25   [place] 12  Added 6/25, cues to lift gently    Flexion 12  Added 6/25cues to lift gently                Standing      3 way shoulder 10x ea  Added 6/17  Back against wall               Other:     Specific Instructions for next treatment: improve left shoulder A/PROM and strength    Neck Evaluation    Phys[facility]  Medical Diagnosis: S16. 1XXA (ICD-10-CM) - Strain of neck muscle, initial encounter  Rehab Codes: M54.2  Onset Date: March 2020       Subjective: Pt is a 68 y.o. female with complaints of neck pain that began in March without known cause of injury. Pt reports the pain is intermittent and can be either on the R or left side. Pt describes the pain as jabbing and aching. Pt currently reports the neck at a 3/10. Pt is not sure what makes her pain worse but tends to be worse in the morning. Pt also notes her pain is present throughout the day as well. Pt also feels like she has lost some motion in the neck but does note an increase in headaches due to neck pain. Pt denies any radiating pain but does have a hx of neck pain.        Objective:  Posture: forward head, protracted shoulders  Palpation: spinous process of C6-7, cervical paraspinals, B upper trap and levator scap, suboccipital muscles  Special tests: negative Spurling's, vertebral artery test, transverse ligament testing  AROM:   Flexion: 38 deg  Extension: 35 Pain: Pt will report less than or equal to 0-1/10 left shoulder pain with raising/lowering the LUE, reaching behind the back or behind the head, and lifting objects in order to assist with ADL completion. Ongoing   ROM: Pt will be able to demonstrate left shoulder AROM to greater than or equal to 160 degrees elevation, IR to T5, and ER to T4 without compensations in order to assist with raising/lowering the LUE to complete ADLs and lift objects. Ongoing   Strength: Pt will demonstrate greater than or equal to 4+/5 LUE strength and 4/5 scapular strength in order to assist with shoulder strength and stability when lifting objects to complete ADLs. Ongoing   Outcome Score: Pt will score greater than or equal to 65% on the UEFS in order to demonstrate improved function Ongoing                     Patient goals: improve use of LUE      Pt. Education:  [x] Yes  [] No  [x] Reviewed Prior HEP/Ed  Method of Education: [x] Verbal  [] Demo  [x] Written  6/23: cane exercises, sidelying exercises, standing exercises  6/29: cervical retraction, upright sitting posture  Comprehension of Education:  [x] Verbalizes understanding. [x] Demonstrates understanding. [x] Needs review. [x] Demonstrates/verbalizes HEP/Ed previously given. Plan: [x] Continue current frequency toward long and short term goals.     [x] Specific Instructions for subsequent treatments: manual therapy to cervical spine      Time In: 902 a           Time Out: 1009 am    Electronically signed by:  Yelena Zhang PT

## 2020-07-01 ENCOUNTER — TELEPHONE (OUTPATIENT)
Dept: FAMILY MEDICINE CLINIC | Age: 70
End: 2020-07-01

## 2020-07-01 NOTE — TELEPHONE ENCOUNTER
Pt states she has had a stiff next. Taken muscle relaxer for several days. Went to PT treatments. Took muscle relaxer afterwards and made her extremely dizzy and ended up falling . Has had headache for a week. Had covid test which was negative. Wanted some advise to aid with dizzyness. Pleases advise. Her phone is [phone]. Sister's phone is [phone]. Both are land lines.

## 2020-07-01 NOTE — TELEPHONE ENCOUNTER
ARIA. Still dizzy. After having PT she got up from her bed and then felt very dizzy and had a fall. She fell on a stack of toilet paper and did not hurt herself. Improving. Will start exercises. Thank you.

## 2020-07-02 ENCOUNTER — HOSPITAL ENCOUNTER (OUTPATIENT)
Dept: PHYSICAL THERAPY | Facility: CLINIC | Age: 70
Setting detail: THERAPIES SERIES
Discharge: HOME | End: 2020-07-02
Payer: MEDICARE

## 2020-07-02 NOTE — FLOWSHEET NOTE
[] [address]  [address]    [phone]  F: [phone]   [x] [address]  [facility]   [address]  P: [phone]  F: [phone]  [] [place]  [address] [facility]  P: [phone]  F: [phone]  [] [address]  [facility]   [address]   [place]: [phone]  F: [phone]   [] [place] [address] [facility] [facility] [address]  [place]: [phone]   F: [phone]     Physical Therapy Cancel/No Show note    Date: 2020  Patient: Bud Tracy  : 1950  MRN: [id number]    Cancels/No Shows to date:     For today's appointment patient:    [x]  Cancelled    [] Rescheduled appointment    [] No-show     Reason given by patient:    [x]  Patient ill    []  Conflicting appointment    [] No transportation      [] Conflict with work    [] No reason given    [] Weather related    [] COVID-19    [x] Other:      Comments: Patient is very dizzy and having neck pain.        [x] Next appointment was confirmed    Electronically signed by: Nasrin Graham PTA

## 2020-07-06 ENCOUNTER — HOSPITAL ENCOUNTER (OUTPATIENT)
Dept: PHYSICAL THERAPY | Facility: CLINIC | Age: 70
Setting detail: THERAPIES SERIES
Discharge: HOME | End: 2020-07-06
Payer: MEDICARE

## 2020-07-06 ENCOUNTER — TELEPHONE (OUTPATIENT)
Dept: FAMILY MEDICINE CLINIC | Age: 70
End: 2020-07-06

## 2020-07-06 PROCEDURE — 97016 VASOPNEUMATIC DEVICE THERAPY: CPT

## 2020-07-06 PROCEDURE — 97110 THERAPEUTIC EXERCISES: CPT

## 2020-07-06 NOTE — FLOWSHEET NOTE
[] [facility]) - [facility] &  Therapy  [address]  [phone]  F: [phone] [x] [address]  [facility]   [address]  P: [phone]  F: [phone] [] [address]  Therapy  [address] [facility]  P: [phone]  F: [phone] [] [address]  [facility]   [address]   [place]: [phone]  F: [phone]      Physical Therapy Daily Treatment Note    Date:  2020  Patient Name:  Geronimo Rayo    :  1950  MRN: [id number]  Physician: Dr. Luann Valladares: Medicare/[employer]  Diagnosis: S/P Arthroscopy of left shoulder  Onset Date: 20   Next Dr. Cabrera Sandra:   Visit# / total visits: ; Progress note for Medicare patient due at visit 10    Cancels/No Shows: 1/0    Subjective:    Pain:  [x] Yes  [] No Location: left shoulder; neck  Pain Rating: (0-10 scale) 2-3/10 (left shoulder); 4-5/10 (neck)  Pain altered Tx:  [x] No  [] Yes  Action:  Comments:Pt cancelled her previous appointment secondary to being dizzy. Pt notes her neck was sore at night and she took a muscle relaxer and went to bed. Pt reports she woke up about 1.5 hours later and was very dizzy. Pt reports she kept herself from falling but later did fall secondary to being dizzy. Pt reports she was sore following therapy but nothing significant. Pt reports completing chin tucks caused some soreness. Pt reports her neck is still sore and she uses ice. Pt reports it seems to tighten up by the end of the day. Pt has started to notice a click in her left shoulder. Pt notes dizziness with positional changes.      Objective: extra pillows due to dizziness; significant dizziness with supine to sit- notes improvement with ducking head to the R and to the left  Modalities: GAMEREADY, min compression, left shoulder 10 min  Precautions:  S/p Left shoulder arthroscopy, capsular release. Ok for active and passive ROM, strengthening. No restrictions;  Adhesive tape, NO BP ON R ARM; new onset of dizziness with positional changes  Exercises: Bolded completed 07/06/20   Exercise Reps/ Time Weight/ Level Comments   UBE 4' lv1 Old UBE         Sitting      Upper trap stretch x     Levator scap stretch x     Cervical retraction 10x5\"  Added 6/25         STANDING      Cane extension 15x 1#    IR stretch 10x towel Cues at scap   Wall push ups 15x     Wall slides 15x ea  Flex/abd         Supine      Neck manual 12'     PROM 12'  Restrictions at flexion end range;pain when moving from IR to ER at 90; anterior translated humeral head with IR; tenderness over the anterior humeral head   CANE EXERCISES  2#    Chest press 15x  Added 6/17   Protraction 15x  Added 6/17   Flexion 15x  Added 6/17   Abduction 15x  Added 6/17   ER 15x  Added 6/17   Horizontal abd 15x  Added 6/17   Rhythmic stabilization 20\"x3  Added 6/25         Sidelying      ER 20x A Tactile cues at scapula   H. abd 20x A    Abduction  20x A Notes pain on the top of the shoulder at shoulder height- increased pain with inferior glide; no change in pain with scapular assist         Prone      Extention 15  Added 6/25   Horizontal abd 12  Added 6/25   Scaption 12  Added 6/25, cues to lift gently    Flexion 12  Added 6/25cues to lift gently                Standing      3 way shoulder 10x ea 1# Added weight 7/6  LUE   Lat pulldown 15x lime Dec to lime to promote proper technique   Rows  15x  lime Dec to lime to promote proper technique    ER 15x lime Added 7/6   IR 15x lime Added 7/6   scaption 15x yellow Added 7/6  Back to wall   Horizontal abduction 15x yellow Added 7/6  Cues for proper completion         Other:     Specific Instructions for next treatment: improve left shoulder A/PROM and strength    Re-assessment 7/6  Left shoulder PROM  Flexion: 168°  Abduction: 180°  IR: 50  ER: 60; 75; 83    Left shoulder AROM (standing)  Flexion: 143°  Abduction: 151°  IR: R side of T9  ER: T4     UEFS: 55/80, 68.75% max function      Treatment Charges: Mins Units   []  Modalities     [x]  Ther Exercise 40 3   []  Manual Therapy     []  Ther Activities     []  Aquatics     [x]  Vasocompression 15 1   []  Other     Total Treatment time 55 4   Medicare total as of 07/06/20: $522.32    Assessment: [x] Progressing toward goals. Pt with reports of fatigue in the left shoulder at the end of the session this date. Due to continued complaints of dizziness with positional changes, cervical spine manual therapy and ROM was not completed. Pt demonstrates appropriate progressions following surgical procedure to the left shoulder. Improvements in left shoulder P/AROM are documented. Pt with less restriction in left shoulder flexion as previously appreciated. Pt was able to progress reps of rotator cuff strengthening and scapular strengthening. Cues to promote proper cervical spine alignment and technique with completion of exercises was provided. Pt educated on taking breaks if muscles are fatiguing in order to complete the exercises with proper technique vs compensatory strategies. Increased rest time with changes from supine to sit are noted secondary to dizziness. Dizziness resolved with rest.       [] No change. [x] Other: Pt to contact her PCP to get a script for vestibular therapy    [x] Patient will continue to benefit from skilled therapeutic interventions including therapeutic exercise, manual therapy, and vasocompression, in order to improve left shoulder pain, A/PROM, and strength in order to return to prior level of activity. LEFT SHOULDER   Short term goals: MEET IN 6 VISITS- reassessed 7/6/2020 Status   Pain: Pt will report less than or equal to 1-2/10 left shoulder pain with therapeutic ROM and strengthening exercises in order to improve tolerance to completion of ADLs.  Ongoing   ROM: Pt will demonstrate Left shoulder AROM to greater than or equal to 150 degrees elevation with minimal compensations and IR to T7 with 1-2/10 left shoulder pain  in order to improve ability to complete ADLs overhead and behind the back. MET   Strength: Pt will demonstrate 3/5 left shoulder strength with elevation to assist with raising and lowering the LUE to assist with ADLs. MET   Outcome Score: Pt will score greater than or equal to 50% on the UEFS in order to demonstrate improved function MET   Home Exercise Program: Pt will demonstrate independence with HEP. MET   Longer term goals: MEET IN 12 VISITS     Pain: Pt will report less than or equal to 0-1/10 left shoulder pain with raising/lowering the LUE, reaching behind the back or behind the head, and lifting objects in order to assist with ADL completion. Ongoing   ROM: Pt will be able to demonstrate left shoulder AROM to greater than or equal to 160 degrees elevation, IR to T5, and ER to T4 without compensations in order to assist with raising/lowering the LUE to complete ADLs and lift objects. Ongoing   Strength: Pt will demonstrate greater than or equal to 4+/5 LUE strength and 4/5 scapular strength in order to assist with shoulder strength and stability when lifting objects to complete ADLs. Ongoing   Outcome Score: Pt will score greater than or equal to 65% on the UEFS in order to demonstrate improved function    Updated 7/6: Pt will score greater than or equal to 75% on the UEFS in order to demonstrate improved function MET      Ongoing           Problems: Neck pain  [x]? ? Cervical Pain: 3/10            [x]? ? ROM: dec AROM                          [x]? ? Strength: postural muscle strength deficits (deep neck flexors and parascapular muscles)  [x]? ? Function: NDI: 20% impairment  [x]?  Postural Deviations: FHP and rounded shoulders    Longer term goals: MEET IN 6 VISITS     Pain: Pt will report less than or equal to 0-1/10 neck pain with ADL completion in

## 2020-07-06 NOTE — TELEPHONE ENCOUNTER
Patient calling because she needs a referral to the physical therapist at [address] for her dizziness patient states she has appointment scheduled for Wednesday at noon.  The fax number is [phone] Please advise

## 2020-07-08 ENCOUNTER — HOSPITAL ENCOUNTER (OUTPATIENT)
Dept: PHYSICAL THERAPY | Facility: CLINIC | Age: 70
Setting detail: THERAPIES SERIES
Discharge: HOME | End: 2020-07-08
Payer: MEDICARE

## 2020-07-08 PROCEDURE — 97110 THERAPEUTIC EXERCISES: CPT

## 2020-07-08 PROCEDURE — 95992 CANALITH REPOSITIONING PROC: CPT

## 2020-07-08 PROCEDURE — 97161 PT EVAL LOW COMPLEX 20 MIN: CPT

## 2020-07-08 NOTE — CONSULTS
6/30/2020    PMHx:    [x] Unremarkable [x] Diabetes  []MI/Heart Problems   [] Pacemaker  [] HTN   [x] Cancer- previous mastectomies; lumpectomy; hystectomy; tonsillectomy; h/o R humerus fracture. [x] Arthritis:   [x] Surgeries: 6/12/2020- L shoulder scope. [x] Other: H/o vertigo x13 y.o. Test: [x] X-Ray  [] MRI- nothing recent of cervical spine or brain. Allergies: [x] NKA  [] Refer to intake sheet  Medication: [x] Refer to intake sheet  [] None- Meclizine- no relief. Comments: N/A         Function:     Hand dominance [x] (R)  [] (L)  Working:     [] Normal duty  [] Light duty  [] Off D/T Condition   [x] Retired  [] Not employed [] Disability   [] Other   Return to work:     Job/ADL Description: N/A    Assist Device: N/A    Pain:    /10  [] No c/o pain- concurrent cervical spine pain   Pain altered Tx: [x] No  [] Yes Action: N/A  Symptoms: [x] Improving  [] Worsening  [] Same  Sleep: [x] OK  [] Disturbed       Subjective Measure Score Indications   DHI X; 34/100 LOW DISABILITY/IMPAIRMENT   ABC X X3-4 items rated at 10%   PCSS - -   BIVSS - -     OBSERVATION No Deficit Deficit Not Tested   Posture      Forward Head []  [x]  []    Rounded Shoulders [] [x] []   Kyphosis [] [x]inc.  [] dec. []   Lordosis [x] []inc.  [] dec. []   Sensation [x] [] []   Edema [x] [] []   Neurological [x] [] []   Deep Tendon Reflexes [x] [] []     B diabetic neuropathy- L > R foot- within arch/medial aspect. Reports intermittent N/T within R thenar eminence. Red flags & Special Tests:     Test Result   Vertebral Artery -   Alar Ligament -   Transverse Ligament -     C/S ROM:    Motion Goniometric Measurement Pain/Symptoms   Flexion WNL Dizziness   Extension WNL Dizziness    R, L rotation < 75% each- inc P and difficulty with R rotation    R, L SB < 50% each- inc P and difficulty with R SB Inc dizziness with R SB only      C/S Strength:     Motion MMT Grade   Flexion WNL   Extension WNL   R, L rotation WNL   R, L SB WNL Oculomotor Tests- With Fixation (Open Vision):    Dons glasses at all times- except with reading. Glasses have bifocals. Test Result- Normal, Abnormal (R) (L) Upward Downward Description   Spontaneous Nystagmus N        Gaze Holding Nystagmus N        Smooth Pursuit N        Ocular ROM N        Saccades N        Convergence    N/A N/A -   VOR Slow  N/A N/A N/A N/A    VOR Cancellation  N/A N/A N/A N/A    Head Thrust    N/A N/A    Static Visual Acuity  N/A N/A N/A N/A -   Dynamic Visual Acuity WNL    >3 Line difference    Muscle Guard N/A N/A N/A N/A -   Valsalva N          Denies symptoms with all above testing. Oculomotor Tests- Without Fixation (Vision Blocked):    Test Result Description   Spontaneous Nystagmus N    Gaze Hold Nystagmus N    Valsalva N    Head-Shaking Nystagmus N      Denies symptoms with all above testing. Positional Tests:    Test R Torsional Upbeat L Torsional Upbeat Up Beat Down Beat Horizontal Duration Symptoms   Destiney Halpike- R x - - - x < 30\"  Dizziness    Destiney Halpike- L - - - - - - -   Roll Test       NEXT VISIT     Performed testing and treatment with pillow at trunk in order to prevent excessive cervical extension secondary to concurrent cervical spine pain and limited ROM. Positional Treatments:    BPPV Type Treatment Technique Trials Result Other:   R posterior canal canlithiasis R posterior canal canalithiasis CRT x1 MIN imbalance immediately post  Nystagmus and symptoms with roll to L- fleeting                           Problems:                                                                                     [x]  1. Vertigo  []  2. Difficulty walking a straight course                                    [x]  3. Positive Destiney Hallpike                                               []  4.  Static balance deficit: mCTSIB  []  5. Dynamic balance deficit: Rivas Balance Scale (BBS), Dynamic Gait Index (DGI), Functional Gait Assessment (FGA)  [x]  6.   Increased

## 2020-07-09 ENCOUNTER — HOSPITAL ENCOUNTER (OUTPATIENT)
Dept: PHYSICAL THERAPY | Facility: CLINIC | Age: 70
Setting detail: THERAPIES SERIES
Discharge: HOME | End: 2020-07-09
Payer: MEDICARE

## 2020-07-09 PROCEDURE — 97110 THERAPEUTIC EXERCISES: CPT

## 2020-07-09 PROCEDURE — 97016 VASOPNEUMATIC DEVICE THERAPY: CPT

## 2020-07-09 NOTE — FLOWSHEET NOTE
[] [facility]) - [facility] &  Therapy  [address]  [phone]  F: [phone] [x] [address] [employer]  [facility]   [address]  P: [phone]  F: [phone] [] [address] &  Therapy  [address] [facility]  P: [phone]  F: [phone] [] [address]  [facility]   [address]   [place]: [phone]  F: [phone]      Physical Therapy Daily Treatment Note    Date:  2020  Patient Name:  Elsa Garcia    :  1950  MRN: [id number]  [facility] Tang Velazquez[facility]  Diagnosis: S/P Arthroscopy of left shoulder  Onset Date: 20   Next Dr. Seth Grand:   Visit# / total visits: ; Progress note for Medicare patient due at visit 10    Cancels/No Shows: 1/0    Subjective:    Pain:  [x] Yes  [] No Location: left shoulder; neck  Pain Rating: (0-10 scale) 3/10; R side of neck (0/10)  Pain altered Tx:  [x] No  [] Yes  Action:  Comments:Pt notes the left shoulder is achey today. Pt reports she did lay on the left shoulder when sleeping and feels this could be why. Pt reports she felt okay after the last session. Objective: extra pillows due to dizziness; significant dizziness with supine to sit- notes improvement with ducking head to the R and to the left  Modalities: GAMEREADY, min compression, left shoulder 10 min  Precautions:  S/p Left shoulder arthroscopy, capsular release. Ok for active and passive ROM, strengthening. No restrictions;  Adhesive tape, NO BP ON R ARM; new onset of dizziness with positional changes-- avoid quick head/neck movements, laying on R SIDE, AND laying completely flat  Exercises: Bolded completed 20   Exercise Reps/ Time Weight/ Level Comments   UBE 2/2 lv1          Sitting      Upper trap stretch x     Levator scap stretch x     Cervical retraction 10x5\"  Added 6/25         STANDING      Cane extension 15x 1#    IR stretch 10x towel Cues at scap   Wall push ups 15x     Wall slides 15x ea  Flex/abd         Supine      Neck manual 12'     PROM 12'  Restrictions at flexion end range;pain when moving from IR to ER at 90; anterior translated humeral head with IR; tenderness over the anterior humeral head   CANE EXERCISES  2#    Chest press+protraction 15x2  Added 6/17   Flexion 15x  Added 6/17   Abduction 15x  Added 6/17   ER 15x  Added 6/17   Horizontal abd 15x  Added 6/17   Rhythmic stabilization 20\"x3  Added 6/25         Sidelying      ER 20x A Tactile cues at scapula   H. abd 20x A    Abduction  20x A Notes pain on the top of the shoulder at shoulder height- increased pain with inferior glide; no change in pain with scapular assist         Prone      Extention 15  Added 6/25   Horizontal abd 12  Added 6/25   Scaption 12  Added 6/25, cues to lift gently    Flexion 12  Added 6/25cues to lift gently                Standing      3 way shoulder 15x ea 1# Added weight 7/6  LUE   Lat pulldown 15x2 lime Dec to lime to promote proper technique   Rows  15x 2 lime Dec to lime to promote proper technique    ER 3x10 lime Added 7/6  Towel roll under arm   IR 3x10 blue Added 7/6   scaption 2x10 yellow Added 7/6  Back to wall   Horizontal abduction 2x10 yellow Added 7/6  Cues for proper completion         Other:     Specific Instructions for next treatment: improve left shoulder A/PROM and strength    Re-assessment 7/6  Left shoulder PROM  Flexion: 168°  Abduction: 180°  IR: 50  ER: 60; 75; 83    Left shoulder AROM (standing)  Flexion: 143°  Abduction: 151°  IR: R side of T9  ER: T4     UEFS: 55/80, 68.75% max function      Treatment Charges: Mins Units   []  Modalities     [x]  Ther Exercise 35 2   []  Manual Therapy     []  Ther Activities     []  Aquatics     [x]  Vasocompression 15 1   []  Other     Total Treatment time 50 3   Medicare total as of 07/09/20: $$221.09    Assessment: [x] Progressing toward goals. Pt with continued pain complaints at end ranges of flexion and abduction with mild guarding. Continued improvements in PROM are appreciated with firm end feel at end range. Pt requires tactile cueing for scapular and rotator cuff strengthening to maintain proper positoining and avoid compensatory strategies. Pt able to progress sets and reps to improve strength of the left shoulder. Due to positional vertigo, avoided laying on the R side this date. Will return to sidelying and prone exercises as able. [] No change. [x] Other: Pt to contact her PCP to get a script for vestibular therapy    [x] Patient will continue to benefit from skilled therapeutic interventions including therapeutic exercise, manual therapy, and vasocompression, in order to improve left shoulder pain, A/PROM, and strength in order to return to prior level of activity. LEFT SHOULDER   Short term goals: MEET IN 6 VISITS- reassessed 7/6/2020 Status   Pain: Pt will report less than or equal to 1-2/10 left shoulder pain with therapeutic ROM and strengthening exercises in order to improve tolerance to completion of ADLs. Ongoing   ROM: Pt will demonstrate Left shoulder AROM to greater than or equal to 150 degrees elevation with minimal compensations and IR to T7 with 1-2/10 left shoulder pain  in order to improve ability to complete ADLs overhead and behind the back. MET   Strength: Pt will demonstrate 3/5 left shoulder strength with elevation to assist with raising and lowering the LUE to assist with ADLs. MET   Outcome Score: Pt will score greater than or equal to 50% on the UEFS in order to demonstrate improved function MET   Home Exercise Program: Pt will demonstrate independence with HEP.  MET   Longer term goals: MEET IN 12 VISITS     Pain: Pt will report less than or equal to 0-1/10 left shoulder pain with raising/lowering the LUE, reaching behind the back or behind the head, and lifting objects in order to assist with ADL completion. Ongoing   ROM: Pt will be able to demonstrate left shoulder AROM to greater than or equal to 160 degrees elevation, IR to T5, and ER to T4 without compensations in order to assist with raising/lowering the LUE to complete ADLs and lift objects. Ongoing   Strength: Pt will demonstrate greater than or equal to 4+/5 LUE strength and 4/5 scapular strength in order to assist with shoulder strength and stability when lifting objects to complete ADLs. Ongoing   Outcome Score: Pt will score greater than or equal to 65% on the UEFS in order to demonstrate improved function    Updated : Pt will score greater than or equal to 75% on the UEFS in order to demonstrate improved function MET      Ongoing           Problems: Neck pain  [x]? ? Cervical Pain: 3/10            [x]? ? ROM: dec AROM                          [x]? ? Strength: postural muscle strength deficits (deep neck flexors and parascapular muscles)  [x]? ? Function: NDI: 20% impairment  [x]? Postural Deviations: FHP and rounded shoulders    Longer term goals: MEET IN 6 VISITS     Pain: Pt will report less than or equal to 0-1/10 neck pain with ADL completion in order to improve tolerance to sitting and reading. Ongoing   ROM: Pt will be able to complete cervical AROM with 0-1/10 pain to improve cervical mobility to assist with scanning the environment and turning her head when driving. Sidebendin degrees  Rotation: 75 degrees Ongoing   Strength: Pt be able to sit with upright posturing for greater than or equal to 5 minutes with minimal cueing from the therapist in order to promote optimal mechanics with ADL completion. Ongoing   Outcome Score: Pt will score less than or equal to 10% impairment on the NDI. Ongoing                Patient goals: improve use of LUE      Pt.  Education:  [x] Yes  [] No  [x] Reviewed Prior HEP/Ed  Method of Education: [x] Verbal  [] Demo  [] Written  : cane exercises, sidelying exercises, standing exercises  6/29: cervical retraction, upright sitting posture  Comprehension of Education:  [x] Verbalizes understanding. [x] Demonstrates understanding. [x] Needs review. [x] Demonstrates/verbalizes HEP/Ed previously given. Plan: [x] Continue current frequency toward long and short term goals.     [x] Specific Instructions for subsequent treatments: manual therapy to cervical spine      Time In: 1000 a           Time Out: 1053 am    Electronically signed by:  Luciana Morales PT

## 2020-07-09 NOTE — PROGRESS NOTES
[] [facility]) - [facility] &  Therapy  [address]  [phone]  F: [phone] [] [address]  [place] [address]   [address]  P: [phone]  F: [phone] [] [address] &  Therapy  [address]  P: [phone]  F: [phone] [] [address]  [facility]   [address]   [place]: [phone]  F: [phone]      Physical Therapy Progress Note    Date: 2020      Patient: Saintclair Capra  : 1950  MRN: [id number]    Physician: Dr. Mark Bustamante: Medicare/[employer]  Diagnosis: S/P Arthroscopy of left shoulder  Onset Date: 20                           Next Dr. Kay Preciado:   Visit# / total visits: ; Progress note for Medicare patient due at visit 10                        Cancels/No Shows: 1/0  Date range of services: 6/15/2020 to 2020    Subjective:    Pain:  [x]? Yes  []? No   Location: left shoulder; neck   Pain Rating: (0-10 scale) 2-3/10 (left shoulder); 4-5/10 (neck)  Pain altered Tx:  [x]? No  []? Yes  Action:  Comments:Pt cancelled her previous appointment secondary to being dizzy. Pt notes her neck was sore at night and she took a muscle relaxer and went to bed. Pt reports she woke up about 1.5 hours later and was very dizzy. Pt reports she kept herself from falling but later did fall secondary to being dizzy. Pt reports she was sore following therapy but nothing significant. Pt reports completing chin tucks caused some soreness. Pt reports her neck is still sore and she uses ice. Pt reports it seems to tighten up by the end of the day. Pt has started to notice a click in her left shoulder. Pt notes dizziness with positional changes.          Objective:  Re-assessment   Left shoulder PROM  Flexion: 168°  Abduction: 180°  IR: 50  ER: certify a need for medically necessary rehabilitation services.      *PLEASE SIGN ABOVE AND FAX BACK ALL PAGES*

## 2020-07-10 ENCOUNTER — HOSPITAL ENCOUNTER (OUTPATIENT)
Dept: PHYSICAL THERAPY | Facility: CLINIC | Age: 70
Setting detail: THERAPIES SERIES
Discharge: HOME | End: 2020-07-10
Payer: MEDICARE

## 2020-07-10 PROCEDURE — 95992 CANALITH REPOSITIONING PROC: CPT

## 2020-07-10 PROCEDURE — 97110 THERAPEUTIC EXERCISES: CPT

## 2020-07-10 NOTE — FLOWSHEET NOTE
[] [facility]) - [facility] &  Therapy  [address]  [phone]  F: [phone] [x] [address]  [place]   [address]  P: [phone]  F: [phone] [] [address]  Therapy  [address]  P: [phone]  F: [phone] [] [address]  [facility]   [address]   [place]: [phone]  F: [phone]      Physical Therapy Daily Treatment Note    Date:  7/10/2020  Patient Name:  Zakia Gomez    :  1950  MRN: [id number]  Physician: Jay Richardson MD                  Insurance: [employer] visit count with shoulder/cervical]   Medical Diagnosis: H81.10- benign paroxysmal positional vertigo, unspecified laterality              Rehab Codes: H81.10  Onset date: 2020                       Next Dr's appt.: TBD- pending success with PT services  Visit# / total visits: ; Progress note for Medicare patient due at visit 10     Cancels/No Shows: 0/0    Subjective:    Pain:  [x] Yes  [] No Location: dizziness Pain Rating: (0-10 scale) 7-8/10  Pain altered Tx:  [x] No  [] Yes  Action: N/A  Comments: Reports relief post last visit. However, unable to control quick movements of head and neck- and therefore- inc symptoms with these while at home. Reports good compliance, tolerance to post-CRT positioning precautions. However, inc dizziness this AM. Reports x1 episode of spinning sensations this AM with leaning to R to reach for watch. Reports imbalance with ambulation into clinic today.      Todays Treatment:  Modalities:   Precautions: R posterior canal canalithiasis VS R horizontal canal involvement   Exercises:  Exercise Reps/ Time Weight/ Level Comments   Education     BPPV- mechanism, rationale and expectations for treatment; post-CRT positioning precautions for home- verbalized understanding to all; issued informational handouts; HEP    Videos of R posterior canal canalithiasis and R horizontal canal nystagmus    Treat R posterior canal first, then R horizontal canal second- may require additional treatments for greater relief    Emphasized once again for home, up until appointment next Wednesday- verbalized understanding to all.                                            Other:    Positional Tests:    Test R Torsional Upbeat L Torsional Upbeat Up Beat Down Beat Horizontal Duration Symptoms   Destiney Halpike- R x - - - x  L horizontal/ageo < 30\"    > 1' #1  trial  #2 trial more defined torsional, R upbeat  Less L horizontal    Destiney [address] Test     X ageo in R  X leandro in [facility] symptoms L       MIN nystagmus and symptoms present with roll test performed with only cervical rotation, and therefore, performed with pt quickly rolling to each side- more provocative- inc nystagmus. Positional Treatments:    BPPV Type Treatment Technique Trials Result Other:   R post canal canalithiasis  R post canal canalithiasis CRT x2 HORZ present within L S/L, and upright positions with each trial  No pillow today for inc cervical extension     Inc hold durations in each position    Reports imbalance at finish                          Reports dec imbalance and dizziness compared with start of treatment session.      Specific Instructions for next treatment: Follow-up with pt regarding spinning episodes. Re-assess for BPPV within all canals once again, and focus treatment upon R horizontal canal involvement if able next visit. Treatment Charges: Mins Units   []  Modalities     [x]  Ther Exercise 20 1   []  Manual Therapy     []  Ther Activities     []  Aquatics     []  Vasocompression     [x]  Other- CRT 20 1   Total Treatment time 40 2     Medicare total as of 07/10/20: $862.68 + 39.72= 902.40    Assessment: [x] Progressing toward goals.  Pt presents with sig dizziness this 1. Decreased Vertigo  []? 2. Improved gait mechanics, trajectory  [x]? 3. Negative Saralee Ronny  []? 4. Improved static balance  []? 5. Improved dynamic balance  [x]? 6. Decreased Dizziness Handicap Inventory ([address] [place] [address])- 0/100  []? 7. Decreased Post Concussion Symptom Survey (PCSS)  []? 8. Decreased Brain Injury Vision Symptom Survey (BIVSS)  [x]? 9. Independent with Home Exercise Program (HEP)  []? 10. Demonstrate knowledge of fall prevention  11. Will report no symptoms with sit<>supine, supine<>sit, rolling to R, and sit<> order to improve QOL.     Patient Goals: \"resolve dizziness. \"     Pt. Education:  [x] Yes  [] No  [] Reviewed Prior HEP/Ed  Method of Education: [x] Verbal  [] Demo  [] Written  Comprehension of Education:  [x] Verbalizes understanding. [] Demonstrates understanding. [] Needs review. [] Demonstrates/verbalizes HEP/Ed previously given. 7/10/2020- See grid above for details. Plan: [x] Continue current frequency toward long and short term goals. [x] Specific Instructions for subsequent treatments: Follow-up with pt regarding spinning episodes. Re-assess for BPPV within all canals once again, and focus treatment upon R horizontal canal involvement if able next visit.        Time In: 10:05AM            Time Out: 10:45AM    Electronically signed by:  Fracisco Allison, PT

## 2020-07-13 ENCOUNTER — HOSPITAL ENCOUNTER (OUTPATIENT)
Dept: PHYSICAL THERAPY | Facility: CLINIC | Age: 70
Setting detail: THERAPIES SERIES
Discharge: HOME | End: 2020-07-13
Payer: MEDICARE

## 2020-07-13 ENCOUNTER — OFFICE VISIT (OUTPATIENT)
Dept: ORTHOPEDIC SURGERY | Age: 70
End: 2020-07-13

## 2020-07-13 VITALS
HEIGHT: 68 IN | HEART RATE: 78 BPM | DIASTOLIC BLOOD PRESSURE: 70 MMHG | BODY MASS INDEX: 24.31 KG/M2 | WEIGHT: 160.4 LBS | SYSTOLIC BLOOD PRESSURE: 122 MMHG

## 2020-07-13 PROCEDURE — 97110 THERAPEUTIC EXERCISES: CPT

## 2020-07-13 PROCEDURE — 99024 POSTOP FOLLOW-UP VISIT: CPT | Performed by: ORTHOPAEDIC SURGERY

## 2020-07-13 PROCEDURE — 97016 VASOPNEUMATIC DEVICE THERAPY: CPT

## 2020-07-13 ASSESSMENT — ENCOUNTER SYMPTOMS
APNEA: 0
ABDOMINAL PAIN: 0
DIARRHEA: 0
SHORTNESS OF BREATH: 0
COUGH: 0
VOMITING: 0
ABDOMINAL DISTENTION: 0
CONSTIPATION: 0
COLOR CHANGE: 0
NAUSEA: 0
CHEST TIGHTNESS: 0

## 2020-07-13 NOTE — FLOWSHEET NOTE
[] [facility]) - [facility] &  Therapy  [address]  [phone]  F: [phone] [x] [address]  [place] [address]   [address]  P: [phone]  F: [phone] [] [address] &  Therapy  [address] [facility]  P: [phone]  F: [phone] [] [address]  [facility]   [address]   [place]: [phone]  F: [phone]      Physical Therapy Daily Treatment Note    Date:  2020  Patient Name:  Zakia Gomez    :  1950  MRN: [id number]  Physician: Dr. Eyal Cortes: Medicare/[facility]  Diagnosis: S/P Arthroscopy of left shoulder  Onset Date: 20   Next Dr. Jean Cast:   Visit# / total visits: ; Progress note for Medicare patient due at visit 10    Cancels/No Shows: 1/0    Subjective:    Pain:  [x] Yes  [] No Location: left shoulder; neck  Pain Rating: (0-10 scale) 3/10 left shoulder  Pain altered Tx:  [x] No  [] Yes  Action:  Comments: Pt reports she is supposed to lay on her left side per vestibular PT which is making her left shoulder worse. Objective: extra pillows due to dizziness; significant dizziness with supine to sit  Modalities: GAMEREADY, mod compression, left shoulder 15 min  Precautions:  S/p Left shoulder arthroscopy, capsular release. Ok for active and passive ROM, strengthening. No restrictions;  Adhesive tape, NO BP ON R ARM; new onset of dizziness with positional changes-- avoid quick head/neck movements, laying on R SIDE, AND laying completely flat  Exercises: Bolded completed 20   Exercise Reps/ Time Weight/ Level Comments   UBE 2/2 lv1          Sitting      Upper trap stretch x     Levator scap stretch x     Cervical retraction 10x5\"  Added          STANDING      Cane extension 15x 1#    IR stretch 10x towel Cues at scap   Wall push ups 15x Wall slides 15x ea  Flex/abd         Supine      Neck manual 12' ADD    PROM 15'  Restrictions at flexion end range;pain when moving from IR to ER at 90; anterior translated humeral head with IR; tenderness over the anterior humeral head   CANE EXERCISES  2#    Chest press+protraction 15x2  Added 6/17   Flexion 15x  Added 6/17   Abduction 15x  Added 6/17   ER 15x  Added 6/17   Horizontal abd 15x  Added 6/17   Rhythmic stabilization 20\"x3  Added 6/25         Sidelying      ER 20x A Tactile cues at scapula   H. abd 20x A    Abduction  20x A Notes pain on the top of the shoulder at shoulder height- increased pain with inferior glide; no change in pain with scapular assist         Prone      Extention 15  Added 6/25   Horizontal abd 12  Added 6/25   Scaption 12  Added 6/25, cues to lift gently    Flexion 12  Added 6/25cues to lift gently                Standing      3 way shoulder 15x ea 1# Added weight 7/6  LUE   Lat pulldown 15x2 lime Dec to lime to promote proper technique   Rows  15x 2 lime Dec to lime to promote proper technique    ER 3x10 lime Added 7/6  Towel roll under arm   IR 3x10 blue Added 7/6   scaption 2x10 yellow Added 7/6  Back to wall   Horizontal abduction 2x10 yellow Added 7/6  Cues for proper completion         Other:     Specific Instructions for next treatment: Manual to neck improve left shoulder A/PROM and strength    Re-assessment 7/6  Left shoulder PROM  Flexion: 168°  Abduction: 180°  IR: 50  ER: 60; 75; 83    Left shoulder AROM (standing)  Flexion: 143°  Abduction: 151°  IR: R side of T9  ER: T4     UEFS: 55/80, 68.75% max function      Treatment Charges: Mins Units   []  Modalities     [x]  Ther Exercise 40 3   []  Manual Therapy     []  Ther Activities     []  Aquatics     [x]  Vasocompression 15 1   []  Other     Total Treatment time 55 4   Medicare total as of 07/13/20: $935.38    Assessment: [x] Progressing toward goals.   Avoided sidelying exercises this date and discussed left sidelying sleeping positions to help decrease shoulder pain while abiding by vestibular precautions. [] No change. [] Other:     [x] Patient will continue to benefit from skilled therapeutic interventions including therapeutic exercise, manual therapy, and vasocompression, in order to improve left shoulder pain, A/PROM, and strength in order to return to prior level of activity. LEFT SHOULDER   Short term goals: MEET IN 6 VISITS- reassessed 7/6/2020 Status   Pain: Pt will report less than or equal to 1-2/10 left shoulder pain with therapeutic ROM and strengthening exercises in order to improve tolerance to completion of ADLs. Ongoing   ROM: Pt will demonstrate Left shoulder AROM to greater than or equal to 150 degrees elevation with minimal compensations and IR to T7 with 1-2/10 left shoulder pain  in order to improve ability to complete ADLs overhead and behind the back. MET   Strength: Pt will demonstrate 3/5 left shoulder strength with elevation to assist with raising and lowering the LUE to assist with ADLs. MET   Outcome Score: Pt will score greater than or equal to 50% on the UEFS in order to demonstrate improved function MET   Home Exercise Program: Pt will demonstrate independence with HEP. MET   Longer term goals: MEET IN 12 VISITS     Pain: Pt will report less than or equal to 0-1/10 left shoulder pain with raising/lowering the LUE, reaching behind the back or behind the head, and lifting objects in order to assist with ADL completion. Ongoing   ROM: Pt will be able to demonstrate left shoulder AROM to greater than or equal to 160 degrees elevation, IR to T5, and ER to T4 without compensations in order to assist with raising/lowering the LUE to complete ADLs and lift objects. Ongoing   Strength: Pt will demonstrate greater than or equal to 4+/5 LUE strength and 4/5 scapular strength in order to assist with shoulder strength and stability when lifting objects to complete ADLs.   Ongoing Outcome Score: Pt will score greater than or equal to 65% on the UEFS in order to demonstrate improved function    Updated : Pt will score greater than or equal to 75% on the UEFS in order to demonstrate improved function MET      Ongoing           Problems: Neck pain  [x]? ? Cervical Pain: 3/10            [x]? ? ROM: dec AROM                          [x]? ? Strength: postural muscle strength deficits (deep neck flexors and parascapular muscles)  [x]? ? Function: NDI: 20% impairment  [x]? Postural Deviations: FHP and rounded shoulders    Longer term goals: MEET IN 6 VISITS     Pain: Pt will report less than or equal to 0-1/10 neck pain with ADL completion in order to improve tolerance to sitting and reading. Ongoing   ROM: Pt will be able to complete cervical AROM with 0-1/10 pain to improve cervical mobility to assist with scanning the environment and turning her head when driving. Sidebendin degrees  Rotation: 75 degrees Ongoing   Strength: Pt be able to sit with upright posturing for greater than or equal to 5 minutes with minimal cueing from the therapist in order to promote optimal mechanics with ADL completion. Ongoing   Outcome Score: Pt will score less than or equal to 10% impairment on the NDI. Ongoing                Patient goals: improve use of LUE      Pt. Education:  [x] Yes  [] No  [x] Reviewed Prior HEP/Ed  Method of Education: [x] Verbal  [] Demo  [] Written  : cane exercises, sidelying exercises, standing exercises  : cervical retraction, upright sitting posture  Comprehension of Education:  [x] Verbalizes understanding. [x] Demonstrates understanding. [x] Needs review. [x] Demonstrates/verbalizes HEP/Ed previously given. Plan: [x] Continue current frequency toward long and short term goals.     [x] Specific Instructions for subsequent treatments: manual therapy to cervical spine      Time In: 9:00 a           Time Out: 10:03 am    Electronically signed by:  Trinh Galicia PTA

## 2020-07-15 ENCOUNTER — HOSPITAL ENCOUNTER (OUTPATIENT)
Dept: PHYSICAL THERAPY | Facility: CLINIC | Age: 70
Setting detail: THERAPIES SERIES
Discharge: HOME | End: 2020-07-15
Payer: MEDICARE

## 2020-07-15 PROCEDURE — 97110 THERAPEUTIC EXERCISES: CPT

## 2020-07-15 PROCEDURE — 95992 CANALITH REPOSITIONING PROC: CPT

## 2020-07-15 NOTE — FLOWSHEET NOTE
[] [facility]) - [facility] &  Therapy  [address]  [phone]  F: [phone] [x] [address]  [place] [address]   [address]  P: [phone]  F: [phone] [] [address]  Therapy  [address] [facility]  P: [phone]  F: [phone] [] [address]  [facility]   [address]   [place]: [phone]  F: [phone]      Physical Therapy Daily Treatment Note    Date:  7/15/2020  Patient Name:  Hernan Lopez    :  1950  MRN: [id number]  Physician: Marbin Grover MD                  Insurance: Denyce Gums visit count with shoulder/cervical]   Medical Diagnosis: H81.10- benign paroxysmal positional vertigo, unspecified laterality              Rehab Codes: H81.10  Onset date: 2020                       Next 's appt.: TBD- pending success with PT services  Visit# / total visits: 2/; Progress note for Medicare patient due at visit 10     Cancels/No Shows: 0/0    Subjective:    Pain:  [] Yes  [x] No Location: dizziness Pain Rating: (0-10 scale) 5/10  Pain altered Tx:  [x] No  [] Yes  Action: N/A  Comments: Reports relief post last visit, which remains into today- however- not 100% relief. Reports a few episodes of spinning sensations with accidentally rolling to her R side in bed, and also with certain movements this AM. Reports intermittent compliance to post-CRT positioning precautions. Reports dec imbalance with ambulation into clinic today, compared with last visit.      Todays Treatment:  Modalities:   Precautions: R posterior canal canalithiasis VS R horizontal canal involvement   Exercises:  Exercise Reps/ Time Weight/ Level Comments   Education     BPPV- mechanism, rationale and expectations for treatment; post-CRT positioning precautions for home- verbalized understanding to all; issued informational handouts; HEP    Videos of R posterior canal canalithiasis and R horizontal canal nystagmus    Treat R posterior canal first, then R horizontal canal second- may require additional treatments for greater relief    Emphasized once again for home, up until appointment next Wednesday- verbalized understanding to all. Education     Roll test, [place] and Lean test- rationale, expectations, results    Repeat R nicol halpike- rationale, expectations, results    R horizontal canal involvement now resolved, but R posterior canal involvement remains     Emphasized post-CRT positioning precautions up until next Wednesday    Will re-assess next visit and perform the YUNIOR maneuver if needed- verbalized understanding to all.                                  Other: BOLD is completed. Positional Tests:    Test R Torsional Upbeat L Torsional Upbeat Up Beat Down Beat Horizontal Duration Symptoms   R Mobile Halpike  x - - - - < 15\"  Less violence, nystagmus compared with last visit & eval    No rebound effect             Roll Test     X ageo in R- slight    ABSENT in L  Trace nystagmus,trace symptoms in R this date     No nystagmus,nor symptoms  In L       Roll test performed with pt quickly rolling to each side- more provocative- inc nystagmus.      [place] and [employer] test:  #1- no nystagmus or symptoms present in either position  #2- no nystagmus or symptoms present in either position   MIN A PT for positioning and support     Positional Treatments:    BPPV Type Treatment Technique Trials Result Other:   R post canal canalithiasis  R post canal canalithiasis CRT x1 No nystagmus or symptoms present in L S/L or at finish in upright at EOB No pillow today for inc cervical extension     Inc hold durations in each position    Reports trace imbalance at finish                          Reports dec imbalance and dizziness compared with start of treatment session.      Specific Instructions for next treatment: Follow-up with pt regarding spinning episodes. Re-assess for BPPV within all canals once again, and consider YUNIOR maneuver if needed next visit. Treatment Charges: Mins Units   []  Modalities     [x]  Ther Exercise 25 2   []  Manual Therapy     []  Ther Activities     []  Aquatics     []  Vasocompression     [x]  Other- CRT 20 1   Total Treatment time 45 2     Medicare total as of 07/10/20: $862.68 + 62.94= $965.34    Assessment: [x] Progressing toward goals. Pt presents with dec dizziness and imbalance compared with last visit, and also relief post last visit. However, continues to report spinning episodes and sensations at home. Therefore, assessed for R horizontal canal involvement. Pt tests negative this date- with results as detailed above. Proceeded with re-assessment of R nicol halpike- tests positive with results as detailed above. Performed R posterior canal canalithiasis- reports only trace imbalance at finish. Consider ideas above and below next visit. [] No change. [x] Other:  [x] Patient would continue to benefit from skilled physical therapy services in order to: decrease dizziness with sit<>supine, supine<>sit, rolling to R, and sit<>stand. Problems:                                                                                     [x]? 1.  Vertigo  []? 2.  Difficulty walking a straight course                                    [x]? 3.  Positive [place] Hallpike                                               []?  4.  Static balance deficit: mCTSIB  []? 5.  Dynamic balance deficit: Rivas Balance Scale (BBS), Dynamic Gait Index (DGI), Functional Gait Assessment (FGA)  [x]? 6. Increased Dizziness Handicap Inventory ([address] [place] [address])- 34/100  []? 7. Increased Post Concussion Symptom Survey (PCSS)  []? 8. Increased Brain Injury Vision Symptom Survey (BIVSS)        Short Term Goals: (     6        Treatments)  [x]? 1. Decreased Vertigo  []? 2. Improved gait mechanics, trajectory  [x]?  3. Negative Sruthi Mcdonald Felipeke  []? 4. Improved static balance  []? 5. Improved dynamic balance  [x]? 6. Decreased Dizziness Handicap Inventory ([facility])- 0/100  []? 7. Decreased Post Concussion Symptom Survey (PCSS)  []? 8. Decreased Brain Injury Vision Symptom Survey (BIVSS)  [x]? 9. Independent with Home Exercise Program (HEP)  []? 10. Demonstrate knowledge of fall prevention  11. Will report no symptoms with sit<>supine, supine<>sit, rolling to R, and sit<> order to improve QOL.     Patient Goals: \"resolve dizziness. \"     Pt. Education:  [x] Yes  [] No  [] Reviewed Prior HEP/Ed  Method of Education: [x] Verbal  [] Demo  [] Written  Comprehension of Education:  [x] Verbalizes understanding. [] Demonstrates understanding. [] Needs review. [] Demonstrates/verbalizes HEP/Ed previously given. 7/10/2020- See grid above for details. 7/15/2020- See grid above for details. Plan: [x] Continue current frequency toward long and short term goals. [x] Specific Instructions for subsequent treatments: Follow-up with pt regarding spinning episodes. Re-assess for BPPV within all canals once again, and consider YUNIOR maneuver if needed next visit.       Time In: 12:05PM            Time Out: 12:50PM    Electronically signed by:  Mirna Ortega PT

## 2020-07-16 ENCOUNTER — HOSPITAL ENCOUNTER (OUTPATIENT)
Dept: PHYSICAL THERAPY | Facility: CLINIC | Age: 70
Setting detail: THERAPIES SERIES
Discharge: HOME | End: 2020-07-16
Payer: MEDICARE

## 2020-07-16 PROCEDURE — 97110 THERAPEUTIC EXERCISES: CPT

## 2020-07-16 PROCEDURE — 97140 MANUAL THERAPY 1/> REGIONS: CPT

## 2020-07-16 NOTE — FLOWSHEET NOTE
[] [facility]) - [facility] &  Therapy  [address]  [phone]  F: [phone] [x] [address]  [facility]   [address]  P: [phone]  F: [phone] [] [address] &  Therapy  [address] [facility]  P: [phone]  F: [phone] [] [address]  [facility]   [address]   [place]: [phone]  F: [phone]      Physical Therapy Daily Treatment Note    Date:  2020  Patient Name:  Christina Nielsen    :  1950  MRN: [id number]  Physician: Dr. Dewayne Jensen: Medicare/[facility]  Diagnosis: S/P Arthroscopy of left shoulder  Onset Date: 20   Next Dr. Leilani Hernandez:   Visit# / total visits: ; Progress note for Medicare patient due at visit 10    Cancels/No Shows: 1/0    Subjective:    Pain:  [x] Yes  [] No Location: left shoulder; neck  Pain Rating: (0-10 scale) 3/10 left shoulder  Pain altered Tx:  [x] No  [] Yes  Action:  Comments: Pt reports she is supposed to lay on her left side per vestibular PT which is making her left shoulder worse. Objective: extra pillows due to dizziness; significant dizziness with supine to sit-TRY TO ADDRESS FROM LEFT SIDE  Modalities: GAMEREADY, mod compression, left shoulder 15 min-not today  Precautions:  S/p Left shoulder arthroscopy, capsular release. Ok for active and passive ROM, strengthening. No restrictions;  Adhesive tape, NO BP ON R ARM; new onset of dizziness with positional changes-- avoid quick head/neck movements, laying on R SIDE, AND laying completely flat  Exercises: Bolded completed 20   Exercise Reps/ Time Weight/ Level Comments   UBE  lv1          Sitting      Upper trap stretch x     Levator scap stretch x     Cervical retraction 10x5\"  Added          STANDING      Cane extension 15x 1#    IR stretch 10x towel Cues at scap   Wall push ups 15x     Wall slides 15x ea  Flex/abd         Supine      Neck-manual 10'  Suboccipital release and gentle cervical traction    PROM 6'  Restrictions at flexion end range;pain when moving from IR to ER at 90; anterior translated humeral head with IR; tenderness over the anterior humeral head   CANE EXERCISES  3# Increased 7/16   Chest press+protraction 15x2  Added 6/17   Flexion 15x  Added 6/17   Abduction 15x  Added 6/17   ER 15x  Added 6/17   Horizontal abd 15x  Added 6/17   Rhythmic stabilization 20\"x3  Added 6/25         Sidelying      ER 20x A Tactile cues at scapula   H. abd 20x A    Abduction  20x A Notes pain on the top of the shoulder at shoulder height- increased pain with inferior glide; no change in pain with scapular assist         Prone      Extention 15  Added 6/25   Horizontal abd 12  Added 6/25   Scaption 12  Added 6/25, cues to lift gently    Flexion 12  Added 6/25cues to lift gently                Standing      3 way shoulder 15x ea 1# Added weight 7/6  LUE   Lat pulldown 15x2 lime Dec to lime to promote proper technique   Rows  15x 2 lime Dec to lime to promote proper technique    ER 2x15 lime Added 7/6  Towel roll under arm   IR 3x10 blue Added 7/6   scaption 2x15 yellow Added 7/6  Back to wall  Increased reps 7/16   Horizontal abduction 2x15 yellow Added 7/6  Cues for proper completion  Increased reps 7/16         Other:     Specific Instructions for next treatment: Manual to neck improve left shoulder A/PROM and strength    Re-assessment 7/6  Left shoulder PROM  Flexion: 168°  Abduction: 180°  IR: 50  ER: 60; 75; 83    Left shoulder AROM (standing)  Flexion: 143°  Abduction: 151°  IR: R side of T9  ER: T4     UEFS: 55/80, 68.75% max function      Treatment Charges: Mins Units   []  Modalities     [x]  Ther Exercise 42 3   [x]  Manual Therapy 16 1   []  Ther Activities     []  Aquatics     []  Vasocompression     []  Other     Total Treatment time 58 4   Medicare total as of 07/16/20: $1018.57    Assessment: [x] Progressing toward goals. Increased weight with cane exercises and added manual cervical traction and suboccipital release, careful throughout treatment not to aggravate patient's vestibular issues. Held vaso this date. Patient had dizziness upon sitting up and did not feel well enough to stay for vasocompression. [] No change. [] Other:     [x] Patient will continue to benefit from skilled therapeutic interventions including therapeutic exercise, manual therapy, and vasocompression, in order to improve left shoulder pain, A/PROM, and strength in order to return to prior level of activity. LEFT SHOULDER   Short term goals: MEET IN 6 VISITS- reassessed 7/6/2020 Status   Pain: Pt will report less than or equal to 1-2/10 left shoulder pain with therapeutic ROM and strengthening exercises in order to improve tolerance to completion of ADLs. Ongoing   ROM: Pt will demonstrate Left shoulder AROM to greater than or equal to 150 degrees elevation with minimal compensations and IR to T7 with 1-2/10 left shoulder pain  in order to improve ability to complete ADLs overhead and behind the back. MET   Strength: Pt will demonstrate 3/5 left shoulder strength with elevation to assist with raising and lowering the LUE to assist with ADLs. MET   Outcome Score: Pt will score greater than or equal to 50% on the UEFS in order to demonstrate improved function MET   Home Exercise Program: Pt will demonstrate independence with HEP. MET   Longer term goals: MEET IN 12 VISITS     Pain: Pt will report less than or equal to 0-1/10 left shoulder pain with raising/lowering the LUE, reaching behind the back or behind the head, and lifting objects in order to assist with ADL completion.   Ongoing   ROM: Pt will be able to demonstrate left shoulder AROM to greater than or equal to 160 degrees elevation, IR to T5, and ER to T4 without compensations in order to assist with raising/lowering the LUE to complete ADLs and lift objects. Ongoing   Strength: Pt will demonstrate greater than or equal to 4+/5 LUE strength and 4/5 scapular strength in order to assist with shoulder strength and stability when lifting objects to complete ADLs. Ongoing   Outcome Score: Pt will score greater than or equal to 65% on the UEFS in order to demonstrate improved function    Updated : Pt will score greater than or equal to 75% on the UEFS in order to demonstrate improved function MET      Ongoing           Problems: Neck pain  [x]? ? Cervical Pain: 3/10            [x]? ? ROM: dec AROM                          [x]? ? Strength: postural muscle strength deficits (deep neck flexors and parascapular muscles)  [x]? ? Function: NDI: 20% impairment  [x]? Postural Deviations: FHP and rounded shoulders    Longer term goals: MEET IN 6 VISITS     Pain: Pt will report less than or equal to 0-1/10 neck pain with ADL completion in order to improve tolerance to sitting and reading. Ongoing   ROM: Pt will be able to complete cervical AROM with 0-1/10 pain to improve cervical mobility to assist with scanning the environment and turning her head when driving. Sidebendin degrees  Rotation: 75 degrees Ongoing   Strength: Pt be able to sit with upright posturing for greater than or equal to 5 minutes with minimal cueing from the therapist in order to promote optimal mechanics with ADL completion. Ongoing   Outcome Score: Pt will score less than or equal to 10% impairment on the NDI. Ongoing                Patient goals: improve use of LUE      Pt. Education:  [x] Yes  [] No  [x] Reviewed Prior HEP/Ed  Method of Education: [x] Verbal  [] Demo  [] Written  : cane exercises, sidelying exercises, standing exercises  : cervical retraction, upright sitting posture  Comprehension of Education:  [x] Verbalizes understanding. [x] Demonstrates understanding. [x] Needs review.   [x] Demonstrates/verbalizes HEP/Ed previously given. Plan: [x] Continue current frequency toward long and short term goals.     [x] Specific Instructions for subsequent treatments: check response to manual therapy of cervical spine      Time In: 10:25 a           Time Out: 11:30 am    Electronically signed by:  Jah Parrish PTA

## 2020-07-20 ENCOUNTER — HOSPITAL ENCOUNTER (OUTPATIENT)
Dept: PHYSICAL THERAPY | Facility: CLINIC | Age: 70
Setting detail: THERAPIES SERIES
Discharge: HOME | End: 2020-07-20
Payer: MEDICARE

## 2020-07-20 PROCEDURE — 97140 MANUAL THERAPY 1/> REGIONS: CPT

## 2020-07-20 PROCEDURE — 97110 THERAPEUTIC EXERCISES: CPT

## 2020-07-20 NOTE — FLOWSHEET NOTE
scap stretch x     Cervical retraction 10x5\"  Added 6/25         STANDING      Cane extension 15x 1#    IR stretch 10x towel Cues at scap   Wall push ups 20x     Serratus Wall slides 15x           Supine      Neck-manual 10'  Suboccipital release and gentle cervical traction    PROM 6'  Restrictions at flexion end range;pain when moving from IR to ER at 90; anterior translated humeral head with IR; tenderness over the anterior humeral head   CANE EXERCISES  3# Increased 7/16   Chest press+protraction 15x2  Added 6/17   Flexion 20x  Added 6/17   Abduction 15x  Added 6/17   ER 15x  Added 6/17   Horizontal abd 20x  Added 6/17   Rhythmic stabilization 20\"x3 3# wand Added 6/25         Sidelying      ER 20x A Tactile cues at scapula   H. abd 20x A    Abduction  20x A Notes pain on the top of the shoulder at shoulder height- increased pain with inferior glide; no change in pain with scapular assist         Prone      Extention 15  Added 6/25   Horizontal abd 12  Added 6/25   Scaption 12  Added 6/25, cues to lift gently    Flexion 12  Added 6/25cues to lift gently                Standing      3 way shoulder 15x ea 1# Added weight 7/6  LUE   Lat pulldown 15x2 lime Dec to lime to promote proper technique   Scapular depressions 20x lime    Rows  15x 2 lime Dec to lime to promote proper technique    ER 2x15 lime Added 7/6  Towel roll under arm   IR 2x15 blue Added 7/6   scaption 2x10 yellow Added 7/6  Back to wall  Increased reps 7/16   Horizontal abduction 2x10 yellow Added 7/6  Cues for proper completion  Increased reps 7/16   PNF pattern D2 FLEXION 10X Yellow  Added 7/20   Other:     Specific Instructions for next treatment: Manual to neck improve left shoulder A/PROM and strength    Re-assessment 7/6  Left shoulder PROM  Flexion: 168°  Abduction: 180°  IR: 50  ER: 60; 75; 83    Left shoulder AROM (standing)  Flexion: 143°  Abduction: 151°  IR: R side of T9  ER: T4     UEFS: 55/80, 68.75% max function        Treatment Charges: Mins Units   []  Modalities     [x]  Ther Exercise 40 2   [x]  Manual Therapy 10 1   []  Ther Activities     []  Aquatics     []  Vasocompression     []  Other     Total Treatment time 50 3   Medicare total as of 07/20/20: $2480.69    Assessment: [x] Progressing toward goals. Pt denies an increase in left shoulder or neck pain at the end of the session. Pt with an audible pop in the left shoulder during PROM without reports of pain, however, subjectively observed increases in PROM abd/flexion were appreciated. Pt with good tolerance to all mat table shoulder ROM exercises. Frequent cueing is required during standing exercises to focus on proper form and to prevent compensatory strategies when lifting the left arm. implemented PNF D2 flexion with resistance band to improve functional strength of the LUE, however, compensatory trunk rotation to the left is appreciated. Cues to focus on proper trunk alignment are provided without carryover. HEP of band exercises are provided with cueing to take rest breaks when form is suffering in order to complete the tasks appropriately. Pt has been advised to avoid using a band for PNF pattern exercise at home and to focus on proper movement patterns. Pt with noted soreness in the left side of the neck which was improved with manual therapy. [] No change. [] Other:     [x] Updated pt's HEP and will complete for 1 week, independently. Discharge pending for next visit with pt understanding the importance of HEP compliance. LEFT SHOULDER   Short term goals: MEET IN 6 VISITS- reassessed 7/6/2020 Status   Pain: Pt will report less than or equal to 1-2/10 left shoulder pain with therapeutic ROM and strengthening exercises in order to improve tolerance to completion of ADLs.  Ongoing   ROM: Pt will demonstrate Left shoulder AROM to greater than or equal to 150 degrees elevation with minimal compensations and IR to T7 with 1-2/10 left shoulder pain  in order to improve ability to complete ADLs overhead and behind the back. MET   Strength: Pt will demonstrate 3/5 left shoulder strength with elevation to assist with raising and lowering the LUE to assist with ADLs. MET   Outcome Score: Pt will score greater than or equal to 50% on the UEFS in order to demonstrate improved function MET   Home Exercise Program: Pt will demonstrate independence with HEP. MET   Longer term goals: MEET IN 12 VISITS     Pain: Pt will report less than or equal to 0-1/10 left shoulder pain with raising/lowering the LUE, reaching behind the back or behind the head, and lifting objects in order to assist with ADL completion. Ongoing   ROM: Pt will be able to demonstrate left shoulder AROM to greater than or equal to 160 degrees elevation, IR to T5, and ER to T4 without compensations in order to assist with raising/lowering the LUE to complete ADLs and lift objects. Ongoing   Strength: Pt will demonstrate greater than or equal to 4+/5 LUE strength and 4/5 scapular strength in order to assist with shoulder strength and stability when lifting objects to complete ADLs. Ongoing   Outcome Score: Pt will score greater than or equal to 65% on the UEFS in order to demonstrate improved function    Updated 7/6: Pt will score greater than or equal to 75% on the UEFS in order to demonstrate improved function MET      Ongoing           Problems: Neck pain  [x]? ? Cervical Pain: 3/10            [x]? ? ROM: dec AROM                          [x]? ? Strength: postural muscle strength deficits (deep neck flexors and parascapular muscles)  [x]? ? Function: NDI: 20% impairment  [x]? Postural Deviations: FHP and rounded shoulders    Longer term goals: MEET IN 6 VISITS     Pain: Pt will report less than or equal to 0-1/10 neck pain with ADL completion in order to improve tolerance to sitting and reading.  Ongoing   ROM: Pt will be able to complete cervical AROM with 0-1/10 pain to improve cervical mobility to assist with

## 2020-07-22 ENCOUNTER — HOSPITAL ENCOUNTER (OUTPATIENT)
Dept: PHYSICAL THERAPY | Facility: CLINIC | Age: 70
Setting detail: THERAPIES SERIES
Discharge: HOME | End: 2020-07-22
Payer: MEDICARE

## 2020-07-22 PROCEDURE — 95992 CANALITH REPOSITIONING PROC: CPT

## 2020-07-22 PROCEDURE — 97110 THERAPEUTIC EXERCISES: CPT

## 2020-07-22 NOTE — FLOWSHEET NOTE
[] [facility]) - [facility] &  Therapy  [address]  [phone]  F: [phone] [x] [address]  [place] [address]   [address]  P: [phone]  F: [phone] [] [address]  Therapy  [address]  P: [phone]  F: [phone] [] [address]  [facility]   [address]   [place]: [phone]  F: [phone]      Physical Therapy Daily Treatment Note    Date:  2020  Patient Name:  Dejuan Vera    :  1950  MRN: [id number]  Physician: Nils Gore MD                  Insurance: [facility] Organ visit count with shoulder/cervical]   Medical Diagnosis: H81.10- benign paroxysmal positional vertigo, unspecified laterality              Rehab Codes: H81.10  Onset date: 2020                       Next Dr's appt.: TBD- pending success with PT services  Visit# / total visits: 3/6; Progress note for Medicare patient due at visit 10     Cancels/No Shows: 0/0    Subjective:    Pain:  [] Yes  [x] No Location: dizziness Pain Rating: (0-10 scale) not rated/10 \"fair\"  Pain altered Tx:  [x] No  [] Yes  Action: N/A  Comments: Reports relief post last visit, which remains into today- however- not 100% relief. Reports worse symptoms with undergoing PT services for her cervical spine x2 treatments- \"kind of uneasy the whole day. \" However, L shoulder and cervical spine symptoms most likely related, and may take additional time, beyond PT services, to recover. Plans for final visit for L shoulder and cervical treatment on Monday. Reports handful of spinning episodes since last treatment session- \"8 or 10 I suppose. \" Once again, fair compliance to post-CRT positioning precautions. Reports dec imbalance with ambulation into clinic today, compared with last visit.      Jarrod Zhao Treatment:  Modalities:   Precautions: R posterior canal canalithiasis VS R horizontal canal involvement   Exercises:  Exercise Reps/ Time Weight/ Level Comments   Education     BPPV- mechanism, rationale and expectations for treatment; post-CRT positioning precautions for home- verbalized understanding to all; issued informational handouts; HEP    Videos of R posterior canal canalithiasis and R horizontal canal nystagmus    Treat R posterior canal first, then R horizontal canal second- may require additional treatments for greater relief    Emphasized once again for home, up until appointment next Wednesday- verbalized understanding to all. Education     Roll test, [place] and Lean test- rationale, expectations, results    Repeat R destiney halpike- rationale, expectations, results    R horizontal canal involvement now resolved, but R posterior canal involvement remains     Emphasized post-CRT positioning precautions up until next Wednesday    Will re-assess next visit and perform the YUNIOR maneuver if needed- verbalized understanding to all. Education     Repeat R destiney halpike- rationale, expectations, results    YUNIOR maneuver- rationale, expectations, results    Emphasized post-CRT positioning precautions- sleep upright or in recliner for only x1 week    Will re-assess and D/C next visit- verbalized understanding to all                        Other: BOLD is completed.      Positional Tests:    Test R Torsional Upbeat L Torsional Upbeat Up Beat Down Beat Horizontal Duration Symptoms   R Destiney Halpike  x - - - - < 15\"  Less violence, nystagmus compared with last visit & eval    No rebound effect                           Positional Treatments:    BPPV Type Treatment Technique Trials Result Other:   R post canal canalithiasis  YUNIOR maneuver x2 No nystagmus or symptoms present in L S/L    Slight nystagmus and symptoms present in upright at EOB No pillow today for inc cervical extension     Inc hold durations in each position    Inc amplitude and speed of head shaking during second trial     Reports trace imbalance at finish                          Reports dec imbalance and dizziness compared with start of treatment session.      Specific Instructions for next treatment: Follow-up with pt regarding modifications to post-CRT positioning precautions, as well as spinning episodes. Re-assess for R BPPV, and consider YUNIOR maneuver, once again, if needed next visit. Plan for D/C next visit. Treatment Charges: Mins Units   []  Modalities     [x]  Ther Exercise 15 1   []  Manual Therapy     []  Ther Activities     []  Aquatics     []  Vasocompression     [x]  Other- CRT 20 1   Total Treatment time 35 2     Medicare total as of 07/22/20: $1132.93    Assessment: [x] Progressing toward goals. Pt presents with dec dizziness and imbalance compared with last visit, and also relief post last visit. However, continues to report spinning episodes and sensations at home. Therefore, re-assessed for R posterior canal canalithiasis. Pt tests positive this date- with results as detailed above. Performed YUNIOR maneuver x2- reports only trace imbalance at finish. Continues to demo progress, as nystagmus has localized to R posterior canal, has become less violent, and has dec in duration. Consider ideas above and below next visit. [] No change. [x] Other:  [x] Patient would continue to benefit from skilled physical therapy services in order to: decrease dizziness with sit<>supine, supine<>sit, rolling to R, and sit<>stand. Problems:                                                                                     [x]? 1.  Vertigo  []? 2.  Difficulty walking a straight course                                    [x]? 3.  Positive Destiney Hallpike                                               []?  4.  Static balance deficit: mCTSIB  []?   5.  Dynamic balance deficit: Rivas Balance Scale (BBS), Dynamic Gait Index (DGI), Functional Gait Assessment (FGA)  [x]? 6. Increased Dizziness Handicap Inventory ([address] [place] [address])- 34/100  []? 7. Increased Post Concussion Symptom Survey (PCSS)  []? 8. Increased Brain Injury Vision Symptom Survey (BIVSS)        Short Term Goals: (     6        Treatments)  [x]? 1. Decreased Vertigo  []? 2. Improved gait mechanics, trajectory  [x]? 3. Negative Scarlet Mimes  []? 4. Improved static balance  []? 5. Improved dynamic balance  [x]? 6. Decreased Dizziness Handicap Inventory ([address] [place] [address])- 0/100  []? 7. Decreased Post Concussion Symptom Survey (PCSS)  []? 8. Decreased Brain Injury Vision Symptom Survey (BIVSS)  [x]? 9. Independent with Home Exercise Program (HEP)  []? 10. Demonstrate knowledge of fall prevention  11. Will report no symptoms with sit<>supine, supine<>sit, rolling to R, and sit<> order to improve QOL.     Patient Goals: \"resolve dizziness. \"     Pt. Education:  [x] Yes  [] No  [] Reviewed Prior HEP/Ed  Method of Education: [x] Verbal  [] Demo  [] Written  Comprehension of Education:  [x] Verbalizes understanding. [] Demonstrates understanding. [] Needs review. [] Demonstrates/verbalizes HEP/Ed previously given. 7/10/2020- See grid above for details. 7/15/2020- See grid above for details. 7/22/2020- See grid above for details. Plan: [x] Continue current frequency toward long and short term goals. [x] Specific Instructions for subsequent treatments: Follow-up with pt regarding modifications to post-CRT positioning precautions, as well as spinning episodes. Re-assess for R BPPV, and consider YUNIOR maneuver, once again, if needed next visit. Plan for D/C next visit.       Time In: 12PM         Time Out: 12:35PM    Electronically signed by:  Elysia Maddox, PT

## 2020-07-23 ENCOUNTER — APPOINTMENT (OUTPATIENT)
Dept: PHYSICAL THERAPY | Facility: CLINIC | Age: 70
End: 2020-07-23
Payer: MEDICARE

## 2020-07-27 ENCOUNTER — HOSPITAL ENCOUNTER (OUTPATIENT)
Dept: PHYSICAL THERAPY | Facility: CLINIC | Age: 70
Setting detail: THERAPIES SERIES
Discharge: HOME | End: 2020-07-27
Payer: MEDICARE

## 2020-07-27 PROCEDURE — 97016 VASOPNEUMATIC DEVICE THERAPY: CPT

## 2020-07-27 PROCEDURE — 97110 THERAPEUTIC EXERCISES: CPT

## 2020-07-27 NOTE — FLOWSHEET NOTE
[] [facility]) - [facility] &  Therapy  [address]  [phone]  F: [phone] [x] [address]  [place] [address]   [address]  P: [phone]  F: [phone] [] [address] &  Therapy  [address]  P: [phone]  F: [phone] [] [address]  [facility]   [address]   [place]: [phone]  F: [phone]      Physical Therapy Daily Treatment Note    Date:  2020  Patient Name:  Surya Goldman    :  1950  MRN: [id number]  [facility] Lexy Solis[facility]  Diagnosis: S/P Arthroscopy of left shoulder  Onset Date: 20   Next Dr. Martín Ferrell: 2020  Visit# / total visits: 10/12; Progress note for Medicare patient due at visit 10    Cancels/No Shows: 1/0    Subjective:    Pain:  [x] Yes  [] No Location: left shoulder; neck  Pain Rating: (0-10 scale) 1/10 left shoulder; 4/10 (neck)  Pain altered Tx:  [x] No  [] Yes  Action:  Comments: Pt reports minimal time to exercise due to it being chaotic at home. Pt reports she feels find doing her exercises at home. Pt reports her neck is still sore. Pt notes after we work on the neck, she notes more dizziness and off balance. Objective: extra pillows due to dizziness; significant dizziness with supine to sit-TRY TO ADDRESS FROM LEFT SIDE  Modalities: GAMEREADY, mod compression, left shoulder 10'  Precautions:  S/p Left shoulder arthroscopy, capsular release. Ok for active and passive ROM, strengthening. No restrictions;  Adhesive tape, NO BP ON R ARM; new onset of dizziness with positional changes-- avoid quick head/neck movements, laying on R SIDE, AND laying completely flat  Exercises: Bolded completed 20   Exercise Reps/ Time Weight/ Level Comments   UBE 3/3 lv2          Sitting      Upper trap stretch x Levator scap stretch x     Cervical retraction 10x5\"  Added 6/25         STANDING      Cane extension 15x 1#    IR stretch 10x towel Cues at scap   Wall push ups 20x     Serratus Wall slides 15x           Supine      Neck-manual 10'  Suboccipital release and gentle cervical traction    PROM 6'  Restrictions at flexion end range;pain when moving from IR to ER at 90; anterior translated humeral head with IR; tenderness over the anterior humeral head   CANE EXERCISES  3# Increased 7/16   Chest press+protraction 15x2  Added 6/17   Flexion 20x  Added 6/17   Abduction 15x  Added 6/17   ER 15x  Added 6/17   Horizontal abd 20x  Added 6/17   Rhythmic stabilization 20\"x3 3# wand Added 6/25         Standing      3 way shoulder 15x ea 1# Added weight 7/6  LUE   Lat pulldown 2X10 blue    Scapular depressions 20x blue    Rows  2x10 blue    ER 2x10 blue Added 7/6  Towel roll under arm   IR 2x15 blue Added 7/6   scaption 2x10 yellow Added 7/6  Back to wall  Increased reps 7/16   Horizontal abduction 2x10 yellow Added 7/6  Cues for proper completion  Increased reps 7/16   PNF pattern D2 FLEXION 10X Yellow  Added 7/20   Other:     Specific Instructions for next treatment: Manual to neck improve left shoulder A/PROM and strength    Re-assessment 7/27  Left shoulder PROM  Flexion:165°; 5/5  Abduction:175° continued end range lack of flexion; 5-/5  IR: 55°; 5/5  ER:  62°, 80°, 85°; 4+/5  Supra: 5/5  IR at 90: 5/5  ER at 90: 4+/5  Lats: 5/5  Middle trap 4/5  Rhomboids: 4/5  Lower trap: 5/5    Left shoulder AROM (standing)-7/27  Flexion: 150°  Abduction: 155°  IR: R side of T9  ER: T4     UEFS: 62/80, 77.5% max function        Treatment Charges: Mins Units   []  Modalities     [x]  Ther Exercise 45 3   []  Manual Therapy 6 0   []  Ther Activities     []  Aquatics     [x]  Vasocompression 10 1   []  Other     Total Treatment time 61 4   Medicare total as of 07/27/20: $1218.45    Assessment: [x] Progressing toward goals.   Niko Manriquez Bin is 6 weeks post-op left shoulder arthroscopy. Pt continues to have pain at end ranges of PROM with all movement and continues to note some pain with AROM of the left shoulder. Pt with improvements in left shoulder strength and scapular strength with continued weakness with abduction and ER. Due to continued aggravation of dizziness, cervical goals were not reassessed and will be discharged at this time secondary to increased dizziness following manual therapy. Pt will continue with vestibular therapy to assist with positional vertigo. Pt has been advised to continue working on ROM and strength exercises in order to improve pain and functional use of the LUE as she continues to demonstrate improvements. Pt verbalizes understanding. LEFT SHOULDER   Short term goals: MEET IN 6 VISITS- reassessed 7/6/2020 Status   Pain: Pt will report less than or equal to 1-2/10 left shoulder pain with therapeutic ROM and strengthening exercises in order to improve tolerance to completion of ADLs. MET   ROM: Pt will demonstrate Left shoulder AROM to greater than or equal to 150 degrees elevation with minimal compensations and IR to T7 with 1-2/10 left shoulder pain  in order to improve ability to complete ADLs overhead and behind the back. MET   Strength: Pt will demonstrate 3/5 left shoulder strength with elevation to assist with raising and lowering the LUE to assist with ADLs. MET   Outcome Score: Pt will score greater than or equal to 50% on the UEFS in order to demonstrate improved function MET   Home Exercise Program: Pt will demonstrate independence with HEP. MET   Longer term goals: MEET IN 15 VISITS- re-assessed 7/27     Pain: Pt will report less than or equal to 0-1/10 left shoulder pain with raising/lowering the LUE, reaching behind the back or behind the head, and lifting objects in order to assist with ADL completion.   Intermittently met   ROM: Pt will be able to demonstrate left shoulder AROM to greater than or equal to 160 degrees elevation, IR to T5, and ER to T4 without compensations in order to assist with raising/lowering the LUE to complete ADLs and lift objects. Partially met    Strength: Pt will demonstrate greater than or equal to 4+/5 LUE strength and 4/5 scapular strength in order to assist with shoulder strength and stability when lifting objects to complete ADLs. MET   Outcome Score: Pt will score greater than or equal to 65% on the UEFS in order to demonstrate improved function    Updated : Pt will score greater than or equal to 75% on the UEFS in order to demonstrate improved function MET      MET           Problems: Neck pain- NOT RE-ASSESSED SECONDARY TO VESTIBULAR ISSUES, MINIMAL HEAD/NECK ROM COMPLETED SECONDARY TO DIZZINESS EXACERBATION   [x]? ? Cervical Pain: 3/10            [x]? ? ROM: dec AROM                          [x]? ? Strength: postural muscle strength deficits (deep neck flexors and parascapular muscles)  [x]? ? Function: NDI: 20% impairment  [x]? Postural Deviations: FHP and rounded shoulders    Longer term goals: MEET IN 6 VISITS     Pain: Pt will report less than or equal to 0-1/10 neck pain with ADL completion in order to improve tolerance to sitting and reading. Ongoing   ROM: Pt will be able to complete cervical AROM with 0-1/10 pain to improve cervical mobility to assist with scanning the environment and turning her head when driving. Sidebendin degrees  Rotation: 75 degrees Ongoing   Strength: Pt be able to sit with upright posturing for greater than or equal to 5 minutes with minimal cueing from the therapist in order to promote optimal mechanics with ADL completion. Ongoing   Outcome Score: Pt will score less than or equal to 10% impairment on the NDI. Ongoing                Patient goals: improve use of LUE      Pt.  Education:  [x] Yes  [] No  [x] Reviewed Prior HEP/Ed  Method of Education: [x] Verbal  [] Demo  [] Written  : cane exercises, sidelying exercises, standing exercises  6/29: cervical retraction, upright sitting posture  7/20: lat pulldowns, depressions, rows, ER, IR, PNF D2 FLEXION, scaption  Comprehension of Education:  [x] Verbalizes understanding. [x] Demonstrates understanding. [x] Needs review. [x] Demonstrates/verbalizes HEP/Ed previously given. Plan: [] Continue current frequency toward long and short term goals.  - discharge 07/27/20    [] Specific Instructions for subsequent treatments:      Time In: 1001 a           Time Out: 1107 am    Electronically signed by:  Osmar Ghotra PT

## 2020-07-27 NOTE — DISCHARGE SUMMARY
[] [address]  [address]  [phone]  F: [phone] [x] [address] [facility] [address]   [address]  P: [phone]  F: [phone] [] [address]  [address] [facility]  P: [phone]  F: [phone] [] [address]  [facility]   [address]   [place]: [phone]  F: [phone]      Physical Therapy Discharge Note    Date: 2020      Patient: Zakia Gomez  : 1950  MRN: [id number]    [facility] Sudha Rhodes[facility]  Medical Diagnosis: S16. 1XXA (ICD-10-CM) - Strain of neck muscle, initial encounter  Rehab Codes: M54.2  Onset Date: 2020   Total visits attended:   Cancels/No shows: 0/0  Date of initial visit: 2020                Date of final visit: 20       Subjective:    Pain:  [x]? Yes  []? No   Location: left shoulder; neck   Pain Rating: (0-10 scale) 1/10 left shoulder; 4/10 (neck)  Pain altered Tx:  [x]? No  []? Yes  Action:  Comments: Pt reports minimal time to exercise due to it being chaotic at home. Pt reports she feels find doing her exercises at home. Pt reports her neck is still sore. Pt notes after we work on the neck, she notes more dizziness and off balance. Assessment:  Problems: Neck pain- NOT RE-ASSESSED SECONDARY TO VESTIBULAR ISSUES, MINIMAL HEAD/NECK ROM COMPLETED SECONDARY TO DIZZINESS EXACERBATION   [x]?? ? Cervical Pain: 3/10            [x]? ? ? ROM: dec AROM                          [x]? ? ? Strength: postural muscle strength deficits (deep neck flexors and parascapular muscles)  [x]? ? ? Function: NDI: 20% impairment  [x]? ? Postural Deviations: FHP and rounded shoulders     Longer term goals: MEET IN 6 VISITS     Pain: Pt will report less than or equal to 0-1/10 neck pain with ADL completion in order to improve tolerance to sitting and reading. Ongoing   ROM: Pt will be able to complete cervical AROM with 0-1/10 pain to improve cervical mobility to assist with scanning the environment and turning her head when driving. Sidebendin degrees  Rotation: 75 degrees Ongoing   Strength: Pt be able to sit with upright posturing for greater than or equal to 5 minutes with minimal cueing from the therapist in order to promote optimal mechanics with ADL completion.  Ongoing   Outcome Score: Pt will score less than or equal to 10% impairment on the NDI. Ongoing                Patient goals: improve use of LUE      Treatment to Date:  [x] Therapeutic Exercise    [] Modalities:  [] Therapeutic Activity    [] Ultrasound  [] Electrical Stimulation  [] Gait Training     [] Massage       [] Lumbar/Cervical Traction  [] Neuromuscular Re-education [] Cold/hotpack [] Iontophoresis: 4 mg/mL  [x] Instruction in Home Exercise Program                     Dexamethasone Sodium  [x] Manual Therapy             Phosphate 40-80 mAmin  [] Aquatic Therapy                   [] Vasocompression/    [] Other:             Game Ready    Discharge Status:     [] Pt recovered from conditions. Treatment goals were met. [] Pt received maximum benefit. No further therapy indicated at this time. [] Pt to continue exercise/home instructions independently. [] Therapy interrupted due to:    [] Pt has 2 or more no shows/cancels, is discontinued per our policy. [] Pt has completed prescribed number of treatment sessions. [x] Other: Due to continued aggravation of dizziness, cervical goals were not reassessed and will be discharged at this time secondary to increased dizziness following manual therapy. Pt will continue with vestibular therapy to assist with positional vertigo.           Electronically signed by June Kennedy PT on 2020 at 1:11 PM      If you have any questions or concerns, please don't hesitate to call.   Thank you for your referral.

## 2020-07-29 ENCOUNTER — HOSPITAL ENCOUNTER (OUTPATIENT)
Dept: PHYSICAL THERAPY | Facility: CLINIC | Age: 70
Setting detail: THERAPIES SERIES
Discharge: HOME | End: 2020-07-29
Payer: MEDICARE

## 2020-07-29 PROCEDURE — 97110 THERAPEUTIC EXERCISES: CPT

## 2020-07-29 NOTE — FLOWSHEET NOTE
understanding to all; issued informational handouts; HEP    Videos of R posterior canal canalithiasis and R horizontal canal nystagmus    Treat R posterior canal first, then R horizontal canal second- may require additional treatments for greater relief    Emphasized once again for home, up until appointment next Wednesday- verbalized understanding to all. Education     Roll test, [place] and Lean test- rationale, expectations, results    Repeat R nicol halpike- rationale, expectations, results    R horizontal canal involvement now resolved, but R posterior canal involvement remains     Emphasized post-CRT positioning precautions up until next Wednesday    Will re-assess next visit and perform the YUNIOR maneuver if needed- verbalized understanding to all. Education     Repeat R nicol halpike- rationale, expectations, results    YUNIOR maneuver- rationale, expectations, results    Emphasized post-CRT positioning precautions- sleep upright or in recliner for only x1 week    Will re-assess and D/C next visit- verbalized understanding to all    Goal update     COMPLETED- 7/29/2020   [address] [place] [address]   COMPLETED- 7/29/2020   PT POC education    COMPLETED- 7/29/2020   Education   Resume normal movement patterns and sleeping positions; d/c post-CRT positioning precautions; should continue to resolve with inc time, as well as return to place of residence; do not utilize muscle relaxor to determine effect upon symptoms- no direct correlation- verbalized understanding to all                   Other: BOLD is completed. See below for goal update. [address] [place] [address] is 32/100    Specific Instructions for next treatment: PT services placed on hold for x1 month- will discharge if does not return in that time.        Treatment Charges: Mins Units   []  Modalities     [x]  Ther Exercise 20 1   []  Manual Therapy     []  Ther Activities     []  Aquatics     []  Vasocompression     []  Other- CRT     Total Treatment time 20 1     Medicare total as of 07/22/20: $1132.93+ 29.72= $1162.65    Assessment: [x] Progressing toward goals. Pt presents with dec dizziness and imbalance compared with last visit, and also relief post last visit. Also reports dec dizziness episodes overall with PT services thus far. However, continued symptoms with rolling R. Therefore, performed goal update and re-assessed [facility] [address] this date for hold on PT services in order to allow crystals to settle and provide greater symptom relief. Pt meets or demos progress toward majority of therapy goals. Pt also demos dec violence, duration, and symptom intensity of R torsional upbeat nystagmus as compared with positional testing at initial evaluation. Pt may be affecting results of positional treatments secondary to caring for sister who just underwent a TKA and other movement patterns while at home, and therefore, should notice continued relief as returns to her original place of residence, as well as with time away from positional treatments. Plan to place PT services on hold for x1 month, in case symptoms worsen- will discharge if does not return in that time. [] No change. [x] Other:  [x] Patient would continue to benefit from skilled physical therapy services in order to: decrease dizziness with sit<>supine, supine<>sit, rolling to R, and sit<>stand. Problems:                                                                                     [x]? 1.  Vertigo  []? 2.  Difficulty walking a straight course                                    [x]? 3.  Positive Robeline Hallpike                                               []?  4.  Static balance deficit: mCTSIB  []? 5.  Dynamic balance deficit: Rivas Balance Scale (BBS), Dynamic Gait Index (DGI), Functional Gait Assessment (FGA)  [x]? 6. Increased Dizziness Handicap Inventory ([facility] [address])- 34/100  []? 7. Increased Post Concussion Symptom Survey (PCSS)  []? 8.   Increased Brain Injury Vision Symptom Survey (BIVSS)        Short Term Goals: (     6 Treatments)  [x]? 1. Decreased Vertigo- MET  []? 2. Improved gait mechanics, trajectory  [x]? 3. Negative Destiney Hallpike- PROGRESSING- SEE ABOVE   []? 4. Improved static balance  []? 5. Improved dynamic balance  [x]? 6. Decreased Dizziness Handicap Inventory ([facility] [address])- 0/100- PROGRESSING- 32/100  []? 7. Decreased Post Concussion Symptom Survey (PCSS)  []? 8. Decreased Brain Injury Vision Symptom Survey (BIVSS)  [x]? 9. Independent with Home Exercise Program (HEP)- MET  []? 10. Demonstrate knowledge of fall prevention  11. Will report no symptoms with sit<>supine, supine<>sit, rolling to R, and sit<> order to improve QOL. - PROGRESSING- reports trace symptoms with sit<>supine, supine<>sit, and inc symptoms with rolling to R       Patient Goals: \"resolve dizziness. \"- PROGRESSING    Pt. Education:  [x] Yes  [] No  [] Reviewed Prior HEP/Ed  Method of Education: [x] Verbal  [] Demo  [] Written  Comprehension of Education:  [x] Verbalizes understanding. [] Demonstrates understanding. [] Needs review. [] Demonstrates/verbalizes HEP/Ed previously given. 7/10/2020- See grid above for details. 7/15/2020- See grid above for details. 7/22/2020- See grid above for details. 7/29/2020- See grid above for details. Plan: [x] Continue current frequency toward long and short term goals. [x] Specific Instructions for subsequent treatments: PT services placed on hold for x1 month- will discharge if does not return in that time.        Time In: 12PM         Time Out: 12:20PM    Electronically signed by:  Cinthia Wilkinson, PT

## 2020-07-29 NOTE — PROGRESS NOTES
[] [facility]) [facility] &  Therapy  [address]  [phone]  F: [phone] [x] [address]  [place] [address]   [address]  P: [phone]  F: [phone] [] [place]  [address] [facility]  P: [phone]  F: [phone] [] [address]  [facility]   [address]   [place]: [phone]  F: [phone]      Physical Therapy Progress Note    Date: 2020      Patient: Zakia Gomez  : 1950  MRN: [id number]    Jl Delgado MD                  Insurance: [phone] visit count with shoulder/cervical]   Medical Diagnosis: H81.10- benign paroxysmal positional vertigo, unspecified laterality              Rehab Codes: H81.10  Onset date: 2020                       Next 's appt.: TBD- pending success with PT services; 2020- annual physical   Visit# / total visits: /; Progress note for Medicare patient due at visit 10                                      Cancels/No Shows: 0/0    Subjective:    Pain:  []? Yes  [x]? No   Location: dizziness     Pain Rating: (0-10 scale) not rated/10 \"okay\"  Pain altered Tx:  [x]? No  []? Yes  Action: N/A  Comments: Reports relief post last visit and dec dizziness episodes overall since last visit, which included the YUNIOR maneuver. However, symptoms have not resolved x100%. Reports good compliance with post-CRT positioning precautions- including sleeping upright in a recliner for a few nights. Agreeable to placing a hold on PT services this date. Assessment:  Pt presents with dec dizziness and imbalance compared with last visit, and also relief post last visit. Also reports dec dizziness episodes overall with PT services thus far. However, continued symptoms with rolling R.  Therefore, performed goal update and re-assessed [address] [place] [address] this date for hold on PT services in order to allow crystals to settle and provide greater symptom relief. Pt meets or demos progress toward majority of therapy goals. Pt also demos dec violence, duration, and symptom intensity of R torsional upbeat nystagmus as compared with positional testing at initial evaluation. Pt may be affecting results of positional treatments secondary to caring for sister who just underwent a TKA and other movement patterns while at home, and therefore, should notice continued relief as returns to her original place of residence, as well as with time away from positional treatments. Plan to place PT services on hold for x1 month, in case symptoms worsen- will discharge if does not return in that time. Problems:                                                                                     [x]? ?  1.  Vertigo  []??  2.  Difficulty walking a straight course                                    [x]? ?  3.  Positive Susana Kala                                               []? ?[place].Mace.  Static balance deficit: mCTSIB  []??  5.  Dynamic balance deficit: Rivas Balance Scale (BBS), Dynamic Gait Index (DGI), Functional Gait Assessment (FGA)  [x]? ?  6.  Increased Dizziness Handicap Inventory ([address])- 34/100  []??  7.  Increased Post Concussion Symptom Survey (PCSS)  []??  8.  Increased Brain Injury Vision Symptom Survey (BIVSS)        Short Term Goals: (     7        [id number])  [x]? ? 1. Decreased Vertigo- MET  []?? 2. Improved gait mechanics, trajectory  [x]? ? 3. Negative [place] Hallpike- PROGRESSING- SEE ABOVE   []?? 4. Improved static balance  []?? 5. Improved dynamic balance  [x]? ? 6. Decreased Dizziness Handicap Inventory (DHI)- 0/100- PROGRESSING- 32/100  []?? 7. Decreased Post Concussion Symptom Survey (PCSS)  []?? 8. Decreased Brain Injury Vision Symptom Survey (BIVSS)  [x]? ? 9. Independent with Home Exercise Program (HEP)- MET  []?? 10. Demonstrate knowledge of fall prevention  11.  Will

## 2020-07-30 ENCOUNTER — APPOINTMENT (OUTPATIENT)
Dept: PHYSICAL THERAPY | Facility: CLINIC | Age: 70
End: 2020-07-30
Payer: MEDICARE

## 2020-08-05 ENCOUNTER — TELEPHONE (OUTPATIENT)
Dept: FAMILY MEDICINE CLINIC | Age: 70
End: 2020-08-05

## 2020-08-10 ENCOUNTER — NURSE TRIAGE (OUTPATIENT)
Dept: OTHER | Facility: CLINIC | Age: 70
End: 2020-08-10

## 2020-08-10 ENCOUNTER — OFFICE VISIT (OUTPATIENT)
Dept: FAMILY MEDICINE CLINIC | Age: 70
End: 2020-08-10
Payer: MEDICARE

## 2020-08-10 VITALS
SYSTOLIC BLOOD PRESSURE: 137 MMHG | DIASTOLIC BLOOD PRESSURE: 65 MMHG | TEMPERATURE: 97.6 F | WEIGHT: 165 LBS | BODY MASS INDEX: 25.09 KG/M2 | HEART RATE: 80 BPM | OXYGEN SATURATION: 97 %

## 2020-08-10 PROCEDURE — G8399 PT W/DXA RESULTS DOCUMENT: HCPCS | Performed by: FAMILY MEDICINE

## 2020-08-10 PROCEDURE — G8417 CALC BMI ABV UP PARAM F/U: HCPCS | Performed by: FAMILY MEDICINE

## 2020-08-10 PROCEDURE — 99213 OFFICE O/P EST LOW 20 MIN: CPT | Performed by: FAMILY MEDICINE

## 2020-08-10 PROCEDURE — 3051F HG A1C>EQUAL 7.0%<8.0%: CPT | Performed by: FAMILY MEDICINE

## 2020-08-10 PROCEDURE — 1123F ACP DISCUSS/DSCN MKR DOCD: CPT | Performed by: FAMILY MEDICINE

## 2020-08-10 PROCEDURE — 1090F PRES/ABSN URINE INCON ASSESS: CPT | Performed by: FAMILY MEDICINE

## 2020-08-10 PROCEDURE — G8427 DOCREV CUR MEDS BY ELIG CLIN: HCPCS | Performed by: FAMILY MEDICINE

## 2020-08-10 PROCEDURE — 4040F PNEUMOC VAC/ADMIN/RCVD: CPT | Performed by: FAMILY MEDICINE

## 2020-08-10 PROCEDURE — 3017F COLORECTAL CA SCREEN DOC REV: CPT | Performed by: FAMILY MEDICINE

## 2020-08-10 PROCEDURE — 1036F TOBACCO NON-USER: CPT | Performed by: FAMILY MEDICINE

## 2020-08-10 PROCEDURE — 2022F DILAT RTA XM EVC RTNOPTHY: CPT | Performed by: FAMILY MEDICINE

## 2020-08-10 RX ORDER — PREDNISONE 20 MG/1
20 TABLET ORAL DAILY
Qty: 5 TABLET | Refills: 0 | Status: SHIPPED | OUTPATIENT
Start: 2020-08-10 | End: 2020-08-15

## 2020-08-10 RX ORDER — GLIMEPIRIDE 4 MG/1
TABLET ORAL
Qty: 90 TABLET | Refills: 0 | Status: SHIPPED | OUTPATIENT
Start: 2020-08-10 | End: 2020-11-02

## 2020-08-10 NOTE — PROGRESS NOTES
General FM note    Lorenzo Marks is a 68 y.o. female who presents today for follow up on her  medical conditions as noted below.   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 ([address]     Chemotherapy-induced neuropathy ([address]     Paronychia of fifth toe, left     Chronic left shoulder pain     Uncontrolled type 2 diabetes mellitus without complication, without long-term current use of insulin (HCC)     Post-op pain     Past Medical History:   Diagnosis Date    Allergic rhinitis     Arthritis     osteoarthritis    Asthma     exercise induced    Breast cancer ([address] 12/1994    Dr Huma Estrella Lumpectomy and lymph nodes excised    Breast cancer ([facility] 2015    bilatereal breast cancer    Chemotherapy-induced neuropathy ([address]     Colon polyps     Frequent UTI     GERD (gastroesophageal reflux disease)     Heavy menstrual bleeding     History of bone density study 11/12    normal    Pap test, as part of routine gynecological examination 8/12    peds speculum    PONV (postoperative nausea and vomiting)     Type II or unspecified type diabetes mellitus without mention of complication, not stated as uncontrolled 2007    Urinary tract infection     Wears glasses       Past Surgical History:   Procedure Laterality Date    BREAST LUMPECTOMY  1/1994    rt breast, cancer    BREAST RECONSTRUCTION Bilateral 06/30/15    BREAST RECONSTRUCTION Bilateral 4/21/16    expander removal, implants placed, fat injected, lipo    BREAST SURGERY Bilateral 04/21/2016    implants    COLONOSCOPY      2019    DILATION AND CURETTAGE OF UTERUS      ENDOSCOPY, COLON, DIAGNOSTIC      HYSTERECTOMY, TOTAL ABDOMINAL      with BSO    MASTECTOMY Bilateral     OTHER SURGICAL HISTORY 09/27/2016    chest port removal    SHOULDER ARTHROSCOPY Left 6/12/2020    LEFT SHOULDER ARTHROSCOPY WITH SAD, DEBRIDEMENT, CAPSULAR RELEASE, MANIPULATION UNDER ANESTHESIA performed by Vincent Beard DO at [facility]      polyp    TONSILLECTOMY      TUNNELED VENOUS PORT PLACEMENT Left 7/30/2015     Family History   Problem Relation Age of Onset    Breast Cancer Mother     Other Mother         heart disease, pacemaker    Other Father         heart disease, thyroid disease    Breast Cancer Maternal Aunt     Breast Cancer Paternal Grandmother     Breast Cancer Maternal Aunt     Asthma Maternal Grandmother      Current Outpatient Medications   Medication Sig Dispense Refill    predniSONE (DELTASONE) 20 MG tablet Take 1 tablet by mouth daily for 5 days 5 tablet 0    glimepiride (AMARYL) 4 MG tablet daily 90 tablet 0    tiZANidine (ZANAFLEX) 2 MG tablet Take 1 tablet by mouth nightly as needed (muscle strain) 10 tablet 0    dapagliflozin (FARXIGA) 10 MG tablet TAKE 1 TABLET BY MOUTH ONE TIME A DAY 90 tablet 0    aspirin EC 81 MG EC tablet Take 1 tablet by mouth daily 30 tablet 0    sucralfate (CARAFATE) 1 GM/10ML suspension Take 10 mLs by mouth 4 times daily 1200 mL 3    TRUE METRIX BLOOD GLUCOSE TEST strip TEST BLOOD SUGAR ONCE DAILY 200 strip 0    metFORMIN (GLUCOPHAGE-XR) 500 MG extended release tablet TAKE 1 TABLET BY MOUTH 4 TIMES A  tablet 3    fluticasone (FLONASE) 50 MCG/ACT nasal spray 1 spray by Nasal route 2 times daily       montelukast (SINGULAIR) 10 MG tablet Take 10 mg by mouth nightly.  cetirizine (ZYRTEC) 10 MG tablet Take 10 mg by mouth daily.  lansoprazole (PREVACID) 30 MG capsule Take 15 mg by mouth daily as needed (15 MG daily)       budesonide-formoterol (SYMBICORT) 160-4.5 MCG/ACT AERO Inhale 2 puffs into the lungs 2 times daily.         albuterol (PROVENTIL HFA;VENTOLIN HFA) 108 (90 BASE) MCG/ACT inhaler Inhale 2 puffs into the lungs every 4 hours as needed       clobetasol (TEMOVATE) 0.05 % cream Apply topically 2 times daily as needed Apply topically 2 times daily.  letrozole (FEMARA) 2.5 MG tablet Take 1 tablet by mouth daily 90 tablet 3     No current facility-administered medications for this visit. ALLERGIES:    Allergies   Allergen Reactions    Amoxicillin     Plantain Other (See Comments)     Pt denies    Statins     Tape Arthurine Fuchs Tape]     Sulfa Antibiotics Itching and Rash       Social History     Tobacco Use    Smoking status: Former Smoker     Packs/day: 0.25     Years: 0.50     Pack years: 0.12     Last attempt to quit: 8/10/1970     Years since quittin.0    Smokeless tobacco: Never Used    Tobacco comment: in 20's once a week   Substance Use Topics    Alcohol use: Yes     Comment: rare social drink      Body mass index is 25.09 kg/m². /65   Pulse 80   Temp 97.6 °F (36.4 °C)   Wt 165 lb (74.8 kg)   SpO2 97%   BMI 25.09 kg/m²     Subjective:      HPI    67 yo female coming in today for ongoing dizziness which she has been having now for weeks and weeks. Dizziness - after getting up very dizzy and then she even had a fall after getting up the sec time. This was 4 weeks ago. Patient tells me she followed up with physical therapy and they told her that she has it BPPV at her right side. But then again the patient says that the physical therapy did not help her at all. She still feels very dizzy especially when getting up from a bed. Now she states that she has some right ear discomfort and also some discomfort at her jaw since last night but overall she has been feeling somewhat off. She has been doing the exercises which she was given years ago for BPPV. Review of Systems   Constitutional: Negative for fever and unexpected weight change. HENT: Negative for ear pain, congestion, sore throat and rhinorrhea. Eyes: Negative for itching and visual disturbance.    Respiratory: Negative for cough and shortness of breath. Cardiovascular: Negative for chest pain and leg swelling. Gastrointestinal: Negative for diarrhea, constipation and blood in stool. Endocrine: Negative for polydipsia and polyuria. Genitourinary: Negative for dysuria and hematuria. Musculoskeletal: Negative for back pain and gait problem. Skin: Negative for color change and rash. Neurological: Negative for dizziness and headaches. Psychiatric/Behavioral: Negative for confusion and agitation. Objective:   Physical Exam  Constitutional: VS (see above). General appearance: normal development, habitus and attention, no deformities. No distress. Eyes: normal conjunctiva and lids. Right ear: Cerumen present the right ear was flushed. no infection tympanic membrane looked clear. CAV: RRR, no RMG. No edema lower extremities. Pulmo: CTA bilateral, no CWR. Skin: no rashes, lesions or ulcers. Musculoskeletal: normal gait. Nails: no clubbing or cyanosis. Psychiatric: alert and oriented to place, time and person. Normal mood and affect. Assessment:       Diagnosis Orders   1. Labyrinthitis of right ear  predniSONE (DELTASONE) 20 MG tablet   2. Type 2 diabetes mellitus without complication, without long-term current use of insulin (HCC)  glimepiride (AMARYL) 4 MG tablet       Plan:   I still believe the patient has a vertigo. Exercises provided again. Patient will start the steroid for vestibulitis. Medication refill. Patient will be back in a couple of weeks for follow-up. Call or return to clinic prn if these symptoms worsen or fail to improve as anticipated. I have reviewed the instructions with the patient, answering all questions to her satisfaction. Return in about 3 weeks (around 8/31/2020), or if symptoms worsen or fail to improve, for medicare visit. No orders of the defined types were placed in this encounter.     Orders Placed This Encounter   Medications    predniSONE (DELTASONE) 20 MG tablet     Sig: Take 1 tablet by mouth daily for 5 days     Dispense:  5 tablet     Refill:  0    glimepiride (AMARYL) 4 MG tablet     Sig: daily     Dispense:  90 tablet     Refill:  0       Cora received counseling on the following healthy behaviors: nutrition, exercise and medication adherence  Reviewed prior labs and health maintenance. Continue current medications, diet and exercise. Discussed use, benefit, and side effects of prescribed medications. Barriers to medication compliance addressed. Patient given educational materials - see patient instructions. All patient questions answered. Patient voiced understanding.       Electronically signed by Jay Richardson MD on 8/11/2020 at 6:29 AM       (Please note that portions of this note were completed with a voice recognition program. Efforts were made to edit the dictations but occasionally words are mis-transcribed.)

## 2020-08-10 NOTE — PATIENT INSTRUCTIONS
Patient Education        Temporomandibular Disorder: Care Instructions  Your Care Instructions     Temporomandibular (TM) disorders are a problem with the muscles and joints that connect your jaw to your skull. They cause pain when you open your mouth, chew, or yawn. You may feel this pain on one or both sides. TM disorders are often caused by tight jaw muscles. The tightness can be caused by clenching or grinding your teeth. This may happen when you have a lot of stress in your life. If you lower your stress, you may be able to stop clenching or grinding your teeth. This will help relax your jaw and reduce your pain. You may also be able to do some things at home to feel better. But if none of this works, your doctor may prescribe medicine to help relax your muscles and control the pain. Follow-up care is a key part of your treatment and safety. Be sure to make and go to all appointments, and call your doctor if you are having problems. It's also a good idea to know your test results and keep a list of the medicines you take. How can you care for yourself at home? · Put a warm, moist cloth or heating pad set on low on your jaw. Do this for 10 to 20 minutes at a time. Put a thin cloth between the heating pad and your skin. · Avoid hard or chewy foods that cause your jaws to work very hard. Examples include popcorn, jerky, tough meats, chewy breads, gum, and raw apples and carrots. · Choose softer foods that are easy to chew. These include eggs, yogurt, and soup. · Cut your food into small pieces. Chew slowly. · If your jaw gets too painful to chew, or if it locks, you may need to puree your food for a few days or weeks. · To relax your jaw, repeat this exercise for a few minutes every morning and evening. Watch yourself in a mirror. Gently open and close your mouth. Move your jaw straight up and down. But don't do this if it makes your pain worse.   · Get at least 30 minutes of exercise on most days of the week to relieve stress. Walking is a good choice. You also may want to do other activities, such as running, swimming, cycling, or playing tennis or team sports. · Do not:  ? Hold a phone between your shoulder and your jaw. ? Open your mouth all the way, like when you sing loudly or yawn. ? Clench or grind your teeth, bite your lips, or chew your fingernails. ? Clench things such as pens, pipes, or cigars between your teeth. When should you call for help? Call your doctor now or seek immediate medical care if:  · Your jaw is locked open or shut or it is hard to move your jaw. Watch closely for changes in your health, and be sure to contact your doctor if:  · Your jaw pain gets worse. · Your face is swollen. · You do not get better as expected. Where can you learn more? Go to https://Touchstone Semiconductorperemigioeweb.Catarizm. org and sign in to your [employer] account. Enter Z659 in the RingMD box to learn more about \"Temporomandibular Disorder: Care Instructions. \"     If you do not have an account, please click on the \"Sign Up Now\" link. Current as of: March 25, 2020               Content Version: 12.5  © 2006-2020 Healthwise, Incorporated. Care instructions adapted under license by [facility] ([facility]). If you have questions about a medical condition or this instruction, always ask your healthcare professional. [place] [address] any warranty or liability for your use of this information. Patient Education        [place]orne Exercises for Vertigo: Care Instructions  Your Care Instructions  Simple exercises can help you regain your balance when you have vertigo. If you have Ménière's disease, benign paroxysmal positional vertigo (BPPV), or another inner ear problem, you may have vertigo off and on. Do these exercises first thing in the morning and before you go to bed. You might get dizzy when you first start them. If this happens, try to do them for at least 5 minutes.  Do a group of exercises at a time, starting at the top of the list. It may take several weeks before you can do all the exercises without feeling dizzy. Follow-up care is a key part of your treatment and safety. Be sure to make and go to all appointments, and call your doctor if you are having problems. It's also a good idea to know your test results and keep a list of the medicines you take. How can you care for yourself at home? Exercise 1  While sitting on the side of the bed and holding your head still:  · Look up as far as you can. · Look down as far as you can. · Look from side to side as far as you can. · Stretch your arm straight out in front of you. Focus on your index finger. Continue to focus on your finger while you bring it to your nose. Exercise 2  While sitting on the side of the bed:  · Bring your head as far back as you can. · Bring your head forward to touch your chin to your chest.  · Turn your head from side to side. · Do these exercises first with your eyes open. Then try with your eyes closed. Exercise 3  While sitting on the side of the bed:  · Shrug your shoulders straight upward, then relax them. · Bend over and try to touch the ground with your fingers. Then go back to a sitting position. · Toss a small ball from one hand to the other. Throw the ball higher than your eyes so you have to look up. Exercise 4  While standing (with someone close by if you feel uncomfortable):  · Repeat Exercise 1.  · Repeat Exercise 2.  · Pass a ball between your legs and above your head. · Sit down and then stand up. Repeat. Turn around in a [place] a different way each time you stand. · With someone close by to help you, try the above exercises with your eyes closed. Exercise 5  In a room that is cleared of obstacles:  · Walk to a corner of the room, turn to your right, and walk back to the starting point. Now, repeat and turn left. · Walk up and down a slope. Now try stairs.   · While holding on to someone's arm, try these exercises with your eyes closed. When should you call for help? Watch closely for changes in your health, and be sure to contact your doctor if:  · You do not get better as expected. Where can you learn more? Go to https://chpenikhileb.Valyoo Technologies. org and sign in to your Insurance Noodle account. Enter F099 in the VitaSensis box to learn more about \"Cawthorne Exercises for Vertigo: Care Instructions. \"     If you do not have an account, please click on the \"Sign Up Now\" link. Current as of: July 29, 2019               Content Version: 12.5  © 8854-2718 Healthwise, Incorporated. Care instructions adapted under license by [facility] ([facility]). If you have questions about a medical condition or this instruction, always ask your healthcare professional. [address] any warranty or liability for your use of this information.

## 2020-08-11 ASSESSMENT — PATIENT HEALTH QUESTIONNAIRE - PHQ9
1. LITTLE INTEREST OR PLEASURE IN DOING THINGS: 0
SUM OF ALL RESPONSES TO PHQ QUESTIONS 1-9: 0
SUM OF ALL RESPONSES TO PHQ QUESTIONS 1-9: 0
2. FEELING DOWN, DEPRESSED OR HOPELESS: 0
SUM OF ALL RESPONSES TO PHQ9 QUESTIONS 1 & 2: 0

## 2020-08-24 ENCOUNTER — HOSPITAL ENCOUNTER (OUTPATIENT)
Facility: MEDICAL CENTER | Age: 70
End: 2020-08-24
Payer: MEDICARE

## 2020-08-25 ENCOUNTER — OFFICE VISIT (OUTPATIENT)
Dept: ORTHOPEDIC SURGERY | Age: 70
End: 2020-08-25

## 2020-08-25 VITALS
BODY MASS INDEX: 24.98 KG/M2 | SYSTOLIC BLOOD PRESSURE: 130 MMHG | TEMPERATURE: 97.6 F | HEIGHT: 68 IN | DIASTOLIC BLOOD PRESSURE: 72 MMHG | WEIGHT: 164.8 LBS | HEART RATE: 80 BPM

## 2020-08-25 PROCEDURE — 99024 POSTOP FOLLOW-UP VISIT: CPT | Performed by: PHYSICIAN ASSISTANT

## 2020-08-25 ASSESSMENT — ENCOUNTER SYMPTOMS
RESPIRATORY NEGATIVE: 1
SHORTNESS OF BREATH: 0
ABDOMINAL DISTENTION: 0
CONSTIPATION: 0
APNEA: 0
VOMITING: 0
DIARRHEA: 0
COLOR CHANGE: 0
CHEST TIGHTNESS: 0
NAUSEA: 0
COUGH: 0
ABDOMINAL PAIN: 0

## 2020-08-25 NOTE — PROGRESS NOTES
[facility]  [address] [place] [address]  Dept: [phone]  Dept Fax: [phone]        Post Operative Follow Up    Subjective:     Chief Complaint   Patient presents with    Post-Op Check     left shoulder arthroscopy DOS 6/12/20     Post Op Surgery:     The patient is here for a follow up after having a Left shoulder arthroscopy with capsular release and manipulation under anesthesia, left shoulder arthroscopic soft tissue bursa and coracoacromial ligament subacromial decompression, debridement of humeral head. The date of surgery was on 06/12/2020. Therefore we are 10 weeks and 4 days post op. Currently the patient's pain is controlled with nothing. Physical therapy was  completed. Patient presents today with no ambulatory devices. Patient states that she is doing well but she states that she has been feeling dizzy recently and she also has been having some tinnitus in the ears as well but is not that bad. She states that the vertigo tends to happen really quickly and she has to hold on to an object to prevent her falling. Review of Systems   Constitutional: Positive for activity change. Negative for appetite change, fatigue and fever. Respiratory: Negative. Negative for apnea, cough, chest tightness and shortness of breath. Cardiovascular: Negative. Negative for chest pain, palpitations and leg swelling. Gastrointestinal: Negative for abdominal distention, abdominal pain, constipation, diarrhea, nausea and vomiting. Genitourinary: Negative for difficulty urinating, dysuria and hematuria. Musculoskeletal: Positive for arthralgias. Negative for gait problem, joint swelling and myalgias. Skin: Negative for color change and rash. Neurological: Negative for dizziness, weakness, numbness and headaches. Psychiatric/Behavioral: Negative for sleep disturbance.      I have reviewed the CC, HPI, ROS, PMH, FHX, treatment alternatives including anti-inflammatory medications, physical therapy, injections, further imaging studies and as a last resort surgery. During today's visit, I explained to the patient that her shoulder looks good and there is nothing that I am going to change. However, I informed her that she should stretch and do her exercises for the shoulder to retain her ROM and strength within the shoulder. In addition, I informed her that the shoulder has arthritis and with that, if the shoulder ever starts to have arthritic pain that arises, she can take tylenol with an NSAID to help reduce the pain that she may have but if that does not work for the pain, I told her that she can call us and we can do a cortisone injection to help reduce her pain a great deal. The patient then stated that she understands the plan and at this time, the patient has opted to continue doing the3 stretches and exercises that she was shown at physical therapy. But if she is not able to do the exercises on her own, she can call us and we can give her a physical therapy prescription so she may go back. Patient should return to the office PRN. The patient will call the office immediately with any problems that may arise. No orders of the defined types were placed in this encounter. No orders of the defined types were placed in this encounter. Lobito Zamora Day V, am scribing for and in the presence of Manish White PA-C. 8/25/2020  8:32 AM      I, Manish White PA-C, have personally seen this patient, reviewed the chart including history, and imaging if done. I personally  performed the physical exam and obtained any needed additional history. I placed orders, performed or supervised procedures and developed the treatment plan.     Electronically signed by Jesu Laws PA-C, on 8/25/2020 at 8:35 AM      Electronically signed by Jesu Laws PA-C, on 8/25/2020 at 8:32 AM

## 2020-08-26 NOTE — DISCHARGE SUMMARY
[] [facility]) [facility]  [facility]  [address]  [phone]  F: [phone] [x] [address]  [address]   [address]  P: [phone]  F: [phone] [] [place]  [address]  P: [phone]  F: [phone] [] [address]  [facility]   [address]   [place]: [phone]  F: [phone]      Physical Therapy Discharge Note    Date: 2020      Patient: Nataliya Crawford  : 1950  MRN: [id number]    Tamika Zamarripa MD                  Insurance: [id number]  visit count with shoulder/cervical]   Medical Diagnosis: H81.10- benign paroxysmal positional vertigo, unspecified laterality              Rehab Codes: H81.10  Onset date: 2020                       Next Dr's appt.: TBD- pending success with PT services; 2020- annual physical   Visit# / total visits: /; Progress note for Medicare patient due at visit 10                                      Cancels/No Shows: 0/0  Date of initial visit: 2020               Date of final visit: 2020    Assessment:  Pt presents with dec dizziness and imbalance compared with last visit, and also relief post last visit. Also reports dec dizziness episodes overall with PT services thus far. However, continued symptoms with rolling R. Therefore, performed goal update and re-assessed [address] this date for hold on PT services in order to allow crystals to settle and provide greater symptom relief. Pt meets or demos progress toward majority of therapy goals. Pt also demos dec violence, duration, and symptom intensity of R torsional upbeat nystagmus as compared with positional testing at initial evaluation.  Pt may be affecting results of positional treatments secondary to caring for sister who just underwent a TKA and other movement patterns while at home, and therefore, should notice continued relief as returns to her original place of residence, as well as with time away from positional treatments. Plan to place PT services on hold for x1 month, in case symptoms worsen- will discharge if does not return in that time.     Discharged today from PT services- pt never returned. Problems:                                                                                     [x]? ??  1.  Vertigo  []? ??  2.  Difficulty walking a straight course                                    [x]? ??  3.  Positive [place] Hallpike                                               []???  4.  Static balance deficit: mCTSIB  []???  5.  Dynamic balance deficit: [place]ivas Balance Scale (BBS), Dynamic Gait Index (DGI), Functional Gait Assessment (FGA)  [x]???  6.  Increased Dizziness Handicap Inventory (DHI)- 34/100  []???  7.  Increased Post Concussion Symptom Survey (PCSS)  []? ??  8.  Increased Brain Injury Vision Symptom Survey (BIVSS)        Short Term Goals: (     6        Treatments)  [x]??? 1. Decreased Vertigo- MET  []??? 2. Improved gait mechanics, trajectory  [x]??? 3. Negative [place] Hallpike- PROGRESSING- SEE ABOVE   []??? 4. Improved static balance  []??? 5. Improved dynamic balance  [x]??? 6. Decreased Dizziness Handicap Inventory (DHI)- 0/100- PROGRESSING- 32/100  []??? 7. Decreased Post Concussion Symptom Survey (PCSS)  []??? 8. Decreased Brain Injury Vision Symptom Survey (BIVSS)  [x]??? 9. Independent with Home Exercise Program (HEP)- MET  []??? 10. Demonstrate knowledge of fall prevention  11. Will report no symptoms with sit<>supine, supine<>sit, rolling to R, and sit<> order to improve QOL. - PROGRESSING- reports trace symptoms with sit<>supine, supine<>sit, and inc symptoms with rolling to R       Patient Goals: \"resolve dizziness. \"- PROGRESSING    Treatment to Date:  [x] Therapeutic Exercise    [] Modalities:  [] Therapeutic

## 2020-08-28 ENCOUNTER — TELEPHONE (OUTPATIENT)
Dept: ONCOLOGY | Age: 70
End: 2020-08-28

## 2020-08-28 ENCOUNTER — OFFICE VISIT (OUTPATIENT)
Dept: ONCOLOGY | Age: 70
End: 2020-08-28
Payer: MEDICARE

## 2020-08-28 VITALS
HEART RATE: 76 BPM | TEMPERATURE: 97.3 F | DIASTOLIC BLOOD PRESSURE: 62 MMHG | BODY MASS INDEX: 25.38 KG/M2 | SYSTOLIC BLOOD PRESSURE: 125 MMHG | WEIGHT: 166.9 LBS | RESPIRATION RATE: 18 BRPM

## 2020-08-28 PROCEDURE — 3017F COLORECTAL CA SCREEN DOC REV: CPT | Performed by: INTERNAL MEDICINE

## 2020-08-28 PROCEDURE — 99214 OFFICE O/P EST MOD 30 MIN: CPT | Performed by: INTERNAL MEDICINE

## 2020-08-28 PROCEDURE — 1036F TOBACCO NON-USER: CPT | Performed by: INTERNAL MEDICINE

## 2020-08-28 PROCEDURE — 4040F PNEUMOC VAC/ADMIN/RCVD: CPT | Performed by: INTERNAL MEDICINE

## 2020-08-28 PROCEDURE — 99212 OFFICE O/P EST SF 10 MIN: CPT | Performed by: INTERNAL MEDICINE

## 2020-08-28 PROCEDURE — G8427 DOCREV CUR MEDS BY ELIG CLIN: HCPCS | Performed by: INTERNAL MEDICINE

## 2020-08-28 PROCEDURE — G8399 PT W/DXA RESULTS DOCUMENT: HCPCS | Performed by: INTERNAL MEDICINE

## 2020-08-28 PROCEDURE — G8417 CALC BMI ABV UP PARAM F/U: HCPCS | Performed by: INTERNAL MEDICINE

## 2020-08-28 PROCEDURE — 1090F PRES/ABSN URINE INCON ASSESS: CPT | Performed by: INTERNAL MEDICINE

## 2020-08-28 PROCEDURE — 1123F ACP DISCUSS/DSCN MKR DOCD: CPT | Performed by: INTERNAL MEDICINE

## 2020-08-28 RX ORDER — MECLIZINE HYDROCHLORIDE 25 MG/1
25 TABLET ORAL 3 TIMES DAILY PRN
Qty: 90 TABLET | Refills: 0 | Status: SHIPPED | OUTPATIENT
Start: 2020-08-28 | End: 2020-09-27

## 2020-08-28 NOTE — TELEPHONE ENCOUNTER
[facility] FOR MD VISIT  DR Rajiv Alvarez IN TO SEE PATIENT  ORDERS RECEIVED  SCRIPT SENT TO PATIENT'S PHARMACY  MRI BREAST & MRI THORACIC SOON  RV 6 MONTHS NO LABS  MD VISIT 2/26/21 @ 10:15 AM  MRI BREAST BILAT  9/14/20 @ 10:45 AM ARRIVE @ 10:15 AM [facility]  MRI THORACIC SPINE 9/8/20 @ 2:30 PM ARRIVE @ 2PM  AVS PRINTED AND GIVEN TO PATIENT W/ INSTRUCTIONS  PATIENT DISCHARGED AMBULATORY No

## 2020-08-28 NOTE — PROGRESS NOTES
proceed with adjuvant chemotherapy with DANNY FERRARA. She tolerated that fairly well. Plan 6 cycles of chemotherapy followed by maintenance Herceptin and hormone therapy. Completed 6 cycles of chemotherapy without problems  After that she finished one year of herceptin, completed in July/2016. Diagnosed with frozen shoulder, left, 12/2017, underwent PT, range of motion improved, pain persisted. INTERIM HISTORY: Patient presents today for follow up for breast cancer. She reports she has been struggling with vertigo for the past 3 months that was preceded by neck soreness and a session of physical therapy with muscle relaxer 06/30/2020. She did fall going to the bathroom once. It has eased since it started but persists and is concerned for infection as she has also had soreness in right ear, slightly swollen right gland and sore throat. She has sinus drainage. She has tried exercises for TMJ per PCP but they have not improved symptoms and she has not had any issues eating or opening/closing mouth. She has had some vestibular therapy which was slightly helpful, steroids course was ineffective. She has now started to have headaches. She can complete daily tasks but she is largely uncomfortable, worse at night. She did have inner ear infection in her 25s with vertigo with other brief episodes of vertigo and used meclizine. She had shoulder surgery early 83/2361 with no complications.      Past Medical History   has a past medical history of Allergic rhinitis, Arthritis, Asthma, Breast cancer ([address] Breast cancer ([address] Chemotherapy-induced neuropathy ([address] Colon polyps, Frequent UTI, GERD (gastroesophageal reflux disease), Heavy menstrual bleeding, History of bone density study, Pap test, as part of routine gynecological examination, PONV (postoperative nausea and vomiting), Type II or unspecified type diabetes mellitus without mention of complication, not stated as uncontrolled, Urinary tract infection, and Wears glasses. Surgical History   has a past surgical history that includes Dilation and curettage of uterus; sinus surgery; Tonsillectomy; Hysterectomy, total abdominal; Tunneled venous port placement (Left, 7/30/2015); Mastectomy (Bilateral); Breast lumpectomy (1/1994); Breast reconstruction (Bilateral, 06/30/15); Breast reconstruction (Bilateral, 4/21/16); Breast surgery (Bilateral, 04/21/2016); Endoscopy, colon, diagnostic; other surgical history (09/27/2016); Colonoscopy; and Shoulder arthroscopy (Left, 6/12/2020). Family History  Family History   Problem Relation Age of Onset    Breast Cancer Mother     Other Mother         heart disease, pacemaker    Other Father         heart disease, thyroid disease    Breast Cancer Maternal Aunt     Breast Cancer Paternal Grandmother     Breast Cancer Maternal Aunt     Asthma Maternal Grandmother        Home Medications  has a current medication list which includes the following prescription(s): glimepiride, dapagliflozin, sucralfate, true metrix blood glucose test, letrozole, metformin, fluticasone, montelukast, cetirizine, lansoprazole, budesonide-formoterol, albuterol sulfate hfa, clobetasol, and aspirin ec. Allergies:  Amoxicillin; Plantain; Statins; Tape [adhesive tape]; and Sulfa antibiotics        Review of Systems :    Constitutional: feels much better . No fever or chills. No night sweats, no weight loss   Eyes: No eye discharge, double vision, or eye pain   HEENT: negative for sore mouth or throat, no hoarseness and voice change.  +vertigo, ear pain, swollen gland and sore throat, sinus drainage  Respiratory: negative for cough , sputum, dyspnea, wheezing, hemoptysis, chest pain   Cardiovascular: negative for chest pain, dyspnea, palpitations, orthopnea, PND +cough  Gastrointestinal: negative for nausea, vomiting, no diarrhea, no abdominal pain, dysphagia, hematemesis and hematochezia   Genitourinary: negative for frequency, dysuria, nocturia, urinary incontinence, and hematuria   Integument: Eczema on right shoulder  Hematologic/Lymphatic: negative for easy bruising, bleeding, lymphadenopathy, petechiae and swelling/edema   Endocrine: negative for heat or cold intolerance, tremor, weight changes, change in bowel habits and hair loss   Musculoskeletal: negative for myalgias, arthralgias, pain, joint swelling,and bone pain. Left shoulder pain - resolved with surgery; back pain as noted above  Neurological: negative dizziness, seizures, weakness, numbness +headaches      Physical Examination :       /62   Pulse 76   Temp 97.3 °F (36.3 °C) (Temporal)   Resp 18   Wt 166 lb 14.4 oz (75.7 kg)   BMI 25.38 kg/m²     Performance Status:Estimated performance status is ECOG 0    General appearance - well appearing, no in pain or distress   Mental status - alert and cooperative   Eyes - pupils equal and reactive, extraocular eye movements intact   Ears - bilateral TM's and external ear canals normal   Mouth - mucous membranes moist, pharynx normal without lesions   Neck - supple, no significant adenopathy ,trach is central   Lymphatics - no palpable lymphadenopathy, no hepatosplenomegaly   Chest - clear to auscultation, no wheezes, rales or rhonchi, symmetric air entry   Heart - normal rate, regular rhythm, normal S1, S2, no murmurs, rubs, clicks or gallops   Abdomen - tender and swelling from surgery, nondistended, no masses or organomegaly   Neurological - alert, oriented, normal speech, no focal findings or movement disorder noted. Mild numbness on the bottom of foots bilaterally. Musculoskeletal - no joint tenderness, deformity or swelling  Extremities - peripheral pulses normal, trace pedal edema, no clubbing or cyanosis .    Skin - normal coloration and turgor, no rashes, no suspicious skin lesions noted  Breast: S/P bilateral mastectomy with reconstruction implants, no lumps, no adenopathy, no masses, reconstruction with irregular margin at outer aspect of right and inner aspect of left, decreased sensation in lower left breast unchanged      REVIEW OF LABORATORY DATA:  Lab Results   Component Value Date    WBC 2.7 (L) 05/22/2020    HGB 11.8 (L) 05/22/2020    HCT 37.3 05/22/2020    MCV 88.6 05/22/2020     (L) 05/22/2020       Chemistry        Component Value Date/Time     05/22/2020 0929    K 4.1 05/22/2020 0929     05/22/2020 0929    CO2 23 05/22/2020 0929    BUN 14 05/22/2020 0929    CREATININE 0.80 05/22/2020 0929        Component Value Date/Time    CALCIUM 9.8 08/07/2019 1045    ALKPHOS 97 08/07/2019 1045    AST 26 08/07/2019 1045    ALT 30 08/07/2019 1045    BILITOT 0.47 08/07/2019 1045        Lab Results   Component Value Date    LABA1C 7.5 06/22/2020     Lab Results   Component Value Date     05/22/2020         REVIEW OF RADIOLOGICAL RESULTS:      09/01/2017 DEXA :  T-SCORE:  LUMBAR:   -0.1  LEFT HIP:  0.0  FEMORAL NECK:  -0.8        ASSESSMENT  1. h/o right breast DCIS in 1994 s/p BCS/EBRT and 7 years of Tamoxifen until 2001  2. Ipsilateral recurrence, invasive ductal carcinoma that's triple positive, pathological staging is T2 N0 M0.  3. Contralateral DCIS, found incidentally on bilateral mastectomy  4. Received 6 cycles of adjuvant chemotherapy with TCH, well tolerated  5. Continue maintenance Herceptin, plan to finish 1 year of therapy   6. Completed breast reconstruction   7. DM2    8. Diastolic dysfunction, she is asymptomatic   9. Frozen shoulder, status post shoulder surgery  10. Abnormal thoracic spine MRI, Per the report, they are requesting a short-term follow-up. We will order a follow-up study. 11. Vertigo, likely benign positional vertigo      PLAN:   1. We had lengthy discussion regarding her vertigo and various treatment, I believe it is benign positional vertigo based on her symptoms. 2. I am writing for meclizine and recommend she discuss further with Dr. Pradip Maurer.    3. We discussed follow up MRI for the thoracic spine and I am putting in orders for that as well as her annual bilateral breast MRI. 4. I recommend she try saline sinus wash nightly. 5. She is 5 years in remission. 6. Return in 6 months.

## 2020-09-03 ENCOUNTER — OFFICE VISIT (OUTPATIENT)
Dept: FAMILY MEDICINE CLINIC | Age: 70
End: 2020-09-03
Payer: MEDICARE

## 2020-09-03 VITALS
SYSTOLIC BLOOD PRESSURE: 127 MMHG | DIASTOLIC BLOOD PRESSURE: 71 MMHG | HEART RATE: 91 BPM | TEMPERATURE: 97.1 F | WEIGHT: 166 LBS | BODY MASS INDEX: 24.59 KG/M2 | HEIGHT: 69 IN | OXYGEN SATURATION: 97 %

## 2020-09-03 PROCEDURE — G0439 PPPS, SUBSEQ VISIT: HCPCS | Performed by: FAMILY MEDICINE

## 2020-09-03 PROCEDURE — 99213 OFFICE O/P EST LOW 20 MIN: CPT | Performed by: FAMILY MEDICINE

## 2020-09-03 PROCEDURE — 3017F COLORECTAL CA SCREEN DOC REV: CPT | Performed by: FAMILY MEDICINE

## 2020-09-03 PROCEDURE — 1036F TOBACCO NON-USER: CPT | Performed by: FAMILY MEDICINE

## 2020-09-03 PROCEDURE — G8420 CALC BMI NORM PARAMETERS: HCPCS | Performed by: FAMILY MEDICINE

## 2020-09-03 PROCEDURE — G8399 PT W/DXA RESULTS DOCUMENT: HCPCS | Performed by: FAMILY MEDICINE

## 2020-09-03 PROCEDURE — 4040F PNEUMOC VAC/ADMIN/RCVD: CPT | Performed by: FAMILY MEDICINE

## 2020-09-03 PROCEDURE — 1123F ACP DISCUSS/DSCN MKR DOCD: CPT | Performed by: FAMILY MEDICINE

## 2020-09-03 PROCEDURE — G8427 DOCREV CUR MEDS BY ELIG CLIN: HCPCS | Performed by: FAMILY MEDICINE

## 2020-09-03 PROCEDURE — 1090F PRES/ABSN URINE INCON ASSESS: CPT | Performed by: FAMILY MEDICINE

## 2020-09-03 ASSESSMENT — PATIENT HEALTH QUESTIONNAIRE - PHQ9
SUM OF ALL RESPONSES TO PHQ QUESTIONS 1-9: 1
SUM OF ALL RESPONSES TO PHQ QUESTIONS 1-9: 1

## 2020-09-03 ASSESSMENT — LIFESTYLE VARIABLES
HOW OFTEN DURING THE LAST YEAR HAVE YOU FAILED TO DO WHAT WAS NORMALLY EXPECTED FROM YOU BECAUSE OF DRINKING: 0
AUDIT-C TOTAL SCORE: 1
HOW OFTEN DURING THE LAST YEAR HAVE YOU BEEN UNABLE TO REMEMBER WHAT HAPPENED THE NIGHT BEFORE BECAUSE YOU HAD BEEN DRINKING: 0
HOW OFTEN DO YOU HAVE A DRINK CONTAINING ALCOHOL: 1
HOW OFTEN DO YOU HAVE SIX OR MORE DRINKS ON ONE OCCASION: 0
HOW MANY STANDARD DRINKS CONTAINING ALCOHOL DO YOU HAVE ON A TYPICAL DAY: 0
HAVE YOU OR SOMEONE ELSE BEEN INJURED AS A RESULT OF YOUR DRINKING: 0
AUDIT TOTAL SCORE: 1
HAS A RELATIVE, FRIEND, DOCTOR, OR ANOTHER HEALTH PROFESSIONAL EXPRESSED CONCERN ABOUT YOUR DRINKING OR SUGGESTED YOU CUT DOWN: 0
HOW OFTEN DURING THE LAST YEAR HAVE YOU NEEDED AN ALCOHOLIC DRINK FIRST THING IN THE MORNING TO GET YOURSELF GOING AFTER A NIGHT OF HEAVY DRINKING: 0
HOW OFTEN DURING THE LAST YEAR HAVE YOU FOUND THAT YOU WERE NOT ABLE TO STOP DRINKING ONCE YOU HAD STARTED: 0
HOW OFTEN DURING THE LAST YEAR HAVE YOU HAD A FEELING OF GUILT OR REMORSE AFTER DRINKING: 0

## 2020-09-03 NOTE — PATIENT INSTRUCTIONS
(Maybe you're afraid of having pain, losing your independence, or being kept alive by machines.)  · Where would you prefer to die? (Your home? A hospital? A nursing home?)  · Do you want to donate your organs when you die? · Do you want certain Congregational practices performed before you die? When should you call for help? Be sure to contact your doctor if you have any questions. Where can you learn more? Go to [email or web address] and sign in to your [employer] account. Enter R264 in the [employer] box to learn more about \"Advance Directives: Care Instructions. \"     If you do not have an account, please click on the \"Sign Up Now\" link. Current as of: December 9, 2019               Content Version: 12.5  © 5615-5480 Healthwise, Incorporated. Care instructions adapted under license by [facility] ([facility] If you have questions about a medical condition or this instruction, always ask your healthcare professional. [place] [address] any warranty or liability for your use of this information. Learning About Medical Power of   What is a medical power of ? A medical power of , also called a durable power of  for health care, is one type of the legal forms called advance directives. It lets you name the person you want to make treatment decisions for you if you can't speak or decide for yourself. The person you choose is called your health care agent. This person is also called a health care proxy or health care surrogate. A medical power of  may be called something else in your state. How do you choose a health care agent? Choose your health care agent carefully. This person may or may not be a family member. Talk to the person before you make your final decision. Make sure he or she is comfortable with this responsibility. It's a good idea to choose someone who:  · Is at least 25years old.   · Knows you well and understands what makes life meaningful for you. · Understands your Episcopalian and moral values. · Will do what you want, not what he or she wants. · Will be able to make difficult choices at a stressful time. · Will be able to refuse or stop treatment, if that is what you would want, even if you could die. · Will be firm and confident with health professionals if needed. · Will ask questions to get needed information. · Lives near you or agrees to travel to you if needed. Your family may help you make medical decisions while you can still be part of that process. But it's important to choose one person to be your health care agent in case you aren't able to make decisions for yourself. If you don't fill out the legal form and name a health care agent, the decisions your family can make may be limited. A health care agent may be called something else in your state. Who will make decisions for you if you don't have a health care agent? If you don't have a health care agent or a living will, you may not get the care you want. Decisions may be made by family members who disagree about your medical care. Or decisions may be made by a medical professional who doesn't know you well. In some cases, a  makes the decisions. When you name a health care agent, it is very clear who has the power to make health decisions for you. How do you name a health care agent? You name your health care agent on a legal form. This form is usually called a medical power of . Ask your hospital, state bar association, or office on aging where to find these forms. You must sign the form to make it legal. Some states require you to get the form notarized. This means that a person called a  watches you sign the form and then he or she signs the form. Some states also require that two or more witnesses sign the form. Be sure to tell your family members and doctors who your health care agent is.   Where can you learn more? Go to [email or web address] and sign in to your [employer] account. Enter [id number] in the [facility] box to learn more about \"Learning About [address]. \"     If you do not have an account, please click on the \"Sign Up Now\" link. Current as of: December 9, 2019               Content Version: 12.5  © 3459-9747 [employer]. Care instructions adapted under license by [facility] ([facility]). If you have questions about a medical condition or this instruction, always ask your healthcare professional. [address] any warranty or liability for your use of this information. Learning About Living [employer]  What is a living will? A living will, also called a declaration, is a legal form. It tells your family and your doctor your wishes when you can't speak for yourself. It's used by the health professionals who will treat you as you near the end of your life or if you get seriously hurt or ill. If you put your wishes in writing, your loved ones and others will know what kind of care you want. They won't need to guess. This can ease your mind and be helpful to others. And you can change or cancel your living will at any time. A living will is not the same as an estate or property will. An estate will explains what you want to happen with your money and property after you die. How do you use it? A living will is used to describe the kinds of treatment or life support you want as you near the end of your life or if you get seriously hurt or ill. Keep these facts in mind about living finley. · Your living will is used only if you can't speak or make decisions for yourself. Most often, one or more doctors must certify that you can't speak or decide for yourself before your living will takes effect. · If you get better and can speak for yourself again, you can accept or refuse any treatment.  It doesn't matter what you said in your can't breathe on your own, your heart stops, or you can't swallow. · What things would you still want to be able to do after you receive life-support methods? Would you want to be able to walk? To speak? To eat on your own? To live without the help of machines? · Do you want certain Church practices performed if you become very ill? · If you have a choice, where do you want to be cared for? In your home? At a hospital or nursing home? · If you have a choice at the end of your life, where would you prefer to die? At home? In a hospital or nursing home? Somewhere else? · Would you prefer to be buried or cremated? · Do you want your organs to be donated after you die? What should you do with your living will? · Make sure that your family members and your health care agent have copies of your living will (also called a declaration). · Give your doctor a copy of your living will. Ask him or her to keep it as part of your medical record. If you have more than one doctor, make sure that each one has a copy. · Put a copy of your living will where it can be easily found. For example, some people may put a copy on their refrigerator door. If you are using a digital copy, be sure your doctor, family members, and health care agent know how to find and access it. Where can you learn more? Go to [email or web address] and sign in to your [employer] account. Enter R467 in the [facility] box to learn more about \"Learning About Living Anjel Reyes. \"     If you do not have an account, please click on the \"Sign Up Now\" link. Current as of: December 9, 2019               Content Version: 12.5  © 1726-0226 Healthwise, Incorporated. Care instructions adapted under license by [facility] ([facility]).  If you have questions about a medical condition or this instruction, always ask your healthcare professional. [address] any warranty or liability for your use of this information. Personalized Preventive Plan for Zoltan Duran - 9/3/2020  Medicare offers a range of preventive health benefits. Some of the tests and screenings are paid in full while other may be subject to a deductible, co-insurance, and/or copay. Some of these benefits include a comprehensive review of your medical history including lifestyle, illnesses that may run in your family, and various assessments and screenings as appropriate. After reviewing your medical record and screening and assessments performed today your provider may have ordered immunizations, labs, imaging, and/or referrals for you. A list of these orders (if applicable) as well as your Preventive Care list are included within your After Visit Summary for your review. Other Preventive Recommendations:    · A preventive eye exam performed by an eye specialist is recommended every 1-2 years to screen for glaucoma; cataracts, macular degeneration, and other eye disorders. · A preventive dental visit is recommended every 6 months. · Try to get at least 150 minutes of exercise per week or 10,000 steps per day on a pedometer . · Order or download the FREE \"Exercise & Physical Activity: Your Everyday Guide\" from [employer]. Call 9-531.393.5058 or search [employer] online. · You need 8044-1632 mg of calcium and 8204-1474 IU of vitamin D per day. It is possible to meet your calcium requirement with diet alone, but a vitamin D supplement is usually necessary to meet this goal.  · When exposed to the sun, use a sunscreen that protects against both UVA and UVB radiation with an SPF of 30 or greater. Reapply every 2 to 3 hours or after sweating, drying off with a towel, or swimming. · Always wear a seat belt when traveling in a car. Always wear a helmet when riding a bicycle or motorcycle.     Heart-Healthy Diet   Sodium, Fat, and Cholesterol Controlled Diet       What Is a Heart Healthy Diet?   A heart-healthy diet is one that limits sodium , certain types of fat , and cholesterol . This type of diet is recommended for:   People with any form of cardiovascular disease (eg, coronary heart disease , peripheral vascular disease , previous heart attack , previous stroke )   People with risk factors for cardiovascular disease, such as high blood pressure , high cholesterol , or diabetes   Anyone who wants to lower their risk of developing cardiovascular disease   Sodium    Sodium is a mineral found in many foods. In general, most people consume much more sodium than they need. Diets high in sodium can increase blood pressure and lead to edema (water retention). On a heart-healthy diet, you should consume no more than 2,300 mg (milligrams) of sodium per dayabout the amount in one teaspoon of table salt. The foods highest in sodium include table salt (about 50% sodium), processed foods, convenience foods, and preserved foods. Cholesterol    Cholesterol is a fat-like, waxy substance in your blood. Our bodies make some cholesterol. It is also found in animal products, with the highest amounts in fatty meat, egg yolks, whole milk, cheese, shellfish, and organ meats. On a heart-healthy diet, you should limit your cholesterol intake to less than 200 mg per day. It is normal and important to have some cholesterol in your bloodstream. But too much cholesterol can cause plaque to build up within your arteries, which can eventually lead to a heart attack or stroke. The two types of cholesterol that are most commonly referred to are:   Low-density lipoprotein (LDL) cholesterol  Also known as bad cholesterol, this is the cholesterol that tends to build up along your arteries. Bad cholesterol levels are increased by eating fats that are saturated or hydrogenated. Optimal level of this cholesterol is less than 100. Over 130 starts to get risky for heart disease.    High-density lipoprotein (HDL) cholesterol  Also diet, for example, this would mean 60 grams of fat or less per day. Saturated fat and trans fat in your diet raises your blood cholesterol the most, much more than dietary cholesterol does. For this reason, on a heart-healthy diet, less than 7% of your calories should come from saturated fat and ideally 0% from trans fat. On an 1800-calorie diet, this translates into less than 14 grams of saturated fat per day, leaving 46 grams of fat to come from mono- and polyunsaturated fats.    Food Choices on a Heart Healthy Diet   Food Category   Foods Recommended   Foods to Avoid   Grains   Breads and rolls without salted tops Most dry and cooked cereals Unsalted crackers and breadsticks Low-sodium or homemade breadcrumbs or stuffing All rice and pastas   Breads, rolls, and crackers with salted tops High-fat baked goods (eg, muffins, donuts, pastries) Quick breads, self-rising flour, and biscuit mixes Regular bread crumbs Instant hot cereals Commercially prepared rice, pasta, or stuffing mixes   Vegetables   Most fresh, frozen, and low-sodium canned vegetables Low-sodium and salt-free vegetable juices Canned vegetables if unsalted or rinsed   Regular canned vegetables and juices, including sauerkraut and pickled vegetables Frozen vegetables with sauces Commercially prepared potato and vegetable mixes   Fruits   Most fresh, frozen, and canned fruits All fruit juices   Fruits processed with salt or sodium   Milk   Nonfat or low-fat (1%) milk Nonfat or low-fat yogurt Cottage cheese, low-fat ricotta, cheeses labeled as low-fat and low-sodium   Whole milk Reduced-fat (2%) milk Malted and chocolate milk Full fat yogurt Most cheeses (unless low-fat and low salt) Buttermilk (no more than 1 cup per week)   Meats and Beans   Lean cuts of fresh or frozen beef, veal, lamb, or pork (look for the word loin) Fresh or frozen poultry without the skin Fresh or frozen fish and some shellfish Egg whites and egg substitutes (Limit whole eggs to three per week) Tofu Nuts or seeds (unsalted, dry-roasted), low-sodium peanut butter Dried peas, beans, and lentils   Any smoked, cured, salted, or canned meat, fish, or poultry (including powell, chipped beef, cold cuts, hot dogs, sausages, sardines, and anchovies) Poultry skins Breaded and/or fried fish or meats Canned peas, beans, and lentils Salted nuts   Fats and Oils   Olive oil and canola oil Low-sodium, low-fat salad dressings and mayonnaise   Butter, margarine, coconut and palm oils, powell fat   Snacks, Sweets, and Condiments   Low-sodium or unsalted versions of broths, soups, soy sauce, and condiments Pepper, herbs, and spices; vinegar, lemon, or lime juice Low-fat frozen desserts (yogurt, sherbet, fruit bars) Sugar, cocoa powder, honey, syrup, jam, and preserves Low-fat, trans-fat free cookies, cakes, and pies Luis F and animal crackers, fig bars, ruma snaps   High-fat desserts Broth, soups, gravies, and sauces, made from instant mixes or other high-sodium ingredients Salted snack foods Canned olives Meat tenderizers, seasoning salt, and most flavored vinegars   Beverages   Low-sodium carbonated beverages Tea and coffee in moderation Soy milk   Commercially softened water   Suggestions   Make whole grains, fruits, and vegetables the base of your diet. Choose heart-healthy fats such as canola, olive, and flaxseed oil, and foods high in heart-healthy fats, such as nuts, seeds, soybeans, tofu, and fish. Eat fish at least twice per week; the fish highest in omega-3 fatty acids and lowest in mercury include salmon, herring, mackerel, sardines, and canned chunk light tuna. If you eat fish less than twice per week or have high triglycerides, talk to your doctor about taking fish oil supplements. Read food labels.    For products low in fat and cholesterol, look for fat free, low-fat, cholesterol free, saturated fat free, and trans fat freeAlso scan the Nutrition Facts Label, which lists saturated fat, thinning. Follow-up care is a key part of your treatment and safety. Be sure to make and go to all appointments, and call your doctor if you are having problems. It's also a good idea to know your test results and keep a list of the medicines you take. How can you care for yourself at home? Get enough calcium and vitamin D. The [employer] of [employer] recommends adults younger than age 46 need 1,000 mg of calcium and 600 IU of vitamin D each day. Women ages 46 to 79 need 1,200 mg of calcium and 600 IU of vitamin D each day. Men ages 46 to 79 need 1,000 mg of calcium and 600 IU of vitamin D each day. Adults 71 and older need 1,200 mg of calcium and 800 IU of vitamin D each day. Eat foods rich in calcium, like yogurt, cheese, milk, and dark green vegetables. Eat foods rich in vitamin D, like eggs, fatty fish, cereal, and fortified milk. Get some sunshine. Your body uses sunshine to make its own vitamin D. The safest time to be out in the sun is before 10 a.m. or after 3 p.m. Avoid getting sunburned. Sunburn can increase your risk of skin cancer. Talk to your doctor about taking a calcium plus vitamin D supplement. Ask about what type of calcium is right for you, and how much to take at a time. Adults ages 23 to 48 should not get more than 2,500 mg of calcium and 4,000 IU of vitamin D each day, whether it is from supplements and/or food. Adults ages 46 and older should not get more than 2,000 mg of calcium and 4,000 IU of vitamin D each day from supplements and/or food. Get regular bone-building exercise. Weight-bearing and resistance exercises keep bones healthy by working the muscles and bones against gravity. Start out at an exercise level that feels right for you. Add a little at a time until you can do the following:  Do 30 minutes of weight-bearing exercise on most days of the week. Walking, jogging, stair climbing, and dancing are good choices.   Do resistance exercises with weights or elastic bands 2 to 3 days a week. Limit alcohol. Drink no more than 1 alcohol drink a day if you are a woman. Drink no more than 2 alcohol drinks a day if you are a man. Do not smoke. Smoking can make bones thin faster. If you need help quitting, talk to your doctor about stop-smoking programs and medicines. These can increase your chances of quitting for good. When should you call for help? Watch closely for changes in your health, and be sure to contact your doctor if:  You need help with a healthy eating plan. You need help with an exercise plan    © 9161-8042 [facility], Incorporated. Care instructions adapted under license by [facility]. This care instruction is for use with your licensed healthcare professional. If you have questions about a medical condition or this instruction, always ask your healthcare professional. [address] any warranty or liability for your use of this information. Content Version: 9.4.79659; Last Revised: June 20, 2011              ·     Keep Your Memory [place]       Many factors can affect your ability to remembera hectic lifestyle, aging, stress, chronic disease, and certain medicines. But, there are steps you can take to sharpen your mind and help preserve your memory. Challenge Your Brain   Regularly challenging your mind may help keeps it in top shape. Good mental exercises include:   Crossword puzzlesUse a dictionary if you need it; you will learn more that way. Brainteasers Try some! Crafts, such as wood working and sewing   Hobbies, such as gardening and building model airplanes   SocializingVisit old friends or join groups to meet new ones.    Reading   Learning a new language   Taking a class, whether it be art history or cresencio chi   TravelingExperience the food, history, and culture of your destination   Learning to use a computer   Going to museums, the theater, or thought-provoking movies   Changing things in your daily life, such as reversing your pattern in the grocery store or brushing your teeth using your nondominant hand   Use Memory Aids   There is no need to remember every detail on your own. These memory aids can help:   Calendars and day planners   Electronic organizers to store all sorts of helpful informationThese devices can \"beep\" to remind you of appointments. A book of days to record birthdays, anniversaries, and other occasions that occur on the same date every year   Detailed \"to-do\" lists and strategically placed sticky notes   Quick \"study\" sessionsBefore a gathering, review who will be there so their names will be fresh in your mind. Establish routinesFor example, keep your keys, wallet, and umbrella in the same place all the time or take medicine with your 8:00 AM glass of juice   Live a Healthy Life   Many actions that will keep your body strong will do the same for your mind. For example:   Talk to Your Doctor About Herbs and Supplements    Malnutrition and vitamin deficiencies can impair your mental function. For example, vitamin B12 deficiency can cause a range of symptoms, including confusion. But, what if your nutritional needs are being met? Can herbs and supplements still offer a benefit? Researchers have investigated a range of natural remedies, such as ginkgo , ginseng , and the supplement phosphatidylserine (PS). So far, though, the evidence is inconsistent as to whether these products can improve memory or thinking. If you are interested in taking herbs and supplements, talk to your doctor first because they may interact with other medicines that you are taking. Exercise Regularly    Among the many benefits of regular exercise are increased blood flow to the brain and decreased risk of certain diseases that can interfere with memory function. One study found that even moderate exercise has a beneficial effect.  Examples of \"moderate\" exercise include:   Playing 18 holes of golf once a week, without a cart   Playing tennis twice a week   Walking one mile per day   Manage Stress    It can be tough to remember what is important when your mind is cluttered. Make time for relaxation. Choose activities that calm you down, and make it routine. Manage Chronic Conditions    Side effects of high blood pressure , diabetes, and heart disease can interfere with mental function. Many of the lifestyle steps discussed here can help manage these conditions. Strive to eat a healthy diet, exercise regularly, get stress under control, and follow your doctor's advice for your condition. Minimize Medications    Talk to your doctor about the medicines that you take. Some may be unnecessary. Also, healthy lifestyle habits may lower the need for certain drugs. Last Reviewed: April 2010 Meri Musa MD   Updated: 4/13/2010   ·     [address] [address] a [facility]       As we get older, changes in balance, gait, strength, vision, hearing, and cognition make even the most youthful senior more prone to accidents. Falls are one of the leading health risks for older people. This increased risk of falling is related to:   Aging process (eg, decreased muscle strength, slowed reflexes)   Higher incidence of chronic health problems (eg, arthritis, diabetes) that may limit mobility, agility or sensory awareness   Side effects of medicine (eg, dizziness, blurred vision)especially medicines like prescription pain medicines and drugs used to treat mental health conditions   Depending on the brittleness of your bones, the consequences of a fall can be serious and long lasting. Home Life   Research by the Association of Aging [facility]) shows that some home accidents among older adults can be prevented by making simple lifestyle changes and basic modifications and repairs to the home environment. Here are some lifestyle changes that experts recommend:   Have your hearing and vision checked regularly. Be sure to wear prescription glasses that are right for you. rooms well lit? Can you reach a lamp without getting out of bed? Are floor surfaces well maintained? Shag rugs, high-pile carpeting, tile floors, and polished wood floors can be particularly slippery. Stairs should always have handrails and be carpeted or fitted with a non-skid tread. Is your telephone easily reachable. Is the cord safely tucked away? Room by Room   According to the Association of Aging, bathrooms and angelina are the two most potentially hazardous rooms in your home. In the Kitchen    Be sure your stove is in proper working order and always make sure burners and the oven are off before you go out or go to sleep. Keep pots on the back burners, turn handles away from the front of the stove, and keep stove clean and free of grease build-up. Kitchen ventilation systems and range exhausts should be working properly. Keep flammable objects such as towels and pot holders away from the cooking area except when in use. Make sure kitchen curtains are tied back. Move cords and appliances away from the sink and hot surfaces. If extension cords are needed, install wiring guides so they do not hang over the sink, range, or working areas. Look for coffee pots, kettles and toaster ovens with automatic shut-offs. Keep a mop handy in the kitchen so you can wipe up spills instantly. You should also have a small fire extinguisher. Arrange your kitchen with frequently used items on lower shelves to avoid the need to stand on a stepstool to reach them. Make sure countertops are well-lit to avoid injuries while cutting and preparing food. In the Bathroom    Use a non-slip mat or decals in the tub and shower, since wet, soapy tile or porcelain surfaces are extremely slippery. Make sure bathroom rugs are non-skid or tape them firmly to the floor. Bathtubs should have at least one, preferably two, grab bars, firmly attached to structural supports in the wall.  (Do not use built-in soap holders or glass shower doors as grab bars.)    Tub seats fitted with non-slip material on the legs allow you to wash sitting down. For people with limited mobility, bathtub transfer benches allow you to slide safely into the tub. Raised toilet seats and toilet safety rails are helpful for those with knee or hip problems. In the [facility]    Make sure you use a nightlight and that the area around your bed is clear of potential obstacles. Be careful with electric blankets and never go to sleep with a heating pad, which can cause serious burns even if on a low setting. Use fire-resistant mattress covers and pillows, and NEVER smoke in bed. Keep a phone next to the bed that is programmed to dial 911 at the push of a button. If you have a chronic condition, you may want to sign on with an automatic call-in service. Typically the system includes a small pendant that connects directly to an emergency medical voice-response system. You should also make arrangements to stay in contact with someonefriend, neighbor, family memberon a regular schedule. Fire Prevention   According to the [employer]. (Smoke Alarms for Every) [address] [place] [address], senior citizens are one of the two highest risk groups for death and serious injuries due to residential fires. When cooking, wear short-sleeved items, never a bulky long-sleeved robe. The [facility]'s Safety Checklist for Older Consumers emphasizes the importance of checking basements, garages, workshops and storage areas for fire hazards, such as volatile liquids, piles of old rags or clothing and overloaded circuits. Never smoke in bed or when lying down on a couch or recliner chair. Small portable electric or kerosene heaters are responsible for many home fires and should be used cautiously if at all. If you do use one, be sure to keep them away from flammable materials. In case of fire, make sure you have a pre-established emergency exit plan.     Have a professional check your fireplace and other fuel-burning appliances yearly. Helping Hands   Baby boomers entering the souza years will continue to see the development of new products to help older adults live safely and independently in spite of age-related changes. Making Life More Livable  , by Keanu Paredes, lists over 1,000 products for \"living well in the mature years,\" such as bathing and mobility aids, household security devices, ergonomically designed knives and peelers, and faucet valves and knobs for temperature control. Medical supply stores and organizations are good sources of information about products that improve your quality of life and insure your safety.      Last Reviewed: November 2009 Yaron Payne MD   Updated: 3/7/2011     ·

## 2020-09-03 NOTE — PROGRESS NOTES
Medicare Annual Wellness Visit  Name: Krishna Krishnan Date: 9/3/2020   MRN: [id number] Sex: Female   Age: 68 y.o. Ethnicity: Non-/Non    : 1950 Race: Sandhya Score is here for Medicare AWV    Screenings for behavioral, psychosocial and functional/safety risks, and cognitive dysfunction are all negative except as indicated below. These results, as well as other patient data from the [address] form, are documented in Flowsheets linked to this Encounter. Allergies   Allergen Reactions    Amoxicillin     Plantain Other (See Comments)     Pt denies    Statins     Tape Bren Bongo Tape]     Sulfa Antibiotics Itching and Rash         Prior to Visit Medications    Medication Sig Taking? Authorizing Provider   zoster recombinant adjuvanted vaccine (SHINGRIX) 50 MCG/0.5ML SUSR injection Inject 0.5 mLs into the muscle once for 1 dose Yes Silviano Olson MD   meclizine (ANTIVERT) 25 MG tablet Take 1 tablet by mouth 3 times daily as needed for Dizziness Yes Liza Gonzalez MD   glimepiride (AMARYL) 4 MG tablet daily Yes Silviano Olson MD   dapagliflozin (FARXIGA) 10 MG tablet TAKE 1 TABLET BY MOUTH ONE TIME A DAY Yes Silviano Olson MD   sucralfate (CARAFATE) 1 GM/10ML suspension Take 10 mLs by mouth 4 times daily Yes Silviano Olson MD   TRUE METRIX BLOOD GLUCOSE TEST strip TEST BLOOD SUGAR ONCE DAILY Yes Silviano Olson MD   metFORMIN (GLUCOPHAGE-XR) 500 MG extended release tablet TAKE 1 TABLET BY MOUTH 4 TIMES A DAY Yes Silviano Olson MD   fluticasone (FLONASE) 50 MCG/ACT nasal spray 1 spray by Nasal route 2 times daily  Yes Historical Provider, MD   montelukast (SINGULAIR) 10 MG tablet Take 10 mg by mouth nightly. Yes Historical Provider, MD   cetirizine (ZYRTEC) 10 MG tablet Take 10 mg by mouth daily.    Yes Historical Provider, MD   lansoprazole (PREVACID) 30 MG capsule Take 15 mg by mouth daily as needed (15 MG daily)  Yes Historical Provider, MD   budesonide-formoterol (SYMBICORT) 160-4.5 MCG/ACT AERO Inhale 2 puffs into the lungs 2 times daily. Yes Historical Provider, MD   albuterol (PROVENTIL HFA;VENTOLIN HFA) 108 (90 BASE) MCG/ACT inhaler Inhale 2 puffs into the lungs every 4 hours as needed  Yes Historical Provider, MD   clobetasol (TEMOVATE) 0.05 % cream Apply topically 2 times daily as needed Apply topically 2 times daily.  Yes Historical Provider, MD   letrozole [facility]) 2.5 MG tablet Take 1 tablet by mouth daily  Janie Burns MD         Past Medical History:   Diagnosis Date    Allergic rhinitis     Arthritis     osteoarthritis    Asthma     exercise induced    Breast cancer ([facility] [address] 12/1994    Dr Janel Bridges Lumpectomy and lymph nodes excised    Breast cancer ([facility] [address] 2015    bilatereal breast cancer    Chemotherapy-induced neuropathy ([facility] [address]     Colon polyps     Frequent UTI     GERD (gastroesophageal reflux disease)     Heavy menstrual bleeding     History of bone density study 11/12    normal    Pap test, as part of routine gynecological examination 8/12    peds speculum    PONV (postoperative nausea and vomiting)     Type II or unspecified type diabetes mellitus without mention of complication, not stated as uncontrolled 2007    Urinary tract infection     Wears glasses        Past Surgical History:   Procedure Laterality Date    BREAST LUMPECTOMY  1/1994    rt breast, cancer    BREAST RECONSTRUCTION Bilateral 06/30/15    BREAST RECONSTRUCTION Bilateral 4/21/16    expander removal, implants placed, fat injected, lipo    BREAST SURGERY Bilateral 04/21/2016    implants    COLONOSCOPY      2019    DILATION AND CURETTAGE OF UTERUS      ENDOSCOPY, COLON, DIAGNOSTIC      HYSTERECTOMY, TOTAL ABDOMINAL      with BSO    MASTECTOMY Bilateral     OTHER SURGICAL HISTORY  09/27/2016    chest port removal    SHOULDER ARTHROSCOPY Left 6/12/2020    LEFT SHOULDER ARTHROSCOPY WITH SAD, DEBRIDEMENT, CAPSULAR RELEASE, Wt 166 lb (75.3 kg)   SpO2 97%   BMI 24.87 kg/m²   Constitutional: Alert and oriented. Well-nourished. Head: Normocephalic and atraumatic. Right Ear: External ear normal. TM: no bulging, erythema or fluid seen. Left Ear: External ear normal. TM: no bulging, erythema or fluid seen. Nose: Nose normal.   Mouth/Throat: Oropharynx is clear and moist. Teeth in good repair. Eyes: Pupils are equal, round, and reactive to light. Right eye exhibits no discharge. Left eye exhibits no discharge. No scleral icterus. Neck: Normal range of motion. Neck supple. No JVD present. No tracheal deviation present. No thyromegaly present. Cardiovascular: Normal rate, regular rhythm, normal heart sounds. Pulmonary/Chest: Effort normal and breath sounds normal. No respiratory distress. She has no wheezes. She has no rales. Abdominal: Soft. Bowel sounds are normal.  She exhibits no distension and no mass. There is no tenderness. There is no rebound and no guarding. Musculoskeletal: Normal range of motion. She exhibits no edema or tenderness. Lymphadenopathy:    She has no cervical adenopathy. Neurological:  She is alert and oriented to person, place, and time. Cranial nerves grossly intact. No sensation problem noted. Muscle strength 5/5 throughout. Skin: Skin is warm and dry. No rash noted. No erythema. Psychiatric:  She has a normal mood and affect. Behavior is normal.      Patient's complete Health Risk Assessment and screening values have been reviewed and are found in Flowsheets. The following problems were reviewed today and where indicated follow up appointments were made and/or referrals ordered. Positive Risk Factor Screenings with Interventions:     Fall Risk:  Timed Up and Go Test > 12 seconds?  (Complete if either Fall Risk answers are Yes): no  2 or more falls in past year?: (!) yes  Fall with injury in past year?: (!) yes  Fall Risk Interventions:    · Home safety tips provided  · will see ENT Annual Wellness Visit (AWV)  06/19/2019    Flu vaccine (1) 09/01/2020    A1C test (Diabetic or Prediabetic)  06/22/2021    Colon cancer screen colonoscopy  05/01/2023    DTaP/Tdap/Td vaccine (2 - Td) 02/27/2025    DEXA (modify frequency per FRAX score)  Completed    Pneumococcal 65+ years Vaccine  Completed    Hepatitis C screen  Completed    Hepatitis A vaccine  Aged Out    Hib vaccine  Aged Out    Meningococcal (ACWY) vaccine  Aged Out     Recommendations for [employer] Due: see orders and patient instructions/AVS.  . Recommended screening schedule for the next 5-10 years is provided to the patient in written form: see Patient Americo Wray was seen today for medicare awv. Diagnoses and all orders for this visit:    Routine general medical examination at a health care facility  -     CBC Auto Differential; Future  -     TSH with Reflex; Future  -     Urinalysis; Future  -     Comprehensive Metabolic Panel; Future    Gastroesophageal reflux disease, esophagitis presence not specified  -     AFL - Hector Dye DO, Gastroenterology, [facility]    Asymptomatic menopause  -     DEXA BONE DENSITY 2 SITES; Future    Dizziness  -     AFL - Kyle Murillo MD, Otolaryngology, [facility]    Need for shingles vaccine  -     Hepatitis C Antibody; Future    ACP (advance care planning)  -     [address]    Need for prophylactic vaccination and inoculation against varicella  -     zoster recombinant adjuvanted vaccine (SHINGRIX) 50 MCG/0.5ML SUSR injection; Inject 0.5 mLs into the muscle once for 1 dose    Other problems related to lifestyle    Screening for cardiovascular condition    Screening for diabetes mellitus (DM)  -     Hemoglobin A1C; Future    Screening for hyperlipidemia  -     Lipid Panel; Future        The patient is doing well overall besides the dizziness and the discomfort at her right TMJ joint. The patient will see ENT specialist.  If he cannot help.   Told her I will refer her then to neurologist.  Patient also will follow-up with the gastroenterologist.  The referral was provided. Call for any changes. Call or return to clinic prn if these symptoms worsen or fail to improve as anticipated. I have reviewed the instructions with the patient, answering all questions to her satisfaction.       (Please note that portions of this note were completed with a voice recognition program. Efforts were made to edit the dictations but occasionally words are mis-transcribed.)

## 2020-09-08 ENCOUNTER — HOSPITAL ENCOUNTER (OUTPATIENT)
Dept: MRI IMAGING | Age: 70
Discharge: HOME | End: 2020-09-10
Payer: MEDICARE

## 2020-09-08 LAB
CREAT SERPL-MCNC: 0.93 MG/DL (ref 0.5–0.9)
GFR AFRICAN AMERICAN: >60 ML/MIN
GFR NON-AFRICAN AMERICAN: 59 ML/MIN
GFR SERPL CREATININE-BSD FRML MDRD: ABNORMAL ML/MIN/{1.73_M2}
GFR SERPL CREATININE-BSD FRML MDRD: ABNORMAL ML/MIN/{1.73_M2}

## 2020-09-08 PROCEDURE — A9579 GAD-BASE MR CONTRAST NOS,1ML: HCPCS | Performed by: INTERNAL MEDICINE

## 2020-09-08 PROCEDURE — 6360000004 HC RX CONTRAST MEDICATION: Performed by: INTERNAL MEDICINE

## 2020-09-08 PROCEDURE — 72157 MRI CHEST SPINE W/O & W/DYE: CPT

## 2020-09-08 PROCEDURE — 36415 COLL VENOUS BLD VENIPUNCTURE: CPT

## 2020-09-08 PROCEDURE — 82565 ASSAY OF CREATININE: CPT

## 2020-09-08 RX ADMIN — GADOTERIDOL 16 ML: 279.3 INJECTION, SOLUTION INTRAVENOUS at 15:14

## 2020-09-14 ENCOUNTER — HOSPITAL ENCOUNTER (OUTPATIENT)
Dept: MRI IMAGING | Age: 70
Discharge: HOME | End: 2020-09-16
Payer: MEDICARE

## 2020-09-14 PROCEDURE — 6360000004 HC RX CONTRAST MEDICATION: Performed by: INTERNAL MEDICINE

## 2020-09-14 PROCEDURE — A9579 GAD-BASE MR CONTRAST NOS,1ML: HCPCS | Performed by: INTERNAL MEDICINE

## 2020-09-14 PROCEDURE — 77047 MRI BREAST C- BILATERAL: CPT

## 2020-09-14 RX ADMIN — GADOTERIDOL 15 ML: 279.3 INJECTION, SOLUTION INTRAVENOUS at 13:02

## 2020-09-17 ENCOUNTER — TELEPHONE (OUTPATIENT)
Dept: FAMILY MEDICINE CLINIC | Age: 70
End: 2020-09-17

## 2020-09-28 ENCOUNTER — HOSPITAL ENCOUNTER (OUTPATIENT)
Dept: MAMMOGRAPHY | Age: 70
Discharge: HOME | End: 2020-09-30
Payer: MEDICARE

## 2020-09-28 PROCEDURE — 77080 DXA BONE DENSITY AXIAL: CPT

## 2020-09-29 ENCOUNTER — HOSPITAL ENCOUNTER (OUTPATIENT)
Age: 70
Discharge: HOME | End: 2020-09-29
Payer: MEDICARE

## 2020-09-29 LAB
ABSOLUTE EOS #: 0.14 K/UL (ref 0–0.44)
ABSOLUTE IMMATURE GRANULOCYTE: <0.03 K/UL (ref 0–0.3)
ABSOLUTE LYMPH #: 1.1 K/UL (ref 1.1–3.7)
ABSOLUTE MONO #: 0.19 K/UL (ref 0.1–1.2)
ALBUMIN SERPL-MCNC: 4.1 G/DL (ref 3.5–5.2)
ALBUMIN/GLOBULIN RATIO: 1.5 (ref 1–2.5)
ALP BLD-CCNC: 90 U/L (ref 35–104)
ALT SERPL-CCNC: 26 U/L (ref 5–33)
ANION GAP SERPL CALCULATED.3IONS-SCNC: 12 MMOL/L (ref 9–17)
AST SERPL-CCNC: 21 U/L
BASOPHILS # BLD: 1 % (ref 0–2)
BASOPHILS ABSOLUTE: <0.03 K/UL (ref 0–0.2)
BILIRUB SERPL-MCNC: 0.44 MG/DL (ref 0.3–1.2)
BILIRUBIN URINE: NEGATIVE
BUN BLDV-MCNC: 12 MG/DL (ref 8–23)
BUN/CREAT BLD: ABNORMAL (ref 9–20)
CALCIUM SERPL-MCNC: 9.3 MG/DL (ref 8.6–10.4)
CHLORIDE BLD-SCNC: 101 MMOL/L (ref 98–107)
CHOLESTEROL/HDL RATIO: 3.9
CHOLESTEROL: 162 MG/DL
CO2: 24 MMOL/L (ref 20–31)
COLOR: YELLOW
COMMENT UA: ABNORMAL
CREAT SERPL-MCNC: 0.61 MG/DL (ref 0.5–0.9)
DIFFERENTIAL TYPE: ABNORMAL
EOSINOPHILS RELATIVE PERCENT: 4 % (ref 1–4)
ESTIMATED AVERAGE GLUCOSE: 154 MG/DL
GFR AFRICAN AMERICAN: >60 ML/MIN
GFR NON-AFRICAN AMERICAN: >60 ML/MIN
GFR SERPL CREATININE-BSD FRML MDRD: ABNORMAL ML/MIN/{1.73_M2}
GFR SERPL CREATININE-BSD FRML MDRD: ABNORMAL ML/MIN/{1.73_M2}
GLUCOSE BLD-MCNC: 141 MG/DL (ref 70–99)
GLUCOSE URINE: NEGATIVE
HBA1C MFR BLD: 7 % (ref 4–6)
HCT VFR BLD CALC: 39.3 % (ref 36.3–47.1)
HDLC SERPL-MCNC: 42 MG/DL
HEMOGLOBIN: 12.2 G/DL (ref 11.9–15.1)
HEPATITIS C ANTIBODY: NONREACTIVE
IMMATURE GRANULOCYTES: 1 %
KETONES, URINE: NEGATIVE
LDL CHOLESTEROL: 62 MG/DL (ref 0–130)
LEUKOCYTE ESTERASE, URINE: NEGATIVE
LYMPHOCYTES # BLD: 34 % (ref 24–43)
MCH RBC QN AUTO: 27.4 PG (ref 25.2–33.5)
MCHC RBC AUTO-ENTMCNC: 31 G/DL (ref 28.4–34.8)
MCV RBC AUTO: 88.1 FL (ref 82.6–102.9)
MONOCYTES # BLD: 6 % (ref 3–12)
NITRITE, URINE: NEGATIVE
NRBC AUTOMATED: 0 PER 100 WBC
PDW BLD-RTO: 14.5 % (ref 11.8–14.4)
PH UA: 5 (ref 5–8)
PLATELET # BLD: 122 K/UL (ref 138–453)
PLATELET ESTIMATE: ABNORMAL
PMV BLD AUTO: 11.4 FL (ref 8.1–13.5)
POTASSIUM SERPL-SCNC: 3.9 MMOL/L (ref 3.7–5.3)
PROTEIN UA: NEGATIVE
RBC # BLD: 4.46 M/UL (ref 3.95–5.11)
RBC # BLD: ABNORMAL 10*6/UL
SEG NEUTROPHILS: 54 % (ref 36–65)
SEGMENTED NEUTROPHILS ABSOLUTE COUNT: 1.73 K/UL (ref 1.5–8.1)
SODIUM BLD-SCNC: 137 MMOL/L (ref 135–144)
SPECIFIC GRAVITY UA: 1 (ref 1–1.03)
TOTAL PROTEIN: 6.9 G/DL (ref 6.4–8.3)
TRIGL SERPL-MCNC: 289 MG/DL
TSH SERPL DL<=0.05 MIU/L-ACNC: 4.14 MIU/L (ref 0.3–5)
TURBIDITY: CLEAR
URINE HGB: NEGATIVE
UROBILINOGEN, URINE: NORMAL
VLDLC SERPL CALC-MCNC: ABNORMAL MG/DL (ref 1–30)
WBC # BLD: 3.2 K/UL (ref 3.5–11.3)
WBC # BLD: ABNORMAL 10*3/UL

## 2020-09-29 PROCEDURE — 83036 HEMOGLOBIN GLYCOSYLATED A1C: CPT

## 2020-09-29 PROCEDURE — 81003 URINALYSIS AUTO W/O SCOPE: CPT

## 2020-09-29 PROCEDURE — 85025 COMPLETE CBC W/AUTO DIFF WBC: CPT

## 2020-09-29 PROCEDURE — 80061 LIPID PANEL: CPT

## 2020-09-29 PROCEDURE — 84443 ASSAY THYROID STIM HORMONE: CPT

## 2020-09-29 PROCEDURE — 86803 HEPATITIS C AB TEST: CPT

## 2020-09-29 PROCEDURE — 80053 COMPREHEN METABOLIC PANEL: CPT

## 2020-09-29 PROCEDURE — 36415 COLL VENOUS BLD VENIPUNCTURE: CPT

## 2020-09-30 NOTE — RESULT ENCOUNTER NOTE
A1C 7. Improved. triglycerides elevated from previous. WBC slightly lower than normal but improved from before. Other blood work wnl. Thank you.

## 2020-10-19 RX ORDER — CALCIUM CITRATE/VITAMIN D3 200MG-6.25
TABLET ORAL
Qty: 200 STRIP | Refills: 0 | Status: SHIPPED | OUTPATIENT
Start: 2020-10-19 | End: 2021-04-21

## 2020-10-19 NOTE — TELEPHONE ENCOUNTER
Oscar Palomino is calling to request a refill on the following medication(s):    Last Visit Date (If Applicable):  3/7/0347    Next Visit Date:    Visit date not found    Medication Request:  Requested Prescriptions     Pending Prescriptions Disp Refills    TRUE METRIX BLOOD GLUCOSE TEST strip [Pharmacy Med Name: TRUE METRIX BLOOD GLUCOSE TEST STRP] 200 strip 0     Sig: TEST BLOOD SUGAR DAILY

## 2020-10-20 ENCOUNTER — TELEPHONE (OUTPATIENT)
Dept: SPIRITUAL SERVICES | Age: 70
End: 2020-10-20

## 2020-10-27 ENCOUNTER — HOSPITAL ENCOUNTER (OUTPATIENT)
Dept: MRI IMAGING | Facility: CLINIC | Age: 70
Discharge: HOME | End: 2020-10-29
Payer: MEDICARE

## 2020-10-27 PROCEDURE — 6360000004 HC RX CONTRAST MEDICATION: Performed by: OTOLARYNGOLOGY

## 2020-10-27 PROCEDURE — A9579 GAD-BASE MR CONTRAST NOS,1ML: HCPCS | Performed by: OTOLARYNGOLOGY

## 2020-10-27 PROCEDURE — 70553 MRI BRAIN STEM W/O & W/DYE: CPT

## 2020-10-27 RX ADMIN — GADOTERIDOL 15 ML: 279.3 INJECTION, SOLUTION INTRAVENOUS at 14:52

## 2020-10-29 ENCOUNTER — TELEPHONE (OUTPATIENT)
Dept: SPIRITUAL SERVICES | Age: 70
End: 2020-10-29

## 2020-11-02 RX ORDER — GLIMEPIRIDE 4 MG/1
TABLET ORAL
Qty: 90 TABLET | Refills: 0 | Status: SHIPPED | OUTPATIENT
Start: 2020-11-02 | End: 2020-12-29

## 2020-11-02 NOTE — TELEPHONE ENCOUNTER
Oscar Palomino is calling to request a refill on the following medication(s):    Last Visit Date (If Applicable):  5/1/2843    Next Visit Date:    Visit date not found    Medication Request:  Requested Prescriptions     Pending Prescriptions Disp Refills    glimepiride (AMARYL) 4 MG tablet [Pharmacy Med Name: GLIMEPIRIDE 4MG TABS] 90 tablet 0     Sig: TAKE 1 TABLET BY MOUTH ONE TIME A DAY

## 2020-12-03 ENCOUNTER — HOSPITAL ENCOUNTER (OUTPATIENT)
Dept: VASCULAR LAB | Age: 70
Discharge: HOME | End: 2020-12-03
Payer: MEDICARE

## 2020-12-03 ENCOUNTER — HOSPITAL ENCOUNTER (OUTPATIENT)
Age: 70
Discharge: HOME | End: 2020-12-03
Payer: MEDICARE

## 2020-12-03 ENCOUNTER — TELEPHONE (OUTPATIENT)
Dept: SPIRITUAL SERVICES | Age: 70
End: 2020-12-03

## 2020-12-03 LAB
C-REACTIVE PROTEIN: 8.1 MG/L (ref 0–5)
SEDIMENTATION RATE, ERYTHROCYTE: 4 MM (ref 0–30)

## 2020-12-03 PROCEDURE — 86038 ANTINUCLEAR ANTIBODIES: CPT

## 2020-12-03 PROCEDURE — 36415 COLL VENOUS BLD VENIPUNCTURE: CPT

## 2020-12-03 PROCEDURE — 93880 EXTRACRANIAL BILAT STUDY: CPT

## 2020-12-03 PROCEDURE — 86140 C-REACTIVE PROTEIN: CPT

## 2020-12-03 PROCEDURE — 85652 RBC SED RATE AUTOMATED: CPT

## 2020-12-03 NOTE — TELEPHONE ENCOUNTER
Writer speaks to patient via telephone. Patient acknowledges that she needs to complete ACP but it is not high on her priorities right now. Patient states that she won't do anything until after the holidays. Patient has been contacted twice.  Writer will close the referral.

## 2020-12-04 LAB — ANTI-NUCLEAR ANTIBODY (ANA): NEGATIVE

## 2020-12-29 RX ORDER — GLIMEPIRIDE 4 MG/1
TABLET ORAL
Qty: 90 TABLET | Refills: 0 | Status: SHIPPED | OUTPATIENT
Start: 2020-12-29 | End: 2021-05-14

## 2021-01-06 ENCOUNTER — OFFICE VISIT (OUTPATIENT)
Dept: NEUROLOGY | Age: 71
End: 2021-01-06
Payer: MEDICARE

## 2021-01-06 VITALS
HEART RATE: 74 BPM | TEMPERATURE: 96.4 F | WEIGHT: 170 LBS | DIASTOLIC BLOOD PRESSURE: 73 MMHG | HEIGHT: 69 IN | BODY MASS INDEX: 25.18 KG/M2 | SYSTOLIC BLOOD PRESSURE: 142 MMHG

## 2021-01-06 DIAGNOSIS — M54.12 CERVICAL RADICULOPATHY: Primary | ICD-10-CM

## 2021-01-06 PROCEDURE — G8399 PT W/DXA RESULTS DOCUMENT: HCPCS | Performed by: PSYCHIATRY & NEUROLOGY

## 2021-01-06 PROCEDURE — 1090F PRES/ABSN URINE INCON ASSESS: CPT | Performed by: PSYCHIATRY & NEUROLOGY

## 2021-01-06 PROCEDURE — G8482 FLU IMMUNIZE ORDER/ADMIN: HCPCS | Performed by: PSYCHIATRY & NEUROLOGY

## 2021-01-06 PROCEDURE — 3017F COLORECTAL CA SCREEN DOC REV: CPT | Performed by: PSYCHIATRY & NEUROLOGY

## 2021-01-06 PROCEDURE — G8427 DOCREV CUR MEDS BY ELIG CLIN: HCPCS | Performed by: PSYCHIATRY & NEUROLOGY

## 2021-01-06 PROCEDURE — 99214 OFFICE O/P EST MOD 30 MIN: CPT | Performed by: PSYCHIATRY & NEUROLOGY

## 2021-01-06 PROCEDURE — 1036F TOBACCO NON-USER: CPT | Performed by: PSYCHIATRY & NEUROLOGY

## 2021-01-06 PROCEDURE — G8417 CALC BMI ABV UP PARAM F/U: HCPCS | Performed by: PSYCHIATRY & NEUROLOGY

## 2021-01-06 PROCEDURE — 4040F PNEUMOC VAC/ADMIN/RCVD: CPT | Performed by: PSYCHIATRY & NEUROLOGY

## 2021-01-06 PROCEDURE — 1123F ACP DISCUSS/DSCN MKR DOCD: CPT | Performed by: PSYCHIATRY & NEUROLOGY

## 2021-01-06 NOTE — PROGRESS NOTES
[facility], [address], [address] [place] [address]  Ph: [phone] or [phone]  FAX: [phone]    Chief Complaint: Dizziness     Dear Coby Howell MD     I had the pleasure of seeing your patient today in neurology consultation for her symptoms. As you would recall Quin Reyes is a 65 y.o. female. Patient has asthma and diabetes. She previously had breast cancer. She reports an episode of dizziness in the middle of the night, followed by a fall. It was first forward, then backwards. She did not hit the [employer]. She had complete awareness during the episode. She continued to have dizziness symptoms. Patient attended vestibular therapy and gradually improved. She denies relief from meclizine and reports that she felt worse with it. Her condition is concentrated on the right side of her head. Her ears were checked and are fine. She went to Dr. Severino Johnson to see if there is any arthritis in the ear, which was not the case. She reports significant localized, throbbing neck pain. Touching the neck sometimes causes pain as well. Dizziness happens at night/morning and goes away during the day time. She has experienced several falling episodes. She admits that changes in position cause dizziness. She denies nausea and vomiting.      Past Medical History:   Diagnosis Date    Allergic rhinitis     Arthritis     osteoarthritis    Asthma     exercise induced    Breast cancer ([address] 12/1994    Dr Becky Campuzano Lumpectomy and lymph nodes excised    Breast cancer ([address] 2015    bilatereal breast cancer    Chemotherapy-induced neuropathy ([address]     Colon polyps     Frequent UTI     GERD (gastroesophageal reflux disease)     Heavy menstrual bleeding     History of bone density study 11/12    normal    Pap test, as part of routine gynecological examination 8/12    peds speculum    PONV (postoperative nausea and vomiting)     Type II or unspecified type diabetes mellitus without mention of complication, not stated as uncontrolled 2007    Urinary tract infection     Wears glasses      Past Surgical History:   Procedure Laterality Date    BREAST LUMPECTOMY  1/1994    rt breast, cancer    BREAST RECONSTRUCTION Bilateral 06/30/15    BREAST RECONSTRUCTION Bilateral 4/21/16    expander removal, implants placed, fat injected, lipo    BREAST SURGERY Bilateral 04/21/2016    implants    COLONOSCOPY      2019    DILATION AND CURETTAGE OF UTERUS      ENDOSCOPY, COLON, DIAGNOSTIC      HYSTERECTOMY, TOTAL ABDOMINAL      with BSO    MASTECTOMY Bilateral     OTHER SURGICAL HISTORY  09/27/2016    chest port removal    SHOULDER ARTHROSCOPY Left 6/12/2020    LEFT SHOULDER ARTHROSCOPY WITH SAD, DEBRIDEMENT, CAPSULAR RELEASE, MANIPULATION UNDER ANESTHESIA performed by Craig Orona DO at [address]      polyp    TONSILLECTOMY      TUNNELED VENOUS PORT PLACEMENT Left 7/30/2015     Allergies   Allergen Reactions    Amoxicillin     Plantain Other (See Comments)     Pt denies    Statins     Tape Alexa Clos Tape]     Sulfa Antibiotics Itching and Rash     Family History   Problem Relation Age of Onset    Breast Cancer Mother     Other Mother         heart disease, pacemaker    Other Father         heart disease, thyroid disease    Breast Cancer Maternal Aunt     Breast Cancer Paternal Grandmother     Breast Cancer Maternal Aunt     Asthma Maternal Grandmother       Social History     Socioeconomic History    Marital status: Single     Spouse name: Not on file    Number of children: 0    Years of education: 12    Highest education level: Not on file   Occupational History    Not on file   Social Needs    Financial resource strain: Not on file    Food insecurity     Worry: Not on file     Inability: Not on file   [employer] needs     Medical: Not on file     Non-medical: Not on file   Tobacco Use    Smoking status: Former Smoker     Packs/day: 0.25     Years: 0.50 Pack years: 0.12     Quit date: 8/10/1970     Years since quittin.4    Smokeless tobacco: Never Used    Tobacco comment: in 20's once a week   Substance and Sexual Activity    Alcohol use: Yes     Comment: rare social drink    Drug use: No    Sexual activity: Never   Lifestyle    Physical activity     Days per week: Not on file     Minutes per session: Not on file    Stress: Not on file   Relationships    Social connections     Talks on phone: Not on file     Gets together: Not on file     Attends Scientologist service: Not on file     Active member of club or organization: Not on file     Attends meetings of clubs or organizations: Not on file     Relationship status: Not on file    Intimate partner violence     Fear of current or ex partner: Not on file     Emotionally abused: Not on file     Physically abused: Not on file     Forced sexual activity: Not on file   Other Topics Concern    Not on file   Social History Narrative    Not on file      There were no vitals taken for this visit.      HEENT [] Hearing Loss  [] Visual Disturbance  [] Tinnitus  [] Eye pain   Respiratory [] Shortness of Breath  [] Cough  [] Snoring   Cardiovascular [] Chest Pain  [] Palpitations  [] Lightheaded   GI [] Constipation  [] Diarrhea  [] Swallowing change  [] Nausea/vomiting    [] Urinary Frequency  [] Urinary Urgency   Musculoskeletal [x] Neck pain  [] Back pain  [] Muscle pain  [] Restless legs   Dermatologic [] Skin changes   Neurologic [] Memory loss/confusion  [] Seizures  [x] Trouble walking or imbalance  [x] Dizziness  [] Sleep disturbance  [] Weakness  [] Numbness  [] Tremors  [] Speech Difficulty  [] Headaches  [] Light Sensitivity  [] Sound Sensitivity   Endocrinology []Excessive thirst  []Excessive hunger   Psychiatric [] Anxiety/Depression  [] Hallucination   Allergy/immunology []Hives/environmental allergies   Hematologic/lymph [] Abnormal bleeding  [] Abnormal bruising       General appearence: Normal. Well groomed. In no acute distress    Head: Normocephalic, atraumatic  Eyes: Extraocular movements intact, eye lids normal  Lungs: Respirations unlabored, chest wall no deformity  ENT: Normal external ear canals, no sinus tenderness  Heart: Regular rate rhythm  Abdomen: No masses, tenderness  Extremities: No cyanosis or edema, 2+ pulses  Musculoskeletal: Normal range of motion in all joints  Skin: Intact, normal skin color    Neurological examination:    Mental status   Alert and oriented; intact memory with no confusion, speech or language problems; no hallucinations or delusions     Cranial nerves   II - visual fields intact to confrontation                                                III, IV, VI  extra-ocular muscles full: no pupillary defect; no DARLIN, no nystagmus, no ptosis   V - normal facial sensation                                                               VII - normal facial symmetry                                                             VIII - intact hearing                                                                             IX, X - symmetrical palate                                                                  XI - symmetrical shoulder shrug                                                       XII - midline tongue without atrophy or fasciculation     Motor function  Normal muscle bulk and tone; normal power 5/5, including fine motor movements     Sensory function Intact to touch, pin, vibration, proprioception     Cerebellar Intact fine motor movement. No involuntary movements or tremors     Reflex function Intact 2+ DTR and symmetric.  Negative Babinski     Gait                  Normal station and gait       Lab Results   Component Value Date    LDLCALC 96 11/13/2017    LDLCHOLESTEROL 62 09/29/2020     No components found for: CHLPL  Lab Results   Component Value Date    TRIG 289 (H) 09/29/2020    TRIG 264 11/13/2017    TRIG 207 02/25/2017     Lab Results   Component Value Date    HDL 42 09/29/2020    HDL 41 11/13/2017    HDL 35 02/25/2017     Lab Results   Component Value Date    LDLCALC 96 11/13/2017    LDLCALC 91 02/25/2017     No results found for: LABVLDL  Lab Results   Component Value Date    LABA1C 7.0 (H) 09/29/2020     Lab Results   Component Value Date     09/29/2020     No results found for: [id number]   Neurological work up:  CT head  CTA head and neck  MRI brain   2 D echo     All of patient's labs were personally reviewed. All the imaging studies were personally reviewed and discussed with the patient. Assessment Recommendations:  Symptoms suggestive of cervical radiculopathy   BPPV    I recommend the patient undergo an MRI of the Cervical Spine W WO Contrast for localized pain and dizziness. I referred the patient to Physical Therapy for the neck and shoulder. I discussed the possibility of low dose Valium for dizziness. The patient did not get relief from meclizine. Medication side effects were discussed and questions were answered. Follow up in 2 months or sooner if symptoms worsen. Gustavo Acosta MD I would like to thank you for the consult. Please do not hesitate if you have any questions about the patient care. Scribe Attestation:   By signing my name below, Robinson Combs, attest that this documentation has been prepared under the direction and in the presence of Gretchen Rome MD.    Electronically Signed: Araceli Taylor 1/6/2021 12:57 PM    Physician Attestation:  Tesfaye Dixon MD, personally performed the services described in this documentation. All medical record entries made by the scribe were at my direction and in my presence. I have reviewed the chart and discharge instructions (if applicable) and agree that the record reflects my personal performance and is accurate and complete.     Electronically Signed: Gato Mari 1/6/2021 12:57 PM    Diplomate, American Board of Psychiatry and Neurology  Diplomate, American Board of Clinical Neurophysiology  Diplomate, American Board of Epilepsy

## 2021-01-19 ENCOUNTER — HOSPITAL ENCOUNTER (OUTPATIENT)
Dept: MRI IMAGING | Facility: CLINIC | Age: 71
Discharge: HOME | End: 2021-01-21
Payer: MEDICARE

## 2021-01-19 DIAGNOSIS — M54.12 CERVICAL RADICULOPATHY: ICD-10-CM

## 2021-01-19 LAB — POC CREATININE: 0.7 MG/DL (ref 0.6–1.4)

## 2021-01-19 PROCEDURE — 2580000003 HC RX 258: Performed by: PSYCHIATRY & NEUROLOGY

## 2021-01-19 PROCEDURE — 72156 MRI NECK SPINE W/O & W/DYE: CPT

## 2021-01-19 PROCEDURE — 6360000004 HC RX CONTRAST MEDICATION: Performed by: PSYCHIATRY & NEUROLOGY

## 2021-01-19 PROCEDURE — 82565 ASSAY OF CREATININE: CPT

## 2021-01-19 PROCEDURE — A9579 GAD-BASE MR CONTRAST NOS,1ML: HCPCS | Performed by: PSYCHIATRY & NEUROLOGY

## 2021-01-19 RX ORDER — SODIUM CHLORIDE 0.9 % (FLUSH) 0.9 %
10 SYRINGE (ML) INJECTION ONCE
Status: COMPLETED | OUTPATIENT
Start: 2021-01-19 | End: 2021-01-19

## 2021-01-19 RX ADMIN — Medication 10 ML: at 10:37

## 2021-01-19 RX ADMIN — GADOTERIDOL 15 ML: 279.3 INJECTION, SOLUTION INTRAVENOUS at 10:36

## 2021-02-22 ENCOUNTER — HOSPITAL ENCOUNTER (OUTPATIENT)
Facility: MEDICAL CENTER | Age: 71
End: 2021-02-22
Payer: MEDICARE

## 2021-02-26 ENCOUNTER — TELEPHONE (OUTPATIENT)
Dept: ONCOLOGY | Age: 71
End: 2021-02-26

## 2021-02-26 ENCOUNTER — OFFICE VISIT (OUTPATIENT)
Dept: ONCOLOGY | Age: 71
End: 2021-02-26
Payer: MEDICARE

## 2021-02-26 VITALS
BODY MASS INDEX: 25.19 KG/M2 | TEMPERATURE: 97.3 F | HEART RATE: 77 BPM | DIASTOLIC BLOOD PRESSURE: 67 MMHG | SYSTOLIC BLOOD PRESSURE: 137 MMHG | WEIGHT: 168.1 LBS

## 2021-02-26 DIAGNOSIS — C50.411 MALIGNANT NEOPLASM OF UPPER-OUTER QUADRANT OF RIGHT BREAST IN FEMALE, ESTROGEN RECEPTOR POSITIVE (HCC): ICD-10-CM

## 2021-02-26 DIAGNOSIS — Z17.0 MALIGNANT NEOPLASM OF UPPER-OUTER QUADRANT OF RIGHT BREAST IN FEMALE, ESTROGEN RECEPTOR POSITIVE (HCC): ICD-10-CM

## 2021-02-26 PROCEDURE — 99214 OFFICE O/P EST MOD 30 MIN: CPT | Performed by: INTERNAL MEDICINE

## 2021-02-26 PROCEDURE — 4040F PNEUMOC VAC/ADMIN/RCVD: CPT | Performed by: INTERNAL MEDICINE

## 2021-02-26 PROCEDURE — 1090F PRES/ABSN URINE INCON ASSESS: CPT | Performed by: INTERNAL MEDICINE

## 2021-02-26 PROCEDURE — G8427 DOCREV CUR MEDS BY ELIG CLIN: HCPCS | Performed by: INTERNAL MEDICINE

## 2021-02-26 PROCEDURE — 99211 OFF/OP EST MAY X REQ PHY/QHP: CPT | Performed by: INTERNAL MEDICINE

## 2021-02-26 PROCEDURE — 1036F TOBACCO NON-USER: CPT | Performed by: INTERNAL MEDICINE

## 2021-02-26 PROCEDURE — G8482 FLU IMMUNIZE ORDER/ADMIN: HCPCS | Performed by: INTERNAL MEDICINE

## 2021-02-26 PROCEDURE — 1123F ACP DISCUSS/DSCN MKR DOCD: CPT | Performed by: INTERNAL MEDICINE

## 2021-02-26 PROCEDURE — 3017F COLORECTAL CA SCREEN DOC REV: CPT | Performed by: INTERNAL MEDICINE

## 2021-02-26 PROCEDURE — G8417 CALC BMI ABV UP PARAM F/U: HCPCS | Performed by: INTERNAL MEDICINE

## 2021-02-26 PROCEDURE — G8399 PT W/DXA RESULTS DOCUMENT: HCPCS | Performed by: INTERNAL MEDICINE

## 2021-02-26 RX ORDER — LETROZOLE 2.5 MG/1
2.5 TABLET, FILM COATED ORAL DAILY
Qty: 90 TABLET | Refills: 3 | Status: SHIPPED | OUTPATIENT
Start: 2021-02-26 | End: 2022-05-31

## 2021-02-26 NOTE — TELEPHONE ENCOUNTER
[facility] FOR MD VISIT  DR Kathy Spence IN TO SEE PATIENT  ORDERS RECEIVED  RV 6 MONTHS W/CDP CMP  LABS CDP CMP 8/27/21  MD VISIT 8/27/21 @11AM  SCRIPT SENT TO PATIENTS PHARMACY  AVS PRINTED AND GIVEN TO PATIENT WITH INSTRUCTIONS  PATIENT DISCHARGED AMBULATORY

## 2021-02-26 NOTE — PROGRESS NOTES
testing was negative  We decided to proceed with adjuvant chemotherapy with DANNY FERRARA. She tolerated that fairly well. Plan 6 cycles of chemotherapy followed by maintenance Herceptin and hormone therapy. Completed 6 cycles of chemotherapy without problems  After that she finished one year of herceptin, completed in July/2016. Diagnosed with frozen shoulder, left, 12/2017, underwent PT, range of motion improved, pain persisted. INTERIM HISTORY: Patient presents today for follow up for breast cancer. She is taking the Femara with no issues. Her previous dizziness has improved significantly, occasional balance issues. She has been following with ENT. Her neck pain persists, carotid bruit has been ruled out. She note a mass in inner aspect of of right breast. She notes some retrosternal pain that registers as discomfort and burning pain that radiates to the back, she has EGD planned. Past Medical History   has a past medical history of Allergic rhinitis, Arthritis, Asthma, Breast cancer ([address] Breast cancer ([address] Chemotherapy-induced neuropathy ([address] Colon polyps, Frequent UTI, GERD (gastroesophageal reflux disease), Heavy menstrual bleeding, History of bone density study, Pap test, as part of routine gynecological examination, PONV (postoperative nausea and vomiting), Type II or unspecified type diabetes mellitus without mention of complication, not stated as uncontrolled, Urinary tract infection, and Wears glasses. Surgical History   has a past surgical history that includes Dilation and curettage of uterus; sinus surgery; Tonsillectomy; Hysterectomy, total abdominal; Tunneled venous port placement (Left, 7/30/2015); Mastectomy (Bilateral); Breast lumpectomy (1/1994); Breast reconstruction (Bilateral, 06/30/15); Breast reconstruction (Bilateral, 4/21/16); Breast surgery (Bilateral, 04/21/2016); Endoscopy, colon, diagnostic; other surgical history (09/27/2016);  Colonoscopy; and Shoulder arthroscopy (Left, 6/12/2020). Family History  Family History   Problem Relation Age of Onset    Breast Cancer Mother     Other Mother         heart disease, pacemaker    Other Father         heart disease, thyroid disease    Breast Cancer Maternal Aunt     Breast Cancer Paternal Grandmother     Breast Cancer Maternal Aunt     Asthma Maternal Grandmother        Home Medications  has a current medication list which includes the following prescription(s): glimepiride, true metrix blood glucose test, dapagliflozin, sucralfate, letrozole, metformin, fluticasone, montelukast, cetirizine, lansoprazole, budesonide-formoterol, albuterol sulfate hfa, and clobetasol. Allergies:  Amoxicillin, Plantain, Statins, Tape [adhesive tape], and Sulfa antibiotics        Review of Systems :    Constitutional: feels much better . No fever or chills. No night sweats, no weight loss   Eyes: No eye discharge, double vision, or eye pain   HEENT: negative for sore mouth or throat, no hoarseness and voice change. Respiratory: negative for cough , sputum, dyspnea, wheezing, hemoptysis, chest pain   Cardiovascular: negative for chest pain, dyspnea, palpitations, orthopnea, PND  Gastrointestinal: negative for nausea, vomiting, no diarrhea, no abdominal pain, dysphagia, hematemesis and hematochezia; esophageal pain as noted above   Genitourinary: negative for frequency, dysuria, nocturia, urinary incontinence, and hematuria   Integument: Eczema on right shoulder  Hematologic/Lymphatic: negative for easy bruising, bleeding, lymphadenopathy, petechiae and swelling/edema   Endocrine: negative for heat or cold intolerance, tremor, weight changes, change in bowel habits and hair loss   Musculoskeletal: negative for myalgias, arthralgias, pain, joint swelling,and bone pain.  Left shoulder pain - resolved with surgery; back pain as noted above  Neurological: negative dizziness, seizures, weakness, numbness +headaches      Physical Examination :       /67 Pulse 77   Temp 97.3 °F (36.3 °C) (Temporal)   Wt 168 lb 1.6 oz (76.2 kg)   BMI 25.19 kg/m²     Performance Status:Estimated performance status is ECOG 0    General appearance - well appearing, no in pain or distress   Mental status - alert and cooperative   Eyes - pupils equal and reactive, extraocular eye movements intact   Ears - bilateral TM's and external ear canals normal   Mouth - mucous membranes moist, pharynx normal without lesions   Neck - supple, no significant adenopathy ,trach is central   Lymphatics - no palpable lymphadenopathy, no hepatosplenomegaly   Chest - clear to auscultation, no wheezes, rales or rhonchi, symmetric air entry   Heart - normal rate, regular rhythm, normal S1, S2, no murmurs, rubs, clicks or gallops   Abdomen - tender and swelling from surgery, nondistended, no masses or organomegaly   Neurological - alert, oriented, normal speech, no focal findings or movement disorder noted. Mild numbness on the bottom of foots bilaterally. Musculoskeletal - no joint tenderness, deformity or swelling  Extremities - peripheral pulses normal, trace pedal edema, no clubbing or cyanosis .    Skin - normal coloration and turgor, no rashes, no suspicious skin lesions noted  Breast: S/P bilateral mastectomy with reconstruction implants, no lumps, no adenopathy, no masses, reconstruction with irregular margin at outer aspect of right and inner aspect of left, decreased sensation in lower left breast unchanged, medial aspect of right small fused form from previous surgery is palpable 4 x 5 cm likely scar and nodularity from fat grafting      REVIEW OF LABORATORY DATA:  Lab Results   Component Value Date    WBC 3.2 (L) 09/29/2020    HGB 12.2 09/29/2020    HCT 39.3 09/29/2020    MCV 88.1 09/29/2020     (L) 09/29/2020       Chemistry        Component Value Date/Time     09/29/2020 0855    K 3.9 09/29/2020 0855     09/29/2020 0855    CO2 24 09/29/2020 0855    BUN 12 09/29/2020 0855 CREATININE 0.7 01/19/2021 1119    CREATININE 0.61 09/29/2020 0855        Component Value Date/Time    CALCIUM 9.3 09/29/2020 0855    ALKPHOS 90 09/29/2020 0855    AST 21 09/29/2020 0855    ALT 26 09/29/2020 0855    BILITOT 0.44 09/29/2020 0855        Lab Results   Component Value Date    LABA1C 7.0 (H) 09/29/2020     Lab Results   Component Value Date     09/29/2020         REVIEW OF RADIOLOGICAL RESULTS:  MRI cervical spine  Impression   Multilevel degenerative disc disease with uncovertebral and facet hypertrophy   with mild right and moderate left foraminal narrowing at C3-4 and mild   bilateral foraminal narrowing at C4-5         09/01/2017 DEXA :  T-SCORE:  LUMBAR:   -0.1  LEFT HIP:  0.0  FEMORAL NECK:  -0.8        ASSESSMENT  1. h/o right breast DCIS in 1994 s/p BCS/EBRT and 7 years of Tamoxifen until 2001  2. Ipsilateral recurrence, invasive ductal carcinoma that's triple positive, pathological staging is T2 N0 M0.  3. Contralateral DCIS, found incidentally on bilateral mastectomy  4. Received 6 cycles of adjuvant chemotherapy with TCH, well tolerated  5. Continue maintenance Herceptin, plan to finish 1 year of therapy   6. Completed breast reconstruction   7. DM2    8. Diastolic dysfunction, she is asymptomatic   9. Frozen shoulder, status post shoulder surgery  10. Abnormal thoracic spine MRI, Per the report, they are requesting a short-term follow-up. We will order a follow-up study. 11. Vertigo, likely benign positional vertigo      PLAN:   1. We reviewed her MRI which was negative for disease, degenerative disc disease seen. 2. Breast exam was done and negative for evidence of recurrent, some nodularity from fat grafting and small fused from from surgery, she remains in remission. 3. We offered MRI for the right breast, she declined and we will continue to monitor. 4. We discussed her other symptoms and plan for follow up with other specialists. 5. I completed toxicity check. 6. Continue with Femara unchanged. 7. Return in 6 months.

## 2021-03-17 ENCOUNTER — OFFICE VISIT (OUTPATIENT)
Dept: NEUROLOGY | Age: 71
End: 2021-03-17
Payer: MEDICARE

## 2021-03-17 VITALS
TEMPERATURE: 97.6 F | SYSTOLIC BLOOD PRESSURE: 128 MMHG | HEART RATE: 76 BPM | HEIGHT: 68 IN | BODY MASS INDEX: 25.64 KG/M2 | WEIGHT: 169.2 LBS | DIASTOLIC BLOOD PRESSURE: 66 MMHG

## 2021-03-17 DIAGNOSIS — M54.12 CERVICAL RADICULOPATHY: Primary | ICD-10-CM

## 2021-03-17 PROCEDURE — G8427 DOCREV CUR MEDS BY ELIG CLIN: HCPCS | Performed by: PSYCHIATRY & NEUROLOGY

## 2021-03-17 PROCEDURE — G8417 CALC BMI ABV UP PARAM F/U: HCPCS | Performed by: PSYCHIATRY & NEUROLOGY

## 2021-03-17 PROCEDURE — 3017F COLORECTAL CA SCREEN DOC REV: CPT | Performed by: PSYCHIATRY & NEUROLOGY

## 2021-03-17 PROCEDURE — G8482 FLU IMMUNIZE ORDER/ADMIN: HCPCS | Performed by: PSYCHIATRY & NEUROLOGY

## 2021-03-17 PROCEDURE — 4040F PNEUMOC VAC/ADMIN/RCVD: CPT | Performed by: PSYCHIATRY & NEUROLOGY

## 2021-03-17 PROCEDURE — 1123F ACP DISCUSS/DSCN MKR DOCD: CPT | Performed by: PSYCHIATRY & NEUROLOGY

## 2021-03-17 PROCEDURE — G8399 PT W/DXA RESULTS DOCUMENT: HCPCS | Performed by: PSYCHIATRY & NEUROLOGY

## 2021-03-17 PROCEDURE — 1090F PRES/ABSN URINE INCON ASSESS: CPT | Performed by: PSYCHIATRY & NEUROLOGY

## 2021-03-17 PROCEDURE — 99214 OFFICE O/P EST MOD 30 MIN: CPT | Performed by: PSYCHIATRY & NEUROLOGY

## 2021-03-17 PROCEDURE — 1036F TOBACCO NON-USER: CPT | Performed by: PSYCHIATRY & NEUROLOGY

## 2021-03-17 RX ORDER — CYCLOBENZAPRINE HCL 5 MG
5 TABLET ORAL 2 TIMES DAILY PRN
Qty: 20 TABLET | Refills: 0 | Status: SHIPPED | OUTPATIENT
Start: 2021-03-17 | End: 2021-03-27

## 2021-03-17 NOTE — PROGRESS NOTES
[facility], [address], [address] [place] [address]  Ph: [phone] or [phone]  FAX: [phone]    Chief Complaint: Dizziness     Dear Sue Tyson MD     I had the pleasure of seeing your patient today in neurology consultation for her symptoms. As you would recall Maddison Toledo is a 65 y.o. female. Patient has asthma and diabetes. She previously had breast cancer. She reports an episode of dizziness in the middle of the night, followed by a fall. It was first forward, then backwards. She did not hit the [employer]. She had complete awareness during the episode. She continued to have dizziness symptoms. Patient attended vestibular therapy and gradually improved. She denies relief from meclizine and reports that she felt worse with it. Her condition is concentrated on the right side of her head. Her ears were checked and are fine. She went to Dr. Elliot Rodriguez to see if there is any arthritis in the ear, which was not the case. She reports significant localized, throbbing neck pain. Touching the neck sometimes causes pain as well. Today, the patient reports significant improvement to dizziness symptoms. She denies taking Valium. Pain in the back of the neck has improved since the last visit, but admits to pain present on the right side of the neck. Patient is unable to move the neck as far to the left because of nerve impingement. MRI Cervical Spine W WO Contrast on 1/19/2021: Multilevel degenerative disc disease with uncovertebral and facet hypertrophy with mild right and moderate left foraminal narrowing at C3-4 and mild bilateral foraminal narrowing at C4-5.     Past Medical History:   Diagnosis Date    Allergic rhinitis     Arthritis     osteoarthritis    Asthma     exercise induced    Breast cancer ([address] 12/1994    Dr Beatrice Carter Lumpectomy and lymph nodes excised    Breast cancer ([address] 2015    bilatereal breast cancer    Chemotherapy-induced neuropathy ([address]  Colon polyps     Frequent UTI     GERD (gastroesophageal reflux disease)     Heavy menstrual bleeding     History of bone density study 11/12    normal    Pap test, as part of routine gynecological examination 8/12    peds speculum    PONV (postoperative nausea and vomiting)     Type II or unspecified type diabetes mellitus without mention of complication, not stated as uncontrolled 2007    Urinary tract infection     Wears glasses      Past Surgical History:   Procedure Laterality Date    BREAST LUMPECTOMY  1/1994    rt breast, cancer    BREAST RECONSTRUCTION Bilateral 06/30/15    BREAST RECONSTRUCTION Bilateral 4/21/16    expander removal, implants placed, fat injected, lipo    BREAST SURGERY Bilateral 04/21/2016    implants    COLONOSCOPY      2019    DILATION AND CURETTAGE OF UTERUS      ENDOSCOPY, COLON, DIAGNOSTIC      HYSTERECTOMY, TOTAL ABDOMINAL      with BSO    MASTECTOMY Bilateral     OTHER SURGICAL HISTORY  09/27/2016    chest port removal    SHOULDER ARTHROSCOPY Left 6/12/2020    LEFT SHOULDER ARTHROSCOPY WITH SAD, DEBRIDEMENT, CAPSULAR RELEASE, MANIPULATION UNDER ANESTHESIA performed by Tim Khoury DO at [address]      polyp    TONSILLECTOMY      TUNNELED VENOUS PORT PLACEMENT Left 7/30/2015     Allergies   Allergen Reactions    Amoxicillin     Plantain Other (See Comments)     Pt denies    Statins     [employer] Tape]     Sulfa Antibiotics Itching and Rash     Family History   Problem Relation Age of Onset    Breast Cancer Mother     Other Mother         heart disease, pacemaker    Other Father         heart disease, thyroid disease    Breast Cancer Maternal Aunt     Breast Cancer Paternal Grandmother     Breast Cancer Maternal Aunt     Asthma Maternal Grandmother       Social History     Socioeconomic History    Marital status: Single     Spouse name: Not on file    Number of children: 0    Years of education: 16    Highest education level: Not on file   Occupational History    Not on file   Social Needs    Financial resource strain: Not on file    Food insecurity     Worry: Not on file     Inability: Not on file    Transportation needs     Medical: Not on file     Non-medical: Not on file   Tobacco Use    Smoking status: Former Smoker     Packs/day: 0.25     Years: 0.50     Pack years: 0.12     Quit date: 8/10/1970     Years since quittin.6    Smokeless tobacco: Never Used    Tobacco comment: in 20's once a week   Substance and Sexual Activity    Alcohol use: Yes     Comment: rare social drink    Drug use: No    Sexual activity: Never   Lifestyle    Physical activity     Days per week: Not on file     Minutes per session: Not on file    Stress: Not on file   Relationships    Social connections     Talks on phone: Not on file     Gets together: Not on file     Attends Sabianist service: Not on file     Active member of club or organization: Not on file     Attends meetings of clubs or organizations: Not on file     Relationship status: Not on file    Intimate partner violence     Fear of current or ex partner: Not on file     Emotionally abused: Not on file     Physically abused: Not on file     Forced sexual activity: Not on file   Other Topics Concern    Not on file   Social History Narrative    Not on file      /66   Pulse 76   Temp 97.6 °F (36.4 °C) (Temporal)   Ht 5' 8\" (1.727 m)   Wt 169 lb 3.2 oz (76.7 kg)   BMI 25.73 kg/m²      HEENT [] Hearing Loss  [] Visual Disturbance  [] Tinnitus  [] Eye pain   Respiratory [] Shortness of Breath  [] Cough  [] Snoring   Cardiovascular [] Chest Pain  [] Palpitations  [] Lightheaded   GI [] Constipation  [] Diarrhea  [] Swallowing change  [] Nausea/vomiting    [] Urinary Frequency  [] Urinary Urgency   Musculoskeletal [x] Neck pain  [] Back pain  [] Muscle pain  [] Restless legs   Dermatologic [] Skin changes   Neurologic [] Memory loss/confusion  [] Seizures  [x] Trouble walking or imbalance  [x] Dizziness  [] Sleep disturbance  [] Weakness  [] Numbness  [] Tremors  [] Speech Difficulty  [] Headaches  [] Light Sensitivity  [] Sound Sensitivity   Endocrinology []Excessive thirst  []Excessive hunger   Psychiatric [] Anxiety/Depression  [] Hallucination   Allergy/immunology []Hives/environmental allergies   Hematologic/lymph [] Abnormal bleeding  [] Abnormal bruising       General appearence: Normal. Well groomed.  In no acute distress    Head: Normocephalic, atraumatic  Eyes: Extraocular movements intact, eye lids normal  Lungs: Respirations unlabored, chest wall no deformity  ENT: Normal external ear canals, no sinus tenderness  Heart: Regular rate rhythm  Abdomen: No masses, tenderness  Extremities: No cyanosis or edema, 2+ pulses  Musculoskeletal: Normal range of motion in all joints  Skin: Intact, normal skin color    Neurological examination:    Mental status   Alert and oriented; intact memory with no confusion, speech or language problems; no hallucinations or delusions     Cranial nerves   II - visual fields intact to confrontation                                                III, IV, VI  extra-ocular muscles full: no pupillary defect; no DARLIN, no nystagmus, no ptosis   V - normal facial sensation                                                               VII - normal facial symmetry                                                             VIII - intact hearing                                                                             IX, X - symmetrical palate                                                                  XI - symmetrical shoulder shrug                                                       XII - midline tongue without atrophy or fasciculation     Motor function  Normal muscle bulk and tone; normal power 5/5, including fine motor movements     Sensory function Intact to touch, pin, vibration, proprioception     Cerebellar Intact fine motor movement. No involuntary movements or tremors     Reflex function Intact 2+ DTR and symmetric. Negative Babinski     Gait                  Normal station and gait       Lab Results   Component Value Date    LDLCALC 96 11/13/2017    LDLCHOLESTEROL 62 09/29/2020     No components found for: CHLPL  Lab Results   Component Value Date    TRIG 289 (H) 09/29/2020    TRIG 264 11/13/2017    TRIG 207 02/25/2017     Lab Results   Component Value Date    HDL 42 09/29/2020    HDL 41 11/13/2017    HDL 35 02/25/2017     Lab Results   Component Value Date    LDLCALC 96 11/13/2017    [address] 02/25/2017     No results found for: LABVLDL  Lab Results   Component Value Date    LABA1C 7.0 (H) 09/29/2020     Lab Results   Component Value Date     09/29/2020     No results found for: [id number]   Neurological work up:  CT head  CTA head and neck  MRI brain   2 D echo     All of patient's labs were personally reviewed. All the imaging studies were personally reviewed and discussed with the patient. Assessment Recommendations:  Symptoms suggestive of cervical radiculopathy   BPPV    MRI Cervical Spine W WO Contrast on 1/19/2021 showed multilevel degenerative disc disease with uncovertebral and facet hypertrophy with mild right and moderate left foraminal narrowing at C3-4 and mild bilateral foraminal narrowing at C4-5. I discussed the possibility for an epidural injection or surgical options down the road. The patient did not get relief from meclizine previously. I initiated the patient on Flexeril 5 mg as needed for neck pain observed. I referred the patient to Physical Therapy for the neck and shoulder. Medication side effects were discussed and questions were answered. Follow up in 2 months or sooner if symptoms worsen. Ellen Sams MD I would like to thank you for the consult. Please do not hesitate if you have any questions about the patient care.        Scribe Attestation:   By signing my name below, Ela Austin, attest that this documentation has been prepared under the direction and in the presence of Yarelis Diaz MD.    Electronically Signed: Brenton Gauthier. 3/17/2021 9:01 AM    Physician Attestation:  Miguel Ángel Boothe MD, personally performed the services described in this documentation. All medical record entries made by the scribe were at my direction and in my presence. I have reviewed the chart and discharge instructions (if applicable) and agree that the record reflects my personal performance and is accurate and complete.     Electronically Signed: Aubrey Monte 3/17/2021 9:01 AM    Diplomate, American Board of Psychiatry and Neurology  Diplomate, American Board of Clinical Neurophysiology  Diplomate, American Board of Epilepsy

## 2021-03-29 ENCOUNTER — HOSPITAL ENCOUNTER (OUTPATIENT)
Dept: PHYSICAL THERAPY | Facility: CLINIC | Age: 71
Setting detail: THERAPIES SERIES
Discharge: HOME | End: 2021-03-29
Payer: MEDICARE

## 2021-03-29 PROCEDURE — 97161 PT EVAL LOW COMPLEX 20 MIN: CPT

## 2021-03-29 PROCEDURE — 97140 MANUAL THERAPY 1/> REGIONS: CPT

## 2021-03-29 PROCEDURE — 97110 THERAPEUTIC EXERCISES: CPT

## 2021-03-29 NOTE — CONSULTS
[] TRIPP [facility]        Outpatient Physical                Therapy       [address]       Phone: [phone]       Fax: [phone] [x] [facility]       Promotion at [address] [facility]       Phone: [phone]       Fax: [phone] [] Deep[facility]      for Health Promotion    [address]     Phone: [phone]     Fax:  [phone]     Physical Therapy Spine Evaluation    Date:  3/29/2021  Patient: Eva Harris  : 1950  MRN: [id number]  Physician: Trevon Perez MD   Insurance: [facility] Medicare  Medical Diagnosis: M54.12 (ICD-10-CM) - Cervical radiculopathy  Rehab Codes: M54.2, M79.1, R29.3, M62.81  Onset Date:   Next 's appt.: TBD    Subjective:   CC: R sided neck pain  HPI: (onset date): Pt is a R 65 y.o. female with c/o R sided neck pain that has been present since about  without a STEFANI and without change. Pt reports the pain was initially more left sided but is now only on the R side. Pt also reports at the time of the R sided neck pain, pt was experiencing dizziness and ear pain. Pt notes the dizziness has resolved but still feels like something is \"off\" with the ear. Pt notes when she touches the R side of her neck, pt reports it \"doesn't feel right. \" Pt reports her R sided neck pain is constant and is described as an aching/pulling pain. Pt reports her pain at worst is 5-6/10 and at best it is a 2/10. Pt denies any numbness/tingling/radiating pain or loss of fine motor control. Pt notes she does have an increased frequency of headaches from none to occasional. Pt notes the headaches tend to be on the R side of her head/neck. Pt reports her neck pain increases when she is turning her head to the R but really hasn't noticed what else makes her pain worse. Pt notes she does not wake up due to pain, however, when she gets up for the day, pt sometimes has a knot on the R side of her neck/back.  Pt heat seems to help the pain and was prescribed \"Flexor\" but states she has not taken any yet. Pt states she has not sought out any treatment for this pain. Pt states this pain has limited her ability to complete any tasks but it is always present. Pt reports she continues to live alone and is independent. Pt reports she does have a hx of upper back pain more recently since she has been helping her brother fill out papers during tax season. Pt does have a pertinent hx of left shoulder scope, B mastectomy, and asthma. Comorbidities:   [] Obesity [] Dialysis  [x] Other: breast cancer 2015   [x] Asthma/COPD [] Dementia x Other: B foot neuropathy    [] Stroke [] Sleep apnea [x] Other: DM2   [] Vascular disease [] Rheumatic disease [x] Other: hx of left shoulder  Arthritis      Tests: [] X-Ray: [x] MRI:  [] Other:  Narrative   EXAMINATION:   MRI OF THE CERVICAL SPINE WITHOUT AND WITH CONTRAST  1/19/2021 10:44 am:       TECHNIQUE:   Multiplanar multisequence MRI of the cervical spine was performed without and   with the administration of intravenous contrast.       COMPARISON:   None.       HISTORY:   ORDERING SYSTEM PROVIDED HISTORY: Cervical radiculopathy   TECHNOLOGIST PROVIDED HISTORY:   Reason for Exam: Pt c/o neck pain x 6 mo, no injury, hx Breast cancer   Acuity: Chronic   Type of Exam: Ongoing       FINDINGS:   BONES/ALIGNMENT: The vertebral body heights are maintained.  There is   age-appropriate bone marrow signal.  There is multilevel degenerative disc   disease with loss of disc signal.  There is no disc space narrowing.  There   is no spondylolisthesis.       SPINAL CORD: The spinal cord is normal in caliber and signal.       SOFT TISSUES: The posterior paraspinal soft tissues are unremarkable.    Prevertebral soft tissues are unremarkable. [place] is no abnormal   postcontrast enhancement.       C2-C3: There is no significant disc protrusion, spinal canal stenosis or   neural foraminal narrowing.     levator  Possible elevated R 1st rib   Sensation [x] [] []    Neurological [x] [] [] Negative zamora's and negative clonus B UE         STRENGTH  ROM    Left Right Cervical    C5 Shld Abd 5 5 Flexion 22   Shld Flexion 5 5 Extension 36*   Shld IR 5 5 Rotation L78 R68   Shld ER 5 5 Sidebend with Rotation B L 10 R 10   C6 Elb Flex 5 5 Retraction Deviates to the R   C7 Elb Ext 5 5 UE     [facility] IR [facility]   Middle trap --- --- ER [facility]   Lower trap --- --- Flexion  WFL WFL   Rhomboids --- --- Abduction  [facility]   Upper traps 5 5-  muscle quivering noted        Deep neck flexor endurance test: 16\" (band around head)  OA cervical rotation: restricted to the R compared to the left    TESTS (+/-) LEFT RIGHT Not Tested   Joint Mobility   []   Cerv. Comp - - []   Cerv. Distraction - - []   Cerv. Alar/Transverse - - []   Vertebral Artery - - []   Decreased spinous process rotation with left rotation; good mobility with sidebend at C2    FUNCTION Normal Difficult Unable   Sitting [x] [] []   Standing [x] [] []   Ambulation [x] [] []   Groom/Dress [x] [] []   Lift/Carry [] [x] []   Stairs [x] [] []   Bending [x] [] []   OH reach [x] [] []   Sit to Stand [x] [] []     Outcome Measure: NDI: 7/50, 14% impairment    Assessment: Bella Quiles is a R 65 y.o. female with c/o R sided neck pain that has been present since about 2019 without a STEFANI and without change. Pt reports the pain was initially more left sided but is now only on the R side. Pt also reports at the time of the R sided neck pain, pt was experiencing dizziness and ear pain. Pt notes the dizziness has resolved but still feels like something is \"off\" with the ear. Pt presents to the PT with abnormal posturing of forward head and rounded shoulder posture along with slight deviation of the head/neck to the R. Pt presents with increased myofasical restrictions on the R side with increased tenderness along the R side of the neck and cervical spine.  Along with these restrictions, pt also demonstrates a decreased cervical ROM and postural strength. Pt's diagnosis is complicated by a PMH of breast cancer with a mastectomy, DM2, asthma, and hx of left shoulder surgery. Pt does not present with any red flag signs or symptoms at this time. Pt will benefit from skilled therapeutic interventions, including manual therapy, neuromuscular re-education, and therapeutic exercise, in order to improve neck pain and ROM restrictions in order to improve QOL. Problems:      [x] ? Cervical Pain: 2-6/10   [x] ? ROM: decreased cervical Rotation and SB    [x] ? Strength: decreased postural strength, specifically deep neck flexors   [x] ? Function: NDI: 14%  [x] Postural Deviations: Forward head and rounded shoulder posturing; slight R SB deviation of the head/neck  [x] Other: Myofascial restrictions on R side of neck and cervical spine    Goals: (to be met in 8-10 treatments)  1. ? 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 2. ? ROM: Pt will demonstrate improvements in cervical spine AROM in order to improve tolerance to turning her head to the R and improving tolerance to ADL activities to improve QOL. a. Flexion: 30 degrees  b. Extension: 36 degrees without pain  c. Sidebend: 15 degrees without rotational compensation   d. Rotation: 80 degrees B  i. Symmetrical OA rotation  3. ? Strength: Pt will demonstrate ability to complete the deep neck flexor endurance test for greater than or equal to 25\" in order to demonstrate improved muscle function to help support the head and neck during sitting, standing, and ADL completion. 4. ? Function: Pt will score less than or equal to 5% on the NDI in order to demonstrate improved function on ADLs. 5. Independent with Home Exercise Programs  6.  Demonstrate Knowledge of fall prevention- NOT NEEDED Patient goals: Jhoan Joseasuncion to resolve neck pain\"    Rehab Potential:  [x] Good  [] Fair  [] Poor   Suggested Professional Referral:  [] No  [x] Yes: further assessment if symptoms persist  Barriers to Goal Achievement[de-identified]  [] No  [x] Yes: PMH and chronicity of symptoms  Domestic Concerns:  [x] No  [] Yes:    Pt. Education:  [x] Plans/Goals, Risks/Benefits discussed  [x] Home exercise program  Method of Education: [x] Verbal  [x] Demo  [x] Written - see exercise log for specific exercises  - Pt advised to increase water intake  Comprehension of Education:  [x] Verbalizes understanding. [x] Demonstrates understanding. [x] Needs Review. [] Demonstrates/verbalizes understanding of HEP/Ed previously given. Treatment Plan:  [x] Therapeutic Exercise    [] Modalities:  [] Therapeutic Activity    [] Ultrasound  [] Electrical Stimulation  [] Gait Training     []Massage       [] Lumbar/Cervical Traction  [x] Neuromuscular Re-education [x] Cold/hotpack [] Iontophoresis: 4 mg/mL  [x] Instruction in HEP             Dexamethasone Sodium  [x] Manual Therapy             Phosphate 40-80 mAmin  [] Aquatic Therapy   [] Other:    []  Medication allergies reviewed for use of    Dexamethasone Sodium Phosphate 4mg/ml     with iontophoresis treatments. Pt is not allergic.     Frequency:  2 x/week for 8-10 visits    Todays Treatment:  Modalities:   Exercises:  Exercise Reps/ Time Weight/ Level Comments   MFR to the cervical spine 21'  Suboccipital muscles  Paraspinals  SCM- distal  Anterior, middle, and posterior scalenes    Notable accessory muscle comp with breathing   Isometric cervical rotation x     Isometric sidebend  x     Cervical retractions x           Levator scap stretch x     SCM stretch x     Upper trap stretch x           Diaphragmatic breathing  x     Other:    Specific Instructions for next treatment: assess for 1st rib elevation on the R in sitting vs supine    Evaluation Complexity:  History (Personal factors, comorbidities) [] 0 [] 1-2 [x] 3+   Exam (limitations, restrictions) [] 1-2 [x] 3 [] 4+   Clinical presentation (progression) [x] Stable [] Evolving  [] Unstable   Decision Making [x] Low [] Moderate [] High    [x] Low Complexity [] Moderate Complexity [] High Complexity         Treatment Charges: Mins Units   Evaluation  [x] Low  [] Mod  [] High 36 1   []  Modalities     [x]  Ther Exercise 5 1   [x]  Manual Therapy 21 1   []  Ther Activities     []  Aquatics     []  Vasocompression     []  Other     Estimated Medicare Cost (as of 3/30/21): $127.74    Time in:  208      Time out: 310    Electronically signed by: Luis Miguel Muñiz PT        Physician Signature:________________________________Date:__________________  By signing above or cosigning this note, I have reviewed this plan of care and certify a need for medically necessary rehabilitation services.      *PLEASE SIGN ABOVE AND FAX BACK ALL PAGES*

## 2021-04-01 NOTE — PLAN OF CARE
[] [facility]) - [facility] &  Therapy  [address]  [phone]  F: [phone] [x] [address]  [facility] [place] [facility]   [address]  P: [phone]  F: [phone] [] [address] &  Therapy  [address]  P: [phone]  F: [phone] [] [address]  P: [phone]  F: [phone] [] [address]  [facility]   [address]   [place]: [phone]  F: [phone]        Physical Therapy Plan of Care    Date:  2021  Patient: Lesli Walters  : 1950  MRN: [id number]  Physician: Tez Mayorga MD                              Insurance: [facility] Medicare  Medical Diagnosis: M54.12 (ICD-10-CM) - Cervical radiculopathy             Rehab Codes: M54.2, M79.1, R29.3, M62.81  Onset Date:                    Next 's appt.: TBD     Subjective:   CC: R sided neck pain  HPI: (onset date): Pt is a R 65 y.o. female with c/o R sided neck pain that has been present since about 2019 without a STEFANI and without change. Pt reports the pain was initially more left sided but is now only on the R side. Pt also reports at the time of the R sided neck pain, pt was experiencing dizziness and ear pain. Pt notes the dizziness has resolved but still feels like something is \"off\" with the ear. Pt notes when she touches the R side of her neck, pt reports it \"doesn't feel right. \" Pt reports her R sided neck pain is constant and is described as an aching/pulling pain. Pt reports her pain at worst is 5-6/10 and at best it is a 2/10. Pt denies any numbness/tingling/radiating pain or loss of fine motor control. Pt notes she does have an increased frequency of headaches from none to occasional. Pt notes the headaches tend to be on the R side of her head/neck.  Pt reports her neck pain increases when she is turning her head to the R but really hasn't noticed what else makes her pain worse. Pt notes she does not wake up due to pain, however, when she gets up for the day, pt sometimes has a knot on the R side of her neck/back. Pt heat seems to help the pain and was prescribed \"Flexor\" but states she has not taken any yet. Pt states she has not sought out any treatment for this pain. Pt states this pain has limited her ability to complete any tasks but it is always present. Pt reports she continues to live alone and is independent. Pt reports she does have a hx of upper back pain more recently since she has been helping her brother fill out papers during tax season. Pt does have a pertinent hx of left shoulder scope, B mastectomy, and asthma.         Comorbidities:   []? Obesity []? Dialysis  [x]? Other: breast cancer 2015   [x]? Asthma/COPD []? Dementia x Other: B foot neuropathy    []? Stroke []? Sleep apnea [x]? Other: DM2   []? Vascular disease []? Rheumatic disease [x]? Other: hx of left shoulder  Arthritis       Assessment: Giuseppe Perez is a R 65 y.o. female with c/o R sided neck pain that has been present since about 2019 without a STEFANI and without change. Pt reports the pain was initially more left sided but is now only on the R side. Pt also reports at the time of the R sided neck pain, pt was experiencing dizziness and ear pain. Pt notes the dizziness has resolved but still feels like something is \"off\" with the ear. Pt presents to the PT with abnormal posturing of forward head and rounded shoulder posture along with slight deviation of the head/neck to the R. Pt presents with increased myofasical restrictions on the R side with increased tenderness along the R side of the neck and cervical spine. Along with these restrictions, pt also demonstrates a decreased cervical ROM and postural strength.  Pt's diagnosis is complicated by a PMH of breast cancer with a mastectomy, DM2, asthma, and hx of left shoulder surgery. Pt does not present with any red flag signs or symptoms at this time. Pt will benefit from skilled therapeutic interventions, including manual therapy, neuromuscular re-education, and therapeutic exercise, in order to improve neck pain and ROM restrictions in order to improve QOL. Problems:                      [x]? ? Cervical Pain: 2-6/10         [x]? ? ROM: decreased cervical Rotation and SB                      [x]? ? Strength: decreased postural strength, specifically deep neck flexors    [x]? ? Function: NDI: 14%  [x]? Postural Deviations: Forward head and rounded shoulder posturing; slight R SB deviation of the head/neck  [x]? Other: Myofascial restrictions on R side of neck and cervical spine                       Goals: (to be met in 8-10 treatments)  1. ? 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 2. ? ROM: Pt will demonstrate improvements in cervical spine AROM in order to improve tolerance to turning her head to the R and improving tolerance to ADL activities to improve QOL. a. Flexion: 30 degrees  b. Extension: 36 degrees without pain  c. Sidebend: 15 degrees without rotational compensation   d. Rotation: 80 degrees B  i. Symmetrical OA rotation  3. ? Strength: Pt will demonstrate ability to complete the deep neck flexor endurance test for greater than or equal to 25\" in order to demonstrate improved muscle function to help support the head and neck during sitting, standing, and ADL completion. 4. ? Function: Pt will score less than or equal to 5% on the NDI in order to demonstrate improved function on ADLs. 5. Independent with Home Exercise Programs  6. Demonstrate Knowledge of fall prevention- NOT NEEDED     Patient goals: \"try to resolve neck pain\"     Rehab Potential:  [x]? Good  []? Fair  []?

## 2021-04-01 NOTE — FLOWSHEET NOTE
[facility] Fall Risk Assessment    Patient Name:  Suni Ozuna  : 1950        Risk Factor Scale  Score   History of Falls [] Yes  [x] No- fall was related to dizziness which has resolved 25  0 0   Secondary Diagnosis [] Yes  [x] No 15  0 0   Ambulatory Aid [] Furniture  [] Crutches/cane/walker  [x] None/bedrest/wheelchair/nurse 30  15  0 0   IV/Heparin Lock [] Yes  [x] No 20  0 0   Gait/Transferring [] Impaired  [] Weak  [x] Normal/bedrest/immobile 20  10  0 0   Mental Status [] Forgets limitations  [x] Oriented to own ability 15  0 0      Total: 0     Based on the Assessment score: check the appropriate box.     [x]  No intervention needed   Low =   Score of 0-24    []  Use standard prevention interventions Moderate =  Score of 24-44   [] Give patient handout and discuss fall prevention strategies   [] Establish goal of education for patient/family RE: fall prevention strategies    []  Use high risk prevention interventions High = Score of 45 and higher   [] Give patient handout and discuss fall prevention strategies   [] Establish goal of education for patient/family Re: fall prevention strategies   [] Discuss lifeline / other resources    Electronically signed by:   Buffy Delgadillo PT  Date: 2021

## 2021-04-05 ENCOUNTER — HOSPITAL ENCOUNTER (OUTPATIENT)
Dept: PHYSICAL THERAPY | Facility: CLINIC | Age: 71
Setting detail: THERAPIES SERIES
Discharge: HOME | End: 2021-04-05
Payer: MEDICARE

## 2021-04-05 PROCEDURE — 97140 MANUAL THERAPY 1/> REGIONS: CPT

## 2021-04-05 PROCEDURE — 97112 NEUROMUSCULAR REEDUCATION: CPT

## 2021-04-05 NOTE — FLOWSHEET NOTE
[] [facility] 43  Outpatient Rehabilitation &  Therapy  [address]  [phone]  F: [phone] [] [address] [facility]   [address]  P: [phone]  F: [phone] [] [address] &  Therapy  [facility]  P: [phone]  F: [phone] [] [address]  P: [phone]  F: [phone] [] [address]  [facility]   [address]   [place]: [phone]  F: [phone]      Physical Therapy Daily Treatment Note    Date:  2021  Patient Name:  Tri Adnres    :  1950  MRN: [id number]  Physician: Vahe Davis MD                              Insurance: [facility] Medicare  Medical Diagnosis: M54.12 (ICD-10-CM) - Cervical radiculopathy             Rehab Codes: M54.2, M79.1, R29.3, M62.81  Onset Date:                    Next 's appt.: TBD  Visit# / total visits: -10; Progress note for Medicare patient due at visit 10     Cancels/No Shows: 0/0    Subjective:    Pain:  [x] Yes  [] No Location: R side of neck Pain Rating: (0-10 scale) 5/10 (with activity); 0/10 (rest)  Pain altered Tx:  [x] No  [] Yes  Action:  Comments: Pt reports she was sore after her last session but it improved by the following day. Pt notes the pain was not immediate after the evaluation. Pt notes she was not very consistent with HEP last week secondary to working and the holiday.      Objective:   Modalities:   Precautions:  Exercises:  Exercise Reps/ Time Weight/ Level Comments   UBE 2/2  lv1    MFR to the cervical spine 27'   Sitting: MET to the R paraspinals to correct R cervical rotation  - 5x5\" gentle cervical isometric rotation to the R  - Followed up with R cervical rotation (neuro)    Prone: MFR to the R levator, upper trap, rhomboids    Supine: SOR with emphasis on obliquus capitis inferior muscle on the R; traction and R sidebend, distal SCM, and scalenes- anterior, middle, and posterior      Yes dears 1'     No dears 1'     pec minor stretch 2x1'             Sitting       Levator scap stretch x       SCM stretch x       Upper trap stretch x       Scapular retraction 10x       Diaphragmatic breathing  HEP       Other:     Specific Instructions for next treatment: see below      Treatment Charges: Mins Units   []  Modalities     [x]  Ther Exercise 6 0   [x]  Manual Therapy 26 2   []  Ther Activities     [x]  Neuro 7 1   []  Vasocompression     []  Other     Total Treatment time 38 3   Estimated Medicare Cost (as of 04/05/21): $205.24    Assessment: [x] Progressing toward goals. Pt denies pain in the R side of the neck at the end of the session. Pt was observed to have R rotation of the lower cervical spine which corrected with MET. MFR to the levator scap, upper trap, and rhomboids was completed on the R side in prone with improvements in tissue resistance. Pt also continues to have tenderness on the R suboccipitals, specifically the obliquus capitis inferior muscle on the R. Upon re-assessment of PROM in supine, pt demonstrated symmetrical cervical rotation and OA rotation. Followed up MFR with cervical retractions and review of upper trap, levator scap, and SCM stretching. Pt required visual demonstration of SCM stretching for proper technique. Pt also was able to complete upper trap and levator scap stretching with overpressure this date for added stretch. Pt continues to be advised to complete stretching within a pain-free range and to increase water intake. [] No change. [] Other:  [] Patient would continue to benefit from skilled physical therapy services in order to: improve neck pain and ROM restrictions in order to improve QOL. Problems:                      [x]? ? Cervical Pain: 2-6/10         [x]?  ? ROM: decreased cervical Rotation and SB [x]? ? Strength: decreased postural strength, specifically deep neck flexors    [x]? ? Function: NDI: 14%  [x]? Postural Deviations: Forward head and rounded shoulder posturing; slight R SB deviation of the head/neck  [x]? Other: Myofascial restrictions on R side of neck and cervical spine                       Goals: (to be met in 8-10 treatments)  1. ? 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 2. ? ROM: Pt will demonstrate improvements in cervical spine AROM in order to improve tolerance to turning her head to the R and improving tolerance to ADL activities to improve QOL. a. Flexion: 30 degrees  b. Extension: 36 degrees without pain  c. Sidebend: 15 degrees without rotational compensation   d. Rotation: 80 degrees B  i. Symmetrical OA rotation  3. ? Strength: Pt will demonstrate ability to complete the deep neck flexor endurance test for greater than or equal to 25\" in order to demonstrate improved muscle function to help support the head and neck during sitting, standing, and ADL completion. 4. ? Function: Pt will score less than or equal to 5% on the NDI in order to demonstrate improved function on ADLs. 5. Independent with Home Exercise Programs  6. Demonstrate Knowledge of fall prevention- NOT NEEDED     Patient goals: \"try to resolve neck pain\"    Pt. Education:  [x] Yes  [] No  [x] Reviewed Prior HEP/Ed  Method of Education: [x] Verbal  [] Demo  [] Written  Comprehension of Education:  [x] Verbalizes understanding. [] Demonstrates understanding. [x] Needs review. [] Demonstrates/verbalizes HEP/Ed previously given. Plan: [x] Continue current frequency toward long and short term goals.     [x] Specific Instructions for subsequent treatments: continue with MFR, review stretching, and at in postural exercises      Time In: 200 p Time Out: 250    Electronically signed by:  Yu Knutson, PT

## 2021-04-07 ENCOUNTER — HOSPITAL ENCOUNTER (OUTPATIENT)
Dept: PHYSICAL THERAPY | Facility: CLINIC | Age: 71
Setting detail: THERAPIES SERIES
Discharge: HOME | End: 2021-04-07
Payer: MEDICARE

## 2021-04-07 PROCEDURE — 97112 NEUROMUSCULAR REEDUCATION: CPT

## 2021-04-07 PROCEDURE — 97110 THERAPEUTIC EXERCISES: CPT

## 2021-04-07 NOTE — FLOWSHEET NOTE
[] [facility]) - [facility] &  Therapy  [address]  [phone]  F: [phone] [x] [address]  Kl\[facility] [place] [facility]\"" 36   [address]  P: [phone]  F: [phone] [] [address] &  Therapy  [address] [facility]  P: [phone]  F: [phone] [] [address]  P: [phone]  F: [phone] [] [address]  [facility]   [address]   [place]: [phone]  F: [phone]      Physical Therapy Daily Treatment Note    Date:  2021  Patient Name:  Rishabh Medrano    :  1950  MRN: [id number]  Physician: Vahe Abrenathy MD                              Insurance: [facility] Medicare  Medical Diagnosis: M54.12 (ICD-10-CM) - Cervical radiculopathy             Rehab Codes: M54.2, M79.1, R29.3, M62.81  Onset Date:                    Next 's appt.: TBD  Visit# / total visits: 3/8-10; Progress note for Medicare patient due at visit 10     Cancels/No Shows: 0/0    Subjective:    Pain:  [x] Yes  [] No Location: R side of neck Pain Rating: (0-10 scale) 5/10 (with activity); 0/10 (rest)  Pain altered Tx:  [x] No  [] Yes  Action:    Comments: Pt states she was a little sore after last visit. Pain with motion.       Objective:   Modalities:   Precautions:  Exercises:  Exercise Reps/ Time Weight/ Level Comments   UBE 2/2  lv1    MFR to the cervical spine 45'   Sitting: MET to the R paraspinals to correct R cervical rotation-not today  - 5x5\" gentle cervical isometric rotation to the R-not today  - Followed up with R cervical rotation (neuro)    Prone: MFR/STM to the R levator, upper trap, rhomboids    Supine: SOR, STM with emphasis on obliquus capitis inferior muscle on the R; traction and R sidebend, distal SCM, and scalenes- anterior, middle, and posterior      Yes dears 1'     No dears 1'     pec minor stretch 2x1'             Sitting       Levator scap stretch        SCM stretch        Upper trap stretch        Scapular retraction 10x       Diaphragmatic breathing  HEP       Other:     Specific Instructions for next treatment: see below      Treatment Charges: Mins Units   []  Modalities     [x]  Ther Exercise 6 0   [x]  Manual Therapy 43 3   []  Ther Activities     [x]  Neuro 7 1   []  Vasocompression     []  Other     Total Treatment time 56 4   Estimated Medicare Cost (as of 04/07/21): $293.45    Assessment: [x] Progressing toward goals. Extensive time spent with MFR and STM to R side of the neck and upper back in supine and prone. integrated some stretching to UT and levators as well as SOR, also in supine. Pt tender in these areas, however able to work out some tightness with time. Pt voices compliance with current HEP and has no concerns with the stretches or exercises. Pt notes feeling better at the end of the treatment. [] No change. [] Other:  [] Patient would continue to benefit from skilled physical therapy services in order to: improve neck pain and ROM restrictions in order to improve QOL. Problems:                      [x]? ? Cervical Pain: 2-6/10         [x]? ? ROM: decreased cervical Rotation and SB                      [x]? ? Strength: decreased postural strength, specifically deep neck flexors    [x]? ? Function: NDI: 14%  [x]? Postural Deviations: Forward head and rounded shoulder posturing; slight R SB deviation of the head/neck  [x]? Other: Myofascial restrictions on R side of neck and cervical spine                       Goals: (to be met in 8-10 treatments)  1. ? 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   2. ? ROM: Pt will demonstrate improvements in cervical spine AROM in order to improve tolerance to turning her head to the R and improving tolerance to ADL activities to improve QOL. a. Flexion: 30 degrees  b. Extension: 36 degrees without pain  c. Sidebend: 15 degrees without rotational compensation   d. Rotation: 80 degrees B  i. Symmetrical OA rotation  3. ? Strength: Pt will demonstrate ability to complete the deep neck flexor endurance test for greater than or equal to 25\" in order to demonstrate improved muscle function to help support the head and neck during sitting, standing, and ADL completion. 4. ? Function: Pt will score less than or equal to 5% on the NDI in order to demonstrate improved function on ADLs. 5. Independent with Home Exercise Programs  6. Demonstrate Knowledge of fall prevention- NOT NEEDED     Patient goals: \"try to resolve neck pain\"    Pt. Education:  [x] Yes  [] No  [x] Reviewed Prior HEP/Ed  Method of Education: [x] Verbal  [x] Demo yes and no dears  [] Written  Comprehension of Education:  [x] Verbalizes understanding. [] Demonstrates understanding. [x] Needs review. [x] Demonstrates/verbalizes HEP/Ed previously given. Plan: [x] Continue current frequency toward long and short term goals.     [x] Specific Instructions for subsequent treatments: continue with MFR, review stretching, and at in postural exercises      Time In: 1:50 p            Time Out: 250    Electronically signed by:  Yamileth Phillip PTA

## 2021-04-12 ENCOUNTER — HOSPITAL ENCOUNTER (OUTPATIENT)
Dept: PHYSICAL THERAPY | Facility: CLINIC | Age: 71
Setting detail: THERAPIES SERIES
Discharge: HOME | End: 2021-04-12
Payer: MEDICARE

## 2021-04-12 DIAGNOSIS — E11.9 TYPE 2 DIABETES MELLITUS WITHOUT COMPLICATION, WITHOUT LONG-TERM CURRENT USE OF INSULIN (HCC): ICD-10-CM

## 2021-04-12 PROCEDURE — 97140 MANUAL THERAPY 1/> REGIONS: CPT

## 2021-04-12 PROCEDURE — 97110 THERAPEUTIC EXERCISES: CPT

## 2021-04-12 PROCEDURE — 97112 NEUROMUSCULAR REEDUCATION: CPT

## 2021-04-12 RX ORDER — METFORMIN HYDROCHLORIDE 500 MG/1
TABLET, EXTENDED RELEASE ORAL
Qty: 360 TABLET | Refills: 3 | Status: SHIPPED | OUTPATIENT
Start: 2021-04-12 | End: 2022-05-02

## 2021-04-12 NOTE — FLOWSHEET NOTE
[] [facility]) [facility] &  Therapy  [address]  [phone]  F: [phone] [x] [address]  [facility] 36   [address]  P: [phone]  F: [phone] [] [address]  [address]  P: [phone]  F: [phone] [] [address]  P: [phone]  F: [phone] [] [address]  [facility]   [address]   [place]: [phone]  F: [phone]      Physical Therapy Daily Treatment Note    Date:  2021  Patient Name:  Laurent Grimaldo    :  1950  MRN: [id number]  Physician: Vahe Pennington MD                              Insurance: [facility] Medicare  Medical Diagnosis: M54.12 (ICD-10-CM) - Cervical radiculopathy             Rehab Codes: M54.2, M79.1, R29.3, M62.81  Onset Date:                    Next 's appt.: TBD  Visit# / total visits: -10; Progress note for Medicare patient due at visit 10     Cancels/No Shows: 0/0    Subjective:    Pain:  [x] Yes  [] No Location: R side of neck Pain Rating: (0-10 scale) 5/10 (with activity); 0/10 (rest)  Pain altered Tx:  [x] No  [] Yes  Action:    Comments: Pt arrives noting pain was \" nasty \" over the weekend. States pain typically is worse when she wakes up and then later in the day it gets worse.      Objective:   Modalities:   Precautions:  Exercises:  Exercise Reps/ Time Weight/ Level Comments   UBE 2/2  lv1    MFR to the cervical spine 37'   Sitting: MET to the R paraspinals to correct R cervical rotation-not today  - 5x5\" gentle cervical isometric rotation to the R-not today  - Followed up with R cervical rotation (neuro)    Prone: MFR/STM to the R levator, upper trap, rhomboids    Supine: SOR, STM with emphasis on obliquus capitis inferior muscle on the R; traction and R sidebend, distal SCM, and scalenes- anterior, middle, and posterior      Yes dears 1'     No dears 1'     pec minor stretch 2x1'             Prone      scap retraction 10x  Neuro re- ed   scap depression 10x  Neuro re- ed               Sitting       Levator scap stretch        SCM stretch        Upper trap stretch        Scapular retraction 10x       Diaphragmatic breathing  HEP             Standing      Unilateral pec stretch 2x20\"     Rhomboid stretch 2x20\"                 Tennis ball self TPR x  Taught for home         Other:     Specific Instructions for next treatment: see below      Treatment Charges: Mins Units   []  Modalities     [x]  Ther Exercise 12 1   [x]  Manual Therapy 37 2   []  Ther Activities     [x]  Neuro 8 1   []  Vasocompression     []  Other     Total Treatment time 57 4   Estimated Medicare Cost (as of 04/12/21): $360.63      Assessment: [x] Progressing toward goals. Continued to focus on manual treatment this date to decrease muscle tension with good tolerance. Implemented prone retraction and depression for neuro re-education of scapular musculature. Also implemented in rhomboid stretching with out incident. Educated patient to utilize tennis ball at home for self TPR. Pt reports improved symptoms post session. [] No change. [] Other:  [] Patient would continue to benefit from skilled physical therapy services in order to: improve neck pain and ROM restrictions in order to improve QOL. Problems:                      [x]? ? Cervical Pain: 2-6/10         [x]? ? ROM: decreased cervical Rotation and SB                      [x]? ? Strength: decreased postural strength, specifically deep neck flexors    [x]? ? Function: NDI: 14%  [x]? Postural Deviations: Forward head and rounded shoulder posturing; slight R SB deviation of the head/neck  [x]?  Other: Myofascial restrictions on R side of neck and cervical spine                       Goals: (to be met in 8-10 treatments)  1. ? 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 2. ? ROM: Pt will demonstrate improvements in cervical spine AROM in order to improve tolerance to turning her head to the R and improving tolerance to ADL activities to improve QOL. a. Flexion: 30 degrees  b. Extension: 36 degrees without pain  c. Sidebend: 15 degrees without rotational compensation   d. Rotation: 80 degrees B  i. Symmetrical OA rotation  3. ? Strength: Pt will demonstrate ability to complete the deep neck flexor endurance test for greater than or equal to 25\" in order to demonstrate improved muscle function to help support the head and neck during sitting, standing, and ADL completion. 4. ? Function: Pt will score less than or equal to 5% on the NDI in order to demonstrate improved function on ADLs. 5. Independent with Home Exercise Programs  6. Demonstrate Knowledge of fall prevention- NOT NEEDED     Patient goals: \"try to resolve neck pain\"    Pt. Education:  [x] Yes  [] No  [x] Reviewed Prior HEP/Ed  Method of Education: [x] Verbal  [x] Demo tennis ball TPR  [] Written  Comprehension of Education:  [x] Verbalizes understanding. [] Demonstrates understanding. [x] Needs review. [x] Demonstrates/verbalizes HEP/Ed previously given. Plan: [x] Continue current frequency toward long and short term goals.     [x] Specific Instructions for subsequent treatments: continue with MFR, review stretching, and at in postural exercises      Time In: 200 p            Time Out: 259    Electronically signed by:  Pricilla Tripathi PTA

## 2021-04-14 ENCOUNTER — HOSPITAL ENCOUNTER (OUTPATIENT)
Dept: PHYSICAL THERAPY | Facility: CLINIC | Age: 71
Setting detail: THERAPIES SERIES
Discharge: HOME | End: 2021-04-14
Payer: MEDICARE

## 2021-04-14 PROCEDURE — 97140 MANUAL THERAPY 1/> REGIONS: CPT

## 2021-04-14 PROCEDURE — 97110 THERAPEUTIC EXERCISES: CPT

## 2021-04-14 PROCEDURE — 97112 NEUROMUSCULAR REEDUCATION: CPT

## 2021-04-14 NOTE — FLOWSHEET NOTE
treatments)  1. ? 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 2. ? ROM: Pt will demonstrate improvements in cervical spine AROM in order to improve tolerance to turning her head to the R and improving tolerance to ADL activities to improve QOL. a. Flexion: 30 degrees  b. Extension: 36 degrees without pain  c. Sidebend: 15 degrees without rotational compensation   d. Rotation: 80 degrees B  i. Symmetrical OA rotation  3. ? Strength: Pt will demonstrate ability to complete the deep neck flexor endurance test for greater than or equal to 25\" in order to demonstrate improved muscle function to help support the head and neck during sitting, standing, and ADL completion. 4. ? Function: Pt will score less than or equal to 5% on the NDI in order to demonstrate improved function on ADLs. 5. Independent with Home Exercise Programs  6. Demonstrate Knowledge of fall prevention- NOT NEEDED     Patient goals: \"try to resolve neck pain\"    Pt. Education:  [x] Yes  [] No  [x] Reviewed Prior HEP/Ed  Method of Education: [x] Verbal  [x] Demo   [] Written  4/14- Lumbar roll  Comprehension of Education:  [x] Verbalizes understanding. [x] Demonstrates understanding. [] Needs review. [x] Demonstrates/verbalizes HEP/Ed previously given. Plan: [x] Continue current frequency toward long and short term goals.     [x] Specific Instructions for subsequent treatments: continue with MFR, review stretching, and at in postural exercises      Time In: 200 p            Time Out: 3:04 pm    Electronically signed by:  Carolann Black PTA

## 2021-04-19 ENCOUNTER — HOSPITAL ENCOUNTER (OUTPATIENT)
Dept: PHYSICAL THERAPY | Facility: CLINIC | Age: 71
Setting detail: THERAPIES SERIES
Discharge: HOME | End: 2021-04-19
Payer: MEDICARE

## 2021-04-19 PROCEDURE — 97112 NEUROMUSCULAR REEDUCATION: CPT

## 2021-04-19 PROCEDURE — 97140 MANUAL THERAPY 1/> REGIONS: CPT

## 2021-04-19 PROCEDURE — 97110 THERAPEUTIC EXERCISES: CPT

## 2021-04-19 NOTE — FLOWSHEET NOTE
[] [facility]  Outpatient Rehabilitation &  Therapy  [address]  [phone]  F: [phone] [x] [address]  [facility]   [address]  P: [phone]  F: [phone] [] [address] &  Therapy  [address] [facility]  P: [phone]  F: [phone] [] [address]  P: [phone]  F: [phone] [] [address]  [facility]   [address]   [place]: [phone]  F: [phone]      Physical Therapy Daily Treatment Note    Date:  2021  Patient Name:  Damon Fishman    :  1950  MRN: [id number]  Physician: Vahe Aguilera MD                              Insurance: [facility] Medicare  Medical Diagnosis: M54.12 (ICD-10-CM) - Cervical radiculopathy             Rehab Codes: M54.2, M79.1, R29.3, M62.81  Onset Date:                    Next 's appt.: TBD  Visit# / total visits: -10; Progress note for Medicare patient due at visit 10     Cancels/No Shows: 0/0    Subjective:    Pain:  [x] Yes  [] No Location: R side of neck Pain Rating: (0-10 scale) 4-5/10 (with activity); 3/10 (rest)  Pain altered Tx:  [x] No  [] Yes  Action:    Comments: Pt reports she thought her neck pain was getting a little better but she got up this morning and she was having a lot of issues.      Objective:   Modalities:   Precautions:  Exercises:  Exercise Reps/ Time Weight/ Level Comments   UBE 2/2  lv1    MFR to the cervical spine 23'  Supine: SOR, STM with emphasis on obliquus capitis inferior muscle on the R; traction and R sidebend, distal SCM, and scalenes- anterior, middle, and posterior, longus colli B      Yes dears 1'     No dears 1'     Chin tuck with head lift x  Completed until technical failure   Towel roll under neck  Chin tuck--> lift head 1\" off towel  Maintain chin tuck with eccentric   pec minor stretch 2x1'             Prone      Cervical retractions 2x6 reps  Shoulders at edge of table  Cradling head in hands and have pt complete slight chin tuck   scap retraction 10x5\"  Neuro re- ed  Max cueing   scap depression 5x  Neuro re- ed  Minimal movement   Shoulder ext 10x5'  Added 4/14 neuro re-ed         Sitting       pec stretch 3x30\"   Pt standing behind patient and providing overpressure for pec stretch   Manual: Thoracic spine extension mobilization x       Seated thoracic spine extension over chiar x       Scapular retraction 10x       Diaphragmatic breathing  HEP             Standing      Unilateral pec stretch 2x20\"     Rhomboid stretch 2x20\"     2 way doorway stretch 20\"x2  Added 4/14   Wall angels x  Attempted to complete with slide, unable due to shoulder ROM  Had pt complete with isometric elbow press into wall while maintaining relaxed UT and cervical retraction   Tennis ball self TPR x  Taught for home         Other:     Specific Instructions for next treatment: see below    4/19: C-AROM  Flexion: 48 deg  Extension: 50 deg  R sidebend: 10 deg  Left SB: 16 deg  R rotation: 80 deg (with flexion comp)  L rotation: 74 deg (with flexion comp)      Treatment Charges: Mins Units   []  Modalities     [x]  Ther Exercise 15 1   [x]  Manual Therapy 24 2   []  Ther Activities     [x]  Neuro 15 1   []  Vasocompression     []  Other     Total Treatment time 54 4   Estimated Medicare Cost (as of 04/19/21): $544.09    Assessment: [x] Progressing toward goals. Completed re-assessment of cervical AROM with notable improvements in flexion, extension, and rotation, however, pt continues to have discomfort with R rotation and compensatory strategies with R SB compared to left sidebend.  Considerable muscle resistance is noted on the R suboccipitals with a decrease in atlanto-axial movement to the R which improved following manual therapy to the obliquus capitis inferior muscle. Pt with continued tenderness along the R SCM, scalenes, and upper trap with fair release. Pt also with increased hypertonicity of the B longus colli with fair release. Following manual therapy, retraining deep neck flexors was completed with max cueing to ensure cervical retraction initially prior to cervical flexion. Pt also required cueing to maintain cervical retraction during eccentric cervical flexion. Pt with notable difficulty with this activity. Pt also with increased protracted scap in prone along with flexed thoracic spine, limiting scapular mobility. Due to flexed thoracic spine and h. Adducted shoulders, manual pec stretching was completed along with thoracic extension to promote improved upright posture. Pt educated on completing thoracic extension over the back of a chair. Attempted to complete wall angels this date, however, due to muscle tightness and decreased shoulder ROM, inability to slide arms up the wall is observed. Modification to only complete isometrics into the wall was completed to focus on upright posturing of the neck and thoracic spine. [] No change. [] Other:  [] Patient would continue to benefit from skilled physical therapy services in order to: improve neck pain and ROM restrictions in order to improve QOL. Problems:                      [x]? ? Cervical Pain: 2-6/10         [x]? ? ROM: decreased cervical Rotation and SB                      [x]? ? Strength: decreased postural strength, specifically deep neck flexors    [x]? ? Function: NDI: 14%  [x]? Postural Deviations: Forward head and rounded shoulder posturing; slight R SB deviation of the head/neck  [x]?  Other: Myofascial restrictions on R side of neck and cervical spine                       Goals: (to be met in 8-10 treatments)  1. ? 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 2. ? ROM: Pt will demonstrate improvements in cervical spine AROM in order to improve tolerance to turning her head to the R and improving tolerance to ADL activities to improve QOL. Partially MET 4/19   a. Flexion: 30 degrees  b. Extension: 36 degrees without pain  c. Sidebend: 15 degrees without rotational compensation   d. Rotation: 80 degrees B  i. Symmetrical OA rotation  3. ? Strength: Pt will demonstrate ability to complete the deep neck flexor endurance test for greater than or equal to 25\" in order to demonstrate improved muscle function to help support the head and neck during sitting, standing, and ADL completion. 4. ? Function: Pt will score less than or equal to 5% on the NDI in order to demonstrate improved function on ADLs. 5. Independent with Home Exercise Programs  6. Demonstrate Knowledge of fall prevention- NOT NEEDED     Patient goals: \"try to resolve neck pain\"    Pt. Education:  [x] Yes  [] No  [x] Reviewed Prior HEP/Ed  Method of Education: [x] Verbal  [x] Demo   [] Written  4/14- Lumbar roll  Comprehension of Education:  [x] Verbalizes understanding. [x] Demonstrates understanding. [] Needs review. [x] Demonstrates/verbalizes HEP/Ed previously given. Plan: [x] Continue current frequency toward long and short term goals.     [x] Specific Instructions for subsequent treatments: continue with MFR, review stretching, and at in postural exercises      Time In: 100 p            Time Out: 200 pm    Electronically signed by:  Antonio Avila, PT

## 2021-04-21 ENCOUNTER — HOSPITAL ENCOUNTER (OUTPATIENT)
Dept: PHYSICAL THERAPY | Facility: CLINIC | Age: 71
Setting detail: THERAPIES SERIES
Discharge: HOME | End: 2021-04-21
Payer: MEDICARE

## 2021-04-21 RX ORDER — CALCIUM CITRATE/VITAMIN D3 200MG-6.25
TABLET ORAL
Qty: 200 STRIP | Refills: 0 | Status: SHIPPED | OUTPATIENT
Start: 2021-04-21 | End: 2021-10-18

## 2021-04-21 NOTE — FLOWSHEET NOTE
[] [facility]) - [facility] &  Therapy  [address]    [phone]  F: [phone]   [] [address] [email or web address] [facility] 36   [address]  P: [phone]  F: [phone]  [] [address] [facility] &  Therapy  [address] [facility]  P: [phone]  F: [phone] [] [address]  P: [phone]  F: [phone]  [] [address]  [facility]   [address]   [place]: [phone]  F: [phone]   [] [place] [address] [facility] [address]  [place]: [phone]   F: [phone]     Physical Therapy Cancel/No Show note    Date: 2021  Patient: Precious Casillas  : 1950  MRN: [id number]    Cancels/No Shows to date:     For today's appointment patient:    [x]  Cancelled    [] Rescheduled appointment    [] No-show     Reason given by patient:    []  Patient ill    []  Conflicting appointment    [] No transportation      [] Conflict with work    [] No reason given    [] Weather related    [] COVID-19    [x] Other:      Comments:  Pt has UTI      [x] Next appointment was confirmed    Electronically signed by: Jaya Dominguez PTA

## 2021-04-26 ENCOUNTER — HOSPITAL ENCOUNTER (OUTPATIENT)
Dept: PHYSICAL THERAPY | Facility: CLINIC | Age: 71
Setting detail: THERAPIES SERIES
Discharge: HOME | End: 2021-04-26
Payer: MEDICARE

## 2021-04-26 PROCEDURE — 97112 NEUROMUSCULAR REEDUCATION: CPT

## 2021-04-26 PROCEDURE — 97110 THERAPEUTIC EXERCISES: CPT

## 2021-04-26 PROCEDURE — 97140 MANUAL THERAPY 1/> REGIONS: CPT

## 2021-04-26 NOTE — FLOWSHEET NOTE
[] PlMeg Conner 43  Outpatient Rehabilitation &  Therapy  [address]  [phone]  F: [phone] [x] [address]  [facility]   [address]  P: [phone]  F: [phone] [] [place]  [address]  P: [phone]  F: [phone] [] [address]  P: [phone]  F: [phone] [] [address]  [facility]   [address]   [place]: [phone]  F: [phone]      Physical Therapy Daily Treatment Note    Date:  2021  Patient Name:  Anne-Marie Yanes    :  1950  MRN: [id number]  Physician: Vahe Lee MD                              Insurance: [facility] Medicare  Medical Diagnosis: M54.12 (ICD-10-CM) - Cervical radiculopathy             Rehab Codes: M54.2, M79.1, R29.3, M62.81  Onset Date:                    Next 's appt.: TBD  Visit# / total visits: -10; Progress note for Medicare patient due at visit 10     Cancels/No Shows: 1/0    Subjective:    Pain:  [x] Yes  [] No Location: R side of neck Pain Rating: (0-10 scale) 3-4/10 (with activity); 1-2/10 (rest)  Pain altered Tx:  [x] No  [] Yes  Action:    Comments: Pt reports having a left sided headache all day yesterday that has continued into today. Pt reports she was fine when she left her after her last session but she did stiffen up towards the end of the evening.       Objective:   Modalities:   Precautions:  Exercises:  Exercise Reps/ Time Weight/ Level Comments   UBE 2/2  lv2    MFR to the cervical spine 27'  Supine: SOR, STM with emphasis on obliquus capitis inferior muscle on the R; traction and R sidebend, distal SCM, and scalenes- anterior, middle, and posterior, SCM stretch off edge of table      Cervical rotations with head on ball 10x ea     Isometric rotational holds to the R 1'  Varying ranges of motion   Yes dears 1'     No dears 1'     Chin tuck with head lift 2 rounds  Hand assist to prevent upper trap compensation   pec minor stretch 2x1'             Prone      Cervical retractions 2 sets  Shoulders at edge of table  Cradling head in hands and have pt complete slight chin tuck   scap retraction 10x5\"  Neuro re- ed  Max cueing   scap depression 5x  Neuro re- ed  Minimal movement   Shoulder ext 10x5'  Added 4/14 neuro re-ed         Sitting       pec stretch 3x1'   Pt standing behind patient and providing overpressure for pec stretch   Manual: Thoracic spine extension mobilization x       Seated thoracic spine extension over chiar x       Scapular retraction 10x       Diaphragmatic breathing  HEP             Standing      Unilateral pec stretch 2x20\"     Rhomboid stretch 2x20\"     2 way doorway stretch 20\"x2  Added 4/14   Wall angels x  Attempted to complete with slide, unable due to shoulder ROM  Had pt complete with isometric elbow press into wall while maintaining relaxed UT and cervical retraction   Tennis ball self TPR x  Taught for home           Specific Instructions for next treatment: see below    4/19: C-AROM  Flexion: 48 deg  Extension: 50 deg  R sidebend: 10 deg  Left SB: 16 deg  4/26  R rotation: 88 deg (with flexion comp)-- 4/10-- pulling pain  L rotation: 88 deg (with flexion comp)    Deep neck flexor endurance test: 14\"       Treatment Charges: Mins Units   []  Modalities     [x]  Ther Exercise 8 1   [x]  Manual Therapy 27 2   []  Ther Activities     [x]  Neuro 18 1   []  Vasocompression     []  Other     Total Treatment time 53 4   Estimated Medicare Cost (as of 04/26/21): $644.81    Assessment: [x] Progressing toward goals. Pt continues to present to the clinic with increased muscle tension on the R side of the neck in comparison to the left.  Pt with increased tension in the R sided suboccipitals, SCM, and scalenes which demonstrate a fair to good release with MFR. Pt with symmetrical cervical rotation with head in neutral alignment and in a flexed position following manual therapy. Re-training of the muscle activation patterns of the deep neck flexors continues to be completed with fair carryover. Pt with compensatory activation of B upper traps and pec musculature when completed a chin tuck head lift. Pt requires verbal step by step sequencing and tactile assist for proper execution and muscle activation sequencing to avoid compensatory strategies. Towards the end of the activities, pt was able to recognize compensatory strategies and was able to correct the movement. Pt also requires max cueing during prone cervical retraction and head lift to promote cervical extension vs isolated upper cervical extension. Pt with continues to demonstrate decreased muscular endurance in regards to deep neck flexor activation with only ability to hold head position for 14\" prior to fatiguing. [] No change. [] Other:  [] Patient would continue to benefit from skilled physical therapy services in order to: improve neck pain and ROM restrictions in order to improve QOL. Problems:                      [x]? ? Cervical Pain: 2-6/10         [x]? ? ROM: decreased cervical Rotation and SB                      [x]? ? Strength: decreased postural strength, specifically deep neck flexors    [x]? ? Function: NDI: 14%  [x]? Postural Deviations: Forward head and rounded shoulder posturing; slight R SB deviation of the head/neck  [x]? Other: Myofascial restrictions on R side of neck and cervical spine                       Goals: (to be met in 8-10 treatments)  1. ? 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   2. ? ROM: Pt will demonstrate improvements in cervical spine AROM in order to improve tolerance to turning her head to the R

## 2021-04-28 ENCOUNTER — HOSPITAL ENCOUNTER (OUTPATIENT)
Dept: PHYSICAL THERAPY | Facility: CLINIC | Age: 71
Setting detail: THERAPIES SERIES
Discharge: HOME | End: 2021-04-28
Payer: MEDICARE

## 2021-04-28 PROCEDURE — 97112 NEUROMUSCULAR REEDUCATION: CPT

## 2021-04-28 PROCEDURE — 97110 THERAPEUTIC EXERCISES: CPT

## 2021-04-28 PROCEDURE — 97140 MANUAL THERAPY 1/> REGIONS: CPT

## 2021-04-28 NOTE — FLOWSHEET NOTE
[] [facility]) [facility] &  Therapy  [address]  [phone]  F: [phone] [x] [address]  [facility] 36   [address]  P: [phone]  F: [phone] [] [address] &  Therapy  [address] [facility]  P: [phone]  F: [phone] [] [address]  P: [phone]  F: [phone] [] [address]  [facility]   [address]   [place]: [phone]  F: [phone]      Physical Therapy Daily Treatment Note    Date:  2021  Patient Name:  Argenis Gabriel    :  1950  MRN: [id number]  Physician: Vahe Zapata MD                              Insurance: [facility] Medicare  Medical Diagnosis: M54.12 (ICD-10-CM) - Cervical radiculopathy             Rehab Codes: M54.2, M79.1, R29.3, M62.81  Onset Date:                    Next 's appt.: TBD  Visit# / total visits: -10; Progress note for Medicare patient due at visit 10     Cancels/No Shows: 1/0    Subjective:    Pain:  [x] Yes  [] No Location: R side of neck Pain Rating: (0-10 scale) 3/10 (with activity); 2/10 (rest)  Pain altered Tx:  [x] No  [] Yes  Action:    Comments: Pt arrives noting she is feeling a little better today. Started stiffening up later in the evening after previous session.  .     Objective:   Modalities:   Precautions:  Exercises:  Exercise Reps/ Time Weight/ Level Comments   UBE 2/2  lv2    MFR to the cervical spine 27'  Supine: SOR, STM with emphasis on obliquus capitis inferior muscle on the R; traction and R sidebend, distal SCM, and scalenes- anterior, middle, and posterior, SCM stretch off edge of table      Cervical rotations with head on ball 10x ea     Isometric rotational holds to the R 1'  Varying ranges of motion   Yes dears 1'     No dears 1'     Chin tuck with head lift 2 rounds  Hand assist to prevent upper trap compensation   pec minor stretch 2x1'             Prone      Cervical retractions 2 sets  Shoulders at edge of table  Cradling head in hands and have pt complete slight chin tuck   scap retraction 10x5\"  Neuro re- ed  Max cueing   scap depression 5x  Neuro re- ed  Minimal movement   Shoulder ext 10x5'  Added 4/14 neuro re-ed         Sitting       pec stretch 3x1'   Pt standing behind patient and providing overpressure for pec stretch   Manual: Thoracic spine extension mobilization x       Seated thoracic spine extension over chiar x       Scapular retraction 10x       Diaphragmatic breathing  HEP             Standing      Unilateral pec stretch 2x20\"     Rhomboid stretch 2x20\"     2 way doorway stretch 20\"x2  Added 4/14   Wall angels x  Attempted to complete with slide, unable due to shoulder ROM  Had pt complete with isometric elbow press into wall while maintaining relaxed UT and cervical retraction   Tennis ball self TPR x  Taught for home           Specific Instructions for next treatment: see below    4/19: C-AROM  Flexion: 48 deg  Extension: 50 deg  R sidebend: 10 deg  Left SB: 16 deg  4/26  R rotation: 88 deg (with flexion comp)-- 4/10-- pulling pain  L rotation: 88 deg (with flexion comp)    Deep neck flexor endurance test: 14\"       Treatment Charges: Mins Units   []  Modalities     [x]  Ther Exercise 10 1   [x]  Manual Therapy 27 2   []  Ther Activities     [x]  Neuro 17 1   []  Vasocompression     []  Other     Total Treatment time 54 4   Estimated Medicare Cost (as of 04/28/21): $730.44    Assessment: [x] Progressing toward goals. Pt arrives to the clinic again with increased muscle tension on R side that is addressed with manual therapy. Pt specifically reports decreased tension and soreness when manual is completed over R side suboccipitals and SCM. Continues to require cueing to improve muscle activation patterns of deep neck flexors. Again max cueing implemented to perform cervical retraction in prone to decrease compensation. Pt reports improved symptom post session. [] No change. [] Other:  [] Patient would continue to benefit from skilled physical therapy services in order to: improve neck pain and ROM restrictions in order to improve QOL. Problems:                      [x]? ? Cervical Pain: 2-6/10         [x]? ? ROM: decreased cervical Rotation and SB                      [x]? ? Strength: decreased postural strength, specifically deep neck flexors    [x]? ? Function: NDI: 14%  [x]? Postural Deviations: Forward head and rounded shoulder posturing; slight R SB deviation of the head/neck  [x]? Other: Myofascial restrictions on R side of neck and cervical spine                       Goals: (to be met in 8-10 treatments)  1. ? 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 2. ? ROM: Pt will demonstrate improvements in cervical spine AROM in order to improve tolerance to turning her head to the R and improving tolerance to ADL activities to improve QOL. Partially MET 4/19   a. Flexion: 30 degrees  b. Extension: 36 degrees without pain  c. Sidebend: 15 degrees without rotational compensation   d. Rotation: 80 degrees B- MET 4/26/21  i. Symmetrical OA rotation- MET 4/26/21  3. ? Strength: Pt will demonstrate ability to complete the deep neck flexor endurance test for greater than or equal to 25\" in order to demonstrate improved muscle function to help support the head and neck during sitting, standing, and ADL completion. 4. ? Function: Pt will score less than or equal to 5% on the NDI in order to demonstrate improved function on ADLs. 5. Independent with Home Exercise Programs  6. Demonstrate Knowledge of fall prevention- NOT NEEDED     Patient goals: \"try to resolve neck pain\"    Pt.  Education: [x] Yes  [] No  [x] Reviewed Prior HEP/Ed  Method of Education: [x] Verbal  [x] Demo   [] Written  4/14- Lumbar roll  Comprehension of Education:  [x] Verbalizes understanding. [x] Demonstrates understanding. [] Needs review. [x] Demonstrates/verbalizes HEP/Ed previously given. Plan: [x] Continue current frequency toward long and short term goals.     [x] Specific Instructions for subsequent treatments: continue with MFR, review stretching, and at in postural exercises      Time In: 1058 am            Time Out: 11:56    Electronically signed by:  Nia Kelly PTA

## 2021-05-04 ENCOUNTER — HOSPITAL ENCOUNTER (OUTPATIENT)
Dept: PHYSICAL THERAPY | Facility: CLINIC | Age: 71
Setting detail: THERAPIES SERIES
Discharge: HOME | End: 2021-05-04
Payer: MEDICARE

## 2021-05-04 PROCEDURE — 97140 MANUAL THERAPY 1/> REGIONS: CPT

## 2021-05-04 PROCEDURE — 97112 NEUROMUSCULAR REEDUCATION: CPT

## 2021-05-04 NOTE — FLOWSHEET NOTE
neutral and c-spine in flexion   upglides on the left side for R rotation      Cervical rotations with head on ball 10x ea     Isometric rotational holds to the R 1'  Varying ranges of motion   Yes dears 1'     No dears 1'     Chin tuck with head lift 2 rounds  Hand assist to prevent upper trap compensation   pec minor stretch 2x1'             Prone      Cervical retractions 2 sets  Shoulders at edge of table  Cradling head in hands and have pt complete slight chin tuck   scap retraction 10x5\"  Neuro re- ed  Max cueing   scap depression 5x  Neuro re- ed  Minimal movement   Shoulder ext 10x5'  Added 4/14 neuro re-ed         Sitting       c-spine rotation to R x  Instructed to complete following MET   pec stretch 3x1'   Pt standing behind patient and providing overpressure for pec stretch   Manual: Thoracic spine extension mobilization x       Seated thoracic spine extension over chiar x       Scapular retraction 10x       Diaphragmatic breathing  HEP             Standing      Unilateral pec stretch 2x20\"     Rhomboid stretch 2x20\"     2 way doorway stretch 20\"x2  Added 4/14   Wall angels x  Attempted to complete with slide, unable due to shoulder ROM  Had pt complete with isometric elbow press into wall while maintaining relaxed UT and cervical retraction   Tennis ball self TPR x  Taught for home           Specific Instructions for next treatment: see below    4/19: C-AROM  Flexion: 48 deg  Extension: 50 deg  R sidebend: 10 deg  Left SB: 16 deg  4/26  R rotation: 88 deg (with flexion comp)-- 4/10-- pulling pain  L rotation: 88 deg (with flexion comp)    Deep neck flexor endurance test: 14\"       Treatment Charges: Mins Units   []  Modalities     []  Ther Exercise     [x]  Manual Therapy 45 3   []  Ther Activities     [x]  Neuro 8 1   []  Vasocompression     []  Other     Total Treatment time 53 4   Estimated Medicare Cost (as of 05/04/21): $829.64    Assessment: [x] Progressing toward goals.  Pt arrives to the clinic with continued reports of R sided neck pain that was increased yesterday. Pt with increased tension in B suboccipital muscles, R cervical paraspinals, R SCM, and R upper trap. MFR was completed to these areas with good release, however, continued pain is noted with R rotation especially with the neck in extension. Upglides were completed on the left side of the cervical spine to assist with R rotation along with OA distraction to both sides. Pt with greater response to MET of the cervical spine into rotation. Pt responded with improvements in R sided cervical pain with both active and passive rotation. Following manual therapy, neuro re-ed of the deep neck flexor musculatures was continued. Pt continues to require cueing for deep neck flexor muscle activation vs global neck flexor activation and pec compensation. Pt was also provided with instructions on how to complete cervical MET to assist with painful neck rotation. Pt verbalizes understanding. [] No change. [] Other:  [] Patient would continue to benefit from skilled physical therapy services in order to: improve neck pain and ROM restrictions in order to improve QOL. Problems:                      [x]? ? Cervical Pain: 2-6/10         [x]? ? ROM: decreased cervical Rotation and SB                      [x]? ? Strength: decreased postural strength, specifically deep neck flexors    [x]? ? Function: NDI: 14%  [x]? Postural Deviations: Forward head and rounded shoulder posturing; slight R SB deviation of the head/neck  [x]? Other: Myofascial restrictions on R side of neck and cervical spine                       Goals: (to be met in 8-10 treatments)  1. ? 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   2. ? ROM: Pt will demonstrate improvements in cervical spine AROM in order to improve tolerance to turning her head to the R and improving tolerance to ADL activities to improve QOL. Partially MET 4/19   a. Flexion: 30 degrees  b. Extension: 36 degrees without pain  c. Sidebend: 15 degrees without rotational compensation   d. Rotation: 80 degrees B- MET 4/26/21  i. Symmetrical OA rotation- MET 4/26/21  3. ? Strength: Pt will demonstrate ability to complete the deep neck flexor endurance test for greater than or equal to 25\" in order to demonstrate improved muscle function to help support the head and neck during sitting, standing, and ADL completion. 4. ? Function: Pt will score less than or equal to 5% on the NDI in order to demonstrate improved function on ADLs. 5. Independent with Home Exercise Programs  6. Demonstrate Knowledge of fall prevention- NOT NEEDED     Patient goals: \"try to resolve neck pain\"    Pt. Education:  [x] Yes  [] No  [x] Reviewed Prior HEP/Ed  Method of Education: [x] Verbal  [x] Demo   [x] Written  4/14- Lumbar roll  5/4: cervical spine MET, cervical rotation   Comprehension of Education:  [x] Verbalizes understanding. [x] Demonstrates understanding. [] Needs review. [x] Demonstrates/verbalizes HEP/Ed previously given. Plan: [x] Continue current frequency toward long and short term goals.     [x] Specific Instructions for subsequent treatments: continue with MFR, review stretching, and at in postural exercises      Time In: 902 am            Time Out: 1001    Electronically signed by:  Perlita Tineo PT

## 2021-05-06 ENCOUNTER — HOSPITAL ENCOUNTER (OUTPATIENT)
Dept: PHYSICAL THERAPY | Facility: CLINIC | Age: 71
Setting detail: THERAPIES SERIES
Discharge: HOME | End: 2021-05-06
Payer: MEDICARE

## 2021-05-06 PROCEDURE — 97110 THERAPEUTIC EXERCISES: CPT

## 2021-05-06 PROCEDURE — 97140 MANUAL THERAPY 1/> REGIONS: CPT

## 2021-05-06 NOTE — FLOWSHEET NOTE
[] [facility]) - [facility] &  Therapy  [address]  [phone]  F: [phone] [x] [address]  [address] [facility]   [address]  P: [phone]  F: [phone] [] [address] &  Therapy  [address] [facility]  P: [phone]  F: [phone] [] [address] [employer]  P: [phone]  F: [phone] [] [address]  [facility]   [address]   [place]: [phone]  F: [phone]      Physical Therapy Daily Treatment Note    Date:  2021  Patient Name:  Argenis Gabriel    :  1950  MRN: [id number]  Physician: Vahe Zapata MD                              Insurance: [place] [facility]  Medical Diagnosis: M54.12 (ICD-10-CM) - Cervical radiculopathy             Rehab Codes: M54.2, M79.1, R29.3, M62.81  Onset Date:                    Next 's appt.: TBD  Visit# / total visits: 10/8-10; Progress note for Medicare patient due at visit 10     Cancels/No Shows: 1/0    Subjective:    Pain:  [x] Yes  [] No Location: R side of neck Pain Rating: (0-10 scale) 4/10  Pain altered Tx:  [x] No  [] Yes  Action:    Comments: Ptarrives stating her pain isnt bad today but yesterday was a different story. Pt notes the pain was on the R side of the neck and it felt like it was into her back. Pt states she did some stretches which seemed to help. Pt reports the pain yesterday at a 6-7/10. Pt notes on average her pain throughout the day is about 2-3/10.  Pt notes in general her neck pain has improved and she does not have as much pain when she is turning her head to the R.     Objective:   Modalities:   Precautions:  Exercises:  Exercise Reps/ Time Weight/ Level Comments   UBE 4' lv1    MFR to the cervical spine 31'  Supine:  traction and R sidebend, distal SCM, and scalenes- anterior, middle, and posterior, levator scap MFR and MET (scapular elevation against resistance--> passive depression)  MET to cervical spine in rotation-       Cervical rotations with head on ball 10x ea     Isometric rotational holds to the R 1'  Varying ranges of motion   Yes dears 1'     No dears 1'     Chin tuck with head lift 2 rounds  Hand assist to prevent upper trap compensation   pec minor stretch 2x1'             Prone      Cervical retractions 2 sets  Shoulders at edge of table  Cradling head in hands and have pt complete slight chin tuck   scap retraction 10x5\"  Neuro re- ed  Max cueing   scap depression 2x10  Tactile cues for increased movement    Shoulder ext 10x5'  Added 4/14 neuro re-ed         Sitting       c-spine rotation to R x  Instructed to complete following MET   pec stretch 3x1'   Pt standing behind patient and providing overpressure for pec stretch   Manual: Thoracic spine extension mobilization x       Seated thoracic spine extension over chiar x       Scapular retraction 10x       Diaphragmatic breathing  HEP       Upper trap stretch 30\"  Reviewed to sit on the R hand when stretching the R side   Levator scap stretch 30\"  Reviewed to sit on the R hand when stretching the R side         Standing      Unilateral pec stretch 2x20\"     Rhomboid stretch 2x20\"     2 way doorway stretch 20\"x2  Added 4/14   Wall angels x  Attempted to complete with slide, unable due to shoulder ROM  Had pt complete with isometric elbow press into wall while maintaining relaxed UT and cervical retraction   Tennis ball self TPR x  Taught for home           Specific Instructions for next treatment: see below    Re-assessment 5/621    Cervical AROM 5/6/21    Flexion 65   Extension 45    Rotation L90 R95   Sidebend  L 20 R 35     Deep neck flexor endurance test: 8\", 15\", 23\"   NDI: 9/50, 18% impairment       Treatment Charges: Mins Units   []  Modalities     [x]  Ther Exercise 20 1   [x]  Manual Therapy 31 report less than or equal to 3-4/10 R neck pain with turning her head to the R and when sitting filling out paper work for extended periods of time in order to improve QOL. Intermittently met 5/6/2021  2. ? ROM: Pt will demonstrate improvements in cervical spine AROM in order to improve tolerance to turning her head to the R and improving tolerance to ADL activities to improve QOL. Partially MET 4/19   a. Flexion: 30 degrees - MET 5/6  b. Extension: 36 degrees without pain- MET 5/6  c. Sidebend: 15 degrees without rotational compensation - MET 5/6  d. Rotation: 80 degrees B- MET 4/26/21  i. Symmetrical OA rotation- MET 4/26/21  3. ? Strength: Pt will demonstrate ability to complete the deep neck flexor endurance test for greater than or equal to 25\" in order to demonstrate improved muscle function to help support the head and neck during sitting, standing, and ADL completion. Progressing toward goal 5/6  4. ? Function: Pt will score less than or equal to 5% on the NDI in order to demonstrate improved function on ADLs.- NOT MET 5/6  5. Independent with Home Exercise Programs- MET  6. Demonstrate Knowledge of fall prevention- NOT NEEDED     Patient goals: \"try to resolve neck pain\"    Pt. Education:  [x] Yes  [] No  [x] Reviewed Prior HEP/Ed  Method of Education: [x] Verbal  [] Demo   [] Written  4/14- Lumbar roll  5/4: cervical spine MET, cervical rotation   Comprehension of Education:  [x] Verbalizes understanding. [x] Demonstrates understanding. [] Needs review. [x] Demonstrates/verbalizes HEP/Ed previously given. Plan: [x] Pt will be placed on a PT hold for 3 weeks and will be discharged if additional PT visits are not required. Pt's verbally agrees with this plan.   [x] Specific Instructions for subsequent treatments: continue with MFR, review stretching, and at in postural exercises      Time In: 902 am            Time Out: 1000    Electronically signed by:  Penny Garcia PT

## 2021-05-06 NOTE — PROGRESS NOTES
[] [address] [facility] TWELVESTEP [facility]  [address]  [phone]  F: [phone] [x] [address]  [email or web address]   [address]  P: [phone]  F: [phone] [] [address]  [address]  P: [phone]  F: [phone] [] [address] [address]  P: [phone]  F: [phone] [] [address]  [facility]   [address]   [place]: [phone]  F: [phone]      Physical Therapy Progress Note    Date: 2021      Patient: Charli Wheeler  : 1950  MRN: [id number]: BICXX Elvi Garces MD                              Insurance: [facility] Medicare  Medical Diagnosis: M54.12 (ICD-10-CM) - Cervical radiculopathy             Rehab Codes: M54.2, M79.1, R29.3, M62.81  Onset Date:                    Next 's appt.: TBD  Visit# / total visits: 10/8-10; Progress note for Medicare patient due at visit 10                               Cancels/No Shows: 1  Date range of services: 3/29/21 to 21  Subjective:    Pain:  [x]? Yes  []? No   Location: R side of neck         Pain Rating: (0-10 scale) 4/10  Pain altered Tx:  [x]? No  []? Yes  Action:     Comments: Ptarrives stating her pain isnt bad today but yesterday was a different story. Pt notes the pain was on the R side of the neck and it felt like it was into her back. Pt states she did some stretches which seemed to help. Pt reports the pain yesterday at a 6-7/10. Pt notes on average her pain throughout the day is about 2-3/10.  Pt notes in general her neck pain has improved and she does not have as much pain when she is turning her head to the R.     Objective:  Re-assessment    Cervical AROM 21    Flexion 65   Extension 45    Rotation L90 R95   Sidebend  L 20 R 35    Deep neck flexor endurance test: 8\", 15\", 23\"   NDI: 9/50, 18% impairment          Assessment:  Pt denies an increase in neck pain at the end of the session. Pt presents with significant improvements in cervical AROM without reports of pain. Pt with continued restrictions in left side bend in comparison to R side bend secondary to increased muscular tension on the R side. Pt with improvements in deep neck flexor endurance test to 23\" seconds with improved deep neck flexor muscle activation compared to initial evaluation. Pt with intermittent cueing to correct pec compensations vs isolating the deep neck flexors. Pt with considerable tightness in the R levator scap this date which improved with manual therapy and MFR. Pt has been thoroughly educated on the importance of completing HEP with emphasis on isolated deep flexor activation vs compensation, upright posturing to correct rounded shoulders, and reviewed proper stretching of the upper trap and levator scap to ensure proper completion. Pt will be placed on a therapy hold for 3 weeks (5/27/21) and is advised to contact the clinic if symptoms worsen. Pt verbalizes understanding. []? No change. []? Other:  []? Patient would continue to benefit from skilled physical therapy services in order to: improve neck pain and ROM restrictions in order to improve QOL.    Problems:                      [x]? ? ? Cervical Pain: 2-6/10         [x]? ? ? ROM: decreased cervical Rotation and ZB                      [x]? ? ? Strength: decreased postural strength, specifically deep neck flexors    [x]? ? ? Function: NDI: 14%  [x]? ? Postural Deviations: Forward head and rounded shoulder posturing; slight R SB deviation of the head/neck  [x]? ? Other: Myofascial restrictions on R side of neck and cervical spine                       Goals: (to be met in 8-10 treatments)  1. ? Pain: Pt will report less than or equal to 3-4/10 R neck pain with turning her head to the R and when sitting filling out paper work for extended periods of time in order to improve QOL. Intermittently met 5/6/2021  2. ? ROM: Pt will demonstrate improvements in cervical spine AROM in order to improve tolerance to turning her head to the R and improving tolerance to ADL activities to improve QOL. Partially MET 4/19   a. Flexion: 30 degrees - MET 5/6  b. Extension: 36 degrees without pain- MET 5/6  c. Sidebend: 15 degrees without rotational compensation - MET 5/6  d. Rotation: 80 degrees B- MET 4/26/21  i. Symmetrical OA rotation- MET 4/26/21  3. ? Strength: Pt will demonstrate ability to complete the deep neck flexor endurance test for greater than or equal to 25\" in order to demonstrate improved muscle function to help support the head and neck during sitting, standing, and ADL completion. Progressing toward goal 5/6  4. ? Function: Pt will score less than or equal to 5% on the NDI in order to demonstrate improved function on ADLs.- NOT MET 5/6  5. Independent with Home Exercise Programs- MET  6. Demonstrate Knowledge of fall prevention- NOT NEEDED              Treatment Plan:  [x] Therapeutic Exercise   29135  [] Iontophoresis: 4 mg/mL Dexamethasone Sodium Phosphate  mAmin  34542   [] Therapeutic Activity  75349 [] Vasopneumatic cold with compression  33697    [] Gait Training   19546 [] Ultrasound   50590   [x] Neuromuscular Re-education  18180 [] Electrical Stimulation Unattended  84001   [x] Manual Therapy  33178 [] Electrical Stimulation Attended  73597   [x] Instruction in HEP  [] Lumbar/Cervical Traction  11638   [] Aquatic Therapy   16454 [] Cold/hotpack    [] Massage   58397      [] Dry Needling, 1 or 2 muscles  69123   [] Biofeedback, first 15 minutes   71681  [] Biofeedback, additional 15 minutes   50590 [] Dry Needling, 3 or more muscles  52463       Patient Status:     [x] Continue per initial plan of care.     [x] Additional visits necessary- prn.    [] Other:     Requested Frequency/Duration: 1-2 times per week for 4 additional treatments (total of 14 visits)        Electronically signed by Junior Ree PT on 5/6/2021 at 1:17 PM      If you have any questions or concerns, please don't hesitate to call. Thank you for your referral.    Physician Signature:________________________________Date:__________________  By signing above or cosigning this note, I have reviewed this plan of care and certify a need for medically necessary rehabilitation services.      *PLEASE SIGN ABOVE AND FAX BACK ALL PAGES*

## 2021-05-14 DIAGNOSIS — E11.9 TYPE 2 DIABETES MELLITUS WITHOUT COMPLICATION, WITHOUT LONG-TERM CURRENT USE OF INSULIN (HCC): ICD-10-CM

## 2021-05-14 RX ORDER — GLIMEPIRIDE 4 MG/1
TABLET ORAL
Qty: 90 TABLET | Refills: 0 | Status: SHIPPED | OUTPATIENT
Start: 2021-05-14 | End: 2021-08-16

## 2021-05-21 NOTE — DISCHARGE SUMMARY
Sidebend  L 20 [id number]      Deep neck flexor endurance test: 8\", 15\", 23\"   NDI: 9/50, 18% impairment            Assessment:  Pt denies an increase in neck pain at the end of the session. Pt presents with significant improvements in cervical AROM without reports of pain. Pt with continued restrictions in left side bend in comparison to R side bend secondary to increased muscular tension on the R side. Pt with improvements in deep neck flexor endurance test to 23\" seconds with improved deep neck flexor muscle activation compared to initial evaluation. Pt with intermittent cueing to correct pec compensations vs isolating the deep neck flexors. Pt with considerable tightness in the R levator scap this date which improved with manual therapy and MFR. Pt has been thoroughly educated on the importance of completing HEP with emphasis on isolated deep flexor activation vs compensation, upright posturing to correct rounded shoulders, and reviewed proper stretching of the upper trap and levator scap to ensure proper completion. Pt will be placed on a therapy hold for 3 weeks (5/27/21) and is advised to contact the clinic if symptoms worsen. Pt verbalizes understanding.                          []? ? No change.                          []? ? Other:  []?? Patient would continue to benefit from skilled physical therapy services in order to: improve neck pain and ROM restrictions in order to improve QOL.    Problems:                      [x]? ?? ? Cervical Pain: 2-6/10         [x]? ?? ? ROM: decreased cervical Rotation and GP                      [x]? ?? ? Strength: decreased postural strength, specifically deep neck flexors    [x]? ?? ? Function: NDI: 14%  [x]? ?? Postural Deviations: Forward head and rounded shoulder posturing; slight R SB deviation of the head/neck  [x]? ?? Other: Myofascial restrictions on R side of neck and cervical spine                       Goals: (to be met in 8-10 treatments)  1. ? Pain: Pt will report less than or equal to 3-4/10 R neck pain with turning her head to the R and when sitting filling out paper work for extended periods of time in order to improve QOL.  Intermittently met 5/6/2021  2. ? ROM: Pt will demonstrate improvements in cervical spine AROM in order to improve tolerance to turning her head to the R and improving tolerance to ADL activities to improve QOL. Partially MET 4/19   a. Flexion: 30 degrees - MET 5/6  b. Extension: 36 degrees without pain- MET 5/6  c. Sidebend: 15 degrees without rotational compensation - MET 5/6  d. Rotation: 80 degrees B- MET 4/26/21  i. Symmetrical OA rotation- MET 4/26/21  3. ? Strength: Pt will demonstrate ability to complete the deep neck flexor endurance test for greater than or equal to 25\" in order to demonstrate improved muscle function to help support the head and neck during sitting, standing, and ADL completion. Progressing toward goal 5/6  4. ? Function: Pt will score less than or equal to 5% on the NDI in order to demonstrate improved function on ADLs. - NOT MET 5/6  5. Independent with Home Exercise Programs- MET  6. Demonstrate Knowledge of fall prevention- NOT NEEDED               Treatment Plan:  [x]? Therapeutic Exercise   46245            []? Iontophoresis: 4 mg/mL Dexamethasone Sodium Phosphate  mAmin  66009   []? Therapeutic Activity  48872 []? Vasopneumatic cold with compression  [id number]              []? Gait Training             71390 []? Ultrasound      [id number]   [x]? Neuromuscular Re-education  [id number] []? Electrical Stimulation Unattended  22 032201   [x]? Manual Therapy  84396 []? Electrical Stimulation Attended  [id number]   [x]? Instruction in HEP    []? Lumbar/Cervical Traction  [id number]   []? Aquatic Therapy        [id number] []? Cold/hotpack              []? Massage   [id number]      []? Dry Needling, 1 or 2 muscles  97764   []? Biofeedback, first 15 minutes   98597  []? Biofeedback, additional 15 minutes   58916 []?  Dry Needling, 3 or more muscles  36800          Discharge Status:     [] Pt recovered from conditions. Treatment goals were met. [x] Pt received maximum benefit. No further therapy indicated at this time. [x] Pt to continue exercise/home instructions independently. [] Therapy interrupted due to:    [] Pt has 2 or more no shows/cancels, is discontinued per our policy. [x] Pt has completed prescribed number of treatment sessions. [] Other:         Electronically signed by Abi Plummer PT on 5/21/2021 at 1:43 PM      If you have any questions or concerns, please don't hesitate to call.   Thank you for your referral.

## 2021-08-16 DIAGNOSIS — E11.9 TYPE 2 DIABETES MELLITUS WITHOUT COMPLICATION, WITHOUT LONG-TERM CURRENT USE OF INSULIN (HCC): ICD-10-CM

## 2021-08-16 RX ORDER — GLIMEPIRIDE 4 MG/1
TABLET ORAL
Qty: 90 TABLET | Refills: 0 | Status: SHIPPED | OUTPATIENT
Start: 2021-08-16 | End: 2021-11-09

## 2021-08-16 NOTE — TELEPHONE ENCOUNTER
Emerson Servin is calling to request a refill on the following medication(s):    Last Visit Date (If Applicable):  8/8/3190    Next Visit Date:    Visit date not found    Medication Request:  Requested Prescriptions     Pending Prescriptions Disp Refills    glimepiride (AMARYL) 4 MG tablet [Pharmacy Med Name: GLIMEPIRIDE 4MG TABS] 90 tablet 0     Sig: TAKE 1 TABLET BY MOUTH ONE TIME A DAY

## 2021-09-20 ENCOUNTER — HOSPITAL ENCOUNTER (OUTPATIENT)
Facility: MEDICAL CENTER | Age: 71
Discharge: HOME | End: 2021-09-20
Payer: MEDICARE

## 2021-09-20 ENCOUNTER — TELEPHONE (OUTPATIENT)
Dept: ONCOLOGY | Age: 71
End: 2021-09-20

## 2021-09-20 ENCOUNTER — OFFICE VISIT (OUTPATIENT)
Dept: ONCOLOGY | Age: 71
End: 2021-09-20
Payer: MEDICARE

## 2021-09-20 VITALS
DIASTOLIC BLOOD PRESSURE: 61 MMHG | SYSTOLIC BLOOD PRESSURE: 139 MMHG | TEMPERATURE: 98.7 F | WEIGHT: 167.6 LBS | BODY MASS INDEX: 25.48 KG/M2 | HEART RATE: 81 BPM | RESPIRATION RATE: 16 BRPM

## 2021-09-20 DIAGNOSIS — C50.411 MALIGNANT NEOPLASM OF UPPER-OUTER QUADRANT OF RIGHT BREAST IN FEMALE, ESTROGEN RECEPTOR POSITIVE (HCC): Primary | ICD-10-CM

## 2021-09-20 DIAGNOSIS — D05.10 DUCTAL CARCINOMA IN SITU (DCIS) OF BREAST, UNSPECIFIED LATERALITY: ICD-10-CM

## 2021-09-20 DIAGNOSIS — C50.411 MALIGNANT NEOPLASM OF UPPER-OUTER QUADRANT OF RIGHT BREAST IN FEMALE, ESTROGEN RECEPTOR POSITIVE (HCC): ICD-10-CM

## 2021-09-20 DIAGNOSIS — Z17.0 MALIGNANT NEOPLASM OF UPPER-OUTER QUADRANT OF RIGHT BREAST IN FEMALE, ESTROGEN RECEPTOR POSITIVE (HCC): ICD-10-CM

## 2021-09-20 DIAGNOSIS — Z17.0 MALIGNANT NEOPLASM OF UPPER-OUTER QUADRANT OF RIGHT BREAST IN FEMALE, ESTROGEN RECEPTOR POSITIVE (HCC): Primary | ICD-10-CM

## 2021-09-20 LAB
ABSOLUTE EOS #: 0.1 K/UL (ref 0–0.44)
ABSOLUTE IMMATURE GRANULOCYTE: 0.01 K/UL (ref 0–0.3)
ABSOLUTE LYMPH #: 0.88 K/UL (ref 1.1–3.7)
ABSOLUTE MONO #: 0.2 K/UL (ref 0.1–1.2)
ALBUMIN SERPL-MCNC: 4.4 G/DL (ref 3.5–5.2)
ALBUMIN/GLOBULIN RATIO: ABNORMAL (ref 1–2.5)
ALP BLD-CCNC: 96 U/L (ref 35–104)
ALT SERPL-CCNC: 42 U/L (ref 5–33)
ANION GAP SERPL CALCULATED.3IONS-SCNC: 11 MMOL/L (ref 9–17)
AST SERPL-CCNC: 38 U/L
BASOPHILS # BLD: 1 % (ref 0–2)
BASOPHILS ABSOLUTE: 0.03 K/UL (ref 0–0.2)
BILIRUB SERPL-MCNC: 0.5 MG/DL (ref 0.3–1.2)
BUN BLDV-MCNC: 13 MG/DL (ref 8–23)
BUN/CREAT BLD: 17 (ref 9–20)
CALCIUM SERPL-MCNC: 9.1 MG/DL (ref 8.6–10.4)
CHLORIDE BLD-SCNC: 103 MMOL/L (ref 98–107)
CO2: 24 MMOL/L (ref 20–31)
CREAT SERPL-MCNC: 0.76 MG/DL (ref 0.5–0.9)
DIFFERENTIAL TYPE: ABNORMAL
EOSINOPHILS RELATIVE PERCENT: 3 % (ref 1–4)
GFR AFRICAN AMERICAN: >60 ML/MIN
GFR NON-AFRICAN AMERICAN: >60 ML/MIN
GFR SERPL CREATININE-BSD FRML MDRD: ABNORMAL ML/MIN/{1.73_M2}
GFR SERPL CREATININE-BSD FRML MDRD: ABNORMAL ML/MIN/{1.73_M2}
GLUCOSE BLD-MCNC: 187 MG/DL (ref 70–99)
HCT VFR BLD CALC: 40.5 % (ref 36.3–47.1)
HEMOGLOBIN: 12.6 G/DL (ref 11.9–15.1)
IMMATURE GRANULOCYTES: 0 %
LYMPHOCYTES # BLD: 27 % (ref 24–43)
MCH RBC QN AUTO: 26.9 PG (ref 25.2–33.5)
MCHC RBC AUTO-ENTMCNC: 31.1 G/DL (ref 28.4–34.8)
MCV RBC AUTO: 86.4 FL (ref 82.6–102.9)
MONOCYTES # BLD: 6 % (ref 3–12)
NRBC AUTOMATED: 0 PER 100 WBC
PDW BLD-RTO: 13.7 % (ref 11.8–14.4)
PLATELET # BLD: 139 K/UL (ref 138–453)
PLATELET ESTIMATE: ABNORMAL
PMV BLD AUTO: 11 FL (ref 8.1–13.5)
POTASSIUM SERPL-SCNC: 4.4 MMOL/L (ref 3.7–5.3)
RBC # BLD: 4.69 M/UL (ref 3.95–5.11)
RBC # BLD: ABNORMAL 10*6/UL
SEG NEUTROPHILS: 63 % (ref 36–65)
SEGMENTED NEUTROPHILS ABSOLUTE COUNT: 2.07 K/UL (ref 1.5–8.1)
SODIUM BLD-SCNC: 138 MMOL/L (ref 135–144)
TOTAL PROTEIN: 7.5 G/DL (ref 6.4–8.3)
WBC # BLD: 3.3 K/UL (ref 3.5–11.3)
WBC # BLD: ABNORMAL 10*3/UL

## 2021-09-20 PROCEDURE — 1090F PRES/ABSN URINE INCON ASSESS: CPT | Performed by: INTERNAL MEDICINE

## 2021-09-20 PROCEDURE — 3017F COLORECTAL CA SCREEN DOC REV: CPT | Performed by: INTERNAL MEDICINE

## 2021-09-20 PROCEDURE — 1123F ACP DISCUSS/DSCN MKR DOCD: CPT | Performed by: INTERNAL MEDICINE

## 2021-09-20 PROCEDURE — G8427 DOCREV CUR MEDS BY ELIG CLIN: HCPCS | Performed by: INTERNAL MEDICINE

## 2021-09-20 PROCEDURE — 99211 OFF/OP EST MAY X REQ PHY/QHP: CPT | Performed by: INTERNAL MEDICINE

## 2021-09-20 PROCEDURE — 36415 COLL VENOUS BLD VENIPUNCTURE: CPT

## 2021-09-20 PROCEDURE — G8399 PT W/DXA RESULTS DOCUMENT: HCPCS | Performed by: INTERNAL MEDICINE

## 2021-09-20 PROCEDURE — 99214 OFFICE O/P EST MOD 30 MIN: CPT | Performed by: INTERNAL MEDICINE

## 2021-09-20 PROCEDURE — G8417 CALC BMI ABV UP PARAM F/U: HCPCS | Performed by: INTERNAL MEDICINE

## 2021-09-20 PROCEDURE — 85025 COMPLETE CBC W/AUTO DIFF WBC: CPT

## 2021-09-20 PROCEDURE — 4040F PNEUMOC VAC/ADMIN/RCVD: CPT | Performed by: INTERNAL MEDICINE

## 2021-09-20 PROCEDURE — 1036F TOBACCO NON-USER: CPT | Performed by: INTERNAL MEDICINE

## 2021-09-20 PROCEDURE — 80053 COMPREHEN METABOLIC PANEL: CPT

## 2021-09-20 NOTE — PROGRESS NOTES
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 ER/ID positive 95% and HER2 positive Ratio 3.5. Associated with high grade DCIS comedo type' biopsy 2/26/2015. 2- final pathology is T2 N0 M0 invasive ductal carcinoma with surrounding DCIS, margins were negative, tumor is triple positive. 3- h/o right breast DCIS in 1994 s/p BCS/EBRT and 7 years of Tamoxifen until 2001  4- incidentally, left-sided DCIS was found during bilateral mastectomy. Removed with negative margins. CURRENT THERAPY:  1. Right-sided lumpectomy, in 1994. followed by radiation and 7 years of tamoxifen, completed 2001  2. Underwent bilateral mastectomy   3. Adjuvant chemotherapy with [facility], started August, 2015    4. Maintenance hercptin, completed 7/2016   5. Adjuvant  AI for 5 years    BRIEF CASE HISTORY:  This is a 65 y.o. woman with history of right breast DCIS in 1994 treated with BCS / EBRT and Tamoxifen for 7 years until 2001. In February 2015, she had a screening mammogram that showed suspicious findings in the right breast. She underwent a core biopsy that showed infiltrating ductal carcinoma, grade 2, ER/ID positive and HER-2/beth positive with fish ratio 3.5. Because of previous radiation, breast conservation is not an option so the patient underwent mastectomy and axillary sampling. Surgery occurred on 6/30 and pathology showed 2.2 cm moderately differentiated carcinoma, associated with intermediate and high-grade ductal carcinoma in situ. Margins were negative. Attempt for axillary sampling was not successful because of previous surgery and radiation. No lymph nodes were appreciated in the specimen. Contralateral mastectomy was done at the same time and showed high-grade ductal carcinoma in situ, margins were negative. Genetic testing was negative  We decided to proceed with adjuvant chemotherapy with TCH.  She tolerated that fairly well. Plan 6 cycles of chemotherapy followed by maintenance Herceptin and hormone therapy. Completed 6 cycles of chemotherapy without problems  After that she finished one year of herceptin, completed in July/2016. Diagnosed with frozen shoulder, left, 12/2017, underwent PT, range of motion improved, pain persisted. INTERIM HISTORY: Patient presents today for follow up for breast cancer. She is taking the Femara with no issues. She has epigastric pain and central chest pain secondary to heartburn. Past Medical History   has a past medical history of Allergic rhinitis, Arthritis, Asthma, Breast cancer ([address] Breast cancer ([address] Chemotherapy-induced neuropathy ([address] Colon polyps, Frequent UTI, GERD (gastroesophageal reflux disease), Heavy menstrual bleeding, History of bone density study, Pap test, as part of routine gynecological examination, PONV (postoperative nausea and vomiting), Type II or unspecified type diabetes mellitus without mention of complication, not stated as uncontrolled, Urinary tract infection, and Wears glasses. Surgical History   has a past surgical history that includes Dilation and curettage of uterus; sinus surgery; Tonsillectomy; Hysterectomy, total abdominal; Tunneled venous port placement (Left, 7/30/2015); Mastectomy (Bilateral); Breast lumpectomy (1/1994); Breast reconstruction (Bilateral, 06/30/15); Breast reconstruction (Bilateral, 4/21/16); Breast surgery (Bilateral, 04/21/2016); Endoscopy, colon, diagnostic; other surgical history (09/27/2016); Colonoscopy; and Shoulder arthroscopy (Left, 6/12/2020).     Family History  Family History   Problem Relation Age of Onset    Breast Cancer Mother     Other Mother         heart disease, pacemaker    Other Father         heart disease, thyroid disease    Breast Cancer Maternal Aunt     Breast Cancer Paternal Grandmother     Breast Cancer Maternal Aunt     Asthma Maternal Grandmother        Home Medications  has a current medication list which includes the following prescription(s): glimepiride, true metrix blood glucose test, metformin, letrozole, sucralfate, fluticasone, montelukast, cetirizine, lansoprazole, budesonide-formoterol, albuterol sulfate hfa, clobetasol, and dapagliflozin. Allergies:  Amoxicillin, Plantain, Statins, Tape [adhesive tape], and Sulfa antibiotics        Review of Systems :    Constitutional: feels much better . No fever or chills. No night sweats, no weight loss   Eyes: No eye discharge, double vision, or eye pain   HEENT: negative for sore mouth or throat, no hoarseness and voice change. Respiratory: negative for cough , sputum, dyspnea, wheezing, hemoptysis, chest pain   Cardiovascular: negative for chest pain, dyspnea, palpitations, orthopnea, PND  Gastrointestinal: negative for nausea, vomiting, no diarrhea, no abdominal pain, dysphagia, hematemesis and hematochezia; esophageal pain as noted above   Genitourinary: negative for frequency, dysuria, nocturia, urinary incontinence, and hematuria   Integument: Eczema on right shoulder  Hematologic/Lymphatic: negative for easy bruising, bleeding, lymphadenopathy, petechiae and swelling/edema   Endocrine: negative for heat or cold intolerance, tremor, weight changes, change in bowel habits and hair loss   Musculoskeletal: negative for myalgias, arthralgias, pain, joint swelling,and bone pain.  Left shoulder pain - resolved with surgery; back pain as noted above  Neurological: negative dizziness, seizures, weakness, numbness +headaches      Physical Examination :       /61   Pulse 81   Temp 98.7 °F (37.1 °C) (Oral)   Resp 16   Wt 167 lb 9.6 oz (76 kg)   BMI 25.48 kg/m²     Performance Status:Estimated performance status is ECOG 0    General appearance - well appearing, no in pain or distress   Mental status - alert and cooperative   Eyes - pupils equal and reactive, extraocular eye movements intact   Ears - bilateral TM's and external ear canals normal   Mouth - mucous membranes moist, pharynx normal without lesions   Neck - supple, no significant adenopathy ,trach is central   Lymphatics - no palpable lymphadenopathy, no hepatosplenomegaly   Chest - clear to auscultation, no wheezes, rales or rhonchi, symmetric air entry   Heart - normal rate, regular rhythm, normal S1, S2, no murmurs, rubs, clicks or gallops   Abdomen - tender and swelling from surgery, nondistended, no masses or organomegaly   Neurological - alert, oriented, normal speech, no focal findings or movement disorder noted. Mild numbness on the bottom of foots bilaterally. Musculoskeletal - no joint tenderness, deformity or swelling  Extremities - peripheral pulses normal, trace pedal edema, no clubbing or cyanosis .    Skin - normal coloration and turgor, no rashes, no suspicious skin lesions noted  Breast: S/P bilateral mastectomy with reconstruction implants, no lumps, no adenopathy, no masses, reconstruction with irregular margin at outer aspect of right and inner aspect of left, decreased sensation in lower left breast unchanged, medial aspect of right small fused form from previous surgery is palpable 4 x 5 cm likely scar and nodularity from fat grafting      REVIEW OF LABORATORY DATA:  Lab Results   Component Value Date    WBC 3.3 (L) 09/20/2021    HGB 12.6 09/20/2021    HCT 40.5 09/20/2021    MCV 86.4 09/20/2021     09/20/2021       Chemistry        Component Value Date/Time     09/20/2021 0857    K 4.4 09/20/2021 0857     09/20/2021 0857    CO2 24 09/20/2021 0857    BUN 13 09/20/2021 0857    CREATININE 0.76 09/20/2021 0857        Component Value Date/Time    CALCIUM 9.1 09/20/2021 0857    ALKPHOS 96 09/20/2021 0857    AST 38 (H) 09/20/2021 0857    ALT 42 (H) 09/20/2021 0857    BILITOT 0.50 09/20/2021 0857        Lab Results   Component Value Date    LABA1C 7.0 (H) 09/29/2020     Lab Results   Component Value Date     09/29/2020 REVIEW OF RADIOLOGICAL RESULTS:  MRI cervical spine  Impression   Multilevel degenerative disc disease with uncovertebral and facet hypertrophy   with mild right and moderate left foraminal narrowing at C3-4 and mild   bilateral foraminal narrowing at C4-5         09/01/2017 DEXA :  T-SCORE:  LUMBAR:   -0.1  LEFT HIP:  0.0  FEMORAL NECK:  -0.8        ASSESSMENT  1. h/o right breast DCIS in 1994 s/p BCS/EBRT and 7 years of Tamoxifen until 2001  2. Ipsilateral recurrence, invasive ductal carcinoma that's triple positive, pathological staging is T2 N0 M0.  3. Contralateral DCIS, found incidentally on bilateral mastectomy  4. Received 6 cycles of adjuvant chemotherapy with TCH, well tolerated  5. Continue maintenance Herceptin, plan to finish 1 year of therapy   6. Completed breast reconstruction   7. DM2    8. Diastolic dysfunction, she is asymptomatic   9. Frozen shoulder, status post shoulder surgery  10. Abnormal thoracic spine MRI, Per the report, they are requesting a short-term follow-up. We will order a follow-up study. PLAN:   1. The patient continues to do very well  2. Finished 5 years of adjuvant hormone therapy  3. We talked about her chest discomfort and I think it is likely related to heartburn  4. She will follow up with her primary care physician and try to treat it. If her symptoms persist, I asked her to call me to exclude possible to metastatic disease although based on her description it looks unlikely  5. We discussed follow-up options, we talked about followed by primary care physician or checking with her once a year.   She agreed to come in once a year

## 2021-09-20 NOTE — TELEPHONE ENCOUNTER
[facility] ARRIVES AMBULATORY FOR MD VISIT  DR Sadia Avila IN TO SEE PATIENT  ORDERS RECEIVED  RV 1 YEAR WITH CBC CMP  LABS CDP CMP 9/19/22  MD VISIT 9/19/22 @11AM  AVS PRINTED AND GIVEN TO PATIENT WITH INSTRUCTIONS  PATIENT DISCHARGED AMBULATORY

## 2021-10-07 ENCOUNTER — TELEPHONE (OUTPATIENT)
Dept: FAMILY MEDICINE CLINIC | Age: 71
End: 2021-10-07

## 2021-10-11 ENCOUNTER — OFFICE VISIT (OUTPATIENT)
Dept: FAMILY MEDICINE CLINIC | Age: 71
End: 2021-10-11
Payer: MEDICARE

## 2021-10-11 VITALS
HEIGHT: 69 IN | TEMPERATURE: 97.2 F | OXYGEN SATURATION: 94 % | WEIGHT: 168.8 LBS | SYSTOLIC BLOOD PRESSURE: 137 MMHG | HEART RATE: 80 BPM | DIASTOLIC BLOOD PRESSURE: 74 MMHG | BODY MASS INDEX: 25 KG/M2

## 2021-10-11 DIAGNOSIS — R74.8 ELEVATED LIVER ENZYMES: ICD-10-CM

## 2021-10-11 DIAGNOSIS — Z00.00 ROUTINE GENERAL MEDICAL EXAMINATION AT A HEALTH CARE FACILITY: Primary | ICD-10-CM

## 2021-10-11 DIAGNOSIS — G62.0 CHEMOTHERAPY-INDUCED NEUROPATHY (HCC): ICD-10-CM

## 2021-10-11 DIAGNOSIS — Z13.6 SCREENING FOR CARDIOVASCULAR CONDITION: ICD-10-CM

## 2021-10-11 DIAGNOSIS — T45.1X5A CHEMOTHERAPY-INDUCED NEUROPATHY (HCC): ICD-10-CM

## 2021-10-11 DIAGNOSIS — E11.9 TYPE 2 DIABETES MELLITUS WITHOUT COMPLICATION, WITHOUT LONG-TERM CURRENT USE OF INSULIN (HCC): ICD-10-CM

## 2021-10-11 DIAGNOSIS — D69.6 THROMBOCYTOPENIA, UNSPECIFIED (HCC): ICD-10-CM

## 2021-10-11 DIAGNOSIS — E11.65 TYPE 2 DIABETES MELLITUS WITH HYPERGLYCEMIA, WITHOUT LONG-TERM CURRENT USE OF INSULIN (HCC): ICD-10-CM

## 2021-10-11 DIAGNOSIS — Z13.220 SCREENING FOR HYPERLIPIDEMIA: ICD-10-CM

## 2021-10-11 LAB — HBA1C MFR BLD: 7.1 %

## 2021-10-11 PROCEDURE — 3051F HG A1C>EQUAL 7.0%<8.0%: CPT | Performed by: FAMILY MEDICINE

## 2021-10-11 PROCEDURE — 1123F ACP DISCUSS/DSCN MKR DOCD: CPT | Performed by: FAMILY MEDICINE

## 2021-10-11 PROCEDURE — G0439 PPPS, SUBSEQ VISIT: HCPCS | Performed by: FAMILY MEDICINE

## 2021-10-11 PROCEDURE — G8399 PT W/DXA RESULTS DOCUMENT: HCPCS | Performed by: FAMILY MEDICINE

## 2021-10-11 PROCEDURE — G8427 DOCREV CUR MEDS BY ELIG CLIN: HCPCS | Performed by: FAMILY MEDICINE

## 2021-10-11 PROCEDURE — 1090F PRES/ABSN URINE INCON ASSESS: CPT | Performed by: FAMILY MEDICINE

## 2021-10-11 PROCEDURE — 99213 OFFICE O/P EST LOW 20 MIN: CPT | Performed by: FAMILY MEDICINE

## 2021-10-11 PROCEDURE — 2022F DILAT RTA XM EVC RTNOPTHY: CPT | Performed by: FAMILY MEDICINE

## 2021-10-11 PROCEDURE — G8484 FLU IMMUNIZE NO ADMIN: HCPCS | Performed by: FAMILY MEDICINE

## 2021-10-11 PROCEDURE — 3017F COLORECTAL CA SCREEN DOC REV: CPT | Performed by: FAMILY MEDICINE

## 2021-10-11 PROCEDURE — 1036F TOBACCO NON-USER: CPT | Performed by: FAMILY MEDICINE

## 2021-10-11 PROCEDURE — 4040F PNEUMOC VAC/ADMIN/RCVD: CPT | Performed by: FAMILY MEDICINE

## 2021-10-11 PROCEDURE — G8417 CALC BMI ABV UP PARAM F/U: HCPCS | Performed by: FAMILY MEDICINE

## 2021-10-11 PROCEDURE — 83036 HEMOGLOBIN GLYCOSYLATED A1C: CPT | Performed by: FAMILY MEDICINE

## 2021-10-11 RX ORDER — TRIAMCINOLONE ACETONIDE 1 MG/G
CREAM TOPICAL
COMMUNITY
Start: 2021-07-14

## 2021-10-11 RX ORDER — BETAMETHASONE DIPROPIONATE 0.5 MG/G
CREAM TOPICAL
COMMUNITY
Start: 2021-08-30

## 2021-10-11 SDOH — ECONOMIC STABILITY: FOOD INSECURITY: WITHIN THE PAST 12 MONTHS, THE FOOD YOU BOUGHT JUST DIDN'T LAST AND YOU DIDN'T HAVE MONEY TO GET MORE.: NEVER TRUE

## 2021-10-11 SDOH — ECONOMIC STABILITY: FOOD INSECURITY: WITHIN THE PAST 12 MONTHS, YOU WORRIED THAT YOUR FOOD WOULD RUN OUT BEFORE YOU GOT MONEY TO BUY MORE.: NEVER TRUE

## 2021-10-11 ASSESSMENT — SOCIAL DETERMINANTS OF HEALTH (SDOH): HOW HARD IS IT FOR YOU TO PAY FOR THE VERY BASICS LIKE FOOD, HOUSING, MEDICAL CARE, AND HEATING?: NOT HARD AT ALL

## 2021-10-11 ASSESSMENT — PATIENT HEALTH QUESTIONNAIRE - PHQ9
1. LITTLE INTEREST OR PLEASURE IN DOING THINGS: 0
SUM OF ALL RESPONSES TO PHQ QUESTIONS 1-9: 0
SUM OF ALL RESPONSES TO PHQ9 QUESTIONS 1 & 2: 0
SUM OF ALL RESPONSES TO PHQ QUESTIONS 1-9: 0
2. FEELING DOWN, DEPRESSED OR HOPELESS: 0
SUM OF ALL RESPONSES TO PHQ QUESTIONS 1-9: 0

## 2021-10-11 ASSESSMENT — LIFESTYLE VARIABLES: HOW OFTEN DO YOU HAVE A DRINK CONTAINING ALCOHOL: 0

## 2021-10-11 NOTE — PATIENT INSTRUCTIONS
Patient Education        sitagliptin  Pronunciation:  RADHA ta glip tin  Brand:  Januvia  What is the most important information I should know about sitagliptin? Call your doctor if you have symptoms of heart failure --shortness of breath (even while lying down), swelling in your legs or feet, rapid weight gain. Stop taking sitagliptin and call your doctor if you have symptoms of pancreatitis: severe pain in your upper stomach spreading to your back, with or without vomiting. What is sitagliptin? Sitagliptin is used together with diet and exercise to improve blood sugar control in adults with type 2 diabetes mellitus. Sitagliptin is not for treating type 1 diabetes. Sitagliptin may also be used for purposes not listed in this medication guide. What should I discuss with my healthcare provider before taking sitagliptin? You should not use sitagliptin if you are allergic to it, or if you have diabetic ketoacidosis (call your doctor for treatment with insulin). Tell your doctor if you have ever had:  · kidney disease (or if you are on dialysis);  · heart problems;  · pancreatitis;  · high triglycerides (a type of fat in the blood);  · gallstones; or  · alcoholism. Follow your doctor's instructions about using this medicine if you are pregnant or you become pregnant. Controlling diabetes is very important during pregnancy, and having high blood sugar may cause complications in both the mother and the baby. Your name may need to be listed on a sitagliptin pregnancy registry when you start using this medicine. It may not be safe to breast-feed a baby while you are using this medicine. Ask your doctor about any risks. Sitagliptin is not approved for use by anyone younger than 25years old. How should I take sitagliptin? Follow all directions on your prescription label and read all medication guides or instruction sheets. Your doctor may occasionally change your dose. Use the medicine exactly as directed.   You may take this medicine with or without food. Follow your doctor's instructions. Your blood sugar will need to be checked often, and you may need other blood tests at your doctor's office. You may have low blood sugar (hypoglycemia) and feel very hungry, dizzy, irritable, confused, anxious, or shaky. To quickly treat hypoglycemia, eat or drink a fast-acting source of sugar (fruit juice, hard candy, crackers, raisins, or non-diet soda). Your doctor may prescribe a glucagon injection kit in case you have severe hypoglycemia. Be sure your family or close friends know how to give you this injection in an emergency. Also watch for signs of high blood sugar (hyperglycemia) such as increased thirst or urination. Blood sugar levels can be affected by stress, illness, surgery, exercise, alcohol use, or skipping meals. Ask your doctor before changing your dose or medication schedule. Sitagliptin is only part of a complete treatment program that may also include diet, exercise, weight control, blood sugar testing, and special medical care. Follow your doctor's instructions very closely. Store at room temperature away from moisture, heat, and light. What happens if I miss a dose? Take the medicine as soon as you can, but skip the missed dose if it is almost time for your next dose. Do not take two doses at one time. What happens if I overdose? Seek emergency medical attention or call the Poison Help line at 1-523.380.2605. You may have signs of low blood sugar, such as extreme weakness, blurred vision, sweating, trouble speaking, tremors, stomach pain, confusion, and seizure (convulsions). What should I avoid while taking sitagliptin? Follow your doctor's instructions about any restrictions on food, beverages, or activity. What are the possible side effects of sitagliptin?   Get emergency medical help if you have signs of an allergic reaction (hives, difficult breathing, swelling in your face or throat) or a severe skin reaction (fever, sore throat, burning in your eyes, skin pain, red or purple skin rash that spreads and causes blistering and peeling). Stop taking sitagliptin and call your doctor right away if you have symptoms of pancreatitis: severe pain in your upper stomach spreading to your back, with or without vomiting. Call your doctor at once if you have:  · severe autoimmune reaction --itching, blisters, breakdown of the outer layer of skin;  · severe or ongoing pain in your joints;  · little or no urination; or  · symptoms of heart failure --shortness of breath (even while lying down), swelling in your legs or feet, rapid weight gain. Common side effects may include:  · low blood sugar;  · headache; or  · runny or stuffy nose, sore throat. This is not a complete list of side effects and others may occur. Call your doctor for medical advice about side effects. You may report side effects to FDA at 1-423-FDA-0294. What other drugs will affect sitagliptin? Sitagliptin may not work as well when you use other medicines at the same time. Many other drugs can also affect blood sugar control. You may be more likely to have low blood sugar if you also use insulin. Other drugs may affect sitagliptin, including prescription and over-the-counter medicines, vitamins, and herbal products. Tell your doctor about all your current medicines and any medicine you start or stop using. Where can I get more information? Your pharmacist can provide more information about sitagliptin. Remember, keep this and all other medicines out of the reach of children, never share your medicines with others, and use this medication only for the indication prescribed. Every effort has been made to ensure that the information provided by [address] is accurate, up-to-date, and complete, but no guarantee is made to that effect. Drug information contained herein may be time sensitive.  [employer] information has been compiled for use by healthcare practitioners and consumers in the [place] and therefore [employer] does not warrant that uses outside of the [place] are appropriate, unless specifically indicated otherwise. [employer] drug information does not endorse drugs, diagnose patients or recommend therapy. [employer] drug information is an informational resource designed to assist licensed healthcare practitioners in caring for their patients and/or to serve consumers viewing this service as a supplement to, and not a substitute for, the expertise, skill, knowledge and judgment of healthcare practitioners. The absence of a warning for a given drug or drug combination in no way should be construed to indicate that the drug or drug combination is safe, effective or appropriate for any given patient. [facility] does not assume any responsibility for any aspect of healthcare administered with the aid of information [facility] provides. The information contained herein is not intended to cover all possible uses, directions, precautions, warnings, drug interactions, allergic reactions, or adverse effects. If you have questions about the drugs you are taking, check with your doctor, nurse or pharmacist.  Copyright 9522-5285 [address]: 12.01. Revision date: 12/11/2020. Care instructions adapted under license by [facility]. If you have questions about a medical condition or this instruction, always ask your healthcare professional. [place] [address] any warranty or liability for your use of this information. Personalized Preventive Plan for Keisha Moran - 10/11/2021  Medicare offers a range of preventive health benefits. Some of the tests and screenings are paid in full while other may be subject to a deductible, co-insurance, and/or copay.     Some of these benefits include a comprehensive review of your medical history including lifestyle, illnesses that may run in your family, and various assessments and screenings as appropriate. After reviewing your medical record and screening and assessments performed today your provider may have ordered immunizations, labs, imaging, and/or referrals for you. A list of these orders (if applicable) as well as your Preventive Care list are included within your After Visit Summary for your review. Other Preventive Recommendations:    · A preventive eye exam performed by an eye specialist is recommended every 1-2 years to screen for glaucoma; cataracts, macular degeneration, and other eye disorders. · A preventive dental visit is recommended every 6 months. · Try to get at least 150 minutes of exercise per week or 10,000 steps per day on a pedometer . · Order or download the FREE \"Exercise & Physical Activity: Your Everyday Guide\" from [employer]. Call 7-922.389.5766 or search [employer] online. · You need 9383-2802 mg of calcium and 9100-9755 IU of vitamin D per day. It is possible to meet your calcium requirement with diet alone, but a vitamin D supplement is usually necessary to meet this goal.  · When exposed to the sun, use a sunscreen that protects against both UVA and UVB radiation with an SPF of 30 or greater. Reapply every 2 to 3 hours or after sweating, drying off with a towel, or swimming. · Always wear a seat belt when traveling in a car. Always wear a helmet when riding a bicycle or motorcycle. Keep Your Memory Jose E Tolliver       Many factors can affect your ability to remembera hectic lifestyle, aging, stress, chronic disease, and certain medicines. But, there are steps you can take to sharpen your mind and help preserve your memory. Challenge Your Brain   Regularly challenging your mind may help keeps it in top shape. Good mental exercises include:   Crossword puzzlesUse a dictionary if you need it; you will learn more that way. Brainteasers Try some!    Crafts, such as wood working and sewing Hobbies, such as gardening and building model airplanes   SocializingVisit old friends or join groups to meet new ones. Reading   Learning a new language   Taking a class, whether it be art history or cresencio chi   TravelingExperience the food, history, and culture of your destination   Learning to use a computer   Going to museums, the theater, or thought-provoking movies   Changing things in your daily life, such as reversing your pattern in the grocery store or brushing your teeth using your nondominant hand   Use Memory Aids   There is no need to remember every detail on your own. These memory aids can help:   Calendars and day planners   Electronic organizers to store all sorts of helpful informationThese devices can \"beep\" to remind you of appointments. A book of days to record birthdays, anniversaries, and other occasions that occur on the same date every year   Detailed \"to-do\" lists and strategically placed sticky notes   Quick \"study\" sessionsBefore a gathering, review who will be there so their names will be fresh in your mind. Establish routinesFor example, keep your keys, wallet, and umbrella in the same place all the time or take medicine with your 8:00 AM glass of juice   Live a Healthy Life   Many actions that will keep your body strong will do the same for your mind. For example:   Talk to Your Doctor About Herbs and Supplements    Malnutrition and vitamin deficiencies can impair your mental function. For example, vitamin B12 deficiency can cause a range of symptoms, including confusion. But, what if your nutritional needs are being met? Can herbs and supplements still offer a benefit? Researchers have investigated a range of natural remedies, such as ginkgo , ginseng , and the supplement phosphatidylserine (PS). So far, though, the evidence is inconsistent as to whether these products can improve memory or thinking.    If you are interested in taking herbs and supplements, talk to your doctor first because they may interact with other medicines that you are taking. Exercise Regularly    Among the many benefits of regular exercise are increased blood flow to the brain and decreased risk of certain diseases that can interfere with memory function. One study found that even moderate exercise has a beneficial effect. Examples of \"moderate\" exercise include:   Playing 18 holes of golf once a week, without a cart   Playing tennis twice a week   Walking one mile per day   Manage Stress    It can be tough to remember what is important when your mind is cluttered. Make time for relaxation. Choose activities that calm you down, and make it routine. Manage Chronic Conditions    Side effects of high blood pressure , diabetes, and heart disease can interfere with mental function. Many of the lifestyle steps discussed here can help manage these conditions. Strive to eat a healthy diet, exercise regularly, get stress under control, and follow your doctor's advice for your condition. Minimize Medications    Talk to your doctor about the medicines that you take. Some may be unnecessary. Also, healthy lifestyle habits may lower the need for certain drugs. Last Reviewed: April 2010 Tawana Gallegos MD   Updated: 4/13/2010   ·     [address] [place] [address]       As we get older, changes in balance, gait, strength, vision, hearing, and cognition make even the most youthful senior more prone to accidents. Falls are one of the leading health risks for older people.  This increased risk of falling is related to:   Aging process (eg, decreased muscle strength, slowed reflexes)   Higher incidence of chronic health problems (eg, arthritis, diabetes) that may limit mobility, agility or sensory awareness   Side effects of medicine (eg, dizziness, blurred vision)especially medicines like prescription pain medicines and drugs used to treat mental health conditions   Depending on the brittleness of your bones, the consequences of a fall can be serious and long lasting. Home Life   Research by the Association of Aging [facility]) shows that some home accidents among older adults can be prevented by making simple lifestyle changes and basic modifications and repairs to the home environment. Here are some lifestyle changes that experts recommend:   Have your hearing and vision checked regularly. Be sure to wear prescription glasses that are right for you. Speak to your doctor or pharmacist about the possible side effects of your medicines. A number of medicines can cause dizziness. If you have problems with sleep, talk to your doctor. Limit your intake of alcohol. If necessary, use a cane or walker to help maintain your balance. Wear supportive, rubber-soled shoes, even at home. If you live in a region that gets wintry weather, you may want to put special cleats on your shoes to prevent you from slipping on the snow and ice. Exercise regularly to help maintain muscle tone, agility, and balance. Always hold the banister when going up or down stairs. Also, use  bars when getting in or out of the bath or shower, or using the toilet. To avoid dizziness, get up slowly from a lying down position. Sit up first, dangling your legs for a minute or two before rising to a standing position. Overall Home Safety Check   According to the Consumer Product Safety Commision's \"Older Consumer Home Safety Checklist,\" it is important to check for potential hazards in each room. And remember, proper lighting is an essential factor in home safety. If you cannot see clearly, you are more likely to fall. Important questions to ask yourself include:   Are lamp, electric, extension, and telephone cords placed out of the flow of traffic and maintained in good condition? Have frayed cords been replaced? Are all small rugs and runners slip resistant? If not, you can secure them to the floor with a special double-sided carpet tape.    Are smoke detectors properly locatedone on every floor of your home and one outside of every sleeping area? Are they in good working order? Are batteries replaced at least once a year? Do you have a well-maintained carbon monoxide detector outside every sleeping are in your home? Does your furniture layout leave plenty of space to maneuver between and around chairs, tables, beds, and sofas? Are hallways, stairs and passages between rooms well lit? Can you reach a lamp without getting out of bed? Are floor surfaces well maintained? Shag rugs, high-pile carpeting, tile floors, and polished wood floors can be particularly slippery. Stairs should always have handrails and be carpeted or fitted with a non-skid tread. Is your telephone easily reachable. Is the cord safely tucked away? Room by Room   According to the Association of Aging, bathrooms and angelina are the two most potentially hazardous rooms in your home. In the Kitchen    Be sure your stove is in proper working order and always make sure burners and the oven are off before you go out or go to sleep. Keep pots on the back burners, turn handles away from the front of the stove, and keep stove clean and free of grease build-up. Kitchen ventilation systems and range exhausts should be working properly. Keep flammable objects such as towels and pot holders away from the cooking area except when in use. Make sure kitchen curtains are tied back. Move cords and appliances away from the sink and hot surfaces. If extension cords are needed, install wiring guides so they do not hang over the sink, range, or working areas. Look for coffee pots, kettles and toaster ovens with automatic shut-offs. Keep a mop handy in the kitchen so you can wipe up spills instantly. You should also have a small fire extinguisher. Arrange your kitchen with frequently used items on lower shelves to avoid the need to stand on a stepstool to reach them.     Make sure countertops are well-lit to avoid injuries while cutting and preparing food. In the Bathroom    Use a non-slip mat or decals in the tub and shower, since wet, soapy tile or porcelain surfaces are extremely slippery. Make sure bathroom rugs are non-skid or tape them firmly to the floor. Bathtubs should have at least one, preferably two, grab bars, firmly attached to structural supports in the wall. (Do not use built-in soap holders or glass shower doors as grab bars.)    Tub seats fitted with non-slip material on the legs allow you to wash sitting down. For people with limited mobility, bathtub transfer benches allow you to slide safely into the tub. Raised toilet seats and toilet safety rails are helpful for those with knee or hip problems. In the [facility]    Make sure you use a nightlight and that the area around your bed is clear of potential obstacles. Be careful with electric blankets and never go to sleep with a heating pad, which can cause serious burns even if on a low setting. Use fire-resistant mattress covers and pillows, and NEVER smoke in bed. Keep a phone next to the bed that is programmed to dial 911 at the push of a button. If you have a chronic condition, you may want to sign on with an automatic call-in service. Typically the system includes a small pendant that connects directly to an emergency medical voice-response system. You should also make arrangements to stay in contact with someonefriend, neighbor, family memberon a regular schedule. Fire Prevention   According to the [employer]. (Smoke Alarms for Every) [address] [place] [address], senior citizens are one of the two highest risk groups for death and serious injuries due to residential fires. When cooking, wear short-sleeved items, never a bulky long-sleeved robe.     The [facility]'s Safety Checklist for Older Consumers emphasizes the importance of checking basements, garages, workshops and storage areas for fire hazards, such as volatile liquids, piles of old rags or clothing and overloaded circuits. Never smoke in bed or when lying down on a couch or recliner chair. Small portable electric or kerosene heaters are responsible for many home fires and should be used cautiously if at all. If you do use one, be sure to keep them away from flammable materials. In case of fire, make sure you have a pre-established emergency exit plan. Have a professional check your fireplace and other fuel-burning appliances yearly. [employer]   Baby boomers entering the souza years will continue to see the development of new products to help older adults live safely and independently in spite of age-related changes. Making Life More Livable  , by Jacolyn Goldmann, lists over 1,000 products for \"living well in the mature years,\" such as bathing and mobility aids, household security devices, ergonomically designed knives and peelers, and faucet valves and knobs for temperature control. Medical supply stores and organizations are good sources of information about products that improve your quality of life and insure your safety. Last Reviewed: November 2009 Chiquita Diez MD   Updated: 3/7/2011     ·        Advance Care Planning: Care Instructions  Your Care Instructions     It can be hard to live with an illness that cannot be cured. But if your health is getting worse, you may want to make decisions about end-of-life care. Planning for the end of your life does not mean that you are giving up. It is a way to make sure that your wishes are met. Clearly stating your wishes can make it easier for your loved ones. Making plans while you are still able may also ease your mind and make your final days less stressful and more meaningful. Follow-up care is a key part of your treatment and safety. Be sure to make and go to all appointments, and call your doctor if you are having problems.  It's also a good idea to know your test results and keep a list of the medicines you take. What can you do to plan for the end of life? You can bring these issues up with your doctor. You do not need to wait until your doctor starts the conversation. You might start with, \"What makes life worth living for me is. Jannet Bloodgood Jannet Bloodgood \" And then follow it with, \"I would not be willing to live with . Jannet Bloodgood Jannet Bloodgood Jannet Bloodgood \" When you complete this sentence it helps your doctor understand your wishes. Talk openly and honestly with your doctor. This is the best way to understand the decisions you will need to make as your health changes. Know that you can always change your mind. Ask your doctor about commonly used life-support measures. These include tube feedings, breathing machines, and fluids given through a vein (IV). Understanding these treatments will help you decide whether you want them. You may choose to have these life-supporting treatments for a limited time. This allows a trial period to see whether they will help you. You may also decide that you want your doctor to take only certain measures to keep you alive. It may help to think about the big picture, like what makes life worth living for you or what your values and goals are. Talk to your doctor about how long you are likely to live. Your doctor may be able to give you an idea of what usually happens with your specific illness. Think about preparing papers that state your wishes. These papers are called advance directives. If you do this early and review them often, there will not be any confusion about what you want. You can change your instructions at any time. Which papers should you prepare? Advance directives are legal papers that tell doctors how you want to be cared for at the end of your life. You do not need a  to write these papers. Ask your doctor or your state health department for information on how to write your advance directives. They may have the forms for each of these types of papers.  Make sure your doctor has a copy of these on file, and give a copy to a family member or close friend. Consider a do-not-resuscitate order (DNR). This order asks that no extra treatments be done if your heart stops or you stop breathing. Extra treatments may include cardiopulmonary resuscitation (CPR), electrical shock to restart your heart, or a machine to breathe for you. If you decide to have a DNR order, ask your doctor to explain and write it. Place the order in your home where everyone can easily see it. Consider a living will. A living will explains your wishes about life support and other treatments at the end of your life if you become unable to speak for yourself. Living finley tell doctors to use or not use treatments that would keep you alive. You must have one or two witnesses or a notary present when you sign this form. A living will may be called something else in your state. Consider a medical power of . This form allows you to name a person to make decisions about your care if you are not able to. Most people ask a close friend or family member. Talk to this person about the kinds of treatments you want and those that you do not want. Make sure this person understands your wishes. A medical power of  may be called something else in your state. These legal papers are simple to change. Tell your doctor what you want to change, and ask him or her to make a note in your medical file. Give your family updated copies of the papers. Where can you learn more? Go to https://Medypalperemigioewgini.[employer]. org and sign in to your 8tracks [employer] account. Enter P184 in the [facility] box to learn more about \"Advance Care Planning: Care Instructions. \"     If you do not have an account, please click on the \"Sign Up Now\" link. Current as of: March 17, 2021               Content Version: 13.0  © 4195-2774 Healthwise, Incorporated. Care instructions adapted under license by [facility] ([facility]).  If you have questions about a medical condition or this instruction, always ask your healthcare professional. [address] any warranty or liability for your use of this information. ·        Learning About Living Michelet Po  What is a living will? A living will, also called a declaration, is a legal form. It tells your family and your doctor your wishes when you can't speak for yourself. It's used by the health professionals who will treat you as you near the end of your life or if you get seriously hurt or ill. If you put your wishes in writing, your loved ones and others will know what kind of care you want. They won't need to guess. This can ease your mind and be helpful to others. And you can change or cancel your living will at any time. A living will is not the same as an estate or property will. An estate will explains what you want to happen with your money and property after you die. How do you use it? A living will is used to describe the kinds of treatment or life support you want as you near the end of your life or if you get seriously hurt or ill. Keep these facts in mind about living finley. Your living will is used only if you can't speak or make decisions for yourself. Most often, one or more doctors must certify that you can't speak or decide for yourself before your living will takes effect. If you get better and can speak for yourself again, you can accept or refuse any treatment. It doesn't matter what you said in your living will. Some states may limit your right to refuse treatment in certain cases. For example, you may need to clearly state in your living will that you don't want artificial hydration and nutrition, such as being fed through a tube. Is a living will a legal document? A living will is a legal document. Each state has its own laws about living finley. And a living will may be called something else in your state. Here are some things to know about living finley.   You don't need an  to complete a living will. But legal advice can be helpful if your state's laws are unclear. It can also help if your health history is complicated or your family can't agree on what should be in your living will. You can change your living will at any time. Some people find that their wishes about end-of-life care change as their health changes. If you make big changes to your living will, complete a new form. If you move to another state, make sure that your living will is legal in the state where you now live. In most cases, doctors will respect your wishes even if you have a form from a different state. You might use a universal form that has been approved by many states. This kind of form can sometimes be filled out and stored online. Your digital copy will then be available wherever you have a connection to the internet. The doctors and nurses who need to treat you can find it right away. Your state may offer an online registry. This is another place where you can store your living will online. It's a good idea to get your living will notarized. This means using a person called a COMARCO to watch two people sign, or witness, your living will. What should you know when you create a living will? Here are some questions to ask yourself as you make your living will:  Do you know enough about life support methods that might be used? If not, talk to your doctor so you know what might be done if you can't breathe on your own, your heart stops, or you can't swallow. What things would you still want to be able to do after you receive life-support methods? Would you want to be able to walk? To speak? To eat on your own? To live without the help of machines? Do you want certain Yazidism practices performed if you become very ill? If you have a choice, where do you want to be cared for? In your home? At a hospital or nursing home?   If you have a choice at the end of your life, where would you prefer to die? At home? In a hospital or nursing home? Somewhere else? Would you prefer to be buried or cremated? Do you want your organs to be donated after you die? What should you do with your living will? Make sure that your family members and your health care agent have copies of your living will (also called a declaration). Give your doctor a copy of your living will. Ask him or her to keep it as part of your medical record. If you have more than one doctor, make sure that each one has a copy. Put a copy of your living will where it can be easily found. For example, some people may put a copy on their refrigerator door. If you are using a digital copy, be sure your doctor, family members, and health care agent know how to find and access it. Where can you learn more? Go to https://chpepiceweb.Dynamics. org and sign in to your GateGuru account. Enter O929 in the TheySay box to learn more about \"Learning About Living Kenneth Doll. \"     If you do not have an account, please click on the \"Sign Up Now\" link. Current as of: March 17, 2021               Content Version: 13.0  © 7443-4319 neoSaej. Care instructions adapted under license by [facility] [address] If you have questions about a medical condition or this instruction, always ask your healthcare professional. [place] [address] any warranty or liability for your use of this information. ·        Learning About Medical Power of   What is a medical power of ? A medical power of , also called a durable power of  for health care, is one type of the legal forms called advance directives. It lets you name the person you want to make treatment decisions for you if you can't speak or decide for yourself. The person you choose is called your health care agent. This person is also called a health care proxy or health care surrogate.   A medical power of  may be called something else in your state. How do you choose a health care agent? Choose your health care agent carefully. This person may or may not be a family member. Talk to the person before you make your final decision. Make sure he or she is comfortable with this responsibility. It's a good idea to choose someone who:  Is at least 25years old. Knows you well and understands what makes life meaningful for you. Understands your Christian and moral values. Will do what you want, not what he or she wants. Will be able to make difficult choices at a stressful time. Will be able to refuse or stop treatment, if that is what you would want, even if you could die. Will be firm and confident with health professionals if needed. Will ask questions to get needed information. Lives near you or agrees to travel to you if needed. Your family may help you make medical decisions while you can still be part of that process. But it's important to choose one person to be your health care agent in case you aren't able to make decisions for yourself. If you don't fill out the legal form and name a health care agent, the decisions your family can make may be limited. A health care agent may be called something else in your state. Who will make decisions for you if you don't have a health care agent? If you don't have a health care agent or a living will, you may not get the care you want. Decisions may be made by family members who disagree about your medical care. Or decisions may be made by a medical professional who doesn't know you well. In some cases, a  makes the decisions. When you name a health care agent, it is very clear who has the power to make health decisions for you. How do you name a health care agent? You name your health care agent on a legal form. This form is usually called a medical power of .  Ask your hospital, state bar association, or office on aging where to find these forms. You must sign the form to make it legal. Some states require you to get the form notarized. This means that a person called a  watches you sign the form and then he or she signs the form. Some states also require that two or more witnesses sign the form. Be sure to tell your family members and doctors who your health care agent is. Where can you learn more? Go to https://Qualaris Healthcare Solutionspepiceweb.[employer]. org and sign in to your IPG account. Enter [id number] in the TigerText box to learn more about \"Learning About [address]. \"     If you do not have an account, please click on the \"Sign Up Now\" link. Current as of: March 17, 2021               Content Version: 13.0  © 2918-2984 [employer]. Care instructions adapted under license by [facility] ([facility]). If you have questions about a medical condition or this instruction, always ask your healthcare professional. [place] [address] any warranty or liability for your use of this information. Patient Education        Nonalcoholic Steatohepatitis (AGRAWAL): Care Instructions  Overview     Nonalcoholic steatohepatitis (AGRAWAL) is liver inflammation. It is caused by a buildup of fat in the liver. The fat buildup is not caused by drinking alcohol. Because of the inflammation, the liver does not work as well as it should. AGRAWAL is part of a group of liver diseases called nonalcoholic fatty liver disease. In these diseases, fat builds up in the liver and sometimes causes liver damage. This damage can get worse over time. Follow-up care is a key part of your treatment and safety. Be sure to make and go to all appointments, and call your doctor if you are having problems. It's also a good idea to know your test results and keep a list of the medicines you take. How can you care for yourself at home? Stay at a healthy weight. Or if you need to, slowly get to a healthy weight. Control your cholesterol.  Talk to your doctor about ways to lower your cholesterol, if needed. You might try getting active, taking medicines, and making healthy changes to your diet. Eat healthy foods. This includes fruits, vegetables, lean meats and dairy, and whole grains. If you have diabetes, keep your blood sugar in your target range. Get at least 30 minutes of exercise on most days of the week. Walking is a good choice. Limit alcohol, or do not drink. Alcohol can damage the liver and cause health problems. Get immunized. Having AGRAWAL increases your risk for infections, so it's important to get all recommended vaccines. When should you call for help? Call 911 anytime you think you may need emergency care. For example, call if:    You have trouble breathing.     You vomit blood or what looks like coffee grounds. Call your doctor now or seek immediate medical care if:    You feel very sleepy or confused.     You have new or worse belly pain.     You have a fever.     There is a new or increasing yellow tint to your skin or the whites of your eyes.     You have any abnormal bleeding, such as:  Nosebleeds. Vaginal bleeding that is different (heavier, more frequent, at a different time of the month) than what you are used to. Bloody or black stools, or rectal bleeding. Bloody or pink urine. Watch closely for changes in your health, and be sure to contact your doctor if:    Your belly is getting bigger.     You are gaining weight.     You have any problems. Where can you learn more? Go to https://Lucena ResearchkennaSEC Watch.Oktopost. org and sign in to your RiskIQ account. Enter B291 in the [facility] box to learn more about \"Nonalcoholic Steatohepatitis (AGRAWAL): Care Instructions. \"     If you do not have an account, please click on the \"Sign Up Now\" link. Current as of: February 10, 2021               Content Version: 13.0  © 1181-6416 Healthwise, Incorporated. Care instructions adapted under license by [facility] ([facility]).  If you have questions about a medical condition or this instruction, always ask your healthcare professional. [place] [address] any warranty or liability for your use of this information.

## 2021-10-11 NOTE — PROGRESS NOTES
Medicare Annual Wellness Visit  Name: Kush Fajardo Date: 10/11/2021   MRN: [id number] Sex: Female   Age: 65 y.o. Ethnicity: Non- / Non    : 1950 Race: White (non-)      Claudine Choe is here for Medicare AWV, Breathing Problem, and Medication Check (diabetes)    Screenings for behavioral, psychosocial and functional/safety risks, and cognitive dysfunction are all negative except as indicated below. These results, as well as other patient data from the [address] form, are documented in Flowsheets linked to this Encounter. Allergies   Allergen Reactions    Amoxicillin     Plantain Other (See Comments)     Pt denies    Statins     Tape Sue Alisia Tape]     Sulfa Antibiotics Itching and Rash         Prior to Visit Medications    Medication Sig Taking? Authorizing Provider   triamcinolone (KENALOG) 0.1 % cream  Yes Historical Provider, MD   SITagliptin (JANUVIA) 50 MG tablet Take 1 tablet by mouth daily Yes Violette Nuñez MD   glimepiride (AMARYL) 4 MG tablet TAKE 1 TABLET BY MOUTH ONE TIME A DAY Yes Violette Nuñez MD   TRUE METRIX BLOOD GLUCOSE TEST strip TEST BLOOD SUGAR DAILY Yes Violette Nuñez MD   metFORMIN (GLUCOPHAGE-XR) 500 MG extended release tablet TAKE 1 TABLET BY MOUTH 4 TIMES A DAY Yes Violette Nuñez MD   sucralfate (CARAFATE) 1 GM/10ML suspension Take 10 mLs by mouth 4 times daily Yes Violette Nuñez MD   fluticasone (FLONASE) 50 MCG/ACT nasal spray 1 spray by Nasal route 2 times daily  Yes Historical Provider, MD   montelukast (SINGULAIR) 10 MG tablet Take 10 mg by mouth nightly. Yes Historical Provider, MD   cetirizine (ZYRTEC) 10 MG tablet Take 10 mg by mouth daily.    Yes Historical Provider, MD   lansoprazole (PREVACID) 15 MG delayed release capsule Take 15 mg by mouth daily as needed (15 MG daily)  Yes Historical Provider, MD   budesonide-formoterol (SYMBICORT) 160-4.5 MCG/ACT AERO Inhale 2 puffs into the lungs 2 times Yasmeen Balderrama DO at [facility] [place], operated by [facility]      polyp    TONSILLECTOMY      TUNNELED VENOUS PORT PLACEMENT Left 7/30/2015         Family History   Problem Relation Age of Onset    Breast Cancer Mother     Other Mother         heart disease, pacemaker    Other Father         heart disease, thyroid disease    Breast Cancer Maternal Aunt     Breast Cancer Paternal Grandmother     Breast Cancer Maternal Aunt     Asthma Maternal Grandmother        CareTeam (Including outside providers/suppliers regularly involved in providing care):   Patient Care Team:  Ketan Snow MD as PCP - General (Family Medicine)  Ketan Snow MD as PCP - [facility]  Theodore Hussein MD as Consulting Physician (Hematology and Oncology)    Wt Readings from Last 3 Encounters:   10/11/21 168 lb 12.8 oz (76.6 kg)   09/20/21 167 lb 9.6 oz (76 kg)   03/17/21 169 lb 3.2 oz (76.7 kg)     Vitals:    10/11/21 0850   BP: 137/74   Pulse: 80   Temp: 97.2 °F (36.2 °C)   SpO2: 94%   Weight: 168 lb 12.8 oz (76.6 kg)   Height: 5' 8.5\" (1.74 m)     Body mass index is 25.29 kg/m². 66 yo female here for annual visit. Stopped farxiga bc of recurrent yeast infections. Also stopped the metformin for some time due to loose stools while on vacation. Needs to have upper scope done. The patient was told by the gastroenterologist office that I just need to order the endoscopy and the gastroenterologist will do that. Has a hard time taking a deep breath. When waking up in the morning she has a lot of mucous. Just in AM.    Based upon direct observation of the patient, evaluation of cognition reveals recent and remote memory intact. HENT:   /74   Pulse 80   Temp 97.2 °F (36.2 °C)   Ht 5' 8.5\" (1.74 m)   Wt 168 lb 12.8 oz (76.6 kg)   SpO2 94%   BMI 25.29 kg/m²   Constitutional: Alert and oriented. Well-nourished. Head: Normocephalic and atraumatic.    Right Ear: External ear normal. TM: no bulging, erythema or fluid seen.  Left Ear: External ear normal. TM: no bulging, erythema or fluid seen. Nose: Nose normal.   Mouth/Throat: Oropharynx is clear and moist. Teeth in good repair. Eyes: Pupils are equal, round, and reactive to light. Right eye exhibits no discharge. Left eye exhibits no discharge. No scleral icterus. Neck: Normal range of motion. Neck supple. No JVD present. No tracheal deviation present. No thyromegaly present. Cardiovascular: Normal rate, regular rhythm, normal heart sounds and intact distal pulses. Pulmonary/Chest: Effort normal and breath sounds normal. No respiratory distress. She has no wheezes. She has no rales. Abdominal: Soft. Bowel sounds are normal.  She exhibits no distension and no mass. There is no tenderness. There is no rebound and no guarding. Musculoskeletal: Normal range of motion. She exhibits no edema or tenderness. Lymphadenopathy:    She has no cervical adenopathy. Neurological:  She is alert and oriented to person, place, and time. Cranial nerves grossly intact. No sensation problem noted. Muscle strength 5/5 throughout. Skin: Skin is warm and dry. No rash noted. No erythema. Psychiatric:  She has a normal mood and affect. Behavior is normal.      Patient's complete Health Risk Assessment and screening values have been reviewed and are found in Flowsheets. The following problems were reviewed today and where indicated follow up appointments were made and/or referrals ordered. Positive Risk Factor Screenings with Interventions:            General Health and ACP:  General  In general, how would you say your health is?: Good  In the past 7 days, have you experienced any of the following?  New or Increased Pain, New or Increased Fatigue, Loneliness, Social Isolation, Stress or Anger?: None of These  Do you get the social and emotional support that you need?: Yes  Do you have a Living Will?: (!) No  Advance Directives     Power of  Living Will ACP-Advance Directive ACP-Power of     Not on File Not on File Not on File Not on File      General Health Risk Interventions:  · No Living Will: Patient declines ACP discussion/assistance    Health Habits/Nutrition:  Health Habits/Nutrition  Do you exercise for at least 20 minutes 2-3 times per week?: (!) No  Have you lost any weight without trying in the past 3 months?: No  Do you eat only one meal per day?: No  Have you seen the dentist within the past year?: Yes  Body mass index: (!) 25.29  Health Habits/Nutrition Interventions:  · Inadequate physical activity:  educational materials provided to promote increased physical activity       Personalized Preventive Plan   Current Health Maintenance Status  Immunization History   Administered Date(s) Administered    COVID-19, Trotter Peter, PF, 30mcg/0.3mL 03/02/2021, 03/23/2021, 09/30/2021    Influenza Virus Vaccine 10/05/2015, 11/11/2017    Influenza, High Dose (Fluzone 65 yrs and older) 10/17/2014, 09/26/2016, 10/31/2018, 10/03/2020    Pneumococcal Conjugate 13-valent ([id number]) 10/17/2014    Pneumococcal Polysaccharide ([id number]) 02/24/2017    Tdap (Boostrix, Adacel) 02/27/2015    Zoster Recombinant (Shingrix) 12/09/2020, 02/02/2021        Health Maintenance   Topic Date Due    Diabetic foot exam  Never done    Diabetic retinal exam  Never done    Diabetic microalbuminuria test  11/13/2018    Flu vaccine (1) 09/01/2021    Annual Wellness Visit (AWV)  09/04/2021    Lipid screen  09/29/2021    A1C test (Diabetic or Prediabetic)  10/11/2022    Colon cancer screen colonoscopy  05/01/2023    DTaP/Tdap/Td vaccine (2 - Td or Tdap) 02/27/2025    DEXA (modify frequency per FRAX score)  Completed    Shingles Vaccine  Completed    Pneumococcal 65+ years Vaccine  Completed    COVID-19 Vaccine  Completed    Hepatitis C screen  Completed    Hepatitis A vaccine  Aged Out    Hib vaccine  Aged Out    Meningococcal (ACWY) vaccine  Aged Out     Recommendations for Preventive Services Due: see orders and patient instructions/AVS.  . Recommended screening schedule for the next 5-10 years is provided to the patient in written form: see Patient James Delgado was seen today for medicare awv, breathing problem and medication check. Diagnoses and all orders for this visit:    Routine general medical examination at a health care facility  -     CBC Auto Differential; Future  -     TSH with Reflex; Future    Type 2 diabetes mellitus without complication, without long-term current use of insulin (HCC)  -     POCT glycosylated hemoglobin (Hb A1C)  -     Urinalysis; Future  -     Comprehensive Metabolic Panel; Future  -     Microalbumin, Ur; Future    Chemotherapy-induced neuropathy ([facility] [address]    Type 2 diabetes mellitus with hyperglycemia, without long-term current use of insulin (HCC)  -     SITagliptin (JANUVIA) 50 MG tablet; Take 1 tablet by mouth daily  -     Urinalysis; Future  -     Comprehensive Metabolic Panel; Future  -     Microalbumin, Ur; Future    Elevated liver enzymes  -     Comprehensive Metabolic Panel; Future  -     US LIVER; Future    Thrombocytopenia, unspecified    Screening for cardiovascular condition    Screening for hyperlipidemia  -     Lipid Panel; Future           Patient is doing well overall. A1c stable. We will start the patient on Januvia. She did stop [place] due to side effects. Also discussed with her elevated liver enzymes. Recheck liver enzymes and also order ultrasound of the liver. Patient follow-up with the specialists including hematologist and breast cancer specialist.    I will check on the endoscopy order. Let patient know when this is done. Call or return to clinic prn if these symptoms worsen or fail to improve as anticipated. I have reviewed the instructions with the patient, answering all questions to her satisfaction.     (Please note that portions of this note were completed with a voice recognition program. Efforts were made to edit the dictations but occasionally words are mis-transcribed.)

## 2021-10-12 ENCOUNTER — HOSPITAL ENCOUNTER (OUTPATIENT)
Age: 71
Discharge: HOME | End: 2021-10-12
Payer: MEDICARE

## 2021-10-12 DIAGNOSIS — E11.65 TYPE 2 DIABETES MELLITUS WITH HYPERGLYCEMIA, WITHOUT LONG-TERM CURRENT USE OF INSULIN (HCC): ICD-10-CM

## 2021-10-12 DIAGNOSIS — Z00.00 ROUTINE GENERAL MEDICAL EXAMINATION AT A HEALTH CARE FACILITY: ICD-10-CM

## 2021-10-12 DIAGNOSIS — E11.9 TYPE 2 DIABETES MELLITUS WITHOUT COMPLICATION, WITHOUT LONG-TERM CURRENT USE OF INSULIN (HCC): ICD-10-CM

## 2021-10-12 DIAGNOSIS — E11.9 TYPE 2 DIABETES MELLITUS WITHOUT COMPLICATION, WITHOUT LONG-TERM CURRENT USE OF INSULIN (HCC): Primary | ICD-10-CM

## 2021-10-12 DIAGNOSIS — Z13.220 SCREENING FOR HYPERLIPIDEMIA: ICD-10-CM

## 2021-10-12 DIAGNOSIS — R74.8 ELEVATED LIVER ENZYMES: ICD-10-CM

## 2021-10-12 LAB
-: ABNORMAL
ABSOLUTE EOS #: 0.11 K/UL (ref 0–0.44)
ABSOLUTE IMMATURE GRANULOCYTE: <0.03 K/UL (ref 0–0.3)
ABSOLUTE LYMPH #: 0.71 K/UL (ref 1.1–3.7)
ABSOLUTE MONO #: 0.18 K/UL (ref 0.1–1.2)
ALBUMIN SERPL-MCNC: 4.2 G/DL (ref 3.5–5.2)
ALBUMIN/GLOBULIN RATIO: 1.8 (ref 1–2.5)
ALP BLD-CCNC: 97 U/L (ref 35–104)
ALT SERPL-CCNC: 23 U/L (ref 5–33)
AMORPHOUS: ABNORMAL
ANION GAP SERPL CALCULATED.3IONS-SCNC: 14 MMOL/L (ref 9–17)
AST SERPL-CCNC: 22 U/L
BACTERIA: ABNORMAL
BASOPHILS # BLD: 1 % (ref 0–2)
BASOPHILS ABSOLUTE: <0.03 K/UL (ref 0–0.2)
BILIRUB SERPL-MCNC: 0.38 MG/DL (ref 0.3–1.2)
BILIRUBIN URINE: NEGATIVE
BUN BLDV-MCNC: 11 MG/DL (ref 8–23)
BUN/CREAT BLD: ABNORMAL (ref 9–20)
CALCIUM SERPL-MCNC: 9.1 MG/DL (ref 8.6–10.4)
CASTS UA: ABNORMAL /LPF (ref 0–8)
CHLORIDE BLD-SCNC: 103 MMOL/L (ref 98–107)
CHOLESTEROL/HDL RATIO: 3.9
CHOLESTEROL: 158 MG/DL
CO2: 23 MMOL/L (ref 20–31)
COLOR: YELLOW
COMMENT UA: ABNORMAL
CREAT SERPL-MCNC: 0.74 MG/DL (ref 0.5–0.9)
CREATININE URINE: 135.1 MG/DL (ref 28–217)
CRYSTALS, UA: ABNORMAL /HPF
DIFFERENTIAL TYPE: ABNORMAL
EOSINOPHILS RELATIVE PERCENT: 4 % (ref 1–4)
EPITHELIAL CELLS UA: ABNORMAL /HPF (ref 0–5)
GFR AFRICAN AMERICAN: >60 ML/MIN
GFR NON-AFRICAN AMERICAN: >60 ML/MIN
GFR SERPL CREATININE-BSD FRML MDRD: ABNORMAL ML/MIN/{1.73_M2}
GFR SERPL CREATININE-BSD FRML MDRD: ABNORMAL ML/MIN/{1.73_M2}
GLUCOSE BLD-MCNC: 155 MG/DL (ref 70–99)
GLUCOSE URINE: NEGATIVE
HCT VFR BLD CALC: 38.7 % (ref 36.3–47.1)
HDLC SERPL-MCNC: 41 MG/DL
HEMOGLOBIN: 12.1 G/DL (ref 11.9–15.1)
IMMATURE GRANULOCYTES: 0 %
KETONES, URINE: NEGATIVE
LDL CHOLESTEROL: 68 MG/DL (ref 0–130)
LEUKOCYTE ESTERASE, URINE: ABNORMAL
LYMPHOCYTES # BLD: 25 % (ref 24–43)
MCH RBC QN AUTO: 27.3 PG (ref 25.2–33.5)
MCHC RBC AUTO-ENTMCNC: 31.3 G/DL (ref 28.4–34.8)
MCV RBC AUTO: 87.4 FL (ref 82.6–102.9)
MICROALBUMIN/CREAT 24H UR: 40 MG/L
MICROALBUMIN/CREAT UR-RTO: 30 MCG/MG CREAT
MONOCYTES # BLD: 6 % (ref 3–12)
MUCUS: ABNORMAL
NITRITE, URINE: POSITIVE
NRBC AUTOMATED: 0 PER 100 WBC
OTHER OBSERVATIONS UA: ABNORMAL
PDW BLD-RTO: 13.6 % (ref 11.8–14.4)
PH UA: 5.5 (ref 5–8)
PLATELET # BLD: 135 K/UL (ref 138–453)
PLATELET ESTIMATE: ABNORMAL
PMV BLD AUTO: 11.4 FL (ref 8.1–13.5)
POTASSIUM SERPL-SCNC: 4.4 MMOL/L (ref 3.7–5.3)
PROTEIN UA: NEGATIVE
RBC # BLD: 4.43 M/UL (ref 3.95–5.11)
RBC # BLD: ABNORMAL 10*6/UL
RBC UA: ABNORMAL /HPF (ref 0–4)
RENAL EPITHELIAL, UA: ABNORMAL /HPF
SEG NEUTROPHILS: 64 % (ref 36–65)
SEGMENTED NEUTROPHILS ABSOLUTE COUNT: 1.77 K/UL (ref 1.5–8.1)
SODIUM BLD-SCNC: 140 MMOL/L (ref 135–144)
SPECIFIC GRAVITY UA: 1.02 (ref 1–1.03)
TOTAL PROTEIN: 6.5 G/DL (ref 6.4–8.3)
TRICHOMONAS: ABNORMAL
TRIGL SERPL-MCNC: 243 MG/DL
TSH SERPL DL<=0.05 MIU/L-ACNC: 4.82 MIU/L (ref 0.3–5)
TURBIDITY: ABNORMAL
URINE HGB: ABNORMAL
UROBILINOGEN, URINE: NORMAL
VLDLC SERPL CALC-MCNC: ABNORMAL MG/DL (ref 1–30)
WBC # BLD: 2.8 K/UL (ref 3.5–11.3)
WBC # BLD: ABNORMAL 10*3/UL
WBC UA: ABNORMAL /HPF (ref 0–5)
YEAST: ABNORMAL

## 2021-10-12 PROCEDURE — 84443 ASSAY THYROID STIM HORMONE: CPT

## 2021-10-12 PROCEDURE — 36415 COLL VENOUS BLD VENIPUNCTURE: CPT

## 2021-10-12 PROCEDURE — 85025 COMPLETE CBC W/AUTO DIFF WBC: CPT

## 2021-10-12 PROCEDURE — 81001 URINALYSIS AUTO W/SCOPE: CPT

## 2021-10-12 PROCEDURE — 80061 LIPID PANEL: CPT

## 2021-10-12 PROCEDURE — 82043 UR ALBUMIN QUANTITATIVE: CPT

## 2021-10-12 PROCEDURE — 82570 ASSAY OF URINE CREATININE: CPT

## 2021-10-12 PROCEDURE — 80053 COMPREHEN METABOLIC PANEL: CPT

## 2021-10-12 NOTE — PROGRESS NOTES
Januvia is non-preferred DDP4i for patient insurance. Preferred options are Jacquelin Medicus. Alternative medication pended for review.      Cosette Denver, [facility], PharmD, BCPS  Ambulatory Clinical Pharmacist   10/12/2021 9:52 AM     Verbal order given by Mary Jacobo MD

## 2021-10-13 ENCOUNTER — HOSPITAL ENCOUNTER (OUTPATIENT)
Dept: ULTRASOUND IMAGING | Age: 71
Discharge: HOME | End: 2021-10-15
Payer: MEDICARE

## 2021-10-13 DIAGNOSIS — R74.8 ELEVATED LIVER ENZYMES: ICD-10-CM

## 2021-10-13 PROCEDURE — 76705 ECHO EXAM OF ABDOMEN: CPT

## 2021-10-14 ENCOUNTER — TELEPHONE (OUTPATIENT)
Dept: FAMILY MEDICINE CLINIC | Age: 71
End: 2021-10-14

## 2021-10-14 RX ORDER — CIPROFLOXACIN 250 MG/1
250 TABLET, FILM COATED ORAL 2 TIMES DAILY
Qty: 10 TABLET | Refills: 0 | Status: SHIPPED | OUTPATIENT
Start: 2021-10-14 | End: 2021-10-19

## 2021-10-14 NOTE — TELEPHONE ENCOUNTER
Pt needs UTI antibiotics sent to pharmacy , she was informed it was sent but I see no orders placed in patient chart and pt states the pharmacy hasn't received a call or anything electronically.

## 2021-10-18 RX ORDER — CALCIUM CITRATE/VITAMIN D3 200MG-6.25
TABLET ORAL
Qty: 200 STRIP | Refills: 0 | Status: SHIPPED | OUTPATIENT
Start: 2021-10-18 | End: 2022-04-18

## 2021-10-18 NOTE — TELEPHONE ENCOUNTER
Aniket Henson is calling to request a refill on the following medication(s):    Last Visit Date (If Applicable):  84/42/7145    Next Visit Date:    Visit date not found    Medication Request:  Requested Prescriptions     Pending Prescriptions Disp Refills    TRUE METRIX BLOOD GLUCOSE TEST strip [Pharmacy Med Name: TRUE METRIX BLOOD GLUCOSE TEST STRP] 200 strip 0     Sig: TEST BLOOD SUGAR DAILY

## 2021-11-09 DIAGNOSIS — E11.9 TYPE 2 DIABETES MELLITUS WITHOUT COMPLICATION, WITHOUT LONG-TERM CURRENT USE OF INSULIN (HCC): ICD-10-CM

## 2021-11-09 RX ORDER — GLIMEPIRIDE 4 MG/1
TABLET ORAL
Qty: 90 TABLET | Refills: 0 | Status: SHIPPED | OUTPATIENT
Start: 2021-11-09 | End: 2022-02-22

## 2022-01-31 ENCOUNTER — OFFICE VISIT (OUTPATIENT)
Dept: FAMILY MEDICINE CLINIC | Age: 72
End: 2022-01-31
Payer: MEDICARE

## 2022-01-31 ENCOUNTER — TELEPHONE (OUTPATIENT)
Dept: FAMILY MEDICINE CLINIC | Age: 72
End: 2022-01-31

## 2022-01-31 VITALS
BODY MASS INDEX: 24.78 KG/M2 | OXYGEN SATURATION: 99 % | SYSTOLIC BLOOD PRESSURE: 138 MMHG | HEART RATE: 90 BPM | DIASTOLIC BLOOD PRESSURE: 69 MMHG | TEMPERATURE: 97.2 F | WEIGHT: 165.4 LBS

## 2022-01-31 DIAGNOSIS — D69.6 THROMBOCYTOPENIA, UNSPECIFIED (HCC): ICD-10-CM

## 2022-01-31 DIAGNOSIS — E11.9 TYPE 2 DIABETES MELLITUS WITHOUT COMPLICATION, WITHOUT LONG-TERM CURRENT USE OF INSULIN (HCC): ICD-10-CM

## 2022-01-31 DIAGNOSIS — R19.7 DIARRHEA, UNSPECIFIED TYPE: Primary | ICD-10-CM

## 2022-01-31 DIAGNOSIS — R19.8 INCREASED ABDOMINAL GIRTH: ICD-10-CM

## 2022-01-31 DIAGNOSIS — C50.411 MALIGNANT NEOPLASM OF UPPER-OUTER QUADRANT OF RIGHT BREAST IN FEMALE, ESTROGEN RECEPTOR POSITIVE (HCC): ICD-10-CM

## 2022-01-31 DIAGNOSIS — R14.0 BLOATING: ICD-10-CM

## 2022-01-31 DIAGNOSIS — T45.1X5A CHEMOTHERAPY-INDUCED NEUROPATHY (HCC): ICD-10-CM

## 2022-01-31 DIAGNOSIS — G62.0 CHEMOTHERAPY-INDUCED NEUROPATHY (HCC): ICD-10-CM

## 2022-01-31 DIAGNOSIS — Z17.0 MALIGNANT NEOPLASM OF UPPER-OUTER QUADRANT OF RIGHT BREAST IN FEMALE, ESTROGEN RECEPTOR POSITIVE (HCC): ICD-10-CM

## 2022-01-31 DIAGNOSIS — E11.65 TYPE 2 DIABETES MELLITUS WITH HYPERGLYCEMIA, WITHOUT LONG-TERM CURRENT USE OF INSULIN (HCC): ICD-10-CM

## 2022-01-31 LAB — HBA1C MFR BLD: 7.3 %

## 2022-01-31 PROCEDURE — 99214 OFFICE O/P EST MOD 30 MIN: CPT | Performed by: FAMILY MEDICINE

## 2022-01-31 PROCEDURE — 1036F TOBACCO NON-USER: CPT | Performed by: FAMILY MEDICINE

## 2022-01-31 PROCEDURE — G8420 CALC BMI NORM PARAMETERS: HCPCS | Performed by: FAMILY MEDICINE

## 2022-01-31 PROCEDURE — G8399 PT W/DXA RESULTS DOCUMENT: HCPCS | Performed by: FAMILY MEDICINE

## 2022-01-31 PROCEDURE — G8484 FLU IMMUNIZE NO ADMIN: HCPCS | Performed by: FAMILY MEDICINE

## 2022-01-31 PROCEDURE — 3017F COLORECTAL CA SCREEN DOC REV: CPT | Performed by: FAMILY MEDICINE

## 2022-01-31 PROCEDURE — 1090F PRES/ABSN URINE INCON ASSESS: CPT | Performed by: FAMILY MEDICINE

## 2022-01-31 PROCEDURE — 83036 HEMOGLOBIN GLYCOSYLATED A1C: CPT | Performed by: FAMILY MEDICINE

## 2022-01-31 PROCEDURE — 2022F DILAT RTA XM EVC RTNOPTHY: CPT | Performed by: FAMILY MEDICINE

## 2022-01-31 PROCEDURE — 3051F HG A1C>EQUAL 7.0%<8.0%: CPT | Performed by: FAMILY MEDICINE

## 2022-01-31 PROCEDURE — G8427 DOCREV CUR MEDS BY ELIG CLIN: HCPCS | Performed by: FAMILY MEDICINE

## 2022-01-31 PROCEDURE — 1123F ACP DISCUSS/DSCN MKR DOCD: CPT | Performed by: FAMILY MEDICINE

## 2022-01-31 PROCEDURE — 4040F PNEUMOC VAC/ADMIN/RCVD: CPT | Performed by: FAMILY MEDICINE

## 2022-01-31 RX ORDER — SENNA AND DOCUSATE SODIUM 50; 8.6 MG/1; MG/1
1 TABLET, FILM COATED ORAL DAILY
Qty: 20 TABLET | Refills: 1 | Status: SHIPPED | OUTPATIENT
Start: 2022-01-31 | End: 2022-05-31

## 2022-01-31 ASSESSMENT — PATIENT HEALTH QUESTIONNAIRE - PHQ9
SUM OF ALL RESPONSES TO PHQ QUESTIONS 1-9: 0
SUM OF ALL RESPONSES TO PHQ9 QUESTIONS 1 & 2: 0
1. LITTLE INTEREST OR PLEASURE IN DOING THINGS: 0
2. FEELING DOWN, DEPRESSED OR HOPELESS: 0
SUM OF ALL RESPONSES TO PHQ QUESTIONS 1-9: 0

## 2022-01-31 NOTE — TELEPHONE ENCOUNTER
Radha GRIFFITHS [facility] SURG[facility]  Staff  Subject: Appointment Request     Reason for Call: Urgent Abdominal Pain     QUESTIONS   Type of Appointment? Established Patient   Reason for appointment request? No appointments available during search   Additional Information for Provider? appointment, today or tomorrow,   severe Diarrhea for two weeks, upset stomach, has not been to hospital,   tried to rodrigues to office- they said they will send message and call back   ---------------------------------------------------------------------------   --------------   Doyenz   What is the best way for the office to contact you? OK to leave message on   voicemail   Preferred Call Back Phone Number? [phone]   ---------------------------------------------------------------------------   --------------   SCRIPT ANSWERS   Relationship to Patient? Self   Do you have pain that has started or worsened within the past 24 hours? No   Are you vomiting blood or have bloody or black stool? No   Have you recently (14 days) seen a provider for this pain? No   Have you been diagnosed with, awaiting test results for, or told that you   are suspected of having COVID-19 (Coronavirus)? (If patient has tested   negative or was tested as a requirement for work, school, or travel and   not based on symptoms, answer no)? No   Within the past two weeks have you developed any of the following symptoms   (answer no if symptoms have been present longer than 2 weeks or began   more than 2 weeks ago)? Fever or Chills, Cough, Shortness of breath or   difficulty breathing, Loss of taste or smell, Sore throat, Nasal   congestion, Sneezing or runny nose, Fatigue or generalized body aches   (answer no if pain is specific to a body part e.g. back pain), Diarrhea,   Headache?  Yes

## 2022-02-03 ENCOUNTER — TELEPHONE (OUTPATIENT)
Dept: GASTROENTEROLOGY | Age: 72
End: 2022-02-03

## 2022-02-03 NOTE — TELEPHONE ENCOUNTER
Writer called and spoke with pt. New pt appt has been scheduled for 3/7/22. Pt ws offered sooner appt but pt stated that her pcp ordered testing to be done and pt is not sure if appt with Dr Monster Raymond will be needed so pt will call us back to cancel after she gets her CT on 2/12 is completed if needed. ,

## 2022-02-12 ENCOUNTER — HOSPITAL ENCOUNTER (OUTPATIENT)
Dept: CT IMAGING | Age: 72
Discharge: HOME | End: 2022-02-14
Payer: MEDICARE

## 2022-02-12 DIAGNOSIS — R19.8 INCREASED ABDOMINAL GIRTH: ICD-10-CM

## 2022-02-12 LAB
CREAT SERPL-MCNC: 0.67 MG/DL (ref 0.5–0.9)
GFR AFRICAN AMERICAN: >60 ML/MIN
GFR NON-AFRICAN AMERICAN: >60 ML/MIN
GFR SERPL CREATININE-BSD FRML MDRD: NORMAL ML/MIN/{1.73_M2}
GFR SERPL CREATININE-BSD FRML MDRD: NORMAL ML/MIN/{1.73_M2}

## 2022-02-12 PROCEDURE — 6360000004 HC RX CONTRAST MEDICATION: Performed by: FAMILY MEDICINE

## 2022-02-12 PROCEDURE — 74177 CT ABD & PELVIS W/CONTRAST: CPT

## 2022-02-12 PROCEDURE — 36415 COLL VENOUS BLD VENIPUNCTURE: CPT

## 2022-02-12 PROCEDURE — 2580000003 HC RX 258: Performed by: FAMILY MEDICINE

## 2022-02-12 PROCEDURE — 82565 ASSAY OF CREATININE: CPT

## 2022-02-12 RX ORDER — 0.9 % SODIUM CHLORIDE 0.9 %
80 INTRAVENOUS SOLUTION INTRAVENOUS ONCE
Status: COMPLETED | OUTPATIENT
Start: 2022-02-12 | End: 2022-02-12

## 2022-02-12 RX ORDER — SODIUM CHLORIDE 0.9 % (FLUSH) 0.9 %
10 SYRINGE (ML) INJECTION ONCE
Status: COMPLETED | OUTPATIENT
Start: 2022-02-12 | End: 2022-02-12

## 2022-02-12 RX ADMIN — IOHEXOL 30 ML: 300 INJECTION, SOLUTION INTRAVENOUS at 09:25

## 2022-02-12 RX ADMIN — IOPAMIDOL 75 ML: 755 INJECTION, SOLUTION INTRAVENOUS at 09:19

## 2022-02-12 RX ADMIN — SODIUM CHLORIDE, PRESERVATIVE FREE 10 ML: 5 INJECTION INTRAVENOUS at 09:25

## 2022-02-12 RX ADMIN — SODIUM CHLORIDE 80 ML: 9 INJECTION, SOLUTION INTRAVENOUS at 09:25

## 2022-02-14 ENCOUNTER — HOSPITAL ENCOUNTER (OUTPATIENT)
Age: 72
Discharge: HOME | End: 2022-02-16
Payer: MEDICARE

## 2022-02-14 ENCOUNTER — HOSPITAL ENCOUNTER (OUTPATIENT)
Dept: GENERAL RADIOLOGY | Age: 72
Discharge: HOME | End: 2022-02-16
Payer: MEDICARE

## 2022-02-14 ENCOUNTER — HOSPITAL ENCOUNTER (OUTPATIENT)
Age: 72
Discharge: HOME | End: 2022-02-14
Payer: MEDICARE

## 2022-02-14 DIAGNOSIS — R14.0 BLOATING: ICD-10-CM

## 2022-02-14 DIAGNOSIS — R19.7 DIARRHEA, UNSPECIFIED TYPE: ICD-10-CM

## 2022-02-14 LAB
GLIADIN DEAMINIDATED PEPTIDE AB IGA: 1.1 U/ML
GLIADIN DEAMINIDATED PEPTIDE AB IGG: <0.4 U/ML
IGA: 294 MG/DL (ref 70–400)
TISSUE TRANSGLUTAMINASE IGA: 0.8 U/ML

## 2022-02-14 PROCEDURE — 74019 RADEX ABDOMEN 2 VIEWS: CPT

## 2022-02-14 PROCEDURE — 87324 CLOSTRIDIUM AG IA: CPT

## 2022-02-14 PROCEDURE — 87449 NOS EACH ORGANISM AG IA: CPT

## 2022-02-14 PROCEDURE — 36415 COLL VENOUS BLD VENIPUNCTURE: CPT

## 2022-02-14 PROCEDURE — 83516 IMMUNOASSAY NONANTIBODY: CPT

## 2022-02-14 PROCEDURE — 82784 ASSAY IGA/IGD/IGG/IGM EACH: CPT

## 2022-02-14 PROCEDURE — 87506 IADNA-DNA/RNA PROBE TQ 6-11: CPT

## 2022-02-15 LAB
C DIFF AG + TOXIN: NEGATIVE
CAMPYLOBACTER PCR: NORMAL
E COLI ENTEROTOXIGENIC PCR: NORMAL
PLESIOMONAS SHIGELLOIDES PCR: NORMAL
SALMONELLA PCR: NORMAL
SHIGATOXIN GENE PCR: NORMAL
SHIGELLA SP PCR: NORMAL
SPECIMEN DESCRIPTION: NORMAL
SPECIMEN DESCRIPTION: NORMAL
VIBRIO PCR: NORMAL
YERSINIA ENTEROCOLITICA PCR: NORMAL

## 2022-02-22 DIAGNOSIS — E11.9 TYPE 2 DIABETES MELLITUS WITHOUT COMPLICATION, WITHOUT LONG-TERM CURRENT USE OF INSULIN (HCC): ICD-10-CM

## 2022-02-22 RX ORDER — GLIMEPIRIDE 4 MG/1
TABLET ORAL
Qty: 90 TABLET | Refills: 0 | Status: SHIPPED | OUTPATIENT
Start: 2022-02-22 | End: 2022-05-17

## 2022-02-22 NOTE — TELEPHONE ENCOUNTER
Pharm requested    Health Maintenance   Topic Date Due    Diabetic foot exam  Never done    Diabetic retinal exam  Never done    Diabetic microalbuminuria test  10/12/2022    Lipid screen  10/12/2022    Annual Wellness Visit (AWV)  10/12/2022    A1C test (Diabetic or Prediabetic)  01/31/2023    Depression Screen  01/31/2023    Colorectal Cancer Screen  05/01/2023    DTaP/Tdap/Td vaccine (2 - Td or Tdap) 02/27/2025    DEXA (modify frequency per FRAX score)  Completed    Flu vaccine  Completed    Shingles Vaccine  Completed    Pneumococcal 65+ years Vaccine  Completed    COVID-19 Vaccine  Completed    Hepatitis C screen  Completed    Hepatitis A vaccine  Aged Out    Hib vaccine  Aged Out    Meningococcal (ACWY) vaccine  Aged Out             (applicable per patient's age: Cancer Screenings, Depression Screening, Fall Risk Screening, Immunizations)    Hemoglobin A1C (%)   Date Value   01/31/2022 7.3   10/11/2021 7.1   09/29/2020 7.0 (H)     Microalb/Crt.  Ratio (mcg/mg creat)   Date Value   10/12/2021 30 (H)     LDL Cholesterol (mg/dL)   Date Value   10/12/2021 68     LDL Calculated (mg/dL)   Date Value   11/13/2017 96     AST (U/L)   Date Value   10/12/2021 22     ALT (U/L)   Date Value   10/12/2021 23     BUN (mg/dL)   Date Value   10/12/2021 11      (goal A1C is < 7)   (goal LDL is <100) need 30-50% reduction from baseline     BP Readings from Last 3 Encounters:   01/31/22 138/69   10/11/21 137/74   09/20/21 139/61    (goal /80)      All Future Testing planned in [facility]:  Lab Frequency Next Occurrence   CBC Auto Differential     Comprehensive Metabolic Panel         Next Visit Date:  Future Appointments   Date Time Provider Leatha Peck   3/7/2022  2:45 Virginia Barahona MD [facility]   9/19/2022 11:00 AM Edmond Ramos MD [facility]            Patient Active Problem List:     Type 2 diabetes mellitus with hyperglycemia, without long-term current use of insulin ([facility]     HX: breast cancer     DCIS (ductal carcinoma in situ)     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

## 2022-03-07 ENCOUNTER — OFFICE VISIT (OUTPATIENT)
Dept: GASTROENTEROLOGY | Age: 72
End: 2022-03-07
Payer: MEDICARE

## 2022-03-07 VITALS — SYSTOLIC BLOOD PRESSURE: 138 MMHG | DIASTOLIC BLOOD PRESSURE: 78 MMHG | WEIGHT: 164 LBS | BODY MASS INDEX: 24.57 KG/M2

## 2022-03-07 DIAGNOSIS — K74.00 HEPATIC FIBROSIS, UNSPECIFIED: ICD-10-CM

## 2022-03-07 DIAGNOSIS — Z11.59 ENCOUNTER FOR SCREENING FOR OTHER VIRAL DISEASES: ICD-10-CM

## 2022-03-07 DIAGNOSIS — K52.9 CHRONIC DIARRHEA: ICD-10-CM

## 2022-03-07 DIAGNOSIS — K76.0 FATTY LIVER: Primary | ICD-10-CM

## 2022-03-07 DIAGNOSIS — R16.1 SPLENOMEGALY: ICD-10-CM

## 2022-03-07 PROCEDURE — G8427 DOCREV CUR MEDS BY ELIG CLIN: HCPCS | Performed by: INTERNAL MEDICINE

## 2022-03-07 PROCEDURE — G8484 FLU IMMUNIZE NO ADMIN: HCPCS | Performed by: INTERNAL MEDICINE

## 2022-03-07 PROCEDURE — 1036F TOBACCO NON-USER: CPT | Performed by: INTERNAL MEDICINE

## 2022-03-07 PROCEDURE — G8399 PT W/DXA RESULTS DOCUMENT: HCPCS | Performed by: INTERNAL MEDICINE

## 2022-03-07 PROCEDURE — 1123F ACP DISCUSS/DSCN MKR DOCD: CPT | Performed by: INTERNAL MEDICINE

## 2022-03-07 PROCEDURE — 3017F COLORECTAL CA SCREEN DOC REV: CPT | Performed by: INTERNAL MEDICINE

## 2022-03-07 PROCEDURE — G8420 CALC BMI NORM PARAMETERS: HCPCS | Performed by: INTERNAL MEDICINE

## 2022-03-07 PROCEDURE — 4040F PNEUMOC VAC/ADMIN/RCVD: CPT | Performed by: INTERNAL MEDICINE

## 2022-03-07 PROCEDURE — 99214 OFFICE O/P EST MOD 30 MIN: CPT | Performed by: INTERNAL MEDICINE

## 2022-03-07 PROCEDURE — 1090F PRES/ABSN URINE INCON ASSESS: CPT | Performed by: INTERNAL MEDICINE

## 2022-03-07 ASSESSMENT — ENCOUNTER SYMPTOMS
ABDOMINAL PAIN: 1
ALLERGIC/IMMUNOLOGIC NEGATIVE: 1
ABDOMINAL DISTENTION: 1
BACK PAIN: 1
DIARRHEA: 1
RECTAL PAIN: 1
RESPIRATORY NEGATIVE: 1

## 2022-03-07 NOTE — PROGRESS NOTES
Reason for Referral:   Marielle Kent MD  [address] [place] [address], [phone]    Chief Complaint   Patient presents with    New Patient     bloating            HISTORY OF PRESENT ILLNESS: Charisse Frias is a 66 y.o. female , referred for evaluation of bloating. Altered bowel habits. Diarrhea. She has had this for years. She attributes that to Metformin. She reports no blood in the stool or melena. Her weight used to be around 203. She lost weight over the years. Recent CT scan done for abdominal pain showed hepatomegaly, fatty liver, and splenomegaly. She reports no prior GI problems. No heavy drinking. No smoking. No family history of liver disease. Brother had ileitis and underwent total colectomy. This was done years ago. Probably this was Crohn's disease. She reports 1 colon polyp in the past.  Most recent colonoscopy was 5 years ago. She was recommended to come back in 5 years. Past Medical,Family, and Social History reviewed and does not contribute to the patient presenting condition. Patient's PMH/PSH,SH,PSYCH Hx, MEDs, ALLERGIES, and ROS were all reviewed and updated in the appropriate sections.     PAST MEDICAL HISTORY:  Past Medical History:   Diagnosis Date    Allergic rhinitis     Arthritis     osteoarthritis    Asthma     exercise induced    Breast cancer ([address] 12/1994    Dr Vikram Morley Lumpectomy and lymph nodes excised    Breast cancer ([address] 2015    bilatereal breast cancer    Chemotherapy-induced neuropathy ([address]     Colon polyps     Frequent UTI     GERD (gastroesophageal reflux disease)     Heavy menstrual bleeding     History of bone density study 11/12    normal    Pap test, as part of routine gynecological examination 8/12    peds speculum    PONV (postoperative nausea and vomiting)     Type II or unspecified type diabetes mellitus without mention of complication, not stated as uncontrolled 2007    Urinary tract infection     Wears glasses        Past Surgical History:   Procedure Laterality Date    BREAST LUMPECTOMY  1/1994    rt breast, cancer    BREAST RECONSTRUCTION Bilateral 06/30/15    BREAST RECONSTRUCTION Bilateral 4/21/16    expander removal, implants placed, fat injected, lipo    BREAST SURGERY Bilateral 04/21/2016    implants    COLONOSCOPY      2019    DILATION AND CURETTAGE OF UTERUS      ENDOSCOPY, COLON, DIAGNOSTIC      HYSTERECTOMY, TOTAL ABDOMINAL      with BSO    MASTECTOMY Bilateral     OTHER SURGICAL HISTORY  09/27/2016    chest port removal    SHOULDER ARTHROSCOPY Left 6/12/2020    LEFT SHOULDER ARTHROSCOPY WITH SAD, DEBRIDEMENT, CAPSULAR RELEASE, MANIPULATION UNDER ANESTHESIA performed by Magy Galvez DO at [place] [address] TUNNELED VENOUS PORT PLACEMENT Left 7/30/2015       CURRENT MEDICATIONS:    Current Outpatient Medications:     glimepiride (AMARYL) 4 MG tablet, TAKE 1 TABLET BY MOUTH ONE TIME A DAY, Disp: 90 tablet, Rfl: 0    sennosides-docusate sodium (SENOKOT-S) 8.6-50 MG tablet, Take 1 tablet by mouth daily, Disp: 20 tablet, Rfl: 1    TRUE METRIX BLOOD GLUCOSE TEST strip, TEST BLOOD SUGAR DAILY, Disp: 200 strip, Rfl: 0    betamethasone dipropionate 0.05 % cream, , Disp: , Rfl:     triamcinolone (KENALOG) 0.1 % cream, , Disp: , Rfl:     metFORMIN (GLUCOPHAGE-XR) 500 MG extended release tablet, TAKE 1 TABLET BY MOUTH 4 TIMES A DAY, Disp: 360 tablet, Rfl: 3    sucralfate (CARAFATE) 1 GM/10ML suspension, Take 10 mLs by mouth 4 times daily, Disp: 1200 mL, Rfl: 3    fluticasone (FLONASE) 50 MCG/ACT nasal spray, 1 spray by Nasal route 2 times daily , Disp: , Rfl:     montelukast (SINGULAIR) 10 MG tablet, Take 10 mg by mouth nightly.  , Disp: , Rfl:     cetirizine (ZYRTEC) 10 MG tablet, Take 10 mg by mouth daily.   , Disp: , Rfl:     lansoprazole (PREVACID) 15 MG delayed release capsule, Take 15 mg by mouth daily as needed (15 MG daily) , Disp: , Rfl:     budesonide-formoterol (SYMBICORT) 160-4.5 MCG/ACT AERO, Inhale 2 puffs into the lungs 2 times daily. , Disp: , Rfl:     albuterol (PROVENTIL HFA;VENTOLIN HFA) 108 (90 BASE) MCG/ACT inhaler, Inhale 2 puffs into the lungs every 4 hours as needed , Disp: , Rfl:     clobetasol (TEMOVATE) 0.05 % cream, Apply topically 2 times daily as needed Apply topically 2 times daily. , Disp: , Rfl:     linagliptin (TRADJENTA) 5 MG tablet, Take 1 tablet by mouth daily (Patient not taking: Reported on 2022), Disp: 30 tablet, Rfl: 5    letrozole (FEMARA) 2.5 MG tablet, Take 1 tablet by mouth daily, Disp: 90 tablet, Rfl: 3    ALLERGIES:   Allergies   Allergen Reactions    Amoxicillin     Plantain Other (See Comments)     Pt denies    Statins     Tape Aaron Johnson Tape]     Sulfa Antibiotics Itching and Rash       FAMILY HISTORY:       Problem Relation Age of Onset    Breast Cancer Mother     Other Mother         heart disease, pacemaker    Other Father         heart disease, thyroid disease    Breast Cancer Maternal Aunt     Breast Cancer Paternal Grandmother     Breast Cancer Maternal Aunt     Asthma Maternal Grandmother          SOCIAL HISTORY:   Social History     Socioeconomic History    Marital status: Single     Spouse name: Not on file    Number of children: 0    Years of education: 12    Highest education level: Not on file   Occupational History    Not on file   Tobacco Use    Smoking status: Former Smoker     Packs/day: 0.25     Years: 0.50     Pack years: 0.12     Quit date: 8/10/1970     Years since quittin.6    Smokeless tobacco: Never Used    Tobacco comment: in 20's once a week   Vaping Use    Vaping Use: Never used   Substance and Sexual Activity    Alcohol use: Yes     Comment: rare social drink    Drug use: No    Sexual activity: Never   Other Topics Concern    Not on file   Social History Narrative    Not on file     Social Determinants of Health Financial Resource Strain: Low Risk     Difficulty of Paying Living Expenses: Not hard at all   Food Insecurity: No Food Insecurity    Worried About Running Out of Food in the Last Year: Never true    Deyanira of Food in the Last Year: Never true   Transportation Needs:     Lack of Transportation (Medical): Not on file    Lack of Transportation (Non-Medical): Not on file   Physical Activity:     Days of Exercise per Week: Not on file    Minutes of Exercise per Session: Not on file   Stress:     Feeling of Stress : Not on file   Social Connections:     Frequency of Communication with Friends and Family: Not on file    Frequency of Social Gatherings with Friends and Family: Not on file    Attends Faith Services: Not on file    Active Member of [address] [place] [address] [employer] or Organizations: Not on file    Attends Club or Organization Meetings: Not on file    Marital Status: Not on file   Intimate Partner Violence:     Fear of Current or Ex-Partner: Not on file    Emotionally Abused: Not on file    Physically Abused: Not on file    Sexually Abused: Not on file   Housing Stability:     Unable to Pay for Housing in the Last Year: Not on file    Number of [place] in the Last Year: Not on file    Unstable Housing in the Last Year: Not on file       REVIEW OF SYSTEMS: A 12-point review of systemswas obtained and pertinent positives and negatives were enumerated above in the history of present illness. All other reviewed systems / symptoms were negative. Review of Systems   Constitutional: Negative. HENT: Negative. Eyes: Positive for visual disturbance (glasses). Respiratory: Negative. Cardiovascular: Negative. Gastrointestinal: Positive for abdominal distention, abdominal pain, diarrhea and rectal pain. Endocrine: Negative. Genitourinary: Negative. Musculoskeletal: Positive for back pain. Skin: Negative. Allergic/Immunologic: Negative. Neurological: Negative.     Hematological: Negative. Psychiatric/Behavioral: Negative. LABORATORY DATA: Reviewed  Lab Results   Component Value Date    WBC 2.8 (L) 10/12/2021    HGB 12.1 10/12/2021    HCT 38.7 10/12/2021    MCV 87.4 10/12/2021     (L) 10/12/2021     10/12/2021    K 4.4 10/12/2021     10/12/2021    CO2 23 10/12/2021    BUN 11 10/12/2021    CREATININE 0.67 02/12/2022    LABPROT 7.0 11/16/2012    LABALBU 4.2 10/12/2021    BILITOT 0.38 10/12/2021    ALKPHOS 97 10/12/2021    AST 22 10/12/2021    ALT 23 10/12/2021         Lab Results   Component Value Date    RBC 4.43 10/12/2021    HGB 12.1 10/12/2021    MCV 87.4 10/12/2021    MCH 27.3 10/12/2021    MCHC 31.3 10/12/2021    RDW 13.6 10/12/2021    MPV 11.4 10/12/2021    BASOPCT 1 10/12/2021    LYMPHSABS 0.71 (L) 10/12/2021    MONOSABS 0.18 10/12/2021    NEUTROABS 1.77 10/12/2021    EOSABS 0.11 10/12/2021    BASOSABS <0.03 10/12/2021         DIAGNOSTIC TESTING:     CT ABDOMEN PELVIS W IV CONTRAST Additional Contrast? Radiologist Recommendation    Result Date: 2/12/2022  EXAMINATION: CT OF THE ABDOMEN AND PELVIS WITH CONTRAST 2/12/2022 9:04 am TECHNIQUE: CT of the abdomen and pelvis was performed with the administration of intravenous contrast. Multiplanar reformatted images are provided for review. Dose modulation, iterative reconstruction, and/or weight based adjustment of the mA/kV was utilized to reduce the radiation dose to as low as reasonably achievable. COMPARISON: 05/28/2015 HISTORY: ORDERING SYSTEM PROVIDED HISTORY: Increased abdominal girth TECHNOLOGIST PROVIDED HISTORY: Reason for Exam: Pt states severe diarrhea for one month, enlarged abd girth. FINDINGS: Lower Chest: Visualized portions of the lower thorax are unremarkable. Organs: Diffusely low attenuation of the liver with respect to spleen is compatible with fatty infiltration. Liver is also enlarged measuring 22 cm maximal dimension. Spleen is enlarged measuring 19 cm in maximal dimension.  Pancreas and adrenal glands appear unremarkable. Multiple radiodense gallstones. Left kidney within normal limits. 8.6 cm fluid attenuating hypodensity right kidney compatible with cyst. GI/Bowel: No evidence of bowel obstruction. The appendix is normal caliber. There is suggestion of circumferential wall thickening involving the distal sigmoid colon and rectum inserted for infectious or inflammatory proctocolitis. Pelvis: Urinary bladder is not well distended but otherwise unremarkable. Uterus absent. Peritoneum/Retroperitoneum: No free air couple fluid, or lymphadenopathy. Bones/Soft Tissues: No acute osseous abnormality. Findings suspicious for an infectious or inflammatory proctocolitis. Fatty enlarged liver. Marked splenomegaly. Cholelithiasis. The findings were sent to the Radiology Results [id number] at 5:29 pm on 2/12/2022to be communicated to physician's office. XR ABDOMEN (2 VIEWS)    Result Date: 2/14/2022  EXAMINATION: TWO XRAY VIEWS OF THE ABDOMEN 2/14/2022 9:14 am COMPARISON: CT abdomen and pelvis February 12, 2022 HISTORY: ORDERING SYSTEM PROVIDED HISTORY: Bloating TECHNOLOGIST PROVIDED HISTORY: Reason for Exam: Bloating and frequent diarrhea x 4 days. FINDINGS: Nonobstructive bowel gas pattern. No intraperitoneal free air. No mass effect. Lung bases are clear. Osseous structures grossly intact. There are several large right upper quadrant calculi consistent with gallstones. Cholelithiasis Nonobstructive bowel gas pattern. There were no vitals taken for this visit. PHYSICAL EXAMINATION: Vital signs reviewed per the nursing documentation. There is no height or weight on file to calculate BMI. Physical Exam      I personally reviewed the nurse's notes and documentation and I agree with her notes. General: alert, appears stated age and cooperative Psych: Normal. and Alert and oriented, appropriate affect. . Normal affect. Mentation normal  HEENT: PERRLA.  Clear conjunctivae and sclerae. Moist oral mucosae, no lesions or ulcers. The neck is supple, without lymphadenopathy or jugular venous distension. No masses. Normal thyroid. Cardiovascular: S1 S2 RRR no rubs or murmurs. Pulmonary: clear BL. No accessory muscle usage. Abdominal Exam: Soft, NT ND,  hepato and spleno megaly, +BS, no ascites. Bloating. No groin masses or lymphadenopathy. Extremities: no lower ext edema. Skin: Warm skin. No skin rash. No spider nevi palmar erythema nail dystrophy. Joint: No joint swelling or deformity. Neurological: intact sensory. DTR+. IMPRESSION: Ms. Bereket Ling is a 66 y.o. female with diarrhea. Very likely functional-possible related to Metformin. Recommended to her to contact her primary care physician and consider discontinuing Metformin. There is possible colitis. We will plan for colonoscopy with biopsies. She has history of colon polyps. There is concern for cirrhosis by labs and imaging. We will plan for EGD to exclude changes of portal hypertension. We will obtain full liver serologies. Follow-up in 1 month. Spent 30 minutes providing patient education and counseling. Thank you for allowing me to participate in the care of Ms. Bereket Ling. For any further questions please do not hesitate to contact me. I have reviewed and agree with the MA/LPN ROS. Note is dictated utilizing voice recognition software. Unfortunately this leads to occasional typographical errors. Please contact our office if you have any questions.     Ghada Perry MD  [facility]  O: #[phone]

## 2022-03-09 RX ORDER — POLYETHYLENE GLYCOL 3350 17 G/17G
POWDER, FOR SOLUTION ORAL
Qty: 238 G | Refills: 0 | Status: SHIPPED | OUTPATIENT
Start: 2022-03-09 | End: 2022-05-31

## 2022-03-09 RX ORDER — BISACODYL 5 MG
TABLET, DELAYED RELEASE (ENTERIC COATED) ORAL
Qty: 4 TABLET | Refills: 0 | Status: SHIPPED | OUTPATIENT
Start: 2022-03-09 | End: 2022-05-31

## 2022-03-25 ENCOUNTER — HOSPITAL ENCOUNTER (OUTPATIENT)
Age: 72
Discharge: HOME | End: 2022-03-25
Payer: MEDICARE

## 2022-03-25 DIAGNOSIS — K74.00 HEPATIC FIBROSIS, UNSPECIFIED: ICD-10-CM

## 2022-03-25 DIAGNOSIS — K52.9 CHRONIC DIARRHEA: ICD-10-CM

## 2022-03-25 DIAGNOSIS — R16.1 SPLENOMEGALY: ICD-10-CM

## 2022-03-25 DIAGNOSIS — K76.0 FATTY LIVER: ICD-10-CM

## 2022-03-25 DIAGNOSIS — Z11.59 ENCOUNTER FOR SCREENING FOR OTHER VIRAL DISEASES: ICD-10-CM

## 2022-03-25 LAB
AFP: 2.1 UG/L
CERULOPLASMIN: 28 MG/DL (ref 16–45)
FERRITIN: 152 UG/L (ref 13–150)
HAV AB SERPL IA-ACNC: NONREACTIVE
HBV SURFACE AB TITR SER: <3.5 MIU/ML
HEPATITIS B CORE TOTAL ANTIBODY: NONREACTIVE
HEPATITIS B SURFACE ANTIGEN: NONREACTIVE
HEPATITIS C ANTIBODY: NONREACTIVE
IGG: 935 MG/DL (ref 700–1600)
IGM: 76 MG/DL (ref 40–230)
IRON SATURATION: 20 % (ref 20–55)
IRON: 66 UG/DL (ref 37–145)
TOTAL IRON BINDING CAPACITY: 333 UG/DL (ref 250–450)
UNSATURATED IRON BINDING CAPACITY: 267 UG/DL (ref 112–347)

## 2022-03-25 PROCEDURE — 87340 HEPATITIS B SURFACE AG IA: CPT

## 2022-03-25 PROCEDURE — 86317 IMMUNOASSAY INFECTIOUS AGENT: CPT

## 2022-03-25 PROCEDURE — 83516 IMMUNOASSAY NONANTIBODY: CPT

## 2022-03-25 PROCEDURE — 84450 TRANSFERASE (AST) (SGOT): CPT

## 2022-03-25 PROCEDURE — 82977 ASSAY OF GGT: CPT

## 2022-03-25 PROCEDURE — 86708 HEPATITIS A ANTIBODY: CPT

## 2022-03-25 PROCEDURE — 83550 IRON BINDING TEST: CPT

## 2022-03-25 PROCEDURE — 82103 ALPHA-1-ANTITRYPSIN TOTAL: CPT

## 2022-03-25 PROCEDURE — 82784 ASSAY IGA/IGD/IGG/IGM EACH: CPT

## 2022-03-25 PROCEDURE — 83883 ASSAY NEPHELOMETRY NOT SPEC: CPT

## 2022-03-25 PROCEDURE — 86704 HEP B CORE ANTIBODY TOTAL: CPT

## 2022-03-25 PROCEDURE — 86803 HEPATITIS C AB TEST: CPT

## 2022-03-25 PROCEDURE — 83540 ASSAY OF IRON: CPT

## 2022-03-25 PROCEDURE — 86225 DNA ANTIBODY NATIVE: CPT

## 2022-03-25 PROCEDURE — 36415 COLL VENOUS BLD VENIPUNCTURE: CPT

## 2022-03-25 PROCEDURE — 84460 ALANINE AMINO (ALT) (SGPT): CPT

## 2022-03-25 PROCEDURE — 82104 ALPHA-1-ANTITRYPSIN PHENO: CPT

## 2022-03-25 PROCEDURE — 82105 ALPHA-FETOPROTEIN SERUM: CPT

## 2022-03-25 PROCEDURE — 82728 ASSAY OF FERRITIN: CPT

## 2022-03-25 PROCEDURE — 82390 ASSAY OF CERULOPLASMIN: CPT

## 2022-03-25 PROCEDURE — 86038 ANTINUCLEAR ANTIBODIES: CPT

## 2022-03-25 PROCEDURE — 84520 ASSAY OF UREA NITROGEN: CPT

## 2022-03-26 LAB
ANTI DNA DOUBLE STRANDED: <0.5 IU/ML
ANTI-NUCLEAR ANTIBODY (ANA): NEGATIVE
ENA ANTIBODIES SCREEN: 0.3 U/ML

## 2022-03-27 LAB — SMOOTH MUSCLE ANTIBODY: 9 UNITS (ref 0–19)

## 2022-03-30 LAB
ALPHA-1 ANTITRYPSIN PHENOTYPE: NORMAL
ALPHA-1 ANTITRYPSIN: 108 MG/DL (ref 90–200)

## 2022-03-31 LAB
ALANINE AMINOTRANSFERASE, FIBROMETER: 63 U/L (ref 5–40)
ALPHA-2-MACROGLOBULIN, FIBROMETER: 306 MG/DL (ref 131–293)
ASPARTATE AMINOTRANSFERASE, FIBROMETER: 48 U/L (ref 9–40)
CIRRHOMETER PATIENT SCORE: 0.45
EER FIBROMETER REPORT: ABNORMAL
FIBROMETER INTERPRETATION: ABNORMAL
FIBROMETER PATIENT SCORE: 0.86
FIBROMETER PLATELET COUNT: 117
FIBROMETER PROTHROMBIN INDEX: 85 % (ref 90–120)
FIBROSIS METAVIR CLASSIFICATION: ABNORMAL
GAMMA GLUTAMYL TRANSFERASE, FIBROMETER: 59 U/L (ref 7–33)
INFLAMETER METAVIR CLASSIFICATION: ABNORMAL
INFLAMETER PATIENT SCORE: 0.67
UREA NITROGEN, FIBROMETER: 15 MG/DL (ref 7–20)

## 2022-04-15 NOTE — TELEPHONE ENCOUNTER
Tremaine Joseph is calling to request a refill on the following medication(s):    Last Visit Date (If Applicable):  2/74/2172    Next Visit Date:    Visit date not found    Medication Request:  Requested Prescriptions     Pending Prescriptions Disp Refills    TRUE METRIX BLOOD GLUCOSE TEST strip [Pharmacy Med Name: TRUE METRIX BLOOD GLUCOSE TEST STRP] 200 strip 0     Sig: TEST BLOOD SUGAR DAILY

## 2022-04-18 RX ORDER — CALCIUM CITRATE/VITAMIN D3 200MG-6.25
TABLET ORAL
Qty: 200 STRIP | Refills: 0 | Status: SHIPPED | OUTPATIENT
Start: 2022-04-18 | End: 2022-10-13

## 2022-04-21 ENCOUNTER — HOSPITAL ENCOUNTER (OUTPATIENT)
Age: 72
Setting detail: OUTPATIENT SURGERY
Discharge: HOME | End: 2022-04-21
Attending: INTERNAL MEDICINE | Admitting: INTERNAL MEDICINE
Payer: MEDICARE

## 2022-04-21 ENCOUNTER — ANESTHESIA (OUTPATIENT)
Dept: OPERATING ROOM | Age: 72
End: 2022-04-21
Payer: MEDICARE

## 2022-04-21 ENCOUNTER — ANESTHESIA EVENT (OUTPATIENT)
Dept: OPERATING ROOM | Age: 72
End: 2022-04-21
Payer: MEDICARE

## 2022-04-21 VITALS
BODY MASS INDEX: 23.99 KG/M2 | HEART RATE: 68 BPM | DIASTOLIC BLOOD PRESSURE: 73 MMHG | HEIGHT: 69 IN | RESPIRATION RATE: 15 BRPM | TEMPERATURE: 97.2 F | OXYGEN SATURATION: 98 % | WEIGHT: 162 LBS | SYSTOLIC BLOOD PRESSURE: 133 MMHG

## 2022-04-21 VITALS — OXYGEN SATURATION: 98 % | DIASTOLIC BLOOD PRESSURE: 63 MMHG | SYSTOLIC BLOOD PRESSURE: 124 MMHG

## 2022-04-21 LAB
GLUCOSE BLD-MCNC: 152 MG/DL (ref 65–105)
GLUCOSE BLD-MCNC: 202 MG/DL (ref 65–105)

## 2022-04-21 PROCEDURE — 2500000003 HC RX 250 WO HCPCS: Performed by: NURSE ANESTHETIST, CERTIFIED REGISTERED

## 2022-04-21 PROCEDURE — 88305 TISSUE EXAM BY PATHOLOGIST: CPT

## 2022-04-21 PROCEDURE — 2580000003 HC RX 258: Performed by: NURSE ANESTHETIST, CERTIFIED REGISTERED

## 2022-04-21 PROCEDURE — 3700000000 HC ANESTHESIA ATTENDED CARE: Performed by: INTERNAL MEDICINE

## 2022-04-21 PROCEDURE — 2709999900 HC NON-CHARGEABLE SUPPLY: Performed by: INTERNAL MEDICINE

## 2022-04-21 PROCEDURE — 93005 ELECTROCARDIOGRAM TRACING: CPT

## 2022-04-21 PROCEDURE — 3700000001 HC ADD 15 MINUTES (ANESTHESIA): Performed by: INTERNAL MEDICINE

## 2022-04-21 PROCEDURE — 45380 COLONOSCOPY AND BIOPSY: CPT | Performed by: INTERNAL MEDICINE

## 2022-04-21 PROCEDURE — 3609010300 HC COLONOSCOPY W/BIOPSY SINGLE/MULTIPLE: Performed by: INTERNAL MEDICINE

## 2022-04-21 PROCEDURE — 7100000011 HC PHASE II RECOVERY - ADDTL 15 MIN: Performed by: INTERNAL MEDICINE

## 2022-04-21 PROCEDURE — 3609012400 HC EGD TRANSORAL BIOPSY SINGLE/MULTIPLE: Performed by: INTERNAL MEDICINE

## 2022-04-21 PROCEDURE — 43239 EGD BIOPSY SINGLE/MULTIPLE: CPT | Performed by: INTERNAL MEDICINE

## 2022-04-21 PROCEDURE — 82947 ASSAY GLUCOSE BLOOD QUANT: CPT

## 2022-04-21 PROCEDURE — 7100000010 HC PHASE II RECOVERY - FIRST 15 MIN: Performed by: INTERNAL MEDICINE

## 2022-04-21 PROCEDURE — 6360000002 HC RX W HCPCS: Performed by: NURSE ANESTHETIST, CERTIFIED REGISTERED

## 2022-04-21 RX ORDER — SODIUM CHLORIDE 9 MG/ML
INJECTION, SOLUTION INTRAVENOUS PRN
Status: DISCONTINUED | OUTPATIENT
Start: 2022-04-21 | End: 2022-04-21 | Stop reason: HOSPADM

## 2022-04-21 RX ORDER — SODIUM CHLORIDE 0.9 % (FLUSH) 0.9 %
5-40 SYRINGE (ML) INJECTION PRN
Status: DISCONTINUED | OUTPATIENT
Start: 2022-04-21 | End: 2022-04-21 | Stop reason: HOSPADM

## 2022-04-21 RX ORDER — SODIUM CHLORIDE, SODIUM LACTATE, POTASSIUM CHLORIDE, CALCIUM CHLORIDE 600; 310; 30; 20 MG/100ML; MG/100ML; MG/100ML; MG/100ML
INJECTION, SOLUTION INTRAVENOUS CONTINUOUS
Status: DISCONTINUED | OUTPATIENT
Start: 2022-04-22 | End: 2022-04-21 | Stop reason: HOSPADM

## 2022-04-21 RX ORDER — LIDOCAINE HYDROCHLORIDE 10 MG/ML
1 INJECTION, SOLUTION EPIDURAL; INFILTRATION; INTRACAUDAL; PERINEURAL
Status: DISCONTINUED | OUTPATIENT
Start: 2022-04-22 | End: 2022-04-21 | Stop reason: HOSPADM

## 2022-04-21 RX ORDER — LIDOCAINE HYDROCHLORIDE 10 MG/ML
INJECTION, SOLUTION EPIDURAL; INFILTRATION; INTRACAUDAL; PERINEURAL PRN
Status: DISCONTINUED | OUTPATIENT
Start: 2022-04-21 | End: 2022-04-21 | Stop reason: SDUPTHER

## 2022-04-21 RX ORDER — SODIUM CHLORIDE 0.9 % (FLUSH) 0.9 %
5-40 SYRINGE (ML) INJECTION EVERY 12 HOURS SCHEDULED
Status: DISCONTINUED | OUTPATIENT
Start: 2022-04-21 | End: 2022-04-21 | Stop reason: HOSPADM

## 2022-04-21 RX ORDER — SODIUM CHLORIDE, SODIUM LACTATE, POTASSIUM CHLORIDE, CALCIUM CHLORIDE 600; 310; 30; 20 MG/100ML; MG/100ML; MG/100ML; MG/100ML
INJECTION, SOLUTION INTRAVENOUS CONTINUOUS PRN
Status: DISCONTINUED | OUTPATIENT
Start: 2022-04-21 | End: 2022-04-21 | Stop reason: SDUPTHER

## 2022-04-21 RX ORDER — PROPOFOL 10 MG/ML
INJECTION, EMULSION INTRAVENOUS PRN
Status: DISCONTINUED | OUTPATIENT
Start: 2022-04-21 | End: 2022-04-21 | Stop reason: SDUPTHER

## 2022-04-21 RX ADMIN — LIDOCAINE HYDROCHLORIDE 50 MG: 10 INJECTION, SOLUTION EPIDURAL; INFILTRATION; INTRACAUDAL; PERINEURAL at 10:31

## 2022-04-21 RX ADMIN — PROPOFOL 300 MG: 10 INJECTION, EMULSION INTRAVENOUS at 10:31

## 2022-04-21 RX ADMIN — SODIUM CHLORIDE, POTASSIUM CHLORIDE, SODIUM LACTATE AND CALCIUM CHLORIDE: 600; 310; 30; 20 INJECTION, SOLUTION INTRAVENOUS at 10:25

## 2022-04-21 ASSESSMENT — PULMONARY FUNCTION TESTS
PIF_VALUE: 1
PIF_VALUE: 1
PIF_VALUE: 0
PIF_VALUE: 1
PIF_VALUE: 0
PIF_VALUE: 1
PIF_VALUE: 0
PIF_VALUE: 1
PIF_VALUE: 1
PIF_VALUE: 0
PIF_VALUE: 1
PIF_VALUE: 0
PIF_VALUE: 0
PIF_VALUE: 1

## 2022-04-21 ASSESSMENT — PAIN - FUNCTIONAL ASSESSMENT
PAIN_FUNCTIONAL_ASSESSMENT: 0-10
PAIN_FUNCTIONAL_ASSESSMENT: ACTIVITIES ARE NOT PREVENTED

## 2022-04-21 ASSESSMENT — PAIN DESCRIPTION - DESCRIPTORS: DESCRIPTORS: ACHING

## 2022-04-21 NOTE — H&P
History and Physical Service   [place] [address]    HISTORY AND PHYSICAL EXAMINATION            Date of Evaluation: 4/21/2022  Patient name:  Stephan Trejo  MRN:   [id number]  YOB: 1950  PCP:    Steven Cabrera MD    History Obtained From:     Patient, medical records    History of Present Illness: This is Stephan Trejo a 66 y.o. female who presents today for a COLONOSCOPY DIAGNOSTIC, EGD ESOPHAGOGASTRODUODENOSCOPY by Dr. Blanche Apley for [address]. Patient has complaints of frequent diarrhea. Patient states she has episodes after drinking that she feels \"hot and sweaty and feel like I'm going to pass out. Then I lean forward and it's gone. \" Patient has GERD. She denies bloody tarry stools, diarrhea alternating with constipation, nausea, vomiting, abdominal pain or unintentional weight loss. Patient followed bowel prep until watery clear. Previous colonoscopy and EGD. No FH colon cancer or polyps. Denies fever, chills, shortness of breath, cough, congestion, wheezing, chest pain, open sores or wounds. Hx diabetes. POC . Denies taking any blood thinning medications in the last 10 days.      Past Medical History:     Past Medical History:   Diagnosis Date    Allergic rhinitis     Arthritis     osteoarthritis    Asthma     exercise induced    Breast cancer ([facility] 12/1994    Dr Clint Snow Lumpectomy and lymph nodes excised    Breast cancer ([facility] 2015    bilatereal breast cancer    Chemotherapy-induced neuropathy (HCC)     Colon polyps     Eczema     Frequent UTI     GERD (gastroesophageal reflux disease)     Heavy menstrual bleeding     History of bone density study 11/12    normal    Pap test, as part of routine gynecological examination 8/12    peds speculum    PONV (postoperative nausea and vomiting)     Type II or unspecified type diabetes mellitus without mention of complication, not stated as uncontrolled 2007    Urinary tract infection     Wears glasses         Past Surgical History:     Past Surgical History:   Procedure Laterality Date    BREAST LUMPECTOMY  1/1994    rt breast, cancer    BREAST RECONSTRUCTION Bilateral 06/30/15    BREAST RECONSTRUCTION Bilateral 4/21/16    expander removal, implants placed, fat injected, lipo    BREAST SURGERY Bilateral 04/21/2016    implants    COLONOSCOPY      2019    DILATION AND CURETTAGE OF UTERUS      ENDOSCOPY, COLON, DIAGNOSTIC      HYSTERECTOMY, TOTAL ABDOMINAL      with BSO    MASTECTOMY Bilateral     OTHER SURGICAL HISTORY  09/27/2016    chest port removal    SHOULDER ARTHROSCOPY Left 6/12/2020    LEFT SHOULDER ARTHROSCOPY WITH SAD, DEBRIDEMENT, CAPSULAR RELEASE, MANIPULATION UNDER ANESTHESIA performed by Farhad Roy DO at [place] [address] TUNNELED VENOUS PORT PLACEMENT Left 7/30/2015        Medications Prior to Admission:     Prior to Admission medications    Medication Sig Start Date End Date Taking?  Authorizing Provider   TRUE METRIX BLOOD GLUCOSE TEST strip TEST BLOOD SUGAR DAILY 4/18/22   Yony Siegel MD   bisacodyl (BISACODYL) 5 MG EC tablet Please follow instructions given to you by your provider 3/9/22   Trung Umanzor MD   polyethylene glycol (MIRALAX) 17 GM/SCOOP powder Please follow instructions given to you by your provider 3/9/22   Trung Umanzor MD   magnesium citrate solution Take 296 mLs by mouth once for 1 dose 3/9/22 3/9/22  Trung Umanzor MD   glimepiride (AMARYL) 4 MG tablet TAKE 1 TABLET BY MOUTH ONE TIME A DAY 2/22/22   Yony Siegel MD   sennosides-docusate sodium (SENOKOT-S) 8.6-50 MG tablet Take 1 tablet by mouth daily 1/31/22   Yony Siegel MD   linagliptin (TRADJENTA) 5 MG tablet Take 1 tablet by mouth daily  Patient not taking: Reported on 1/31/2022 10/12/21   Yony Siegel MD   betamethasone dipropionate 0.05 % cream  8/30/21   Historical Provider, MD   triamcinolone (KENALOG) 0.1 % cream 7/14/21   Historical Provider, MD   metFORMIN (GLUCOPHAGE-XR) 500 MG extended release tablet TAKE 1 TABLET BY MOUTH 4 TIMES A DAY 4/12/21   Kori Avalos MD   letrozole [facility]) 2.5 MG tablet Take 1 tablet by mouth daily 2/26/21 9/20/21  Na Ramos MD   sucralfate (CARAFATE) 1 GM/10ML suspension Take 10 mLs by mouth 4 times daily 5/26/20   Kori Avalos MD   fluticasone (FLONASE) 50 MCG/ACT nasal spray 1 spray by Nasal route 2 times daily     Historical Provider, MD   montelukast (SINGULAIR) 10 MG tablet Take 10 mg by mouth nightly. Historical Provider, MD   cetirizine (ZYRTEC) 10 MG tablet Take 10 mg by mouth daily. Historical Provider, MD   lansoprazole (PREVACID) 15 MG delayed release capsule Take 15 mg by mouth daily as needed (15 MG daily)     Historical Provider, MD   budesonide-formoterol (SYMBICORT) 160-4.5 MCG/ACT AERO Inhale 2 puffs into the lungs 2 times daily. Historical Provider, MD   albuterol (PROVENTIL HFA;VENTOLIN HFA) 108 (90 BASE) MCG/ACT inhaler Inhale 2 puffs into the lungs every 4 hours as needed     Historical Provider, MD   clobetasol (TEMOVATE) 0.05 % cream Apply topically 2 times daily as needed Apply topically 2 times daily. Historical Provider, MD        Allergies:     Amoxicillin, Plantain, Statins, Tape [adhesive tape], and Sulfa antibiotics    Social History:     Tobacco:    reports that she quit smoking about 51 years ago. She has a 0.13 pack-year smoking history. She has never used smokeless tobacco.  Alcohol:      reports current alcohol use. Drug Use:  reports no history of drug use.     Family History:     Family History   Problem Relation Age of Onset    Breast Cancer Mother     Other Mother         heart disease, pacemaker    Other Father         heart disease, thyroid disease    Breast Cancer Maternal Aunt     Breast Cancer Paternal Grandmother     Breast Cancer Maternal Aunt     Asthma Maternal Grandmother        Review of Systems: Positive and Negative as described in HPI. CONSTITUTIONAL: Fatigue. negative for fevers, chills, sweats, weight loss  HEENT: Wears glasses. Runny nose. negative for hearing changes, throat pain  RESPIRATORY: Asthma Occasional cough. negative for shortness of breath, congestion, wheezing. CARDIOVASCULAR: negative for chest pain, palpitations. GASTROINTESTINAL: See HPI    GENITOURINARY: Difficulty to start urine flow. negative for burning with urination, frequency   INTEGUMENT: Eczema. Easy bruising. negative for skin lesions   HEMATOLOGIC/LYMPHATIC: Hx bilateral breast cancer (2015) negative for swelling/edema   ALLERGIC/IMMUNOLOGIC: Itching at eczema sites. negative for urticaria  ENDOCRINE: Diabetes. negative increase in drinking, increase in urination, hot or cold intolerance  MUSCULOSKELETAL: Osteoarthritis Generalized joint pains. Negative muscle aches, swelling of joints  NEUROLOGICAL: Occasional headaches. negative for dizziness, lightheadedness, numbness, pain, tingling extremities  BEHAVIOR/PSYCH: negative for depression, anxiety    Physical Exam:   BP (!) 143/56   Pulse 84   Temp 96.2 °F (35.7 °C) (Temporal)   Resp 16   Ht 5' 8.5\" (1.74 m)   Wt 162 lb (73.5 kg)   SpO2 98%   BMI 24.27 kg/m²   No LMP recorded. Patient has had a hysterectomy.   No results for input(s): POCGLU in the last 72 hours. General Appearance:  alert, well appearing, and in no acute distress  Mental status: oriented to person, place, and time with normal affect  Head:  normocephalic, atraumatic.   Eye: no icterus, redness, pupils equal and reactive, extraocular eye movements intact, conjunctiva clear  Ear: normal external ear, no discharge, hearing intact  Nose:  no drainage noted  Mouth: mucous membranes moist  Neck: supple, no carotid bruits, thyroid not palpable  Lungs: Bilateral equal air entry, clear to ausculation, no wheezing, rales or rhonchi, normal effort  Cardiovascular: HR 84 normal rate, regular rhythm, no murmur, gallop, rub. Abdomen: Soft, nontender, nondistended, normal bowel sounds  Neurologic: There are no new focal motor or sensory deficits, normal muscle tone and bulk, no abnormal sensation, normal speech, cranial nerves II through XII grossly intact  Skin: No gross lesions, rashes, bruising or bleeding on exposed skin area  Extremities:  peripheral pulses palpable, no pedal edema or calf pain with palpation  Psych: normal affect     Investigations:      Laboratory Testing:  Recent Results (from the past 24 hour(s))   EKG 12 Lead    Collection Time: 04/21/22 10:02 AM   Result Value Ref Range    Ventricular Rate 74 BPM    Atrial Rate 74 BPM    P-R Interval 138 ms    QRS Duration 80 ms    Q-T Interval 402 ms    QTc Calculation (Bazett) 446 ms    P Axis -19 degrees    R Axis -27 degrees    T Axis 62 degrees       No results for input(s): HGB, HCT, WBC, MCV, PLATELET, NA, K, CL, CO2, BUN, CREATININE, GLUCOSE, INR, PROTIME, APTT, AST, ALT, LABALBU, HCG in the last 720 hours. No results for input(s): COVID19 in the last 720 hours. Imaging/Diagnostics:    No results found. Diagnosis:      1.  DX FATTY LIVER  SPLENMEGALY  CHRONIC DIARRHEA    Plans:     1. COLONOSCOPY DIAGNOSTIC, EGD ESOPHAGOGASTRODUODENOSCOPY      SIMON Swanson CNP  4/21/2022  10:20 AM

## 2022-04-21 NOTE — OP NOTE
[facility]     PROCEDURE DATE: 04/21/22    REFERRING PHYSICIAN: No ref. provider found     PRIMARY CARE PROVIDER: Darlin Vail MD    ATTENDING PHYSICIAN: Cj Pacheco MD     HISTORY: Ms. Denita Briggs is a 66 y.o. female who presents to the [place] [address] Endoscopy unit for colonoscopy. The patient's clinical history is remarkable for chronic diarrhea. Cirrhosis. She is currently medically stable and appropriate for the planned procedure. PREOPERATIVE DIAGNOSIS: Chronic diarrhea. Cirrhosis. PROCEDURES:   Transanal Colonoscopy with biopsy. POSTPROCEDURE DIAGNOSIS:  Mild portal hypertensive colopathy  Small rectal varices  Small internal hemorrhoids    SPECIMENS: biopsy    MEDICATIONS:     MAC per anesthesia    EBL: minimal    INSTRUMENT: Olympus PCF-H190 AL flexible Colonoscope. PREPARATION: The nature and character of the procedure as well as risks, benefits, and alternatives were discussed with the patient and informed consent was obtained. Complications were said to include, but were not limited to: medication allergy, medication reaction, cardiovascular and respiratory problems, bleeding, perforation, infection, and/or missed diagnosis. Following arrival in the endoscopy room, the patient was placed in the left lateral decubitus position and final time-out accomplished in the presence of the nursing staff. Baseline vital signs were obtained and reviewed, and IV sedation was subsequently initiated. FINDINGS: Rectal examination demonstrated no significant visible external abnormality and digital palpation was unremarkable. Following adequate conscious sedation the colonoscope was introduced and advanced under direct visualization to the cecum, which was identified by the ileocecal valve and appendiceal orifice. The bowel preparation was felt to be adequate.  This included small amounts of thin and watery stool that was able to be adequately irrigated and aspirated. Cecal intubation time was 2 minutes. Once maximally inserted, the endoscope was withdrawn and the mucosa was carefully inspected. The mucosal exam showed mild diffuse erythema and congestion suggestive of portal hypertensive colopathy. A few scattered diverticula were noted in the left colon. Biopsies were obtained from right colon to exclude underlying microscopic colitis. Retroflexion was performed in the rectum and showed small internal hemorrhoids with small rectal varices. RECOMMENDATIONS:   1) Follow up with referring provider, as previously scheduled. 2) Follow up on pathology report. 3) Follow-up with me in the office in 1 to 2 months.       Shauna Francisco

## 2022-04-21 NOTE — OP NOTE
[facility]     PROCEDURE DATE: 04/21/22    REFERRING PHYSICIAN: No ref. provider found     PRIMARY CARE PROVIDER: Jeancarlos Downey MD    ATTENDING PHYSICIAN: Nasreen Peace MD     HISTORY: Ms. Bill Benson is a 66 y.o. female who presents to the [place] [address] Endoscopy unit for upper endoscopy. The patient's clinical history is remarkable for advanced liver fibrosis with possible cirrhosis. Chronic diarrhea. She is currently medically stable and appropriate for the planned procedure. PREOPERATIVE DIAGNOSIS: Advanced liver fibrosis and possible cirrhosis. Chronic diarrhea. PROCEDURES:   1) Transoral Upper Endoscopy, biopsy. POSTOPERATIVE DIAGNOSIS:   Small low risk esophageal varices  Mild portal hypertensive gastropathy    SPECIMENS: Biopsy    MEDICATIONS:   MAC per anesthesia    EBL: minimal    INSTRUMENT: Olympus GIF-H190 flexible Gastroscope. PREPARATION: The nature and character of the procedure as well as risks, benefits, and alternatives were discussed with the patient and informed consent was obtained. Complications were said to include, but were not limited to: medication allergy, medication reaction, cardiovascular and respiratory problems, bleeding, perforation, infection, and/or missed diagnosis. Following arrival in the endoscopy room, the patient was placed in the left lateral decubitus position and final time-out accomplished in the presence of the nursing staff. Baseline vital signs were obtained and reviewed, and IV sedation was subsequently initiated. FINDINGS:   Esophagus: The esophagus was inspected to the Z-line. The endoscopic exam showed low risk F1 varices in mid and distal esophagus. GE junction appeared normal.     Stomach: The stomach was inspected in both forward and retroflex fashion and was appropriately distensible. The cardia, fundus, incisura, antrum and pylorus were identified via direct visualization.  The endoscopic exam showed mild diffuse portal hypertensive gastropathy. Duodenum: The proximal small bowel was inspected through the bulb, sweep, and second portion of the duodenum. The endoscopic exam showed no pathology. Biopsies were obtained to exclude underlying celiac disease. RECOMMENDATIONS:   1) Follow up on pathology report. 2) Proceed with planned colonoscopy.        Yomaira Hi

## 2022-04-21 NOTE — ANESTHESIA PRE PROCEDURE
Department of Anesthesiology  Preprocedure Note       Name:  Stephan Trejo   Age:  66 y.o.  :  1950                                          MRN:  [id number]         Date:  2022      Surgeon: Kaila Moon):  Jerry Alexander MD    Procedure: Procedure(s):  COLONOSCOPY DIAGNOSTIC  EGD ESOPHAGOGASTRODUODENOSCOPY    Medications prior to admission:   Prior to Admission medications    Medication Sig Start Date End Date Taking? Authorizing Provider   TRUE METRIX BLOOD GLUCOSE TEST strip TEST BLOOD SUGAR DAILY 22   Steven Cabrera MD   bisacodyl (BISACODYL) 5 MG EC tablet Please follow instructions given to you by your provider 3/9/22   Jerry Alexander MD   polyethylene glycol (MIRALAX) 17 GM/SCOOP powder Please follow instructions given to you by your provider 3/9/22   Jerry Alexander MD   magnesium citrate solution Take 296 mLs by mouth once for 1 dose 3/9/22 3/9/22  Jerry Alexander MD   glimepiride (AMARYL) 4 MG tablet TAKE 1 TABLET BY MOUTH ONE TIME A DAY 22   Steven Cabrera MD   sennosides-docusate sodium (SENOKOT-S) 8.6-50 MG tablet Take 1 tablet by mouth daily 22   Steven Cabrera MD   linagliptin (TRADJENTA) 5 MG tablet Take 1 tablet by mouth daily  Patient not taking: Reported on 2022 10/12/21   Steven Cabrera MD   betamethasone dipropionate 0.05 % cream  21   Historical Provider, MD   triamcinolone (KENALOG) 0.1 % cream  21   Historical Provider, MD   metFORMIN (GLUCOPHAGE-XR) 500 MG extended release tablet TAKE 1 TABLET BY MOUTH 4 TIMES A DAY 21   Steven Cabrera MD   letrozole [facility]) 2.5 MG tablet Take 1 tablet by mouth daily 21  Na Ramos MD   sucralfate (CARAFATE) 1 GM/10ML suspension Take 10 mLs by mouth 4 times daily 20   Steven Cabrera MD   fluticasone (FLONASE) 50 MCG/ACT nasal spray 1 spray by Nasal route 2 times daily     Historical Provider, MD arandast (SINGULAIR) 10 MG tablet Take 10 mg by mouth nightly. Historical Provider, MD   cetirizine (ZYRTEC) 10 MG tablet Take 10 mg by mouth daily. Historical Provider, MD   lansoprazole (PREVACID) 15 MG delayed release capsule Take 15 mg by mouth daily as needed (15 MG daily)     Historical Provider, MD   budesonide-formoterol (SYMBICORT) 160-4.5 MCG/ACT AERO Inhale 2 puffs into the lungs 2 times daily. Historical Provider, MD   albuterol (PROVENTIL HFA;VENTOLIN HFA) 108 (90 BASE) MCG/ACT inhaler Inhale 2 puffs into the lungs every 4 hours as needed     Historical Provider, MD   clobetasol (TEMOVATE) 0.05 % cream Apply topically 2 times daily as needed Apply topically 2 times daily. Historical Provider, MD       Current medications:    No current facility-administered medications for this encounter. Allergies:     Allergies   Allergen Reactions    Amoxicillin     Plantain Other (See Comments)     Pt denies    Statins     Tape Jenny Just Tape]     Sulfa Antibiotics Itching and Rash       Problem List:    Patient Active Problem List   Diagnosis Code    Type 2 diabetes mellitus with hyperglycemia, without long-term current use of insulin (HCC) E11.65    HX: breast cancer Z85.3    DCIS (ductal carcinoma in situ) D05.10    Malignant ne[facility] of upper-outer quadrant of female [facility] C50.419    Chemotherapy-induced neuropathy (HCC) G62.0, T45.1X5A    Paronychia of fifth toe, left L03.032    Chronic left shoulder pain M25.512, G89.29    Uncontrolled type 2 diabetes mellitus without complication, without long-term current use of insulin [id number]    Post-op pain G89.18    Thrombocytopenia, unspecified D69.6       Past Medical History:        Diagnosis Date    Allergic rhinitis     Arthritis     osteoarthritis    Asthma     exercise induced    [facility]    Dr Earl Iyer Lumpectomy and lymph nodes excised    Breast [facility] 2015    bilatereal breast cancer    Chemotherapy-induced neuropathy (HCC)     Colon polyps     Frequent UTI     GERD (gastroesophageal reflux disease)     Heavy menstrual bleeding     History of bone density study     normal    Pap test, as part of routine gynecological examination     peds speculum    PONV (postoperative nausea and vomiting)     Type II or unspecified type diabetes mellitus without mention of complication, not stated as uncontrolled     Urinary tract infection     Wears glasses        Past Surgical History:        Procedure Laterality Date    BREAST LUMPECTOMY  1994    rt breast, cancer    BREAST RECONSTRUCTION Bilateral 06/30/15    BREAST RECONSTRUCTION Bilateral 16    expander removal, implants placed, fat injected, lipo    BREAST SURGERY Bilateral 2016    implants    COLONOSCOPY      2019    DILATION AND CURETTAGE OF UTERUS      ENDOSCOPY, COLON, DIAGNOSTIC      HYSTERECTOMY, TOTAL ABDOMINAL      with BSO    MASTECTOMY Bilateral     OTHER SURGICAL HISTORY  2016    chest port removal    SHOULDER ARTHROSCOPY Left 2020    LEFT SHOULDER ARTHROSCOPY WITH SAD, DEBRIDEMENT, CAPSULAR RELEASE, MANIPULATION UNDER ANESTHESIA performed by Namrata Aviles DO at [place] [address] TUNNELED VENOUS PORT PLACEMENT Left 2015       Social History:    Social History     Tobacco Use    Smoking status: Former Smoker     Packs/day: 0.25     Years: 0.50     Pack years: 0.12     Quit date: 8/10/1970     Years since quittin.7    Smokeless tobacco: Never Used    Tobacco comment: in 20's once a week   Substance Use Topics    Alcohol use: Yes     Comment: rare social drink                                Counseling given: Not Answered  Comment: in 20's once a week      Vital Signs (Current): There were no vitals filed for this visit.                                            BP Readings from Last 3 Encounters:   22 138/78   22 138/69   10/11/21 137/74       NPO Status: BMI:   Wt Readings from Last 3 Encounters:   03/07/22 164 lb (74.4 kg)   01/31/22 165 lb 6.4 oz (75 kg)   10/11/21 168 lb 12.8 oz (76.6 kg)     There is no height or weight on file to calculate BMI.    CBC:   Lab Results   Component Value Date    WBC 2.8 10/12/2021    RBC 4.43 10/12/2021    HGB 12.1 10/12/2021    HCT 38.7 10/12/2021    MCV 87.4 10/12/2021    RDW 13.6 10/12/2021     10/12/2021       CMP:   Lab Results   Component Value Date     10/12/2021    K 4.4 10/12/2021     10/12/2021    CO2 23 10/12/2021    BUN 11 10/12/2021    CREATININE 0.67 02/12/2022    GFRAA >60 02/12/2022    LABGLOM >60 02/12/2022    GLUCOSE 155 10/12/2021    GLUCOSE 171 12/22/2011    PROT 6.5 10/12/2021    CALCIUM 9.1 10/12/2021    BILITOT 0.38 10/12/2021    ALKPHOS 97 10/12/2021    AST 22 10/12/2021    ALT 23 10/12/2021       POC Tests: No results for input(s): POCGLU, POCNA, POCK, POCCL, POCBUN, POCHEMO, POCHCT in the last 72 hours. Coags: No results found for: PROTIME, INR, APTT    HCG (If Applicable): No results found for: PREGTESTUR, PREGSERUM, HCG, HCGQUANT     ABGs: No results found for: PHART, PO2ART, ATE9FTM, JDL5YXJ, BEART, B9WOLOAJ     Type & Screen (If Applicable):  No results found for: LABABO, LABRH    Drug/Infectious Status (If Applicable):  Lab Results   Component Value Date    HEPCAB NONREACTIVE 03/25/2022       COVID-19 Screening (If Applicable):   Lab Results   Component Value Date    COVID19 Not Detected 06/08/2020           Anesthesia Evaluation  Patient summary reviewed and Nursing notes reviewed   history of anesthetic complications: PONV.   Airway: Mallampati: II  TM distance: >3 FB   Neck ROM: full  Mouth opening: > = 3 FB Dental: normal exam         Pulmonary:normal exam  breath sounds clear to auscultation  (+) asthma: seasonal asthma,                            Cardiovascular:Negative CV ROS  Exercise tolerance: good (>4 METS),         ECG reviewed                        Neuro/Psych:   Negative Neuro/Psych ROS              GI/Hepatic/Renal:   (+) GERD: poorly controlled,           Endo/Other:    (+) DiabetesType II DM, , : arthritis: OA., malignancy/cancer. Abdominal:             Vascular: negative vascular ROS. Other Findings:           Anesthesia Plan      MAC     ASA 2       Induction: intravenous. Anesthetic plan and risks discussed with patient. Plan discussed with CRNA.                   Jose L Sagastume MD   4/21/2022

## 2022-04-22 LAB
EKG ATRIAL RATE: 74 BPM
EKG P AXIS: -19 DEGREES
EKG P-R INTERVAL: 138 MS
EKG Q-T INTERVAL: 402 MS
EKG QRS DURATION: 80 MS
EKG QTC CALCULATION (BAZETT): 446 MS
EKG R AXIS: -27 DEGREES
EKG T AXIS: 62 DEGREES
EKG VENTRICULAR RATE: 74 BPM
SURGICAL PATHOLOGY REPORT: NORMAL

## 2022-05-02 DIAGNOSIS — E11.9 TYPE 2 DIABETES MELLITUS WITHOUT COMPLICATION, WITHOUT LONG-TERM CURRENT USE OF INSULIN (HCC): ICD-10-CM

## 2022-05-02 RX ORDER — METFORMIN HYDROCHLORIDE 500 MG/1
TABLET, EXTENDED RELEASE ORAL
Qty: 360 TABLET | Refills: 3 | Status: SHIPPED | OUTPATIENT
Start: 2022-05-02

## 2022-05-02 NOTE — TELEPHONE ENCOUNTER
Oscar Palomino is calling to request a refill on the following medication(s):    Last Visit Date (If Applicable):  5/16/0920    Next Visit Date:    Visit date not found    Medication Request:  Requested Prescriptions     Pending Prescriptions Disp Refills    metFORMIN (GLUCOPHAGE-XR) 500 MG extended release tablet [Pharmacy Med Name: METFORMIN HCL ER 500MG TB24] 360 tablet 3     Sig: TAKE 1 TABLET BY MOUTH 4 TIMES A DAY

## 2022-05-17 DIAGNOSIS — E11.9 TYPE 2 DIABETES MELLITUS WITHOUT COMPLICATION, WITHOUT LONG-TERM CURRENT USE OF INSULIN (HCC): ICD-10-CM

## 2022-05-17 RX ORDER — GLIMEPIRIDE 4 MG/1
TABLET ORAL
Qty: 90 TABLET | Refills: 0 | Status: SHIPPED | OUTPATIENT
Start: 2022-05-17 | End: 2022-08-15 | Stop reason: SDUPTHER

## 2022-05-31 ENCOUNTER — OFFICE VISIT (OUTPATIENT)
Dept: GASTROENTEROLOGY | Age: 72
End: 2022-05-31
Payer: MEDICARE

## 2022-05-31 VITALS
BODY MASS INDEX: 24.27 KG/M2 | WEIGHT: 162 LBS | DIASTOLIC BLOOD PRESSURE: 70 MMHG | SYSTOLIC BLOOD PRESSURE: 119 MMHG | HEART RATE: 79 BPM

## 2022-05-31 DIAGNOSIS — K74.60 LIVER CIRRHOSIS SECONDARY TO NASH (HCC): Primary | ICD-10-CM

## 2022-05-31 DIAGNOSIS — K52.9 CHRONIC DIARRHEA: ICD-10-CM

## 2022-05-31 DIAGNOSIS — K75.81 LIVER CIRRHOSIS SECONDARY TO NASH (HCC): Primary | ICD-10-CM

## 2022-05-31 PROCEDURE — G8427 DOCREV CUR MEDS BY ELIG CLIN: HCPCS | Performed by: INTERNAL MEDICINE

## 2022-05-31 PROCEDURE — 1123F ACP DISCUSS/DSCN MKR DOCD: CPT | Performed by: INTERNAL MEDICINE

## 2022-05-31 PROCEDURE — 1090F PRES/ABSN URINE INCON ASSESS: CPT | Performed by: INTERNAL MEDICINE

## 2022-05-31 PROCEDURE — 1036F TOBACCO NON-USER: CPT | Performed by: INTERNAL MEDICINE

## 2022-05-31 PROCEDURE — 99213 OFFICE O/P EST LOW 20 MIN: CPT | Performed by: INTERNAL MEDICINE

## 2022-05-31 PROCEDURE — 3017F COLORECTAL CA SCREEN DOC REV: CPT | Performed by: INTERNAL MEDICINE

## 2022-05-31 PROCEDURE — G8420 CALC BMI NORM PARAMETERS: HCPCS | Performed by: INTERNAL MEDICINE

## 2022-05-31 PROCEDURE — G8399 PT W/DXA RESULTS DOCUMENT: HCPCS | Performed by: INTERNAL MEDICINE

## 2022-05-31 ASSESSMENT — ENCOUNTER SYMPTOMS
VOICE CHANGE: 0
SINUS PRESSURE: 0
RECTAL PAIN: 1
COUGH: 0
NAUSEA: 0
DIARRHEA: 1
SORE THROAT: 0
TROUBLE SWALLOWING: 0
WHEEZING: 0
BLOOD IN STOOL: 0
CONSTIPATION: 0
ANAL BLEEDING: 0
VOMITING: 0
ABDOMINAL DISTENTION: 1
BACK PAIN: 1
ALLERGIC/IMMUNOLOGIC NEGATIVE: 1
CHOKING: 0
ABDOMINAL PAIN: 1
RESPIRATORY NEGATIVE: 1

## 2022-05-31 NOTE — PROGRESS NOTES
GI CLINIC FOLLOW UP    INTERVAL HISTORY:   No referring provider defined for this encounter. Chief Complaint   Patient presents with    Elevated Hepatic Enzymes     Fatty liver . Colon/EGD  done 04/21/2022 Blood work 03/25/2022           HISTORY OF PRESENT ILLNESS: Makayla Sharp is a 66 y.o. female with chronic diarrhea. She underwent EGD and colonoscopy. Changes suggestive of portal hypertension were noted. Fibrosis test showed cirrhosis. This was confirmed by imaging. No mass lesion. She reports previous fatty liver disease. She was overweight for several years. Her maximum weight was 235 pounds. Her bowels are under much better control currently. She reports no complaints. Very rare alcohol. Past Medical,Family, and Social History reviewed and does not contribute to the patient presenting condition. Patient's PMH/PSH,SH,PSYCH Hx, MEDs, ALLERGIES, and ROS were all reviewed and updated in the appropriate sections.     PAST MEDICAL HISTORY:  Past Medical History:   Diagnosis Date    Allergic rhinitis     Arthritis     osteoarthritis    Asthma     exercise induced    Breast cancer ([facility] 12/1994    Dr Kirit Melissa Lumpectomy and lymph nodes excised    Breast cancer ([facility] [address] 2015    bilatereal breast cancer    Chemotherapy-induced neuropathy ([facility] [address]     Colon polyps     Eczema     Frequent UTI     GERD (gastroesophageal reflux disease)     Heavy menstrual bleeding     History of bone density study 11/12    normal    Pap test, as part of routine gynecological examination 8/12    peds speculum    PONV (postoperative nausea and vomiting)     Type II or unspecified type diabetes mellitus without mention of complication, not stated as uncontrolled 2007    Urinary tract infection     Wears glasses        Past Surgical History:   Procedure Laterality Date    BREAST LUMPECTOMY  1/1994    rt breast, cancer    BREAST RECONSTRUCTION Bilateral 06/30/15    BREAST RECONSTRUCTION Bilateral 4/21/16    expander removal, implants placed, fat injected, lipo    BREAST SURGERY Bilateral 04/21/2016    implants    COLONOSCOPY      2019    COLONOSCOPY N/A 4/21/2022    COLONOSCOPY WITH BIOPSY performed by [facility]h [facility] at [facility], DIAGNOSTIC      HYSTERECTOMY, TOTAL ABDOMINAL      with BSO    MASTECTOMY Bilateral     OTHER SURGICAL HISTORY  09/27/2016    chest port removal    SHOULDER ARTHROSCOPY Left 6/12/2020    LEFT SHOULDER ARTHROSCOPY WITH SAD, DEBRIDEMENT, CAPSULAR RELEASE, MANIPULATION UNDER ANESTHESIA performed by Tello Ricardo DO at [place] [id number] TUNNELED VENOUS PORT PLACEMENT Left 7/30/2015    UPPER GASTROINTESTINAL ENDOSCOPY N/A 4/21/2022    EGD BIOPSY performed by Debora Dinh MD at [facility]:    Current Outpatient Medications:     glimepiride (AMARYL) 4 MG tablet, TAKE 1 TABLET BY MOUTH ONE TIME A DAY, Disp: 90 tablet, Rfl: 0    metFORMIN (GLUCOPHAGE-XR) 500 MG extended release tablet, TAKE 1 TABLET BY MOUTH 4 TIMES A DAY, Disp: 360 tablet, Rfl: 3    TRUE METRIX BLOOD GLUCOSE TEST strip, TEST BLOOD SUGAR DAILY, Disp: 200 strip, Rfl: 0    bisacodyl (BISACODYL) 5 MG EC tablet, Please follow instructions given to you by your provider, Disp: 4 tablet, Rfl: 0    polyethylene glycol (MIRALAX) 17 GM/SCOOP powder, Please follow instructions given to you by your provider, Disp: 238 g, Rfl: 0    magnesium citrate solution, Take 296 mLs by mouth once for 1 dose, Disp: 296 mL, Rfl: 0    sennosides-docusate sodium (SENOKOT-S) 8.6-50 MG tablet, Take 1 tablet by mouth daily, Disp: 20 tablet, Rfl: 1    linagliptin (TRADJENTA) 5 MG tablet, Take 1 tablet by mouth daily (Patient not taking: Reported on 1/31/2022), Disp: 30 tablet, Rfl: 5    betamethasone dipropionate 0.05 % cream, , Disp: , Rfl:     triamcinolone (KENALOG) 0.1 % cream, , Disp: , Rfl:    letrozole (FEMARA) 2.5 MG tablet, Take 1 tablet by mouth daily, Disp: 90 tablet, Rfl: 3    sucralfate (CARAFATE) 1 GM/10ML suspension, Take 10 mLs by mouth 4 times daily, Disp: 1200 mL, Rfl: 3    fluticasone (FLONASE) 50 MCG/ACT nasal spray, 1 spray by Nasal route 2 times daily , Disp: , Rfl:     montelukast (SINGULAIR) 10 MG tablet, Take 10 mg by mouth nightly.  , Disp: , Rfl:     cetirizine (ZYRTEC) 10 MG tablet, Take 10 mg by mouth daily. , Disp: , Rfl:     lansoprazole (PREVACID) 15 MG delayed release capsule, Take 15 mg by mouth daily as needed (15 MG daily) , Disp: , Rfl:     budesonide-formoterol (SYMBICORT) 160-4.5 MCG/ACT AERO, Inhale 2 puffs into the lungs 2 times daily. , Disp: , Rfl:     albuterol (PROVENTIL HFA;VENTOLIN HFA) 108 (90 BASE) MCG/ACT inhaler, Inhale 2 puffs into the lungs every 4 hours as needed , Disp: , Rfl:     clobetasol (TEMOVATE) 0.05 % cream, Apply topically 2 times daily as needed Apply topically 2 times daily. , Disp: , Rfl:     ALLERGIES:   Allergies   Allergen Reactions    Amoxicillin     Plantain Other (See Comments)     Pt denies    Statins     Tape Hansel Digna Tape]     Sulfa Antibiotics Itching and Rash       FAMILY HISTORY:       Problem Relation Age of Onset    Breast Cancer Mother     Other Mother         heart disease, pacemaker    Other Father         heart disease, thyroid disease    Breast Cancer Maternal Aunt     Breast Cancer Paternal Grandmother     Breast Cancer Maternal Aunt     Asthma Maternal Grandmother          SOCIAL HISTORY:   Social History     Socioeconomic History    Marital status: Single     Spouse name: Not on file    Number of children: 0    Years of education: 12    Highest education level: Not on file   Occupational History    Not on file   Tobacco Use    Smoking status: Former Smoker     Packs/day: 0.25     Years: 0.50     Pack years: 0.12     Quit date: 8/10/1970     Years since quittin.8    Smokeless tobacco: Never Used    Tobacco comment: in 20's once a week   Vaping Use    Vaping Use: Never used   Substance and Sexual Activity    Alcohol use: Yes     Comment: rare social drink    Drug use: No    Sexual activity: Never   Other Topics Concern    Not on file   Social History Narrative    Not on file     Social Determinants of Health     Financial Resource Strain: Low Risk     Difficulty of Paying Living Expenses: Not hard at all   Food Insecurity: No Food Insecurity    Worried About [address] in the Last Year: Never true    [facility] in the Last Year: Never true   Transportation Needs:     Lack of Transportation (Medical): Not on file    Lack of Transportation (Non-Medical): Not on file   Physical Activity:     Days of Exercise per Week: Not on file    Minutes of Exercise per Session: Not on file   Stress:     Feeling of Stress : Not on file   Social Connections:     Frequency of Communication with Friends and Family: Not on file    Frequency of Social Gatherings with Friends and Family: Not on file    Attends Yazidi Services: Not on file    Active Member of [address] [place] [address] or Organizations: Not on file    Attends Club or Organization Meetings: Not on file    Marital Status: Not on file   Intimate Partner Violence:     Fear of Current or Ex-Partner: Not on file    Emotionally Abused: Not on file    Physically Abused: Not on file    Sexually Abused: Not on file   Housing Stability:     Unable to Pay for Housing in the Last Year: Not on file    Number of [place] in the Last Year: Not on file    Unstable Housing in the Last Year: Not on file       REVIEW OF SYSTEMS: A 12-point review of systemswas obtained and pertinent positives and negatives were enumerated above in the history of present illness. All other reviewed systems / symptoms were negative. Review of Systems   Constitutional: Negative. Negative for appetite change, fatigue and unexpected weight change. HENT: Negative. Negative for dental problem, postnasal drip, sinus pressure, sore throat, trouble swallowing and voice change. Eyes: Positive for visual disturbance (glasses). Respiratory: Negative. Negative for cough, choking and wheezing. Cardiovascular: Negative. Negative for chest pain, palpitations and leg swelling. Gastrointestinal: Positive for abdominal distention, abdominal pain, diarrhea and rectal pain. Negative for anal bleeding, blood in stool, constipation, nausea and vomiting. Endocrine: Negative. Genitourinary: Negative. Negative for difficulty urinating. Musculoskeletal: Positive for back pain. Negative for arthralgias, gait problem and myalgias. Skin: Negative. Allergic/Immunologic: Negative. Negative for environmental allergies and food allergies. Neurological: Negative. Negative for dizziness, weakness, light-headedness, numbness and headaches. Hematological: Negative. Does not bruise/bleed easily. Psychiatric/Behavioral: Negative. Negative for sleep disturbance. The patient is not nervous/anxious.             LABORATORY DATA: Reviewed  Lab Results   Component Value Date    WBC 2.8 (L) 10/12/2021    HGB 12.1 10/12/2021    HCT 38.7 10/12/2021    MCV 87.4 10/12/2021     (L) 10/12/2021     10/12/2021    K 4.4 10/12/2021     10/12/2021    CO2 23 10/12/2021    BUN 11 10/12/2021    CREATININE 0.67 02/12/2022    LABPROT 7.0 11/16/2012    LABALBU 4.2 10/12/2021    BILITOT 0.38 10/12/2021    ALKPHOS 97 10/12/2021    AST 22 10/12/2021    ALT 23 10/12/2021         Lab Results   Component Value Date    RBC 4.43 10/12/2021    HGB 12.1 10/12/2021    MCV 87.4 10/12/2021    MCH 27.3 10/12/2021    MCHC 31.3 10/12/2021    RDW 13.6 10/12/2021    MPV 11.4 10/12/2021    BASOPCT 1 10/12/2021    LYMPHSABS 0.71 (L) 10/12/2021    MONOSABS 0.18 10/12/2021    NEUTROABS 1.77 10/12/2021    EOSABS 0.11 10/12/2021    BASOSABS <0.03 10/12/2021         DIAGNOSTIC TESTING:     No results found.       PHYSICAL EXAMINATION: Vital signs reviewed per the nursing documentation. There were no vitals taken for this visit. There is no height or weight on file to calculate BMI. Physical Exam      I personally reviewed the nurse's notes and documentation and I agree with her notes. General: alert, appears stated age and cooperative Psych: Normal. and Alert and oriented, appropriate affect. . Normal affect. Mentation normal  HEENT: PERRLA. Clear conjunctivae and sclerae. Moist oral mucosae, no lesions or ulcers. The neck is supple, without lymphadenopathy or jugular venous distension. No masses. Normal thyroid. Cardiovascular: S1 S2 RRR no rubs or murmurs. Pulmonary: clear BL. No accessory muscle usage. Abdominal Exam: Soft, NT ND, no hepato or spleno megaly, +BS, no ascites. IMPRESSION: Ms. Nicolas Osborne is a 66 y.o. female with functional diarrhea. She is doing much better. Cirrhosis-compensated. Very likely due to fatty liver disease. She would need [address] screening every 6 months. Thank you for allowing me to participate in the care of Ms. Nicolas Osborne. For any further questions please do not hesitate to contact me. I have reviewed and agree with the ROS entered by the MA/LPN. Note is dictated utilizing voice recognition software. Unfortunately this leads to occasional typographical errors. Please contact our office if you have any questions.     Kristin Simpson MD  [facility]  O: #[phone]

## 2022-06-24 ENCOUNTER — TELEPHONE (OUTPATIENT)
Dept: FAMILY MEDICINE CLINIC | Age: 72
End: 2022-06-24

## 2022-06-24 DIAGNOSIS — Z85.3 HISTORY OF BREAST CANCER: Primary | ICD-10-CM

## 2022-06-24 NOTE — TELEPHONE ENCOUNTER
Patient called the [facility] for pain on the side of her head going to her back to her neck since this morning . PT stated that she feel like it may be a nerve . Walk in was offered to pt. Pt did not   want to proceed .   Please advise

## 2022-06-24 NOTE — TELEPHONE ENCOUNTER
Patient states that she's not worried about the pain states she feels she may have slept wrong, she will try otc anti-inflammatory meds along with heat and ice. If no relief will contact the office Monday.

## 2022-06-24 NOTE — TELEPHONE ENCOUNTER
Patient called [facility] requesting a order for a mammogram with contrast. Please advise.  Thank you

## 2022-07-15 DIAGNOSIS — R16.1 SPLENOMEGALY: ICD-10-CM

## 2022-07-15 DIAGNOSIS — K52.9 CHRONIC DIARRHEA: ICD-10-CM

## 2022-07-15 DIAGNOSIS — K76.0 FATTY LIVER: ICD-10-CM

## 2022-07-19 ENCOUNTER — HOSPITAL ENCOUNTER (OUTPATIENT)
Dept: MRI IMAGING | Age: 72
Discharge: HOME | End: 2022-07-21
Payer: MEDICARE

## 2022-07-19 DIAGNOSIS — Z85.3 HISTORY OF BREAST CANCER: ICD-10-CM

## 2022-07-19 LAB
GFR NON-AFRICAN AMERICAN: >60 ML/MIN
GFR SERPL CREATININE-BSD FRML MDRD: >60 ML/MIN
GFR SERPL CREATININE-BSD FRML MDRD: NORMAL ML/MIN/{1.73_M2}
POC CREATININE: 0.55 MG/DL (ref 0.51–1.19)

## 2022-07-19 PROCEDURE — 6360000004 HC RX CONTRAST MEDICATION: Performed by: FAMILY MEDICINE

## 2022-07-19 PROCEDURE — 2580000003 HC RX 258: Performed by: FAMILY MEDICINE

## 2022-07-19 PROCEDURE — 82565 ASSAY OF CREATININE: CPT

## 2022-07-19 PROCEDURE — C8908 MRI W/O FOL W/CONT, BREAST,: HCPCS

## 2022-07-19 PROCEDURE — A9579 GAD-BASE MR CONTRAST NOS,1ML: HCPCS | Performed by: FAMILY MEDICINE

## 2022-07-19 RX ORDER — SODIUM CHLORIDE 0.9 % (FLUSH) 0.9 %
10 SYRINGE (ML) INJECTION PRN
Status: DISCONTINUED | OUTPATIENT
Start: 2022-07-19 | End: 2022-07-22 | Stop reason: HOSPADM

## 2022-07-19 RX ORDER — 0.9 % SODIUM CHLORIDE 0.9 %
30 INTRAVENOUS SOLUTION INTRAVENOUS ONCE
Status: COMPLETED | OUTPATIENT
Start: 2022-07-19 | End: 2022-07-19

## 2022-07-19 RX ADMIN — SODIUM CHLORIDE, PRESERVATIVE FREE 10 ML: 5 INJECTION INTRAVENOUS at 14:07

## 2022-07-19 RX ADMIN — GADOTERIDOL 15 ML: 279.3 INJECTION, SOLUTION INTRAVENOUS at 14:06

## 2022-07-19 RX ADMIN — SODIUM CHLORIDE 30 ML: 9 INJECTION, SOLUTION INTRAVENOUS at 14:07

## 2022-08-04 ENCOUNTER — HOSPITAL ENCOUNTER (OUTPATIENT)
Dept: ULTRASOUND IMAGING | Age: 72
Discharge: HOME | End: 2022-08-06
Payer: MEDICARE

## 2022-08-04 DIAGNOSIS — K75.81 LIVER CIRRHOSIS SECONDARY TO NASH (HCC): ICD-10-CM

## 2022-08-04 DIAGNOSIS — K74.60 LIVER CIRRHOSIS SECONDARY TO NASH (HCC): ICD-10-CM

## 2022-08-04 PROCEDURE — 76705 ECHO EXAM OF ABDOMEN: CPT

## 2022-08-15 DIAGNOSIS — E11.9 TYPE 2 DIABETES MELLITUS WITHOUT COMPLICATION, WITHOUT LONG-TERM CURRENT USE OF INSULIN (HCC): ICD-10-CM

## 2022-08-15 RX ORDER — GLIMEPIRIDE 4 MG/1
TABLET ORAL
Qty: 90 TABLET | Refills: 0 | Status: SHIPPED | OUTPATIENT
Start: 2022-08-15

## 2022-08-15 NOTE — TELEPHONE ENCOUNTER
Dev Aviles is calling to request a refill on the following medication(s):    Medication Request:  Requested Prescriptions     Pending Prescriptions Disp Refills    glimepiride (AMARYL) 4 MG tablet 90 tablet 0       Last Visit Date (If Applicable):  4/32/9192    Next Visit Date:    Visit date not found

## 2022-08-31 ENCOUNTER — HOSPITAL ENCOUNTER (OUTPATIENT)
Age: 72
Discharge: HOME | End: 2022-08-31
Payer: MEDICARE

## 2022-08-31 LAB
ALBUMIN SERPL-MCNC: 4.3 G/DL (ref 3.5–5.2)
ALBUMIN/GLOBULIN RATIO: 1.3 (ref 1–2.5)
ALP BLD-CCNC: 92 U/L (ref 35–104)
ALT SERPL-CCNC: 43 U/L (ref 5–33)
AST SERPL-CCNC: 41 U/L
BILIRUB SERPL-MCNC: 0.42 MG/DL (ref 0.3–1.2)
BILIRUBIN DIRECT: 0.11 MG/DL
BILIRUBIN, INDIRECT: 0.31 MG/DL (ref 0–1)
INR BLD: 1.1
PROTHROMBIN TIME: 14.5 SEC (ref 11.5–14.2)
TOTAL PROTEIN: 7.6 G/DL (ref 6.4–8.3)

## 2022-08-31 PROCEDURE — 85610 PROTHROMBIN TIME: CPT

## 2022-08-31 PROCEDURE — 36415 COLL VENOUS BLD VENIPUNCTURE: CPT

## 2022-08-31 PROCEDURE — 80076 HEPATIC FUNCTION PANEL: CPT

## 2022-09-16 DIAGNOSIS — C50.411 MALIGNANT NEOPLASM OF UPPER-OUTER QUADRANT OF RIGHT BREAST IN FEMALE, ESTROGEN RECEPTOR POSITIVE (HCC): Primary | ICD-10-CM

## 2022-09-16 DIAGNOSIS — Z17.0 MALIGNANT NEOPLASM OF UPPER-OUTER QUADRANT OF RIGHT BREAST IN FEMALE, ESTROGEN RECEPTOR POSITIVE (HCC): Primary | ICD-10-CM

## 2022-09-19 ENCOUNTER — HOSPITAL ENCOUNTER (OUTPATIENT)
Age: 72
Setting detail: SPECIMEN
Discharge: HOME | End: 2022-09-19
Payer: MEDICARE

## 2022-09-19 ENCOUNTER — TELEPHONE (OUTPATIENT)
Dept: ONCOLOGY | Age: 72
End: 2022-09-19

## 2022-09-19 ENCOUNTER — OFFICE VISIT (OUTPATIENT)
Dept: ONCOLOGY | Age: 72
End: 2022-09-19
Payer: MEDICARE

## 2022-09-19 VITALS
DIASTOLIC BLOOD PRESSURE: 66 MMHG | HEART RATE: 82 BPM | BODY MASS INDEX: 24.48 KG/M2 | RESPIRATION RATE: 16 BRPM | WEIGHT: 163.4 LBS | TEMPERATURE: 97.4 F | SYSTOLIC BLOOD PRESSURE: 121 MMHG

## 2022-09-19 DIAGNOSIS — D69.6 THROMBOCYTOPENIA, UNSPECIFIED (HCC): ICD-10-CM

## 2022-09-19 DIAGNOSIS — C50.411 MALIGNANT NEOPLASM OF UPPER-OUTER QUADRANT OF RIGHT BREAST IN FEMALE, ESTROGEN RECEPTOR POSITIVE (HCC): Primary | ICD-10-CM

## 2022-09-19 DIAGNOSIS — Z17.0 MALIGNANT NEOPLASM OF UPPER-OUTER QUADRANT OF RIGHT BREAST IN FEMALE, ESTROGEN RECEPTOR POSITIVE (HCC): Primary | ICD-10-CM

## 2022-09-19 DIAGNOSIS — Z17.0 MALIGNANT NEOPLASM OF UPPER-OUTER QUADRANT OF RIGHT BREAST IN FEMALE, ESTROGEN RECEPTOR POSITIVE (HCC): ICD-10-CM

## 2022-09-19 DIAGNOSIS — C50.411 MALIGNANT NEOPLASM OF UPPER-OUTER QUADRANT OF RIGHT BREAST IN FEMALE, ESTROGEN RECEPTOR POSITIVE (HCC): ICD-10-CM

## 2022-09-19 LAB
ABSOLUTE EOS #: 0 K/UL (ref 0–0.4)
ABSOLUTE IMMATURE GRANULOCYTE: 0 K/UL (ref 0–0.3)
ABSOLUTE LYMPH #: 0.58 K/UL (ref 1–4.8)
ABSOLUTE MONO #: 0.12 K/UL (ref 0.1–0.8)
ALBUMIN SERPL-MCNC: 4.1 G/DL (ref 3.5–5.2)
ALBUMIN/GLOBULIN RATIO: 1.4 (ref 1–2.5)
ALP BLD-CCNC: 92 U/L (ref 35–104)
ALT SERPL-CCNC: 46 U/L (ref 5–33)
ANION GAP SERPL CALCULATED.3IONS-SCNC: 11 MMOL/L (ref 9–17)
AST SERPL-CCNC: 45 U/L
ATYPICAL LYMPHOCYTE ABSOLUTE COUNT: 0.02 K/UL
ATYPICAL LYMPHOCYTES: 1 %
BASOPHILS # BLD: 0 % (ref 0–2)
BASOPHILS ABSOLUTE: 0 K/UL (ref 0–0.2)
BILIRUB SERPL-MCNC: 0.5 MG/DL (ref 0.3–1.2)
BUN BLDV-MCNC: 10 MG/DL (ref 8–23)
CALCIUM SERPL-MCNC: 9.3 MG/DL (ref 8.6–10.4)
CHLORIDE BLD-SCNC: 103 MMOL/L (ref 98–107)
CO2: 24 MMOL/L (ref 20–31)
CREAT SERPL-MCNC: 0.74 MG/DL (ref 0.5–0.9)
EOSINOPHILS RELATIVE PERCENT: 0 % (ref 1–4)
GFR AFRICAN AMERICAN: >60 ML/MIN
GFR NON-AFRICAN AMERICAN: >60 ML/MIN
GFR SERPL CREATININE-BSD FRML MDRD: ABNORMAL ML/MIN/{1.73_M2}
GLUCOSE BLD-MCNC: 255 MG/DL (ref 70–99)
HCT VFR BLD CALC: 36.8 % (ref 36.3–47.1)
HEMOGLOBIN: 11.4 G/DL (ref 11.9–15.1)
IMMATURE GRANULOCYTES: 0 %
LYMPHOCYTES # BLD: 24 % (ref 24–44)
MCH RBC QN AUTO: 27.3 PG (ref 25.2–33.5)
MCHC RBC AUTO-ENTMCNC: 31 G/DL (ref 28.4–34.8)
MCV RBC AUTO: 88 FL (ref 82.6–102.9)
MONOCYTES # BLD: 5 % (ref 1–7)
MORPHOLOGY: NORMAL
NRBC AUTOMATED: 0 PER 100 WBC
PDW BLD-RTO: 14.2 % (ref 11.8–14.4)
PLATELET # BLD: 110 K/UL (ref 138–453)
PMV BLD AUTO: 11.8 FL (ref 8.1–13.5)
POTASSIUM SERPL-SCNC: 4.1 MMOL/L (ref 3.7–5.3)
RBC # BLD: 4.18 M/UL (ref 3.95–5.11)
SEG NEUTROPHILS: 70 % (ref 36–66)
SEGMENTED NEUTROPHILS ABSOLUTE COUNT: 1.68 K/UL (ref 1.8–7.7)
SODIUM BLD-SCNC: 138 MMOL/L (ref 135–144)
TOTAL PROTEIN: 7 G/DL (ref 6.4–8.3)
WBC # BLD: 2.4 K/UL (ref 3.5–11.3)

## 2022-09-19 PROCEDURE — 99211 OFF/OP EST MAY X REQ PHY/QHP: CPT

## 2022-09-19 PROCEDURE — 1123F ACP DISCUSS/DSCN MKR DOCD: CPT | Performed by: INTERNAL MEDICINE

## 2022-09-19 PROCEDURE — 36415 COLL VENOUS BLD VENIPUNCTURE: CPT

## 2022-09-19 PROCEDURE — G8399 PT W/DXA RESULTS DOCUMENT: HCPCS | Performed by: INTERNAL MEDICINE

## 2022-09-19 PROCEDURE — 1036F TOBACCO NON-USER: CPT | Performed by: INTERNAL MEDICINE

## 2022-09-19 PROCEDURE — G8420 CALC BMI NORM PARAMETERS: HCPCS | Performed by: INTERNAL MEDICINE

## 2022-09-19 PROCEDURE — 85025 COMPLETE CBC W/AUTO DIFF WBC: CPT

## 2022-09-19 PROCEDURE — G8427 DOCREV CUR MEDS BY ELIG CLIN: HCPCS | Performed by: INTERNAL MEDICINE

## 2022-09-19 PROCEDURE — 99214 OFFICE O/P EST MOD 30 MIN: CPT | Performed by: INTERNAL MEDICINE

## 2022-09-19 PROCEDURE — 80053 COMPREHEN METABOLIC PANEL: CPT

## 2022-09-19 PROCEDURE — 3017F COLORECTAL CA SCREEN DOC REV: CPT | Performed by: INTERNAL MEDICINE

## 2022-09-19 PROCEDURE — 1090F PRES/ABSN URINE INCON ASSESS: CPT | Performed by: INTERNAL MEDICINE

## 2022-09-19 NOTE — PROGRESS NOTES
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 ER/MT positive 95% and HER2 positive Ratio 3.5. Associated with high grade DCIS comedo type' biopsy 2/26/2015. 2- final pathology is T2 N0 M0 invasive ductal carcinoma with surrounding DCIS, margins were negative, tumor is triple positive. 3- h/o right breast DCIS in 1994 s/p BCS/EBRT and 7 years of Tamoxifen until 2001  4- incidentally, left-sided DCIS was found during bilateral mastectomy. Removed with negative margins. CURRENT THERAPY:  Right-sided lumpectomy, in 1994. followed by radiation and 7 years of tamoxifen, completed 2001  Underwent bilateral mastectomy   Adjuvant chemotherapy with [facility], started August, 2015    Maintenance hercptin, completed 7/2016   Adjuvant  AI for 5 years    BRIEF CASE HISTORY:  This is a 66 y.o. woman with history of right breast DCIS in 1994 treated with BCS / EBRT and Tamoxifen for 7 years until 2001. In February 2015, she had a screening mammogram that showed suspicious findings in the right breast. She underwent a core biopsy that showed infiltrating ductal carcinoma, grade 2, ER/MT positive and HER-2/beth positive with fish ratio 3.5. Because of previous radiation, breast conservation is not an option so the patient underwent mastectomy and axillary sampling. Surgery occurred on 6/30 and pathology showed 2.2 cm moderately differentiated carcinoma, associated with intermediate and high-grade ductal carcinoma in situ. Margins were negative. Attempt for axillary sampling was not successful because of previous surgery and radiation. No lymph nodes were appreciated in the specimen. Contralateral mastectomy was done at the same time and showed high-grade ductal carcinoma in situ, margins were negative. Genetic testing was negative  We decided to proceed with adjuvant chemotherapy with TCH.  She Home Medications  has a current medication list which includes the following prescription(s): glimepiride, metformin, true metrix blood glucose test, betamethasone dipropionate, triamcinolone, fluticasone, montelukast, cetirizine, lansoprazole, budesonide-formoterol, albuterol sulfate hfa, and sucralfate. Allergies:  Amoxicillin, Plantain, Statins, Tape [adhesive tape], and Sulfa antibiotics        Review of Systems :    Constitutional: feels much better . No fever or chills. No night sweats, no weight loss   Eyes: No eye discharge, double vision, or eye pain   HEENT: negative for sore mouth or throat, no hoarseness and voice change. Respiratory: negative for cough , sputum, dyspnea, wheezing, hemoptysis, chest pain   Cardiovascular: negative for chest pain, dyspnea, palpitations, orthopnea, PND  Gastrointestinal: negative for nausea, vomiting, no diarrhea, no abdominal pain, dysphagia, hematemesis and hematochezia; esophageal pain as noted above   Genitourinary: negative for frequency, dysuria, nocturia, urinary incontinence, and hematuria   Integument: Eczema on right shoulder  Hematologic/Lymphatic: negative for easy bruising, bleeding, lymphadenopathy, petechiae and swelling/edema   Endocrine: negative for heat or cold intolerance, tremor, weight changes, change in bowel habits and hair loss   Musculoskeletal: negative for myalgias, arthralgias, pain, joint swelling,and bone pain.  Left shoulder pain - resolved with surgery; back pain as noted above  Neurological: negative dizziness, seizures, weakness, numbness +headaches      Physical Examination :       /66   Pulse 82   Temp 97.4 °F (36.3 °C) (Temporal)   Resp 16   Wt 163 lb 6.4 oz (74.1 kg)   BMI 24.48 kg/m²     Performance Status:Estimated performance status is ECOG 0    General appearance - well appearing, no in pain or distress   Mental status - alert and cooperative   Eyes - pupils equal and reactive, extraocular eye movements intact Ears - bilateral TM's and external ear canals normal   Mouth - mucous membranes moist, pharynx normal without lesions   Neck - supple, no significant adenopathy ,trach is central   Lymphatics - no palpable lymphadenopathy, no hepatosplenomegaly   Chest - clear to auscultation, no wheezes, rales or rhonchi, symmetric air entry   Heart - normal rate, regular rhythm, normal S1, S2, no murmurs, rubs, clicks or gallops   Abdomen - tender and swelling from surgery, nondistended, no masses or organomegaly   Neurological - alert, oriented, normal speech, no focal findings or movement disorder noted. Mild numbness on the bottom of foots bilaterally. Musculoskeletal - no joint tenderness, deformity or swelling  Extremities - peripheral pulses normal, trace pedal edema, no clubbing or cyanosis .    Skin - normal coloration and turgor, no rashes, no suspicious skin lesions noted  Breast: S/P bilateral mastectomy with reconstruction implants, no lumps, no adenopathy, no masses, reconstruction with irregular margin at outer aspect of right and inner aspect of left, decreased sensation in lower left breast unchanged, medial aspect of right small fused form from previous surgery is palpable 4 x 5 cm likely scar and nodularity from fat grafting      REVIEW OF LABORATORY DATA:  Lab Results   Component Value Date    WBC 2.8 (L) 10/12/2021    HGB 12.1 10/12/2021    HCT 38.7 10/12/2021    MCV 87.4 10/12/2021     (L) 10/12/2021       Chemistry        Component Value Date/Time     10/12/2021 0848    K 4.4 10/12/2021 0848     10/12/2021 0848    CO2 23 10/12/2021 0848    BUN 11 10/12/2021 0848    CREATININE 0.55 07/19/2022 1348    CREATININE 0.67 02/12/2022 0821        Component Value Date/Time    CALCIUM 9.1 10/12/2021 0848    ALKPHOS 92 08/31/2022 1204    AST 41 (H) 08/31/2022 1204    ALT 43 (H) 08/31/2022 1204    BILITOT 0.42 08/31/2022 1204        Lab Results   Component Value Date    LABA1C 7.3 01/31/2022     Lab Results   Component Value Date     09/29/2020         REVIEW OF RADIOLOGICAL RESULTS:  07/2022 BREAST MRI:  No MR evidence of malignancy in the bilateral breasts status post    bilateral   mastectomy with implant reconstruction. BI-RADS 2       BIRADS:   BIRADS - CATEGORY 2       Benign Findings. Normal interval follow-up is recommended in 12 months. OVERALL ASSESSMENT - BENIGN       09/01/2017 DEXA :  T-SCORE:  LUMBAR:   -0.1  LEFT HIP:  0.0  FEMORAL NECK:  -0.8        ASSESSMENT  h/o right breast DCIS in 1994 s/p BCS/EBRT and 7 years of Tamoxifen until 2001  Ipsilateral recurrence, invasive ductal carcinoma that's triple positive, pathological staging is T2 N0 M0. Contralateral DCIS, found incidentally on bilateral mastectomy  Received 6 cycles of adjuvant chemotherapy with TCH, well tolerated  Continue maintenance Herceptin, plan to finish 1 year of therapy   Completed breast reconstruction   DM2    Diastolic dysfunction, she is asymptomatic   Frozen shoulder, status post shoulder surgery  Abnormal thoracic spine MRI, repeat study showed resolution of the abnormality. Continue follow up         PLAN:   We reviewed her breast MRI which was negative. Breast exam is negative for evidence of recurrence. She is doing very well in remission. We will continue with surveillance until year 10. Return in 1 year. Scribe Attestation   This note was created by Lexii Warren acting as scribe for the physician signing this note  Electronically Signed  Lexii Warren, 9/19/2022  Scribe, [employer]. Attending Attestation   Note was reviewed and edited.   I am in agreement with the note as entered    Kennedy Ramos MD  Hematologist/Medical [id number] [facility]

## 2022-09-19 NOTE — TELEPHONE ENCOUNTER
[facility] FOR MD VISIT  DR Gail Randolph IN TO SEE PATIENT  ORDERS RECEIVED  RV 1 YEAR NO LABS  MD VISIT 09/25/23 @11AM  AVS PRINTED AND GIVEN TO PATIENT WITH INSTRUCTIONS  PATIENT DISCHARGED AMBULATORY

## 2022-10-13 RX ORDER — CALCIUM CITRATE/VITAMIN D3 200MG-6.25
TABLET ORAL
Qty: 200 STRIP | Refills: 0 | Status: SHIPPED | OUTPATIENT
Start: 2022-10-13

## 2022-10-13 NOTE — TELEPHONE ENCOUNTER
Beck Vogt is calling to request a refill on the following medication(s):    Medication Request:  Requested Prescriptions     Pending Prescriptions Disp Refills    TRUE METRIX BLOOD GLUCOSE TEST strip [Pharmacy Med Name: TRUE METRIX BLOOD GLUCOSE TEST STRP] 200 strip 0     Sig: USE TO TEST BLOOD SUGAR EVERY DAY       Last Visit Date (If Applicable):  9/70/8844    Next Visit Date:    10/20/2022

## 2022-10-20 ENCOUNTER — OFFICE VISIT (OUTPATIENT)
Dept: FAMILY MEDICINE CLINIC | Age: 72
End: 2022-10-20
Payer: MEDICARE

## 2022-10-20 VITALS
SYSTOLIC BLOOD PRESSURE: 131 MMHG | WEIGHT: 164.2 LBS | OXYGEN SATURATION: 98 % | BODY MASS INDEX: 24.6 KG/M2 | HEART RATE: 82 BPM | DIASTOLIC BLOOD PRESSURE: 64 MMHG

## 2022-10-20 DIAGNOSIS — Z00.00 MEDICARE ANNUAL WELLNESS VISIT, SUBSEQUENT: Primary | ICD-10-CM

## 2022-10-20 DIAGNOSIS — E11.9 TYPE 2 DIABETES MELLITUS WITHOUT COMPLICATION, WITHOUT LONG-TERM CURRENT USE OF INSULIN (HCC): ICD-10-CM

## 2022-10-20 DIAGNOSIS — E11.65 TYPE 2 DIABETES MELLITUS WITH HYPERGLYCEMIA, WITHOUT LONG-TERM CURRENT USE OF INSULIN (HCC): ICD-10-CM

## 2022-10-20 LAB — HBA1C MFR BLD: 8 %

## 2022-10-20 PROCEDURE — 3017F COLORECTAL CA SCREEN DOC REV: CPT | Performed by: FAMILY MEDICINE

## 2022-10-20 PROCEDURE — 1090F PRES/ABSN URINE INCON ASSESS: CPT | Performed by: FAMILY MEDICINE

## 2022-10-20 PROCEDURE — 2022F DILAT RTA XM EVC RTNOPTHY: CPT | Performed by: FAMILY MEDICINE

## 2022-10-20 PROCEDURE — G8484 FLU IMMUNIZE NO ADMIN: HCPCS | Performed by: FAMILY MEDICINE

## 2022-10-20 PROCEDURE — 3052F HG A1C>EQUAL 8.0%<EQUAL 9.0%: CPT | Performed by: FAMILY MEDICINE

## 2022-10-20 PROCEDURE — G0439 PPPS, SUBSEQ VISIT: HCPCS | Performed by: FAMILY MEDICINE

## 2022-10-20 PROCEDURE — G8420 CALC BMI NORM PARAMETERS: HCPCS | Performed by: FAMILY MEDICINE

## 2022-10-20 PROCEDURE — G8399 PT W/DXA RESULTS DOCUMENT: HCPCS | Performed by: FAMILY MEDICINE

## 2022-10-20 PROCEDURE — 99213 OFFICE O/P EST LOW 20 MIN: CPT | Performed by: FAMILY MEDICINE

## 2022-10-20 PROCEDURE — 83036 HEMOGLOBIN GLYCOSYLATED A1C: CPT | Performed by: FAMILY MEDICINE

## 2022-10-20 PROCEDURE — 1123F ACP DISCUSS/DSCN MKR DOCD: CPT | Performed by: FAMILY MEDICINE

## 2022-10-20 PROCEDURE — G8427 DOCREV CUR MEDS BY ELIG CLIN: HCPCS | Performed by: FAMILY MEDICINE

## 2022-10-20 PROCEDURE — 1036F TOBACCO NON-USER: CPT | Performed by: FAMILY MEDICINE

## 2022-10-20 SDOH — ECONOMIC STABILITY: FOOD INSECURITY: WITHIN THE PAST 12 MONTHS, THE FOOD YOU BOUGHT JUST DIDN'T LAST AND YOU DIDN'T HAVE MONEY TO GET MORE.: NEVER TRUE

## 2022-10-20 SDOH — ECONOMIC STABILITY: FOOD INSECURITY: WITHIN THE PAST 12 MONTHS, YOU WORRIED THAT YOUR FOOD WOULD RUN OUT BEFORE YOU GOT MONEY TO BUY MORE.: NEVER TRUE

## 2022-10-20 ASSESSMENT — PATIENT HEALTH QUESTIONNAIRE - PHQ9
1. LITTLE INTEREST OR PLEASURE IN DOING THINGS: 0
DEPRESSION UNABLE TO ASSESS: FUNCTIONAL CAPACITY MOTIVATION LIMITS ACCURACY
SUM OF ALL RESPONSES TO PHQ QUESTIONS 1-9: 0
SUM OF ALL RESPONSES TO PHQ QUESTIONS 1-9: 0
SUM OF ALL RESPONSES TO PHQ9 QUESTIONS 1 & 2: 0
SUM OF ALL RESPONSES TO PHQ QUESTIONS 1-9: 0
2. FEELING DOWN, DEPRESSED OR HOPELESS: 0
SUM OF ALL RESPONSES TO PHQ QUESTIONS 1-9: 0

## 2022-10-20 ASSESSMENT — SOCIAL DETERMINANTS OF HEALTH (SDOH): HOW HARD IS IT FOR YOU TO PAY FOR THE VERY BASICS LIKE FOOD, HOUSING, MEDICAL CARE, AND HEATING?: NOT HARD AT ALL

## 2022-10-20 NOTE — PROGRESS NOTES
Medicare Annual Wellness Visit    Marta Kwon is here for Diabetes    Assessment & Plan   Medicare annual wellness visit, subsequent  Type 2 diabetes mellitus without complication, without long-term current use of insulin [facility]  -     POCT glycosylated hemoglobin (Hb A1C)  -     linagliptin (TRADJENTA) 5 MG tablet; Take 1 tablet by mouth daily, Disp-30 tablet, R-0Normal  -     Lipid Panel; Future  -     Microalbumin, Ur; Future  Type 2 diabetes mellitus with hyperglycemia, without long-term current use of insulin (HCC)      Overall the patient is doing well. She will continue to follow-up with her specialist including the gastroenterologist hematologist.  We will start her on a Tradjenta 5 mg a day. Hopefully this will keep her glucose level normal.  Current A1c is 8. Patient will see me back in 3 months for follow-up. Call or return to clinic prn if these symptoms worsen or fail to improve as anticipated. I have reviewed the instructions with the patient, answering all questions to her satisfaction. Recommendations for Preventive Services Due: see orders and patient instructions/AVS.  Recommended screening schedule for the next 5-10 years is provided to the patient in written form: see Patient Instructions/AVS.     Return in 3 months (on 1/20/2023), or if symptoms worsen or fail to improve, for Medicare Annual Wellness Visit in 1 year, DM II. Subjective   The following acute and/or chronic problems were also addressed today:  Patient tells me that she was finally diagnosed by a gastroenterologist with [facility] syndrome. She has follow-up appointments coming up. The patient is also very concerned about her glucose. She states that she did have to stop the Zettie Like due to increased urinary tract infections. She says that her fasting glucose level of 170 even 200. At times the patient took an additional half metformin/500 mg the morning at night.   But she states that she needs to do something to have her glucose level decreased. Patient's complete Health Risk Assessment and screening values have been reviewed and are found in Flowsheets. The following problems were reviewed today and where indicated follow up appointments were made and/or referrals ordered. Positive Risk Factor Screenings with Interventions:      Depression:  Depression Unable to Assess: Functional capacity motivation limits accuracy  PHQ-2 Score: 0  PHQ-9 Total Score: 0    Severity:1-4 = minimal depression, 5-9 = mild depression, 10-14 = moderate depression, 15-19 = moderately severe depression, 20-27 = severe depression        General Health and ACP:       Advance Directives       Power of  Living Will ACP-Advance Directive ACP-Power of     Not on File Not on File Not on File Not on File        General Health Risk Interventions:  na              Objective   Vitals:    10/20/22 1058   BP: 131/64   Pulse: 82   SpO2: 98%   Weight: 164 lb 3.2 oz (74.5 kg)      Body mass index is 24.6 kg/m². HENT:   /64   Pulse 82   Wt 164 lb 3.2 oz (74.5 kg)   SpO2 98%   BMI 24.60 kg/m²   Constitutional: Alert and oriented. Well-nourished. Head: Normocephalic and atraumatic. Right Ear: External ear normal. TM: no bulging, erythema or fluid seen. Left Ear: External ear normal. TM: no bulging, erythema or fluid seen. Nose: Nose normal.   Mouth/Throat: Oropharynx is clear and moist.  Teeth in very good repair. Eyes: Pupils are equal, round, and reactive to light. Right eye exhibits no discharge. Left eye exhibits no discharge. No scleral icterus. Neck: Normal range of motion. Neck supple. No JVD present. No tracheal deviation present. No thyromegaly present. Cardiovascular: Normal rate, regular rhythm, normal heart sounds. Pulmonary/Chest: Effort normal and breath sounds normal. No respiratory distress. She has no wheezes. She has no rales. Abdominal: Soft.  Bowel sounds are normal.  She exhibits no distension and no mass. There is no tenderness. There is no rebound and no guarding. Musculoskeletal: Normal range of motion. She exhibits no edema or tenderness. Lymphadenopathy:    She has no cervical adenopathy. Neurological:  She is alert and oriented to person, place, and time. Cranial nerves grossly intact. No sensation problem noted. Muscle strength 5/5 throughout. Skin: Skin is warm and dry. No rash noted. No erythema. Psychiatric:  She has a normal mood and affect. Behavior is normal.        Allergies   Allergen Reactions    Amoxicillin     Plantain Other (See Comments)     Pt denies    Statins     Tape [Adhesive Tape]     Sulfa Antibiotics Itching and Rash     Prior to Visit Medications    Medication Sig Taking? Authorizing Provider   linagliptin (TRADJENTA) 5 MG tablet Take 1 tablet by mouth daily Yes Arlette Estrada MD   TRUE METRIX BLOOD GLUCOSE TEST strip USE TO TEST BLOOD SUGAR EVERY DAY Yes Arlette Estrada MD   glimepiride (AMARYL) 4 MG tablet TAKE 1 TABLET BY MOUTH ONE TIME A DAY Yes Arlette Estrada MD   metFORMIN (GLUCOPHAGE-XR) 500 MG extended release tablet TAKE 1 TABLET BY MOUTH 4 TIMES A DAY Yes Arlette Estrada MD   betamethasone dipropionate 0.05 % cream  Yes Historical Provider, MD   triamcinolone (KENALOG) 0.1 % cream  Yes Historical Provider, MD   fluticasone (FLONASE) 50 MCG/ACT nasal spray 1 spray by Nasal route 2 times daily  Yes Historical Provider, MD   montelukast (SINGULAIR) 10 MG tablet Take 10 mg by mouth nightly. Yes Historical Provider, MD   cetirizine (ZYRTEC) 10 MG tablet Take 10 mg by mouth daily. Yes Historical Provider, MD   lansoprazole (PREVACID) 15 MG delayed release capsule Take 15 mg by mouth daily as needed (15 MG daily)  Yes Historical Provider, MD   budesonide-formoterol (SYMBICORT) 160-4.5 MCG/ACT AERO Inhale 2 puffs into the lungs 2 times daily.    Yes Historical Provider, MD   albuterol (PROVENTIL HFA;VENTOLIN HFA) 108 (90 BASE) MCG/ACT inhaler Inhale 2 puffs into the lungs every 4 hours as needed  Yes Historical Provider, MD   sucralfate (CARAFATE) 1 GM/10ML suspension Take 10 mLs by mouth 4 times daily  Patient not taking: No sig reported  Cristy Vergara MD       [facility] (Including outside providers/suppliers regularly involved in providing care):   Patient Care Team:  Cristy Vergara MD as PCP - General (Family Medicine)  Cristy Vergara MD as PCP - [facility] Empaneled Provider  Janina Borrego MD as Consulting Physician (Hematology and Oncology)     Reviewed and updated this visit:  Allergies  Meds  Problems         (Please note that portions of this note were completed with a voice recognition program. Efforts were made to edit the dictations but occasionally words are mis-transcribed.)

## 2022-10-20 NOTE — PATIENT INSTRUCTIONS
Personalized Preventive Plan for Tesfaye Herman - 10/20/2022  Medicare offers a range of preventive health benefits. Some of the tests and screenings are paid in full while other may be subject to a deductible, co-insurance, and/or copay. Some of these benefits include a comprehensive review of your medical history including lifestyle, illnesses that may run in your family, and various assessments and screenings as appropriate. After reviewing your medical record and screening and assessments performed today your provider may have ordered immunizations, labs, imaging, and/or referrals for you. A list of these orders (if applicable) as well as your Preventive Care list are included within your After Visit Summary for your review. Other Preventive Recommendations:    A preventive eye exam performed by an eye specialist is recommended every 1-2 years to screen for glaucoma; cataracts, macular degeneration, and other eye disorders. A preventive dental visit is recommended every 6 months. Try to get at least 150 minutes of exercise per week or 10,000 steps per day on a pedometer . Order or download the FREE \"Exercise & Physical Activity: Your Everyday Guide\" from [employer]. Call 0-663.332.4730 or search [employer] online. You need 4584-0497 mg of calcium and 6546-5886 IU of vitamin D per day. It is possible to meet your calcium requirement with diet alone, but a vitamin D supplement is usually necessary to meet this goal.  When exposed to the sun, use a sunscreen that protects against both UVA and UVB radiation with an SPF of 30 or greater. Reapply every 2 to 3 hours or after sweating, drying off with a towel, or swimming. Always wear a seat belt when traveling in a car. Always wear a helmet when riding a bicycle or motorcycle. Heart-Healthy Diet   Sodium, Fat, and Cholesterol Controlled Diet       What Is a Heart Healthy Diet?    A heart-healthy diet is one that limits sodium , certain types of fat , and cholesterol . This type of diet is recommended for:   People with any form of cardiovascular disease (eg, coronary heart disease , peripheral vascular disease , previous heart attack , previous stroke )   People with risk factors for cardiovascular disease, such as high blood pressure , high cholesterol , or diabetes   Anyone who wants to lower their risk of developing cardiovascular disease   Sodium    Sodium is a mineral found in many foods. In general, most people consume much more sodium than they need. Diets high in sodium can increase blood pressure and lead to edema (water retention). On a heart-healthy diet, you should consume no more than 2,300 mg (milligrams) of sodium per dayabout the amount in one teaspoon of table salt. The foods highest in sodium include table salt (about 50% sodium), processed foods, convenience foods, and preserved foods. Cholesterol    Cholesterol is a fat-like, waxy substance in your blood. Our bodies make some cholesterol. It is also found in animal products, with the highest amounts in fatty meat, egg yolks, whole milk, cheese, shellfish, and organ meats. On a heart-healthy diet, you should limit your cholesterol intake to less than 200 mg per day. It is normal and important to have some cholesterol in your bloodstream. But too much cholesterol can cause plaque to build up within your arteries, which can eventually lead to a heart attack or stroke. The two types of cholesterol that are most commonly referred to are:   Low-density lipoprotein (LDL) cholesterol  Also known as bad cholesterol, this is the cholesterol that tends to build up along your arteries. Bad cholesterol levels are increased by eating fats that are saturated or hydrogenated. Optimal level of this cholesterol is less than 100. Over 130 starts to get risky for heart disease.    High-density lipoprotein (HDL) cholesterol  Also known as good cholesterol, this type of cholesterol actually carries cholesterol away from your arteries and may, therefore, help lower your risk of having a heart attack. You want this level to be high (ideally greater than 60). It is a risk to have a level less than 40. You can raise this good cholesterol by eating olive oil, canola oil, avocados, or nuts. Exercise raises this level, too. Fat    Fat is calorie dense and packs a lot of calories into a small amount of food. Even though fats should be limited due to their high calorie content, not all fats are bad. In fact, some fats are quite healthful. Fat can be broken down into four main types. The good-for-you fats are:   Monounsaturated fat  found in oils such as olive and canola, avocados, and nuts and natural nut butters; can decrease cholesterol levels, while keeping levels of HDL cholesterol high   Polyunsaturated fat  found in oils such as safflower, sunflower, soybean, corn, and sesame; can decrease total cholesterol and LDL cholesterol   Omega-3 fatty acids  particularly those found in fatty fish (such as salmon, trout, tuna, mackerel, herring, and sardines); can decrease risk of arrhythmias, decrease triglyceride levels, and slightly lower blood pressure   The fats that you want to limit are:   Saturated fat  found in animal products, many fast foods, and a few vegetables; increases total blood cholesterol, including LDL levels   Animal fats that are saturated include: butter, lard, whole-milk dairy products, meat fat, and poultry skin   Vegetable fats that are saturated include: hydrogenated shortening, palm oil, coconut oil, cocoa butter   Hydrogenated or trans fat  found in margarine and vegetable shortening, most shelf stable snack foods, and fried foods; increases LDL and decreases HDL     It is generally recommended that you limit your total fat for the day to less than 30% of your total calories.  If you follow an 1800-calorie heart healthy diet, for example, this would mean 60 grams of fat or less per day. Saturated fat and trans fat in your diet raises your blood cholesterol the most, much more than dietary cholesterol does. For this reason, on a heart-healthy diet, less than 7% of your calories should come from saturated fat and ideally 0% from trans fat. On an 1800-calorie diet, this translates into less than 14 grams of saturated fat per day, leaving 46 grams of fat to come from mono- and polyunsaturated fats.    Food Choices on a Heart Healthy Diet   Food Category   Foods Recommended   Foods to Avoid   Grains   Breads and rolls without salted tops Most dry and cooked cereals Unsalted crackers and breadsticks Low-sodium or homemade breadcrumbs or stuffing All rice and pastas   Breads, rolls, and crackers with salted tops High-fat baked goods (eg, muffins, donuts, pastries) Quick breads, self-rising flour, and biscuit mixes Regular bread crumbs Instant hot cereals Commercially prepared rice, pasta, or stuffing mixes   Vegetables   Most fresh, frozen, and low-sodium canned vegetables Low-sodium and salt-free vegetable juices Canned vegetables if unsalted or rinsed   Regular canned vegetables and juices, including sauerkraut and pickled vegetables Frozen vegetables with sauces Commercially prepared potato and vegetable mixes   Fruits   Most fresh, frozen, and canned fruits All fruit juices   Fruits processed with salt or sodium   Milk   Nonfat or low-fat (1%) milk Nonfat or low-fat yogurt Cottage cheese, low-fat ricotta, cheeses labeled as low-fat and low-sodium   Whole milk Reduced-fat (2%) milk Malted and chocolate milk Full fat yogurt Most cheeses (unless low-fat and low salt) Buttermilk (no more than 1 cup per week)   Meats and Beans   Lean cuts of fresh or frozen beef, veal, lamb, or pork (look for the word loin) Fresh or frozen poultry without the skin Fresh or frozen fish and some shellfish Egg whites and egg substitutes (Limit whole eggs to three per week) Tofu Nuts or seeds (unsalted, dry-roasted), low-sodium peanut butter Dried peas, beans, and lentils   Any smoked, cured, salted, or canned meat, fish, or poultry (including powell, chipped beef, cold cuts, hot dogs, sausages, sardines, and anchovies) Poultry skins Breaded and/or fried fish or meats Canned peas, beans, and lentils Salted nuts   Fats and Oils   Olive oil and canola oil Low-sodium, low-fat salad dressings and mayonnaise   Butter, margarine, coconut and palm oils, powell fat   Snacks, Sweets, and Condiments   Low-sodium or unsalted versions of broths, soups, soy sauce, and condiments Pepper, herbs, and spices; vinegar, lemon, or lime juice Low-fat frozen desserts (yogurt, sherbet, fruit bars) Sugar, cocoa powder, honey, syrup, jam, and preserves Low-fat, trans-fat free cookies, cakes, and pies Luis F and animal crackers, fig bars, ruma snaps   High-fat desserts Broth, soups, gravies, and sauces, made from instant mixes or other high-sodium ingredients Salted snack foods Canned olives Meat tenderizers, seasoning salt, and most flavored vinegars   Beverages   Low-sodium carbonated beverages Tea and coffee in moderation Soy milk   Commercially softened water   Suggestions   Make whole grains, fruits, and vegetables the base of your diet. Choose heart-healthy fats such as canola, olive, and flaxseed oil, and foods high in heart-healthy fats, such as nuts, seeds, soybeans, tofu, and fish. Eat fish at least twice per week; the fish highest in omega-3 fatty acids and lowest in mercury include salmon, herring, mackerel, sardines, and canned chunk light tuna. If you eat fish less than twice per week or have high triglycerides, talk to your doctor about taking fish oil supplements. Read food labels. For products low in fat and cholesterol, look for fat free, low-fat, cholesterol free, saturated fat free, and trans fat freeAlso scan the Nutrition Facts Label, which lists saturated fat, trans fat, and cholesterol amounts. For products low in sodium, look for sodium free, very low sodium, low sodium, no added salt, and unsalted   Skip the salt when cooking or at the table; if food needs more flavor, get creative and try out different herbs and spices. Garlic and onion also add substantial flavor to foods. Trim any visible fat off meat and poultry before cooking, and drain the fat off after schmidt. Use cooking methods that require little or no added fat, such as grilling, boiling, baking, poaching, broiling, roasting, steaming, stir-frying, and sauting. Avoid fast food and convenience food. They tend to be high in saturated and trans fat and have a lot of added salt. Talk to a registered dietitian for individualized diet advice. Last Reviewed: March 2011 Cody Huizar MS, MPH, RD   Updated: 3/29/2011       Preventing Osteoporosis: After Your Visit  Your Care Instructions  Osteoporosis means the bones are weak and thin enough that they can break easily. The older you are, the more likely you are to get osteoporosis. But with plenty of calcium, vitamin D, and exercise, you can help prevent osteoporosis. The preteen and teen years are a key time for bone building. With the help of calcium, vitamin D, and exercise in those early years and beyond, the bones reach their peak density and strength by age 27. After age 27, your bones naturally start to thin and weaken. The stronger your bones are at around age 27, the lower your risk for osteoporosis. But no matter what your age and risk are, your bones still need calcium, vitamin D, and exercise to stay strong. Also avoid smoking, and limit alcohol. Smoking and heavy alcohol use can make your bones thinner. Talk to your doctor about any special risks you might have, such as having a close relative with osteoporosis or taking a medicine that can weaken bones. Your doctor can tell you the best ways to protect your bones from thinning.   Follow-up care is a key part of your treatment and safety. Be sure to make and go to all appointments, and call your doctor if you are having problems. It's also a good idea to know your test results and keep a list of the medicines you take. How can you care for yourself at home? Get enough calcium and vitamin D. [employer] recommends adults younger than age 46 need 1,000 mg of calcium and 600 IU of vitamin D each day. Women ages 46 to 79 need 1,200 mg of calcium and 600 IU of vitamin D each day. Men ages 46 to 79 need 1,000 mg of calcium and 600 IU of vitamin D each day. Adults 71 and older need 1,200 mg of calcium and 800 IU of vitamin D each day. Eat foods rich in calcium, like yogurt, cheese, milk, and dark green vegetables. Eat foods rich in vitamin D, like eggs, fatty fish, cereal, and fortified milk. Get some sunshine. Your body uses sunshine to make its own vitamin D. The safest time to be out in the sun is before 10 a.m. or after 3 p.m. Avoid getting sunburned. Sunburn can increase your risk of skin cancer. Talk to your doctor about taking a calcium plus vitamin D supplement. Ask about what type of calcium is right for you, and how much to take at a time. Adults ages 23 to 48 should not get more than 2,500 mg of calcium and 4,000 IU of vitamin D each day, whether it is from supplements and/or food. Adults ages 46 and older should not get more than 2,000 mg of calcium and 4,000 IU of vitamin D each day from supplements and/or food. Get regular bone-building exercise. Weight-bearing and resistance exercises keep bones healthy by working the muscles and bones against gravity. Start out at an exercise level that feels right for you. Add a little at a time until you can do the following:  Do 30 minutes of weight-bearing exercise on most days of the week. Walking, jogging, stair climbing, and dancing are good choices. Do resistance exercises with weights or elastic bands 2 to 3 days a week. Limit alcohol.  Drink no more than 1 alcohol drink a day if you are a woman. Drink no more than 2 alcohol drinks a day if you are a man. Do not smoke. Smoking can make bones thin faster. If you need help quitting, talk to your doctor about stop-smoking programs and medicines. These can increase your chances of quitting for good. When should you call for help? Watch closely for changes in your health, and be sure to contact your doctor if:  You need help with a healthy eating plan. You need help with an exercise plan    © [id number] [employer]. Care instructions adapted under license by [facility]. This care instruction is for use with your licensed healthcare professional. If you have questions about a medical condition or this instruction, always ask your healthcare professional. [address] any warranty or liability for your use of this information. Content Version: 9.4.20604; Last Revised: June 20, 2011                Keep Your Memory Laurie Sanchez       Many factors can affect your ability to remembera hectic lifestyle, aging, stress, chronic disease, and certain medicines. But, there are steps you can take to sharpen your mind and help preserve your memory. Challenge Your Brain   Regularly challenging your mind may help keeps it in top shape. Good mental exercises include:   Crossword puzzlesUse a dictionary if you need it; you will learn more that way. Brainteasers Try some! Crafts, such as wood working and sewing   Hobbies, such as gardening and building model airplanes   SocializingVisit old friends or join groups to meet new ones.    Reading   Learning a new language   Taking a class, whether it be art history or cresencio chi   TravelingExperience the food, history, and culture of your destination   Learning to use a computer   Going to museums, the theater, or thought-provoking movies   Changing things in your daily life, such as reversing your pattern in the grocery store or brushing your teeth using your nondominant hand   Use Memory Aids   There is no need to remember every detail on your own. These memory aids can help:   Calendars and day planners   Electronic organizers to store all sorts of helpful informationThese devices can \"beep\" to remind you of appointments. A book of days to record birthdays, anniversaries, and other occasions that occur on the same date every year   Detailed \"to-do\" lists and strategically placed sticky notes   Quick \"study\" sessionsBefore a gathering, review who will be there so their names will be fresh in your mind. Establish routinesFor example, keep your keys, wallet, and umbrella in the same place all the time or take medicine with your 8:00 AM glass of juice   Live a Healthy Life   Many actions that will keep your body strong will do the same for your mind. For example:   Talk to Your Doctor About Herbs and Supplements    Malnutrition and vitamin deficiencies can impair your mental function. For example, vitamin B12 deficiency can cause a range of symptoms, including confusion. But, what if your nutritional needs are being met? Can herbs and supplements still offer a benefit? Researchers have investigated a range of natural remedies, such as ginkgo , ginseng , and the supplement phosphatidylserine (PS). So far, though, the evidence is inconsistent as to whether these products can improve memory or thinking. If you are interested in taking herbs and supplements, talk to your doctor first because they may interact with other medicines that you are taking. Exercise Regularly    Among the many benefits of regular exercise are increased blood flow to the brain and decreased risk of certain diseases that can interfere with memory function. One study found that even moderate exercise has a beneficial effect.  Examples of \"moderate\" exercise include:   Playing 18 holes of golf once a week, without a cart   Playing tennis twice a week   Walking one mile per day Manage Stress    It can be tough to remember what is important when your mind is cluttered. Make time for relaxation. Choose activities that calm you down, and make it routine. Manage Chronic Conditions    Side effects of high blood pressure , diabetes, and heart disease can interfere with mental function. Many of the lifestyle steps discussed here can help manage these conditions. Strive to eat a healthy diet, exercise regularly, get stress under control, and follow your doctor's advice for your condition. Minimize Medications    Talk to your doctor about the medicines that you take. Some may be unnecessary. Also, healthy lifestyle habits may lower the need for certain drugs.      Last Reviewed: April 2010 Nilam Ramirez MD   Updated: 4/13/2010

## 2022-11-09 DIAGNOSIS — E11.9 TYPE 2 DIABETES MELLITUS WITHOUT COMPLICATION, WITHOUT LONG-TERM CURRENT USE OF INSULIN (HCC): ICD-10-CM

## 2022-11-09 RX ORDER — LINAGLIPTIN 5 MG/1
TABLET, FILM COATED ORAL
Qty: 30 TABLET | Refills: 0 | Status: SHIPPED | OUTPATIENT
Start: 2022-11-09

## 2022-11-09 RX ORDER — GLIMEPIRIDE 4 MG/1
TABLET ORAL
Qty: 90 TABLET | Refills: 0 | Status: SHIPPED | OUTPATIENT
Start: 2022-11-09

## 2022-11-28 ENCOUNTER — HOSPITAL ENCOUNTER (OUTPATIENT)
Age: 72
Discharge: HOME | End: 2022-11-28
Payer: MEDICARE

## 2022-11-28 DIAGNOSIS — E11.9 TYPE 2 DIABETES MELLITUS WITHOUT COMPLICATION, WITHOUT LONG-TERM CURRENT USE OF INSULIN (HCC): ICD-10-CM

## 2022-11-28 LAB
CHOLESTEROL/HDL RATIO: 4.2
CHOLESTEROL: 172 MG/DL
CREATININE URINE: 124.8 MG/DL (ref 28–217)
HDLC SERPL-MCNC: 41 MG/DL
LDL CHOLESTEROL: 86 MG/DL (ref 0–130)
MICROALBUMIN/CREAT 24H UR: <12 MG/L
MICROALBUMIN/CREAT UR-RTO: NORMAL MCG/MG CREAT
TRIGL SERPL-MCNC: 226 MG/DL

## 2022-11-28 PROCEDURE — 82570 ASSAY OF URINE CREATININE: CPT

## 2022-11-28 PROCEDURE — 82043 UR ALBUMIN QUANTITATIVE: CPT

## 2022-11-28 PROCEDURE — 36415 COLL VENOUS BLD VENIPUNCTURE: CPT

## 2022-11-28 PROCEDURE — 80061 LIPID PANEL: CPT

## 2022-12-16 DIAGNOSIS — E11.9 TYPE 2 DIABETES MELLITUS WITHOUT COMPLICATION, WITHOUT LONG-TERM CURRENT USE OF INSULIN (HCC): ICD-10-CM

## 2022-12-16 NOTE — TELEPHONE ENCOUNTER
Tahmina Fuchs is calling to request a refill on the following medication(s):    Last Visit Date (If Applicable):  33/13/8496    Next Visit Date:    Visit date not found    Medication Request:  Requested Prescriptions     Pending Prescriptions Disp Refills    TRADJENTA 5 MG tablet [Pharmacy Med Name: TRADJENTA 5MG TABS] 30 tablet 0     Sig: TAKE 1 TABLET BY MOUTH ONCE DAILY.

## 2022-12-19 RX ORDER — LINAGLIPTIN 5 MG/1
TABLET, FILM COATED ORAL
Qty: 30 TABLET | Refills: 0 | Status: SHIPPED | OUTPATIENT
Start: 2022-12-19

## 2023-01-17 DIAGNOSIS — E11.9 TYPE 2 DIABETES MELLITUS WITHOUT COMPLICATION, WITHOUT LONG-TERM CURRENT USE OF INSULIN (HCC): ICD-10-CM

## 2023-01-17 RX ORDER — LINAGLIPTIN 5 MG/1
TABLET, FILM COATED ORAL
Qty: 30 TABLET | Refills: 3 | Status: SHIPPED | OUTPATIENT
Start: 2023-01-17

## 2023-01-17 NOTE — TELEPHONE ENCOUNTER
Deborah Almanza is calling to request a refill on the following medication(s):    Last Visit Date (If Applicable):  50/64/4589    Next Visit Date:    Visit date not found    Medication Request:  Requested Prescriptions     Pending Prescriptions Disp Refills    TRADJENTA 5 MG tablet [Pharmacy Med Name: TRADJENTA 5MG TABS] 30 tablet 0     Sig: TAKE 1 TABLET BY MOUTH ONCE DAILY.

## 2023-02-07 ENCOUNTER — HOSPITAL ENCOUNTER (OUTPATIENT)
Age: 73
Discharge: HOME | End: 2023-02-07
Payer: MEDICARE

## 2023-02-07 LAB
ALBUMIN SERPL-MCNC: 4.2 G/DL (ref 3.5–5.2)
ALBUMIN/GLOBULIN RATIO: 1.3 (ref 1–2.5)
ALP SERPL-CCNC: 80 U/L (ref 35–104)
ALT SERPL-CCNC: 51 U/L (ref 5–33)
AST SERPL-CCNC: 47 U/L
BILIRUB DIRECT SERPL-MCNC: 0.1 MG/DL
BILIRUB INDIRECT SERPL-MCNC: 0.6 MG/DL (ref 0–1)
BILIRUB SERPL-MCNC: 0.7 MG/DL (ref 0.3–1.2)
INR PPP: 1.2
PROT SERPL-MCNC: 7.4 G/DL (ref 6.4–8.3)
PROTHROMBIN TIME: 14.7 SEC (ref 11.5–14.2)

## 2023-02-07 PROCEDURE — 80076 HEPATIC FUNCTION PANEL: CPT

## 2023-02-07 PROCEDURE — 36415 COLL VENOUS BLD VENIPUNCTURE: CPT

## 2023-02-07 PROCEDURE — 85610 PROTHROMBIN TIME: CPT

## 2023-02-10 DIAGNOSIS — E11.9 TYPE 2 DIABETES MELLITUS WITHOUT COMPLICATION, WITHOUT LONG-TERM CURRENT USE OF INSULIN (HCC): ICD-10-CM

## 2023-02-10 RX ORDER — GLIMEPIRIDE 4 MG/1
TABLET ORAL
Qty: 90 TABLET | Refills: 0 | Status: SHIPPED | OUTPATIENT
Start: 2023-02-10

## 2023-02-10 NOTE — TELEPHONE ENCOUNTER
Dipti Yee is calling to request a refill on the following medication(s):    Last Visit Date (If Applicable):  80/75/4132    Next Visit Date:    Visit date not found    Medication Request:  Requested Prescriptions     Pending Prescriptions Disp Refills    glimepiride (AMARYL) 4 MG tablet [Pharmacy Med Name: GLIMEPIRIDE 4MG TABS] 90 tablet 0     Sig: TAKE 1 TABLET BY MOUTH ONCE DAILY.

## 2023-02-16 RX ORDER — CALCIUM CITRATE/VITAMIN D3 200MG-6.25
TABLET ORAL
Qty: 200 STRIP | Refills: 0 | Status: SHIPPED | OUTPATIENT
Start: 2023-02-16

## 2023-02-16 NOTE — TELEPHONE ENCOUNTER
Stefan Apley is calling to request a refill on the following medication(s):    Last Visit Date (If Applicable):  40/30/1582    Next Visit Date:    Visit date not found    Medication Request:  Requested Prescriptions     Pending Prescriptions Disp Refills    TRUE METRIX BLOOD GLUCOSE TEST strip [Pharmacy Med Name: TRUE METRIX BLOOD GLUCOSE TEST STRP] 200 strip 0     Sig: USE TO MONITOR BLOOD SUGAR ONCE DAILY

## 2023-03-20 ENCOUNTER — OFFICE VISIT (OUTPATIENT)
Dept: FAMILY MEDICINE CLINIC | Age: 73
End: 2023-03-20
Payer: MEDICARE

## 2023-03-20 VITALS
HEART RATE: 85 BPM | DIASTOLIC BLOOD PRESSURE: 66 MMHG | TEMPERATURE: 97.3 F | SYSTOLIC BLOOD PRESSURE: 133 MMHG | WEIGHT: 167.2 LBS | OXYGEN SATURATION: 98 % | BODY MASS INDEX: 25.05 KG/M2

## 2023-03-20 DIAGNOSIS — E11.9 TYPE 2 DIABETES MELLITUS WITHOUT COMPLICATION, WITHOUT LONG-TERM CURRENT USE OF INSULIN (HCC): Primary | ICD-10-CM

## 2023-03-20 DIAGNOSIS — I85.00 ESOPHAGEAL VARICES WITHOUT BLEEDING, UNSPECIFIED ESOPHAGEAL VARICES TYPE (HCC): ICD-10-CM

## 2023-03-20 DIAGNOSIS — T14.8XXA MUSCLE STRAIN: ICD-10-CM

## 2023-03-20 DIAGNOSIS — Z17.0 MALIGNANT NEOPLASM OF UPPER-OUTER QUADRANT OF RIGHT BREAST IN FEMALE, ESTROGEN RECEPTOR POSITIVE (HCC): ICD-10-CM

## 2023-03-20 DIAGNOSIS — E11.65 TYPE 2 DIABETES MELLITUS WITH HYPERGLYCEMIA, WITHOUT LONG-TERM CURRENT USE OF INSULIN (HCC): ICD-10-CM

## 2023-03-20 DIAGNOSIS — D69.6 THROMBOCYTOPENIA, UNSPECIFIED (HCC): ICD-10-CM

## 2023-03-20 DIAGNOSIS — C50.411 MALIGNANT NEOPLASM OF UPPER-OUTER QUADRANT OF RIGHT BREAST IN FEMALE, ESTROGEN RECEPTOR POSITIVE (HCC): ICD-10-CM

## 2023-03-20 LAB — HBA1C MFR BLD: 7.4 %

## 2023-03-20 PROCEDURE — G8484 FLU IMMUNIZE NO ADMIN: HCPCS | Performed by: FAMILY MEDICINE

## 2023-03-20 PROCEDURE — 3017F COLORECTAL CA SCREEN DOC REV: CPT | Performed by: FAMILY MEDICINE

## 2023-03-20 PROCEDURE — 3051F HG A1C>EQUAL 7.0%<8.0%: CPT | Performed by: FAMILY MEDICINE

## 2023-03-20 PROCEDURE — 83036 HEMOGLOBIN GLYCOSYLATED A1C: CPT | Performed by: FAMILY MEDICINE

## 2023-03-20 PROCEDURE — 99214 OFFICE O/P EST MOD 30 MIN: CPT | Performed by: FAMILY MEDICINE

## 2023-03-20 PROCEDURE — 1090F PRES/ABSN URINE INCON ASSESS: CPT | Performed by: FAMILY MEDICINE

## 2023-03-20 PROCEDURE — G8427 DOCREV CUR MEDS BY ELIG CLIN: HCPCS | Performed by: FAMILY MEDICINE

## 2023-03-20 PROCEDURE — G8399 PT W/DXA RESULTS DOCUMENT: HCPCS | Performed by: FAMILY MEDICINE

## 2023-03-20 PROCEDURE — 1123F ACP DISCUSS/DSCN MKR DOCD: CPT | Performed by: FAMILY MEDICINE

## 2023-03-20 PROCEDURE — 2022F DILAT RTA XM EVC RTNOPTHY: CPT | Performed by: FAMILY MEDICINE

## 2023-03-20 PROCEDURE — 1036F TOBACCO NON-USER: CPT | Performed by: FAMILY MEDICINE

## 2023-03-20 PROCEDURE — G8417 CALC BMI ABV UP PARAM F/U: HCPCS | Performed by: FAMILY MEDICINE

## 2023-03-20 RX ORDER — BACLOFEN 10 MG/1
10 TABLET ORAL 3 TIMES DAILY
Qty: 30 TABLET | Refills: 0 | Status: SHIPPED | OUTPATIENT
Start: 2023-03-20

## 2023-03-20 RX ORDER — GLIMEPIRIDE 4 MG/1
TABLET ORAL
Qty: 90 TABLET | Refills: 3 | Status: SHIPPED | OUTPATIENT
Start: 2023-03-20

## 2023-03-20 RX ORDER — METFORMIN HYDROCHLORIDE 500 MG/1
TABLET, EXTENDED RELEASE ORAL
Qty: 360 TABLET | Refills: 3 | Status: SHIPPED | OUTPATIENT
Start: 2023-03-20

## 2023-03-20 SDOH — ECONOMIC STABILITY: INCOME INSECURITY: HOW HARD IS IT FOR YOU TO PAY FOR THE VERY BASICS LIKE FOOD, HOUSING, MEDICAL CARE, AND HEATING?: NOT HARD AT ALL

## 2023-03-20 SDOH — ECONOMIC STABILITY: FOOD INSECURITY: WITHIN THE PAST 12 MONTHS, YOU WORRIED THAT YOUR FOOD WOULD RUN OUT BEFORE YOU GOT MONEY TO BUY MORE.: NEVER TRUE

## 2023-03-20 SDOH — ECONOMIC STABILITY: FOOD INSECURITY: WITHIN THE PAST 12 MONTHS, THE FOOD YOU BOUGHT JUST DIDN'T LAST AND YOU DIDN'T HAVE MONEY TO GET MORE.: NEVER TRUE

## 2023-03-20 SDOH — ECONOMIC STABILITY: HOUSING INSECURITY
IN THE LAST 12 MONTHS, WAS THERE A TIME WHEN YOU DID NOT HAVE A STEADY PLACE TO SLEEP OR SLEPT IN A SHELTER (INCLUDING NOW)?: NO

## 2023-03-20 ASSESSMENT — PATIENT HEALTH QUESTIONNAIRE - PHQ9
SUM OF ALL RESPONSES TO PHQ QUESTIONS 1-9: 0
SUM OF ALL RESPONSES TO PHQ9 QUESTIONS 1 & 2: 0
SUM OF ALL RESPONSES TO PHQ QUESTIONS 1-9: 0
2. FEELING DOWN, DEPRESSED OR HOPELESS: 0
SUM OF ALL RESPONSES TO PHQ QUESTIONS 1-9: 0
1. LITTLE INTEREST OR PLEASURE IN DOING THINGS: 0
2. FEELING DOWN, DEPRESSED OR HOPELESS: 0
SUM OF ALL RESPONSES TO PHQ QUESTIONS 1-9: 0
SUM OF ALL RESPONSES TO PHQ9 QUESTIONS 1 & 2: 0
SUM OF ALL RESPONSES TO PHQ QUESTIONS 1-9: 0
1. LITTLE INTEREST OR PLEASURE IN DOING THINGS: 0

## 2023-03-20 NOTE — PROGRESS NOTES
ENDOSCOPY, COLON, DIAGNOSTIC      HYSTERECTOMY, TOTAL ABDOMINAL (CERVIX REMOVED)      with BSO    MASTECTOMY Bilateral     OTHER SURGICAL HISTORY  09/27/2016    chest port removal    SHOULDER ARTHROSCOPY Left 6/12/2020    LEFT SHOULDER ARTHROSCOPY WITH SAD, DEBRIDEMENT, CAPSULAR RELEASE, MANIPULATION UNDER ANESTHESIA performed by Lesly Norris DO at [facility]      polyp    TONSILLECTOMY      TUNNELED VENOUS PORT PLACEMENT Left 7/30/2015    UPPER GASTROINTESTINAL ENDOSCOPY N/A 4/21/2022    EGD BIOPSY performed by Charlynne Eisenmenger, MD at [address] [place] [address] History   Problem Relation Age of Onset    Breast Cancer Mother     Other Mother         heart disease, pacemaker    Other Father         heart disease, thyroid disease    Breast Cancer Maternal Aunt     Breast Cancer Paternal Grandmother     Breast Cancer Maternal Aunt     Asthma Maternal Grandmother      Current Outpatient Medications   Medication Sig Dispense Refill    glimepiride (AMARYL) 4 MG tablet TAKE 1 TABLET BY MOUTH ONCE DAILY. 90 tablet 3    metFORMIN (GLUCOPHAGE-XR) 500 MG extended release tablet TAKE 1 TABLET BY MOUTH 4 TIMES A  tablet 3    baclofen (LIORESAL) 10 MG tablet Take 1 tablet by mouth 3 times daily 30 tablet 0    TRUE METRIX BLOOD GLUCOSE TEST strip USE TO MONITOR BLOOD SUGAR ONCE DAILY 200 strip 0    linagliptin (TRADJENTA) 5 MG tablet TAKE 1 TABLET BY MOUTH ONCE DAILY. 30 tablet 3    betamethasone dipropionate 0.05 % cream       triamcinolone (KENALOG) 0.1 % cream       fluticasone (FLONASE) 50 MCG/ACT nasal spray 1 spray by Nasal route 2 times daily       montelukast (SINGULAIR) 10 MG tablet Take 10 mg by mouth nightly. cetirizine (ZYRTEC) 10 MG tablet Take 10 mg by mouth daily. lansoprazole (PREVACID) 15 MG delayed release capsule Take 15 mg by mouth daily as needed (15 MG daily)       budesonide-formoterol (SYMBICORT) 160-4.5 MCG/ACT AERO Inhale 2 puffs into the lungs 2 times daily.         albuterol

## 2023-04-03 ENCOUNTER — HOSPITAL ENCOUNTER (OUTPATIENT)
Dept: ULTRASOUND IMAGING | Age: 73
Discharge: HOME | End: 2023-04-05
Payer: MEDICARE

## 2023-04-03 DIAGNOSIS — K75.81 NONALCOHOLIC STEATOHEPATITIS (NASH): ICD-10-CM

## 2023-04-03 PROCEDURE — 76705 ECHO EXAM OF ABDOMEN: CPT

## 2023-05-19 ENCOUNTER — HOSPITAL ENCOUNTER (EMERGENCY)
Facility: CLINIC | Age: 73
Discharge: HOME | End: 2023-05-19
Attending: EMERGENCY MEDICINE
Payer: COMMERCIAL

## 2023-05-19 ENCOUNTER — APPOINTMENT (OUTPATIENT)
Dept: GENERAL RADIOLOGY | Facility: CLINIC | Age: 73
End: 2023-05-19
Payer: COMMERCIAL

## 2023-05-19 VITALS
HEART RATE: 82 BPM | HEIGHT: 68 IN | SYSTOLIC BLOOD PRESSURE: 142 MMHG | BODY MASS INDEX: 24.25 KG/M2 | OXYGEN SATURATION: 98 % | RESPIRATION RATE: 18 BRPM | TEMPERATURE: 98.6 F | WEIGHT: 160 LBS | DIASTOLIC BLOOD PRESSURE: 63 MMHG
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
## 2023-05-19 DIAGNOSIS — S86.911A KNEE STRAIN, RIGHT, INITIAL ENCOUNTER: ICD-10-CM

## 2023-05-19 DIAGNOSIS — S29.019A THORACIC MYOFASCIAL STRAIN, INITIAL ENCOUNTER: ICD-10-CM

## 2023-05-19 PROCEDURE — 73030 X-RAY EXAM OF SHOULDER: CPT

## 2023-05-19 PROCEDURE — 72072 X-RAY EXAM THORAC SPINE 3VWS: CPT

## 2023-05-19 PROCEDURE — 99283 EMERGENCY DEPT VISIT LOW MDM: CPT

## 2023-05-19 PROCEDURE — 73562 X-RAY EXAM OF KNEE 3: CPT

## 2023-05-19 ASSESSMENT — PAIN DESCRIPTION - DESCRIPTORS: DESCRIPTORS: ACHING

## 2023-05-19 ASSESSMENT — PAIN - FUNCTIONAL ASSESSMENT
PAIN_FUNCTIONAL_ASSESSMENT: 0-10
PAIN_FUNCTIONAL_ASSESSMENT: ACTIVITIES ARE NOT PREVENTED

## 2023-05-19 ASSESSMENT — PAIN DESCRIPTION - PAIN TYPE: TYPE: ACUTE PAIN

## 2023-05-19 ASSESSMENT — PAIN SCALES - GENERAL: PAINLEVEL_OUTOF10: 5

## 2023-05-19 ASSESSMENT — PAIN DESCRIPTION - ONSET: ONSET: ON-GOING

## 2023-05-19 ASSESSMENT — PAIN DESCRIPTION - LOCATION: LOCATION: HEAD;BACK;NECK

## 2023-05-19 ASSESSMENT — PAIN DESCRIPTION - FREQUENCY: FREQUENCY: CONTINUOUS
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
## 2023-05-19 NOTE — DISCHARGE INSTRUCTIONS
Heat as needed  Return immediately if any worsening symptoms or any other concerns    Tell us how we did visit: [email or web address]   and let us know about your experience
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
## 2023-05-22 ENCOUNTER — OFFICE VISIT (OUTPATIENT)
Dept: FAMILY MEDICINE CLINIC | Age: 73
End: 2023-05-22

## 2023-05-22 VITALS
OXYGEN SATURATION: 98 % | DIASTOLIC BLOOD PRESSURE: 69 MMHG | HEART RATE: 81 BPM | TEMPERATURE: 97 F | WEIGHT: 161.6 LBS | BODY MASS INDEX: 24.57 KG/M2 | SYSTOLIC BLOOD PRESSURE: 138 MMHG

## 2023-05-22 DIAGNOSIS — E11.9 TYPE 2 DIABETES MELLITUS WITHOUT COMPLICATION, WITHOUT LONG-TERM CURRENT USE OF INSULIN (HCC): ICD-10-CM

## 2023-05-22 DIAGNOSIS — L90.5 SCAR TISSUE: Primary | ICD-10-CM
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
## 2023-05-22 NOTE — PROGRESS NOTES
4/21/2022    COLONOSCOPY WITH BIOPSY performed by Bebeto Butcher MD at [address] [facility], COLON, DIAGNOSTIC      HYSTERECTOMY, TOTAL ABDOMINAL (CERVIX REMOVED)      with BSO    MASTECTOMY Bilateral     OTHER SURGICAL HISTORY  09/27/2016    chest port removal    SHOULDER ARTHROSCOPY Left 6/12/2020    LEFT SHOULDER ARTHROSCOPY WITH SAD, DEBRIDEMENT, CAPSULAR RELEASE, MANIPULATION UNDER ANESTHESIA performed by Handy Bal DO at [facility]      polyp    TONSILLECTOMY      TUNNELED VENOUS PORT PLACEMENT Left 7/30/2015    UPPER GASTROINTESTINAL ENDOSCOPY N/A 4/21/2022    EGD BIOPSY performed by Bebeto Butcher MD at [facility] History   Problem Relation Age of Onset    Breast Cancer Mother     Other Mother         heart disease, pacemaker    Other Father         heart disease, thyroid disease    Breast Cancer Maternal Aunt     Breast Cancer Paternal Grandmother     Breast Cancer Maternal Aunt     Asthma Maternal Grandmother      Current Outpatient Medications   Medication Sig Dispense Refill    linagliptin (TRADJENTA) 5 MG tablet Take 1 tablet by mouth daily 90 tablet 1    glimepiride (AMARYL) 4 MG tablet TAKE 1 TABLET BY MOUTH ONCE DAILY.  90 tablet 3    metFORMIN (GLUCOPHAGE-XR) 500 MG extended release tablet TAKE 1 TABLET BY MOUTH 4 TIMES A  tablet 3    baclofen (LIORESAL) 10 MG tablet Take 1 tablet by mouth 3 times daily 30 tablet 0    TRUE METRIX BLOOD GLUCOSE TEST strip USE TO MONITOR BLOOD SUGAR ONCE DAILY 200 strip 0    betamethasone dipropionate 0.05 % cream       triamcinolone (KENALOG) 0.1 % cream       montelukast (SINGULAIR) 10 MG tablet Take 1 tablet by mouth nightly      cetirizine (ZYRTEC) 10 MG tablet Take 1 tablet by mouth daily      lansoprazole (PREVACID) 15 MG delayed release capsule Take 1 capsule by mouth daily as needed (15 MG daily)      budesonide-formoterol (SYMBICORT) 160-4.5 MCG/ACT AERO Inhale 2 puffs into the lungs 2

## 2023-07-21 ENCOUNTER — HOSPITAL ENCOUNTER (OUTPATIENT)
Age: 73
Discharge: HOME | End: 2023-07-21
Payer: MEDICARE

## 2023-07-21 LAB
ALBUMIN SERPL-MCNC: 3.9 G/DL (ref 3.5–5.2)
ALBUMIN/GLOB SERPL: 1.5 {RATIO} (ref 1–2.5)
ALP SERPL-CCNC: 78 U/L (ref 35–104)
ALT SERPL-CCNC: 24 U/L (ref 5–33)
ANION GAP SERPL CALCULATED.3IONS-SCNC: 11 MMOL/L (ref 9–17)
AST SERPL-CCNC: 26 U/L
BASOPHILS # BLD: 0.02 K/UL (ref 0–0.2)
BASOPHILS NFR BLD: 1 % (ref 0–2)
BILIRUB DIRECT SERPL-MCNC: 0.2 MG/DL
BILIRUB INDIRECT SERPL-MCNC: 0.4 MG/DL (ref 0–1)
BILIRUB SERPL-MCNC: 0.6 MG/DL (ref 0.3–1.2)
BUN SERPL-MCNC: 10 MG/DL (ref 8–23)
CALCIUM SERPL-MCNC: 9 MG/DL (ref 8.6–10.4)
CHLORIDE SERPL-SCNC: 106 MMOL/L (ref 98–107)
CO2 SERPL-SCNC: 24 MMOL/L (ref 20–31)
CREAT SERPL-MCNC: 0.7 MG/DL (ref 0.5–0.9)
EOSINOPHIL # BLD: 0.03 K/UL (ref 0–0.4)
EOSINOPHILS RELATIVE PERCENT: 2 % (ref 1–4)
ERYTHROCYTE [DISTWIDTH] IN BLOOD BY AUTOMATED COUNT: 14.2 % (ref 11.8–14.4)
ERYTHROCYTE [SEDIMENTATION RATE] IN BLOOD BY PHOTOMETRIC METHOD: 11 MM/HR (ref 0–30)
GFR SERPL CREATININE-BSD FRML MDRD: >60 ML/MIN/1.73M2
GLUCOSE SERPL-MCNC: 238 MG/DL (ref 70–99)
HCT VFR BLD AUTO: 36.2 % (ref 36.3–47.1)
HGB BLD-MCNC: 11.4 G/DL (ref 11.9–15.1)
IMM GRANULOCYTES # BLD AUTO: 0 K/UL (ref 0–0.3)
IMM GRANULOCYTES NFR BLD: 0 %
LYMPHOCYTES NFR BLD: 0.46 K/UL (ref 1–4.8)
LYMPHOCYTES RELATIVE PERCENT: 27 % (ref 24–44)
MCH RBC QN AUTO: 27.7 PG (ref 25.2–33.5)
MCHC RBC AUTO-ENTMCNC: 31.5 G/DL (ref 28.4–34.8)
MCV RBC AUTO: 88.1 FL (ref 82.6–102.9)
MONOCYTES NFR BLD: 0.17 K/UL (ref 0.1–0.8)
MONOCYTES NFR BLD: 10 % (ref 1–7)
MORPHOLOGY: NORMAL
NEUTROPHILS NFR BLD: 60 % (ref 36–66)
NEUTS SEG NFR BLD: 1.02 K/UL (ref 1.8–7.7)
NRBC BLD-RTO: 0 PER 100 WBC
PLATELET # BLD AUTO: 107 K/UL (ref 138–453)
PMV BLD AUTO: 11.8 FL (ref 8.1–13.5)
POTASSIUM SERPL-SCNC: 3.6 MMOL/L (ref 3.7–5.3)
PROT SERPL-MCNC: 6.5 G/DL (ref 6.4–8.3)
RBC # BLD AUTO: 4.11 M/UL (ref 3.95–5.11)
SODIUM SERPL-SCNC: 141 MMOL/L (ref 135–144)
WBC OTHER # BLD: 1.7 K/UL (ref 3.5–11.3)

## 2023-07-21 PROCEDURE — 82248 BILIRUBIN DIRECT: CPT

## 2023-07-21 PROCEDURE — 85027 COMPLETE CBC AUTOMATED: CPT

## 2023-07-21 PROCEDURE — 80053 COMPREHEN METABOLIC PANEL: CPT

## 2023-07-21 PROCEDURE — 36415 COLL VENOUS BLD VENIPUNCTURE: CPT

## 2023-07-21 PROCEDURE — 85652 RBC SED RATE AUTOMATED: CPT

## 2023-07-26 ENCOUNTER — TELEPHONE (OUTPATIENT)
Dept: FAMILY MEDICINE CLINIC | Age: 73
End: 2023-07-26

## 2023-07-26 NOTE — TELEPHONE ENCOUNTER
[facility] dermatology office called in stating that the patient is having a adverse reaction to tradjenta and they would like the patient to get a referral to a endocrinologist for further evaluation     [phone]     Please advise

## 2023-08-02 ENCOUNTER — TELEPHONE (OUTPATIENT)
Dept: ONCOLOGY | Age: 73
End: 2023-08-02

## 2023-08-02 NOTE — TELEPHONE ENCOUNTER
Pt asking if needs seen sooner than 9/25/23 at 1100 based on wbc 1. 7?     Note to md    Notified pt await md advice

## 2023-08-08 ENCOUNTER — OFFICE VISIT (OUTPATIENT)
Dept: FAMILY MEDICINE CLINIC | Age: 73
End: 2023-08-08
Payer: MEDICARE

## 2023-08-08 VITALS
BODY MASS INDEX: 24.21 KG/M2 | SYSTOLIC BLOOD PRESSURE: 134 MMHG | TEMPERATURE: 97.3 F | DIASTOLIC BLOOD PRESSURE: 66 MMHG | HEART RATE: 78 BPM | WEIGHT: 159.2 LBS | OXYGEN SATURATION: 98 %

## 2023-08-08 DIAGNOSIS — R74.8 ELEVATED LIVER ENZYMES: ICD-10-CM

## 2023-08-08 DIAGNOSIS — L29.9 CHRONIC PRURITUS: ICD-10-CM

## 2023-08-08 DIAGNOSIS — E11.9 TYPE 2 DIABETES MELLITUS WITHOUT COMPLICATION, WITHOUT LONG-TERM CURRENT USE OF INSULIN (HCC): Primary | ICD-10-CM

## 2023-08-08 DIAGNOSIS — R21 SKIN RASH: ICD-10-CM

## 2023-08-08 DIAGNOSIS — M79.662 PAIN OF LEFT CALF: ICD-10-CM

## 2023-08-08 LAB — HBA1C MFR BLD: 7.1 %

## 2023-08-08 PROCEDURE — 1036F TOBACCO NON-USER: CPT | Performed by: FAMILY MEDICINE

## 2023-08-08 PROCEDURE — G8420 CALC BMI NORM PARAMETERS: HCPCS | Performed by: FAMILY MEDICINE

## 2023-08-08 PROCEDURE — 1090F PRES/ABSN URINE INCON ASSESS: CPT | Performed by: FAMILY MEDICINE

## 2023-08-08 PROCEDURE — 83037 HB GLYCOSYLATED A1C HOME DEV: CPT | Performed by: FAMILY MEDICINE

## 2023-08-08 PROCEDURE — G8399 PT W/DXA RESULTS DOCUMENT: HCPCS | Performed by: FAMILY MEDICINE

## 2023-08-08 PROCEDURE — 3051F HG A1C>EQUAL 7.0%<8.0%: CPT | Performed by: FAMILY MEDICINE

## 2023-08-08 PROCEDURE — 1123F ACP DISCUSS/DSCN MKR DOCD: CPT | Performed by: FAMILY MEDICINE

## 2023-08-08 PROCEDURE — 3017F COLORECTAL CA SCREEN DOC REV: CPT | Performed by: FAMILY MEDICINE

## 2023-08-08 PROCEDURE — G8427 DOCREV CUR MEDS BY ELIG CLIN: HCPCS | Performed by: FAMILY MEDICINE

## 2023-08-08 PROCEDURE — 99214 OFFICE O/P EST MOD 30 MIN: CPT | Performed by: FAMILY MEDICINE

## 2023-08-08 PROCEDURE — 2022F DILAT RTA XM EVC RTNOPTHY: CPT | Performed by: FAMILY MEDICINE

## 2023-08-08 RX ORDER — HYDROXYZINE HYDROCHLORIDE 25 MG/1
25 TABLET, FILM COATED ORAL EVERY 8 HOURS PRN
Qty: 30 TABLET | Refills: 0 | Status: SHIPPED | OUTPATIENT
Start: 2023-08-08 | End: 2023-08-18

## 2023-08-08 ASSESSMENT — PATIENT HEALTH QUESTIONNAIRE - PHQ9
SUM OF ALL RESPONSES TO PHQ QUESTIONS 1-9: 0
2. FEELING DOWN, DEPRESSED OR HOPELESS: 0
1. LITTLE INTEREST OR PLEASURE IN DOING THINGS: 0
SUM OF ALL RESPONSES TO PHQ QUESTIONS 1-9: 0
SUM OF ALL RESPONSES TO PHQ9 QUESTIONS 1 & 2: 0

## 2023-08-08 NOTE — PROGRESS NOTES
General FM note    Kimberly Martel is a 74 y.o. female who presents today for follow up on her  medical conditions as noted below.   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 ([address])     Paronychia of fifth toe, left     Chronic left shoulder pain     Uncontrolled type 2 diabetes mellitus without complication, without long-term current use of insulin     Post-op pain     Thrombocytopenia, unspecified     Esophageal varices without bleeding, unspecified esophageal varices type ([address])     Past Medical History:   Diagnosis Date    Allergic rhinitis     Arthritis     osteoarthritis    Asthma     exercise induced    Breast cancer ([address]) 12/1994    Dr Karissa Chu Lumpectomy and lymph nodes excised    Breast cancer ([address]) 2015    bilatereal breast cancer    Chemotherapy-induced neuropathy (HCC)     Colon polyps     Eczema     Frequent UTI     GERD (gastroesophageal reflux disease)     Heavy menstrual bleeding     History of bone density study 11/12    normal    Nonalcoholic steatohepatitis ([facility]) 8/9/2023    Pap test, as part of routine gynecological examination 8/12    peds speculum    PONV (postoperative nausea and vomiting)     Type II or unspecified type diabetes mellitus without mention of complication, not stated as uncontrolled 2007    Urinary tract infection     Wears glasses       Past Surgical History:   Procedure Laterality Date    BREAST LUMPECTOMY  1/1994    rt breast, cancer    BREAST RECONSTRUCTION Bilateral 06/30/15    BREAST RECONSTRUCTION Bilateral 4/21/16    expander removal, implants placed, fat injected, lipo    BREAST SURGERY Bilateral 04/21/2016    implants    COLONOSCOPY      2019    COLONOSCOPY

## 2023-08-09 PROBLEM — K75.81 NONALCOHOLIC STEATOHEPATITIS (NASH): Status: ACTIVE | Noted: 2023-08-09

## 2023-08-14 RX ORDER — CALCIUM CITRATE/VITAMIN D3 200MG-6.25
TABLET ORAL
Qty: 200 STRIP | Refills: 0 | Status: SHIPPED | OUTPATIENT
Start: 2023-08-14

## 2023-08-14 NOTE — TELEPHONE ENCOUNTER
Juwan Luna is calling to request a refill on the following medication(s):    Last Visit Date (If Applicable):  9/7/7087    Next Visit Date:    Visit date not found    Medication Request:  Requested Prescriptions     Pending Prescriptions Disp Refills    TRUE METRIX BLOOD GLUCOSE TEST strip [Pharmacy Med Name: TRUE METRIX BLOOD GLUCOSE TEST STRP] 200 strip 0     Sig: USE TO TEST BLOOD SUGAR ONCE DAILY

## 2023-08-21 ENCOUNTER — OFFICE VISIT (OUTPATIENT)
Dept: ONCOLOGY | Age: 73
End: 2023-08-21
Payer: MEDICARE

## 2023-08-21 ENCOUNTER — TELEPHONE (OUTPATIENT)
Dept: ONCOLOGY | Age: 73
End: 2023-08-21

## 2023-08-21 VITALS
TEMPERATURE: 97.1 F | HEART RATE: 81 BPM | BODY MASS INDEX: 24.22 KG/M2 | DIASTOLIC BLOOD PRESSURE: 64 MMHG | SYSTOLIC BLOOD PRESSURE: 116 MMHG | RESPIRATION RATE: 16 BRPM | WEIGHT: 159.3 LBS

## 2023-08-21 DIAGNOSIS — R21 SKIN RASH: ICD-10-CM

## 2023-08-21 DIAGNOSIS — D70.9 NEUTROPENIA, UNSPECIFIED TYPE (HCC): ICD-10-CM

## 2023-08-21 DIAGNOSIS — D69.6 THROMBOCYTOPENIA, UNSPECIFIED (HCC): ICD-10-CM

## 2023-08-21 DIAGNOSIS — Z17.0 MALIGNANT NEOPLASM OF UPPER-OUTER QUADRANT OF RIGHT BREAST IN FEMALE, ESTROGEN RECEPTOR POSITIVE (HCC): Primary | ICD-10-CM

## 2023-08-21 DIAGNOSIS — C50.411 MALIGNANT NEOPLASM OF UPPER-OUTER QUADRANT OF RIGHT BREAST IN FEMALE, ESTROGEN RECEPTOR POSITIVE (HCC): Primary | ICD-10-CM

## 2023-08-21 DIAGNOSIS — K75.81 NONALCOHOLIC STEATOHEPATITIS (NASH): ICD-10-CM

## 2023-08-21 DIAGNOSIS — L29.9 PRURITUS: ICD-10-CM

## 2023-08-21 PROCEDURE — 1123F ACP DISCUSS/DSCN MKR DOCD: CPT | Performed by: INTERNAL MEDICINE

## 2023-08-21 PROCEDURE — 99211 OFF/OP EST MAY X REQ PHY/QHP: CPT | Performed by: INTERNAL MEDICINE

## 2023-08-21 PROCEDURE — 99214 OFFICE O/P EST MOD 30 MIN: CPT | Performed by: INTERNAL MEDICINE

## 2023-08-21 PROCEDURE — 1090F PRES/ABSN URINE INCON ASSESS: CPT | Performed by: INTERNAL MEDICINE

## 2023-08-21 PROCEDURE — G8427 DOCREV CUR MEDS BY ELIG CLIN: HCPCS | Performed by: INTERNAL MEDICINE

## 2023-08-21 PROCEDURE — G8420 CALC BMI NORM PARAMETERS: HCPCS | Performed by: INTERNAL MEDICINE

## 2023-08-21 PROCEDURE — 3017F COLORECTAL CA SCREEN DOC REV: CPT | Performed by: INTERNAL MEDICINE

## 2023-08-21 PROCEDURE — G8399 PT W/DXA RESULTS DOCUMENT: HCPCS | Performed by: INTERNAL MEDICINE

## 2023-08-21 PROCEDURE — 1036F TOBACCO NON-USER: CPT | Performed by: INTERNAL MEDICINE

## 2023-08-21 NOTE — TELEPHONE ENCOUNTER
Elia Atkins MD VISIT  Please keep September appointment  MD VISIT 9/25/23 @ 11AM  AVS PRINTED W/ INSTRUCTIONS AND GIVEN TO PT ON EXIT Spontaneous, unlabored and symmetrical

## 2023-08-21 NOTE — PROGRESS NOTES
1040    BUN 10 07/21/2023 1040    CREATININE 0.7 07/21/2023 1040        Component Value Date/Time    CALCIUM 9.0 07/21/2023 1040    ALKPHOS 78 07/21/2023 1040    AST 26 07/21/2023 1040    ALT 24 07/21/2023 1040    BILITOT 0.6 07/21/2023 1040        Lab Results   Component Value Date    LABA1C 7.1 08/08/2023     Lab Results   Component Value Date     09/29/2020         REVIEW OF RADIOLOGICAL RESULTS:  07/2022 BREAST MRI:  No MR evidence of malignancy in the bilateral breasts status post    bilateral   mastectomy with implant reconstruction. BI-RADS 2       BIRADS:   BIRADS - CATEGORY 2       Benign Findings. Normal interval follow-up is recommended in 12 months. OVERALL ASSESSMENT - BENIGN       09/01/2017 DEXA :  T-SCORE:  LUMBAR:   -0.1  LEFT HIP:  0.0  FEMORAL NECK:  -0.8        ASSESSMENT  h/o right breast DCIS in 1994 s/p BCS/EBRT and 7 years of Tamoxifen until 2001  Ipsilateral recurrence, invasive ductal carcinoma that's triple positive, pathological staging is T2 N0 M0. Contralateral DCIS, found incidentally on bilateral mastectomy  Received 6 cycles of adjuvant chemotherapy with TCH, well tolerated  Continue maintenance Herceptin, plan to finish 1 year of therapy   Completed breast reconstruction   DM2    Diastolic dysfunction, she is asymptomatic   Frozen shoulder, status post shoulder surgery  Abnormal thoracic spine MRI, repeat study showed resolution of the abnormality. Continue follow up   Severe pruritus and skin rash  Leukocytopenia please secondary to liver disease        PLAN:   I had a long discussion with the patient. I suspect that her outlook progressive leukocytosis is related to liver disease as the worsening white count is correlating with worsening liver disease. However, she will need a bone marrow aspiration biopsy to exclude underlying MDS especially with the chemotherapy she received.   However, she is more concerned about the skin rash and the severe pruritus as it

## 2023-09-25 ENCOUNTER — HOSPITAL ENCOUNTER (OUTPATIENT)
Age: 73
Discharge: HOME | End: 2023-09-25
Payer: MEDICARE

## 2023-09-25 ENCOUNTER — TELEPHONE (OUTPATIENT)
Dept: ONCOLOGY | Age: 73
End: 2023-09-25

## 2023-09-25 ENCOUNTER — OFFICE VISIT (OUTPATIENT)
Dept: ONCOLOGY | Age: 73
End: 2023-09-25
Payer: MEDICARE

## 2023-09-25 VITALS
DIASTOLIC BLOOD PRESSURE: 57 MMHG | HEART RATE: 70 BPM | SYSTOLIC BLOOD PRESSURE: 115 MMHG | TEMPERATURE: 97.6 F | BODY MASS INDEX: 24.5 KG/M2 | RESPIRATION RATE: 16 BRPM | WEIGHT: 161.1 LBS

## 2023-09-25 DIAGNOSIS — Z17.0 MALIGNANT NEOPLASM OF UPPER-OUTER QUADRANT OF RIGHT BREAST IN FEMALE, ESTROGEN RECEPTOR POSITIVE (HCC): ICD-10-CM

## 2023-09-25 DIAGNOSIS — Z17.0 MALIGNANT NEOPLASM OF UPPER-OUTER QUADRANT OF RIGHT BREAST IN FEMALE, ESTROGEN RECEPTOR POSITIVE (HCC): Primary | ICD-10-CM

## 2023-09-25 DIAGNOSIS — C50.411 MALIGNANT NEOPLASM OF UPPER-OUTER QUADRANT OF RIGHT BREAST IN FEMALE, ESTROGEN RECEPTOR POSITIVE (HCC): ICD-10-CM

## 2023-09-25 DIAGNOSIS — D69.6 THROMBOCYTOPENIA, UNSPECIFIED (HCC): ICD-10-CM

## 2023-09-25 DIAGNOSIS — D70.9 NEUTROPENIA, UNSPECIFIED TYPE (HCC): ICD-10-CM

## 2023-09-25 DIAGNOSIS — K75.81 NONALCOHOLIC STEATOHEPATITIS (NASH): ICD-10-CM

## 2023-09-25 DIAGNOSIS — C50.411 MALIGNANT NEOPLASM OF UPPER-OUTER QUADRANT OF RIGHT BREAST IN FEMALE, ESTROGEN RECEPTOR POSITIVE (HCC): Primary | ICD-10-CM

## 2023-09-25 LAB
ALBUMIN SERPL-MCNC: 4.1 G/DL (ref 3.5–5.2)
ALP SERPL-CCNC: 83 U/L (ref 35–104)
ALT SERPL-CCNC: 45 U/L (ref 5–33)
ANION GAP SERPL CALCULATED.3IONS-SCNC: 5 MMOL/L (ref 9–17)
AST SERPL-CCNC: 37 U/L
BASOPHILS # BLD: 0.02 K/UL (ref 0–0.2)
BASOPHILS NFR BLD: 1 %
BILIRUB SERPL-MCNC: 0.5 MG/DL (ref 0.3–1.2)
BUN SERPL-MCNC: 11 MG/DL (ref 8–23)
BUN/CREAT SERPL: 16 (ref 9–20)
CALCIUM SERPL-MCNC: 9.5 MG/DL (ref 8.6–10.4)
CHLORIDE SERPL-SCNC: 103 MMOL/L (ref 98–107)
CO2 SERPL-SCNC: 27 MMOL/L (ref 20–31)
CREAT SERPL-MCNC: 0.7 MG/DL (ref 0.5–0.9)
EOSINOPHIL # BLD: 0.06 K/UL (ref 0–0.4)
EOSINOPHILS RELATIVE PERCENT: 3 % (ref 1–4)
ERYTHROCYTE [DISTWIDTH] IN BLOOD BY AUTOMATED COUNT: 14.5 % (ref 11.8–14.4)
GFR SERPL CREATININE-BSD FRML MDRD: >60 ML/MIN/1.73M2
GLUCOSE SERPL-MCNC: 218 MG/DL (ref 70–99)
HCT VFR BLD AUTO: 37.1 % (ref 36.3–47.1)
HGB BLD-MCNC: 11.7 G/DL (ref 11.9–15.1)
IMM GRANULOCYTES # BLD AUTO: 0.02 K/UL (ref 0–0.3)
IMM GRANULOCYTES NFR BLD: 1 %
LYMPHOCYTES NFR BLD: 0.65 K/UL (ref 1–4.8)
LYMPHOCYTES RELATIVE PERCENT: 31 % (ref 24–44)
MCH RBC QN AUTO: 27.9 PG (ref 25.2–33.5)
MCHC RBC AUTO-ENTMCNC: 31.5 G/DL (ref 28.4–34.8)
MCV RBC AUTO: 88.3 FL (ref 82.6–102.9)
MONOCYTES NFR BLD: 0.19 K/UL (ref 0.2–0.8)
MONOCYTES NFR BLD: 9 % (ref 1–7)
NEUTROPHILS NFR BLD: 55 % (ref 36–66)
NEUTS SEG NFR BLD: 1.16 K/UL (ref 1.8–7.7)
NRBC BLD-RTO: 0 PER 100 WBC
PLATELET # BLD AUTO: 96 K/UL (ref 138–453)
PMV BLD AUTO: 11.3 FL (ref 8.1–13.5)
POTASSIUM SERPL-SCNC: 4.1 MMOL/L (ref 3.7–5.3)
PROT SERPL-MCNC: 6.9 G/DL (ref 6.4–8.3)
RBC # BLD AUTO: 4.2 M/UL (ref 3.95–5.11)
SODIUM SERPL-SCNC: 135 MMOL/L (ref 135–144)
WBC OTHER # BLD: 2.1 K/UL (ref 3.5–11.3)

## 2023-09-25 PROCEDURE — 1123F ACP DISCUSS/DSCN MKR DOCD: CPT | Performed by: INTERNAL MEDICINE

## 2023-09-25 PROCEDURE — 36415 COLL VENOUS BLD VENIPUNCTURE: CPT

## 2023-09-25 PROCEDURE — 85025 COMPLETE CBC W/AUTO DIFF WBC: CPT

## 2023-09-25 PROCEDURE — 1090F PRES/ABSN URINE INCON ASSESS: CPT | Performed by: INTERNAL MEDICINE

## 2023-09-25 PROCEDURE — 99211 OFF/OP EST MAY X REQ PHY/QHP: CPT

## 2023-09-25 PROCEDURE — G8420 CALC BMI NORM PARAMETERS: HCPCS | Performed by: INTERNAL MEDICINE

## 2023-09-25 PROCEDURE — 3017F COLORECTAL CA SCREEN DOC REV: CPT | Performed by: INTERNAL MEDICINE

## 2023-09-25 PROCEDURE — 80053 COMPREHEN METABOLIC PANEL: CPT

## 2023-09-25 PROCEDURE — G8427 DOCREV CUR MEDS BY ELIG CLIN: HCPCS | Performed by: INTERNAL MEDICINE

## 2023-09-25 PROCEDURE — G8399 PT W/DXA RESULTS DOCUMENT: HCPCS | Performed by: INTERNAL MEDICINE

## 2023-09-25 PROCEDURE — 1036F TOBACCO NON-USER: CPT | Performed by: INTERNAL MEDICINE

## 2023-09-25 PROCEDURE — 99214 OFFICE O/P EST MOD 30 MIN: CPT | Performed by: INTERNAL MEDICINE

## 2023-09-25 RX ORDER — FLUTICASONE FUROATE AND VILANTEROL TRIFENATATE 200; 25 UG/1; UG/1
POWDER RESPIRATORY (INHALATION)
COMMUNITY
Start: 2023-09-08

## 2023-09-25 NOTE — TELEPHONE ENCOUNTER
[facility] ARRIVES AMBULATORY FOR MD VISIT  DR Evette Mullen IN TO SEE  PATIENT  ORDERS RECEIVED  NEED CBC CMP TODAY  RV 1 YEAR WITH CBC CMP & BREAST MRI PRIOR TO RV  LABS ON EXIT CDP CMP  LABS CDP CMP 09/27/24  BREAST MRI TO BE SCHEDULED BY PT AROUND 09/20/24, FORM GIVEN  MD VISIT 09/27/24 @11AM  AVS PRINTED AND GIVEN TO PATIENT WITH INSTRUCTIONS  PATIENT DISCHARGED AMBULATORY

## 2023-09-25 NOTE — PROGRESS NOTES
Reason for the visit:   Chief Complaint   Patient presents with    Follow-up       DIAGNOSIS:   1- Right breast invasive ductal carcinoma Grade 2. ER/TN positive 95% and HER2 positive Ratio 3.5. Associated with high grade DCIS comedo type' biopsy 2/26/2015. 2- final pathology is T2 N0 M0 invasive ductal carcinoma with surrounding DCIS, margins were negative, tumor is triple positive. 3- h/o right breast DCIS in 1994 s/p BCS/EBRT and 7 years of Tamoxifen until 2001  4- incidentally, left-sided DCIS was found during bilateral mastectomy. Removed with negative margins. 5-pancytopenia developed over the years, likely secondary to liver disease but MDS is not completely excluded    CURRENT THERAPY:  Right-sided lumpectomy, in 1994. followed by radiation and 7 years of tamoxifen, completed 2001  Underwent bilateral mastectomy   Adjuvant chemotherapy with [facility], started August, 2015    Maintenance hercptin, completed 7/2016   Adjuvant  AI for 5 years    BRIEF CASE HISTORY:  This is a 74 y. o. woman with history of right breast DCIS in 1994 treated with BCS / EBRT and Tamoxifen for 7 years until 2001. In February 2015, she had a screening mammogram that showed suspicious findings in the right breast. She underwent a core biopsy that showed infiltrating ductal carcinoma, grade 2, ER/TN positive and HER-2/beth positive with fish ratio 3.5. Because of previous radiation, breast conservation is not an option so the patient underwent mastectomy and axillary sampling. Surgery occurred on 6/30 and pathology showed 2.2 cm moderately differentiated carcinoma, associated with intermediate and high-grade ductal carcinoma in situ. Margins were negative. Attempt for axillary sampling was not successful because of previous surgery and radiation. No lymph nodes were appreciated in the specimen. Contralateral mastectomy was done at the same time and showed high-grade ductal carcinoma in situ, margins were negative.

## 2023-11-03 ENCOUNTER — HOSPITAL ENCOUNTER (OUTPATIENT)
Dept: ULTRASOUND IMAGING | Age: 73
End: 2023-11-03
Attending: INTERNAL MEDICINE
Payer: MEDICARE

## 2023-11-03 DIAGNOSIS — K74.60 LIVER CIRRHOSIS SECONDARY TO NASH (HCC): ICD-10-CM

## 2023-11-03 DIAGNOSIS — K75.81 LIVER CIRRHOSIS SECONDARY TO NASH (HCC): ICD-10-CM

## 2023-11-03 PROCEDURE — 76705 ECHO EXAM OF ABDOMEN: CPT

## 2023-11-20 ENCOUNTER — HOSPITAL ENCOUNTER (OUTPATIENT)
Age: 73
Discharge: HOME | End: 2023-11-20
Payer: MEDICARE

## 2023-11-20 LAB
AFP SERPL-MCNC: 2.1 UG/L
ALBUMIN SERPL-MCNC: 3.9 G/DL (ref 3.5–5.2)
ALBUMIN/GLOB SERPL: 1.3 {RATIO} (ref 1–2.5)
ALP SERPL-CCNC: 77 U/L (ref 35–104)
ALT SERPL-CCNC: 45 U/L (ref 5–33)
AST SERPL-CCNC: 41 U/L
BILIRUB DIRECT SERPL-MCNC: 0.2 MG/DL
BILIRUB INDIRECT SERPL-MCNC: 0.5 MG/DL (ref 0–1)
BILIRUB SERPL-MCNC: 0.7 MG/DL (ref 0.3–1.2)
INR PPP: 1.2
PROT SERPL-MCNC: 6.8 G/DL (ref 6.4–8.3)
PROTHROMBIN TIME: 14.5 SEC (ref 11.5–14.2)

## 2023-11-20 PROCEDURE — 36415 COLL VENOUS BLD VENIPUNCTURE: CPT

## 2023-11-20 PROCEDURE — 85610 PROTHROMBIN TIME: CPT

## 2023-11-20 PROCEDURE — 82105 ALPHA-FETOPROTEIN SERUM: CPT

## 2023-11-20 PROCEDURE — 80076 HEPATIC FUNCTION PANEL: CPT

## 2023-12-04 ENCOUNTER — OFFICE VISIT (OUTPATIENT)
Dept: FAMILY MEDICINE CLINIC | Age: 73
End: 2023-12-04
Payer: MEDICARE

## 2023-12-04 VITALS
HEART RATE: 80 BPM | TEMPERATURE: 97.3 F | BODY MASS INDEX: 24.33 KG/M2 | SYSTOLIC BLOOD PRESSURE: 138 MMHG | WEIGHT: 160 LBS | DIASTOLIC BLOOD PRESSURE: 64 MMHG | OXYGEN SATURATION: 98 %

## 2023-12-04 DIAGNOSIS — H81.10 BENIGN PAROXYSMAL POSITIONAL VERTIGO, UNSPECIFIED LATERALITY: Primary | ICD-10-CM

## 2023-12-04 PROCEDURE — G8484 FLU IMMUNIZE NO ADMIN: HCPCS | Performed by: FAMILY MEDICINE

## 2023-12-04 PROCEDURE — 1090F PRES/ABSN URINE INCON ASSESS: CPT | Performed by: FAMILY MEDICINE

## 2023-12-04 PROCEDURE — G8427 DOCREV CUR MEDS BY ELIG CLIN: HCPCS | Performed by: FAMILY MEDICINE

## 2023-12-04 PROCEDURE — G8420 CALC BMI NORM PARAMETERS: HCPCS | Performed by: FAMILY MEDICINE

## 2023-12-04 PROCEDURE — 3017F COLORECTAL CA SCREEN DOC REV: CPT | Performed by: FAMILY MEDICINE

## 2023-12-04 PROCEDURE — 1036F TOBACCO NON-USER: CPT | Performed by: FAMILY MEDICINE

## 2023-12-04 PROCEDURE — 99213 OFFICE O/P EST LOW 20 MIN: CPT | Performed by: FAMILY MEDICINE

## 2023-12-04 PROCEDURE — 1123F ACP DISCUSS/DSCN MKR DOCD: CPT | Performed by: FAMILY MEDICINE

## 2023-12-04 PROCEDURE — G8399 PT W/DXA RESULTS DOCUMENT: HCPCS | Performed by: FAMILY MEDICINE

## 2023-12-04 RX ORDER — FAMOTIDINE 20 MG/1
20 TABLET, FILM COATED ORAL NIGHTLY PRN
COMMUNITY

## 2023-12-04 RX ORDER — MECLIZINE HCL 12.5 MG/1
12.5 TABLET ORAL 3 TIMES DAILY PRN
Qty: 15 TABLET | Refills: 0 | Status: SHIPPED | OUTPATIENT
Start: 2023-12-04 | End: 2023-12-14

## 2023-12-04 ASSESSMENT — PATIENT HEALTH QUESTIONNAIRE - PHQ9
SUM OF ALL RESPONSES TO PHQ QUESTIONS 1-9: 0
SUM OF ALL RESPONSES TO PHQ9 QUESTIONS 1 & 2: 0
2. FEELING DOWN, DEPRESSED OR HOPELESS: 0
SUM OF ALL RESPONSES TO PHQ QUESTIONS 1-9: 0
1. LITTLE INTEREST OR PLEASURE IN DOING THINGS: 0

## 2023-12-08 ENCOUNTER — HOSPITAL ENCOUNTER (OUTPATIENT)
Dept: PHYSICAL THERAPY | Facility: CLINIC | Age: 73
Setting detail: THERAPIES SERIES
Discharge: HOME | End: 2023-12-08
Payer: MEDICARE

## 2023-12-08 PROCEDURE — 97161 PT EVAL LOW COMPLEX 20 MIN: CPT

## 2023-12-08 PROCEDURE — 95992 CANALITH REPOSITIONING PROC: CPT

## 2023-12-08 NOTE — CONSULTS
call for help, cell phone, [facility], phone apps, etc. Emergency numbers by phone on fridge. Goal is to prevent falls and live independently and safely at home. Assessment:  Destini Cabrera is a 74 y.o. yo female who presents with impairments consistent with right posterior canal BPPV, sustained on 11/30. Patient will benefit from skilled outpatient physical therapy in order to: address gait/balance impairment with attempt to address asymmetries, increase gait speed/efficiency, improve postural stability and dynamic standing balance, increase gastroc and hip flexor muscle length to allow improved gait mechanics, and improve overall functional mobility, progress independence and safety, and maximize level of function as close to PLOF as possible. Problems:    [] ? Pain:  [x] ? ROM:  [] ? Strength:  [x] ? Function:  [x] ? Gait performance  [x] ? Balance performance     Rehab Potential:  [x] Good  [] Fair  [] Poor   Suggested Professional Referral:  [x] No  [] Yes:  Barriers to Goal Achievement:  [x] No  [] Yes:  Domestic Concerns:  [x] No  [] Yes:    Patient Education:  [x] Plans/Goals, Risks/Benefits discussed  [x] Home exercise program  Method of Education: [x] Verbal  [x] Demo  [x] Written    Access Code: [email or web address]  URL: [email or web address]  Date: 12/08/2023  Prepared by: [place]    Exercises  - Self-Epley Maneuver Right Ear  - 3 sets    Patient Education  - BPPV  - What Is BPPV?  - BPPV    Comprehension of Education:  [x] Verbalizes understanding. [x] Demonstrates understanding. [] Needs Review. [] Demonstrates/verbalizes understanding of HEP/Ed previously given.         Treatment Plan:  [x] Vestibular Rehab [] Iontophoresis: 4 mg/mL Dexamethasone Sodium Phosphate  mAmin   45954   [x] Therapeutic Activity  38962 [] Vasopneumatic cold with compression  52169    [x] Gait Training   92007 [] Ultrasound   30415   [x] Neuromuscular Re-education  97021 [] Electrical Stimulation

## 2023-12-15 ENCOUNTER — HOSPITAL ENCOUNTER (OUTPATIENT)
Dept: PHYSICAL THERAPY | Facility: CLINIC | Age: 73
Setting detail: THERAPIES SERIES
Discharge: HOME | End: 2023-12-15
Payer: MEDICARE

## 2023-12-15 PROCEDURE — 97110 THERAPEUTIC EXERCISES: CPT

## 2023-12-15 PROCEDURE — 97112 NEUROMUSCULAR REEDUCATION: CPT

## 2023-12-15 NOTE — FLOWSHEET NOTE
[] [address]  [address] [facility].  [phone]  F: [phone] [x] [facility]  [address]   [address]  P: [phone]  F: [phone] [] [address] &  Therapy  [address]  P: [phone]  F: [phone] [] [facility]  P: [phone]  F: [phone] [] [address]   [address]   [place]: [phone]  F: [phone]      Physical Therapy Daily Treatment Note    Date:  12/15/2023  Patient Name:  Mihai Blake    :  1950  MRN: [id number]    Physician: Robles Antonio MD                               Insurance: [address] ([id number] [facility])  Medical Diagnosis: H81.10 (ICD-10-CM) - Benign paroxysmal positional vertigo, unspecified laterality              Rehab Codes: R42, R26.2, R29.3   Date of symptom onset: 23               Next Dr's appt.: 24    Visit# / total visits: 2/10; Progress note for Medicare patient due at visit 10   Frequency: 1x/week    Cancels/No Shows: 0/0    Subjective:    Pain:  [] Yes  [x] No Location:  N/A Dizzy Rating: (0-10 scale) ---/10  Pain altered Tx:  [x] No  [] Yes  Action:  Comments: Patient continues to report dizziness; states she has been compliant with HEP.      Patient Goals: Help with dizziness    Objective:  Modalities: MHP x10 min at end of session to cervical spine  Precautions: Right posterior canal BPPV  Exercises: Bolded exercises completed 12/15/2023  Exercise Reps/ Time Weight/ Level Comments   Sitting      Levator Stretch 30s ea  New 12/15                                       Standing                                                Balance                                                Gait

## 2023-12-29 ENCOUNTER — HOSPITAL ENCOUNTER (OUTPATIENT)
Dept: PHYSICAL THERAPY | Facility: CLINIC | Age: 73
Setting detail: THERAPIES SERIES
Discharge: HOME | End: 2023-12-29
Payer: MEDICARE

## 2023-12-29 PROCEDURE — 95992 CANALITH REPOSITIONING PROC: CPT

## 2023-12-29 NOTE — FLOWSHEET NOTE
balance, increase gastroc and hip flexor muscle length to allow improved gait mechanics, and improve overall functional mobility, progress independence and safety, and maximize level of function as close to PLOF as possible. Assessed:   Goals: Meet in 10 treatments Met Progressing No change Regressing   Dizziness: Patient will rate dizziness 3/10 to demonstrate improved quality of life.   []  []  []  []    BPPV: Patient will present with a negative [employer].  []  []  []  []    Functional Outcome Measure: Patient will improve DHI by 4 points to demonstrate improved quality of life. []  []  []  []    Functional Outcome Measure: Patient will improve ABC by 11% to demonstrate improved confidence with upright mobility. []  []  []  []    Home Exercise Program: Patient to be independent with home exercise program as demonstrated by performance with correct form without cues. []  []  []  []    Demonstrates knowledge of fall prevention [x]   -Provided 12/8 []  []  []        Pt. Education:  [x] Yes  [] No  [x] Reviewed Prior HEP/Ed  Method of Education: [x] Verbal  [x] Demo  [x] Written    Access Code: [email or web address]  URL: [email or web address]  Date: 12/08/2023  Prepared by: Alexa Rodríguez     Exercises  - Self-Epley Maneuver Right Ear  - 3 sets     Patient Education  - BPPV  - What Is BPPV?  - BPPV    Access Code: [email or web address]  URL: [email or web address]/  Date: 12/15/2023  Prepared by: Alexa Rodríguez    Exercises  - Seated Levator Scapulae Stretch  - 2 sets - 30s hold    Comprehension of Education:  [x] Verbalizes understanding. [x] Demonstrates understanding. [x] Needs review. - For correct form  [] Demonstrates/verbalizes HEP/Ed previously given. Plan: [x] Continue current frequency toward long and short term goals.     [] Specific Instructions for subsequent treatments:       Treatment Charges: Mins Units   []  Modalities: MHP     []  Ther Exercise     []  Manual Therapy     []  Ther Activities

## 2024-01-04 ENCOUNTER — HOSPITAL ENCOUNTER (OUTPATIENT)
Dept: PHYSICAL THERAPY | Facility: CLINIC | Age: 74
Setting detail: THERAPIES SERIES
Discharge: HOME | End: 2024-01-04
Payer: MEDICARE

## 2024-01-04 ENCOUNTER — APPOINTMENT (OUTPATIENT)
Dept: PHYSICAL THERAPY | Facility: CLINIC | Age: 74
End: 2024-01-04
Payer: MEDICARE

## 2024-01-04 PROCEDURE — 97140 MANUAL THERAPY 1/> REGIONS: CPT

## 2024-01-04 PROCEDURE — 95992 CANALITH REPOSITIONING PROC: CPT

## 2024-01-12 ENCOUNTER — HOSPITAL ENCOUNTER (OUTPATIENT)
Dept: PHYSICAL THERAPY | Facility: CLINIC | Age: 74
Setting detail: THERAPIES SERIES
Discharge: HOME | End: 2024-01-12
Payer: MEDICARE

## 2024-01-12 PROCEDURE — 97140 MANUAL THERAPY 1/> REGIONS: CPT

## 2024-01-12 PROCEDURE — 97110 THERAPEUTIC EXERCISES: CPT

## 2024-01-12 PROCEDURE — 95992 CANALITH REPOSITIONING PROC: CPT

## 2024-01-12 NOTE — FLOWSHEET NOTE
30s ea  New 12/15   Scapular Retraction 20x3s  New 1/12                                 Supine      Chin Tucks 20x3s  New 1/12                                       Balance                                                Gait                                                Other      Manual to Neck 20 min  SOR and manual cervical traction completed. STM and TP release to upper traps and rhomboids; especcially on the right.                                       Other:    Positional Tests:     Test R Torsional Upbeat L Torsional Upbeat Up Beat Down Beat Horizontal Duration Symptoms   [place]- R + - - - - ~30s   Dizziness      Positional Treatments:     BPPV Type Treatment Technique Trials Result Other:   R posterior canal canlithiasis Epley  -Wedge x3 Decreased symptoms with repetition. Decreased length of nystagmus each rep. Decreased violence compared to previous sessions. Nystagmus and symptoms with roll to L are absent; patient reports no change in dizziness.        Specific Instructions for next treatment:   -Reassess Destiney sheffielde  -Epley as needed       Subjective Measure Score Indications   DHI [Dizziness Handicap Inventory] 38 Moderate Handicap   ABC [Activities Balance Confidence Scale] 27.5% confidence with upright mobility <69% indicates increased risk for falls           Assessment: [] Progressing toward goals.     [] No change.      [x] Other: Completed Epley followed by manual techniques to the cervical spine. Added postural work this date due to cervicogenic component of dizziness and forward head posture. Will determine plan to continue versus discharge next session pending patient progress this week. Ended with MHP to relieve muscular pain.   [x] Patient would continue to benefit from skilled physical therapy services in order to: address gait/balance impairment with attempt to address asymmetries, increase gait speed/efficiency, improve postural stability and dynamic standing balance, increase

## 2024-01-19 ENCOUNTER — HOSPITAL ENCOUNTER (OUTPATIENT)
Dept: PHYSICAL THERAPY | Facility: CLINIC | Age: 74
Setting detail: THERAPIES SERIES
Discharge: HOME | End: 2024-01-19
Payer: MEDICARE

## 2024-01-19 PROCEDURE — 97140 MANUAL THERAPY 1/> REGIONS: CPT

## 2024-01-19 PROCEDURE — 95992 CANALITH REPOSITIONING PROC: CPT

## 2024-01-19 NOTE — DISCHARGE SUMMARY
[] [facility]  Outpatient Rehabilitation &  Therapy  [address]  [phone]  F: [phone] [x] [facility]  Outpatient Rehabilitation &  Therapy  [address]   [address]  P: [phone]  F: [phone] [] [facility]  Outpatient Rehabilitation &  Therapy  [address]  P: [phone]  F: [phone] [] [facility]  Outpatient Rehabilitation &  Therapy  [address]  P: [phone]  F: [phone] [] [facility]  Outpatient Rehabilitation &  Therapy  [address]   [address]   P: [phone]  F: [phone]      Physical Therapy Daily Treatment Note/Discharge Summary    Date:  2024  Patient Name:  Cora Steward    :  1950  MRN: [id number]    Physician: Anila Lowe MD                               Insurance: MEDICARE/[employer] SUPPLEMENT (BMN)  Medical Diagnosis: H81.10 (ICD-10-CM) - Benign paroxysmal positional vertigo, unspecified laterality              Rehab Codes: R42, R26.2, R29.3   Date of symptom onset: 23               Next Dr's appt.: 24    Visit# / total visits: 7/10; Progress note for Medicare patient due at visit 10   Frequency: 1x/week    Cancels/No Shows: 0/0    Subjective:    Pain:  [] Yes  [x] No Location:  N/A Dizzy Rating: (0-10 scale) ---/10  Pain altered Tx:  [x] No  [] Yes  Action:  Comments: Patient reports compliance with HEP. Again today states her dizziness rolling in bed has returned. Continues to have symptoms with Epley when she completes on her own. Has a general pressure in the posterior of her skull. Continues to feel like she wants to be discharged today & will follow-up with PCP if dizziness has not subsided in a month or so.     Patient Goals: Help with dizziness    Objective:  Modalities: MHP x10 min at end of session to relieve muscle tension  Precautions: Right posterior canal BPPV  Exercises: Bolded exercises

## 2024-02-09 RX ORDER — CALCIUM CITRATE/VITAMIN D3 200MG-6.25
TABLET ORAL
Qty: 200 STRIP | Refills: 0 | Status: SHIPPED | OUTPATIENT
Start: 2024-02-09

## 2024-02-27 ENCOUNTER — HOSPITAL ENCOUNTER (OUTPATIENT)
Age: 74
Setting detail: SPECIMEN
Discharge: HOME | End: 2024-02-27

## 2024-02-27 ENCOUNTER — OFFICE VISIT (OUTPATIENT)
Dept: FAMILY MEDICINE CLINIC | Age: 74
End: 2024-02-27
Payer: MEDICARE

## 2024-02-27 VITALS
BODY MASS INDEX: 24.02 KG/M2 | WEIGHT: 158 LBS | TEMPERATURE: 97 F | SYSTOLIC BLOOD PRESSURE: 119 MMHG | HEART RATE: 88 BPM | OXYGEN SATURATION: 98 % | DIASTOLIC BLOOD PRESSURE: 65 MMHG

## 2024-02-27 DIAGNOSIS — D69.6 THROMBOCYTOPENIA, UNSPECIFIED (HCC): ICD-10-CM

## 2024-02-27 DIAGNOSIS — R30.0 DYSURIA: ICD-10-CM

## 2024-02-27 DIAGNOSIS — E11.9 TYPE 2 DIABETES MELLITUS WITHOUT COMPLICATION, WITHOUT LONG-TERM CURRENT USE OF INSULIN (HCC): ICD-10-CM

## 2024-02-27 DIAGNOSIS — Z85.3 HISTORY OF MALIGNANT NEOPLASM OF RIGHT BREAST: ICD-10-CM

## 2024-02-27 DIAGNOSIS — D70.9 NEUTROPENIA, UNSPECIFIED TYPE (HCC): Primary | ICD-10-CM

## 2024-02-27 DIAGNOSIS — I85.00 ESOPHAGEAL VARICES WITHOUT BLEEDING, UNSPECIFIED ESOPHAGEAL VARICES TYPE (HCC): ICD-10-CM

## 2024-02-27 DIAGNOSIS — E11.65 TYPE 2 DIABETES MELLITUS WITH HYPERGLYCEMIA, WITHOUT LONG-TERM CURRENT USE OF INSULIN (HCC): ICD-10-CM

## 2024-02-27 LAB — HBA1C MFR BLD: 9 %

## 2024-02-27 PROCEDURE — 83036 HEMOGLOBIN GLYCOSYLATED A1C: CPT | Performed by: FAMILY MEDICINE

## 2024-02-27 PROCEDURE — G8399 PT W/DXA RESULTS DOCUMENT: HCPCS | Performed by: FAMILY MEDICINE

## 2024-02-27 PROCEDURE — 99214 OFFICE O/P EST MOD 30 MIN: CPT | Performed by: FAMILY MEDICINE

## 2024-02-27 PROCEDURE — G8427 DOCREV CUR MEDS BY ELIG CLIN: HCPCS | Performed by: FAMILY MEDICINE

## 2024-02-27 PROCEDURE — 2022F DILAT RTA XM EVC RTNOPTHY: CPT | Performed by: FAMILY MEDICINE

## 2024-02-27 PROCEDURE — 3052F HG A1C>EQUAL 8.0%<EQUAL 9.0%: CPT | Performed by: FAMILY MEDICINE

## 2024-02-27 PROCEDURE — 1036F TOBACCO NON-USER: CPT | Performed by: FAMILY MEDICINE

## 2024-02-27 PROCEDURE — G8484 FLU IMMUNIZE NO ADMIN: HCPCS | Performed by: FAMILY MEDICINE

## 2024-02-27 PROCEDURE — 1123F ACP DISCUSS/DSCN MKR DOCD: CPT | Performed by: FAMILY MEDICINE

## 2024-02-27 PROCEDURE — 3017F COLORECTAL CA SCREEN DOC REV: CPT | Performed by: FAMILY MEDICINE

## 2024-02-27 PROCEDURE — G8420 CALC BMI NORM PARAMETERS: HCPCS | Performed by: FAMILY MEDICINE

## 2024-02-27 PROCEDURE — 1090F PRES/ABSN URINE INCON ASSESS: CPT | Performed by: FAMILY MEDICINE

## 2024-02-27 RX ORDER — NITROFURANTOIN 25; 75 MG/1; MG/1
100 CAPSULE ORAL 2 TIMES DAILY
Qty: 14 CAPSULE | Refills: 0 | Status: SHIPPED | OUTPATIENT
Start: 2024-02-27 | End: 2024-03-05

## 2024-02-27 RX ORDER — METFORMIN HYDROCHLORIDE 500 MG/1
500 TABLET, EXTENDED RELEASE ORAL 2 TIMES DAILY
Qty: 180 TABLET | Refills: 1 | Status: SHIPPED | OUTPATIENT
Start: 2024-02-27

## 2024-02-27 RX ORDER — METFORMIN HYDROCHLORIDE 500 MG/1
500 TABLET, EXTENDED RELEASE ORAL 2 TIMES DAILY
Qty: 60 TABLET | Refills: 3 | Status: SHIPPED | OUTPATIENT
Start: 2024-02-27 | End: 2024-02-27

## 2024-02-27 ASSESSMENT — PATIENT HEALTH QUESTIONNAIRE - PHQ9
1. LITTLE INTEREST OR PLEASURE IN DOING THINGS: 0
2. FEELING DOWN, DEPRESSED OR HOPELESS: 0
SUM OF ALL RESPONSES TO PHQ QUESTIONS 1-9: 0
SUM OF ALL RESPONSES TO PHQ9 QUESTIONS 1 & 2: 0
SUM OF ALL RESPONSES TO PHQ QUESTIONS 1-9: 0

## 2024-02-27 NOTE — PROGRESS NOTES
night.  She also tells me that she still has Farxiga at her house.    Review of Systems   Constitutional: Negative for fever and unexpected weight change.   Pertinent items are noted in HPI.    Objective:   Physical Exam  Constitutional: VS (see above).   General appearance: normal development, habitus and attention, no deformities. No distress.  Eyes: normal conjunctiva and lids.  CAV: RRR, no RMG. No edema lower extremities.  Pulmo: CTA bilateral, no CWR.  Skin: no rashes, lesions or ulcers.  Musculoskeletal: normal gait. Nails: no clubbing or cyanosis.  Psychiatric: alert and oriented to place, time and person. Normal mood and affect.    A1c today 9.    Assessment:       Diagnosis Orders   1. Neutropenia, unspecified type (HCC)        2. Type 2 diabetes mellitus without complication, without long-term current use of insulin (HCC)  POCT glycosylated hemoglobin (Hb A1C)    metFORMIN (GLUCOPHAGE-XR) 500 MG extended release tablet      3. Dysuria  Culture, Urine    nitrofurantoin, macrocrystal-monohydrate, (MACROBID) 100 MG capsule      4. Esophageal varices without bleeding, unspecified esophageal varices type (HCC)        5. Type 2 diabetes mellitus with hyperglycemia, without long-term current use of insulin (HCC)        6. Thrombocytopenia, unspecified (HCC)        7. History of malignant neoplasm of right breast            Plan:   The patient will continue to follow-up with the oncologist/dermatologist.  I discussed with her that her A1c must be below 8 at her age.  She will restart the procedure she will continue the glimepiride as well as metformin.  I also did give her a lot of information material about GLP-1 and other diabetic medications to weight.  Therefore we need to start her on additional medication but the patient also needs to change her diet.  Await results of urine culture.  As the patient has lot of discomfort will start on Macrobid already.    Return in about 3 months (around 5/27/2024), or if

## 2024-02-27 NOTE — TELEPHONE ENCOUNTER
Cora Steward is calling to request a refill on the following medication(s):    Last Visit Date (If Applicable):  2/27/2024    Next Visit Date:    Visit date not found    Medication Request:  Requested Prescriptions     Pending Prescriptions Disp Refills    metFORMIN (GLUCOPHAGE-XR) 500 MG extended release tablet [Pharmacy Med Name: METFORMIN ER 500MG 24HR TABS] 180 tablet      Sig: TAKE 1 TABLET BY MOUTH IN THE MORNING AND AT BEDTIME

## 2024-02-27 NOTE — PATIENT INSTRUCTIONS
Thank you for choosing [facility].  We know you have options when it comes to your healthcare; we appreciate that you chose us. Our goal is to provide exceptional  service and world class care to every patient.  You will be receiving a survey via email or text message asking for your feedback.  Please take a few minutes to share your thoughts about your recent visit. Your comments helps us understand what we do well and ways we can improve.  Thank you in advance for your valuable feedback.

## 2024-02-29 LAB
MICROORGANISM SPEC CULT: ABNORMAL
SPECIMEN DESCRIPTION: ABNORMAL

## 2024-02-29 RX ORDER — NITROFURANTOIN 25; 75 MG/1; MG/1
100 CAPSULE ORAL 2 TIMES DAILY
Qty: 20 CAPSULE | Refills: 0 | Status: SHIPPED | OUTPATIENT
Start: 2024-02-29 | End: 2024-03-10

## 2024-04-17 ENCOUNTER — TRANSCRIBE ORDERS (OUTPATIENT)
Dept: ADMINISTRATIVE | Age: 74
End: 2024-04-17

## 2024-04-17 DIAGNOSIS — K74.60 LIVER CIRRHOSIS SECONDARY TO NASH (HCC): Primary | ICD-10-CM

## 2024-04-17 DIAGNOSIS — K75.81 LIVER CIRRHOSIS SECONDARY TO NASH (HCC): Primary | ICD-10-CM

## 2024-04-18 DIAGNOSIS — E11.9 TYPE 2 DIABETES MELLITUS WITHOUT COMPLICATION, WITHOUT LONG-TERM CURRENT USE OF INSULIN (HCC): ICD-10-CM

## 2024-04-18 RX ORDER — METFORMIN HYDROCHLORIDE 500 MG/1
500 TABLET, EXTENDED RELEASE ORAL 2 TIMES DAILY
Qty: 180 TABLET | Refills: 1 | Status: SHIPPED | OUTPATIENT
Start: 2024-04-18

## 2024-04-18 NOTE — TELEPHONE ENCOUNTER
Cora Steward is calling to request a refill on the following medication(s):    Last Visit Date (If Applicable):  2/27/2024    Next Visit Date:    Visit date not found    Medication Request:  Requested Prescriptions     Pending Prescriptions Disp Refills    metFORMIN (GLUCOPHAGE-XR) 500 MG extended release tablet 180 tablet 1     Sig: Take 1 tablet by mouth in the morning and at bedtime

## 2024-05-03 ENCOUNTER — HOSPITAL ENCOUNTER (OUTPATIENT)
Dept: ULTRASOUND IMAGING | Age: 74
End: 2024-05-03
Attending: INTERNAL MEDICINE
Payer: MEDICARE

## 2024-05-03 DIAGNOSIS — K74.60 LIVER CIRRHOSIS SECONDARY TO NASH (HCC): ICD-10-CM

## 2024-05-03 DIAGNOSIS — K75.81 LIVER CIRRHOSIS SECONDARY TO NASH (HCC): ICD-10-CM

## 2024-05-03 PROCEDURE — 76705 ECHO EXAM OF ABDOMEN: CPT

## 2024-05-10 ENCOUNTER — HOSPITAL ENCOUNTER (OUTPATIENT)
Age: 74
Discharge: HOME | End: 2024-05-10
Payer: MEDICARE

## 2024-05-10 LAB
AFP SERPL-MCNC: 2.1 UG/L
ALBUMIN SERPL-MCNC: 4.1 G/DL (ref 3.5–5.2)
ALBUMIN/GLOB SERPL: 2 {RATIO} (ref 1–2.5)
ALP SERPL-CCNC: 90 U/L (ref 35–104)
ALT SERPL-CCNC: 32 U/L (ref 10–35)
AST SERPL-CCNC: 30 U/L (ref 10–35)
BILIRUB DIRECT SERPL-MCNC: <0.2 MG/DL (ref 0–0.3)
BILIRUB INDIRECT SERPL-MCNC: NORMAL MG/DL (ref 0–1)
BILIRUB SERPL-MCNC: 0.5 MG/DL (ref 0–1.2)
GLOBULIN SER CALC-MCNC: 2.6 G/DL
INR PPP: 1.1
PROT SERPL-MCNC: 6.7 G/DL (ref 6.6–8.7)
PROTHROMBIN TIME: 14.4 SEC (ref 11.5–14.2)

## 2024-05-10 PROCEDURE — 85610 PROTHROMBIN TIME: CPT

## 2024-05-10 PROCEDURE — 80076 HEPATIC FUNCTION PANEL: CPT

## 2024-05-10 PROCEDURE — 36415 COLL VENOUS BLD VENIPUNCTURE: CPT

## 2024-05-10 PROCEDURE — 82105 ALPHA-FETOPROTEIN SERUM: CPT

## 2024-05-21 ENCOUNTER — OFFICE VISIT (OUTPATIENT)
Dept: FAMILY MEDICINE CLINIC | Age: 74
End: 2024-05-21

## 2024-05-21 VITALS
OXYGEN SATURATION: 98 % | BODY MASS INDEX: 24.1 KG/M2 | HEIGHT: 68 IN | DIASTOLIC BLOOD PRESSURE: 68 MMHG | TEMPERATURE: 97.3 F | HEART RATE: 78 BPM | WEIGHT: 159 LBS | SYSTOLIC BLOOD PRESSURE: 127 MMHG

## 2024-05-21 DIAGNOSIS — Z17.0 MALIGNANT NEOPLASM OF UPPER-OUTER QUADRANT OF RIGHT BREAST IN FEMALE, ESTROGEN RECEPTOR POSITIVE (HCC): ICD-10-CM

## 2024-05-21 DIAGNOSIS — E11.65 TYPE 2 DIABETES MELLITUS WITH HYPERGLYCEMIA, WITHOUT LONG-TERM CURRENT USE OF INSULIN (HCC): ICD-10-CM

## 2024-05-21 DIAGNOSIS — R42 DIZZINESS: ICD-10-CM

## 2024-05-21 DIAGNOSIS — E11.9 TYPE 2 DIABETES MELLITUS WITHOUT COMPLICATION, WITHOUT LONG-TERM CURRENT USE OF INSULIN (HCC): ICD-10-CM

## 2024-05-21 DIAGNOSIS — Z00.00 MEDICARE ANNUAL WELLNESS VISIT, SUBSEQUENT: Primary | ICD-10-CM

## 2024-05-21 DIAGNOSIS — C50.411 MALIGNANT NEOPLASM OF UPPER-OUTER QUADRANT OF RIGHT BREAST IN FEMALE, ESTROGEN RECEPTOR POSITIVE (HCC): ICD-10-CM

## 2024-05-21 RX ORDER — METFORMIN HYDROCHLORIDE 500 MG/1
1000 TABLET, EXTENDED RELEASE ORAL 2 TIMES DAILY
Qty: 360 TABLET | Refills: 1 | Status: SHIPPED | OUTPATIENT
Start: 2024-05-21

## 2024-05-21 SDOH — ECONOMIC STABILITY: FOOD INSECURITY: WITHIN THE PAST 12 MONTHS, YOU WORRIED THAT YOUR FOOD WOULD RUN OUT BEFORE YOU GOT MONEY TO BUY MORE.: NEVER TRUE

## 2024-05-21 SDOH — ECONOMIC STABILITY: INCOME INSECURITY: HOW HARD IS IT FOR YOU TO PAY FOR THE VERY BASICS LIKE FOOD, HOUSING, MEDICAL CARE, AND HEATING?: NOT HARD AT ALL

## 2024-05-21 SDOH — ECONOMIC STABILITY: FOOD INSECURITY: WITHIN THE PAST 12 MONTHS, THE FOOD YOU BOUGHT JUST DIDN'T LAST AND YOU DIDN'T HAVE MONEY TO GET MORE.: NEVER TRUE

## 2024-05-21 ASSESSMENT — LIFESTYLE VARIABLES
HOW OFTEN DO YOU HAVE A DRINK CONTAINING ALCOHOL: NEVER
HOW MANY STANDARD DRINKS CONTAINING ALCOHOL DO YOU HAVE ON A TYPICAL DAY: PATIENT DOES NOT DRINK

## 2024-05-21 ASSESSMENT — PATIENT HEALTH QUESTIONNAIRE - PHQ9
SUM OF ALL RESPONSES TO PHQ QUESTIONS 1-9: 0
SUM OF ALL RESPONSES TO PHQ9 QUESTIONS 1 & 2: 0
1. LITTLE INTEREST OR PLEASURE IN DOING THINGS: NOT AT ALL
2. FEELING DOWN, DEPRESSED OR HOPELESS: NOT AT ALL
SUM OF ALL RESPONSES TO PHQ QUESTIONS 1-9: 0

## 2024-05-21 NOTE — PROGRESS NOTES
Medicare Annual Wellness Visit    Cora Steward is here for Medicare AWV, Discuss Medications (Anxiety meds for eye care and MRI, metformin), Dizziness, and Neck Pain    Assessment & Plan   Medicare annual wellness visit, subsequent  Dizziness  -     XR CERVICAL SPINE (2-3 VIEWS); Future  -     CT HEAD WO CONTRAST; Future  Malignant neoplasm of upper-outer quadrant of right breast in female, estrogen receptor positive (HCC)  Type 2 diabetes mellitus without complication, without long-term current use of insulin (HCC)  -     metFORMIN (GLUCOPHAGE-XR) 500 MG extended release tablet; Take 2 tablets by mouth in the morning and at bedtime, Disp-360 tablet, R-1**Patient requests 90 days supply**Normal  -     CBC with Auto Differential; Future  -     Lipid Panel; Future  -     TSH with Reflex; Future  -     Microalbumin, Ur; Future  -     Basic Metabolic Panel; Future  Type 2 diabetes mellitus with hyperglycemia, without long-term current use of insulin (HCC)  -     CBC with Auto Differential; Future  -     Lipid Panel; Future  -     TSH with Reflex; Future  -     Microalbumin, Ur; Future  -     Basic Metabolic Panel; Future    Overall the patient is doing well.  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  She also will continue to follow-up with the oncologist to read history of breast cancer.  I discussed with her that she needs to make sure not to eat any sweets not to eat any increased very fast foods as this could influence her diabetes numbers.  She will see me back latest in 3 months.  I did call in an additional medication to use.  Call or return to clinic prn if these symptoms worsen or fail to improve as anticipated.  I have reviewed the instructions with the patient, answering all questions to her satisfaction.      Recommendations for Preventive Services Due: see orders and patient instructions/AVS.  Recommended screening schedule

## 2024-05-21 NOTE — PATIENT INSTRUCTIONS
even if on a low setting.    Use fire-resistant mattress covers and pillows, and NEVER smoke in bed.    Keep a phone next to the bed that is programmed to dial 911 at the push of a button.      If you have a chronic condition, you may want to sign on with an automatic call-in service. Typically the system includes a small pendant that connects directly to an emergency medical voice-response system. You should also make arrangements to stay in contact with someonefriend, neighbor, family memberon a regular schedule.   Fire Prevention   According to the National S.A.F.E. (Smoke Alarms for Every) Home Foundation, senior citizens are one of the two highest risk groups for death and serious injuries due to residential fires.   When cooking, wear short-sleeved items, never a bulky long-sleeved robe.    The [facility]'s Safety Checklist for Older Consumers emphasizes the importance of checking basements, garages, workshops and storage areas for fire hazards, such as volatile liquids, piles of old rags or clothing and overloaded circuits.    Never smoke in bed or when lying down on a couch or recliner chair.    Small portable electric or kerosene heaters are responsible for many home fires and should be used cautiously if at all. If you do use one, be sure to keep them away from flammable materials.    In case of fire, make sure you have a pre-established emergency exit plan.    Have a professional check your fireplace and other fuel-burning appliances yearly.    Helping Hands   Baby boomers entering the souza years will continue to see the development of new products to help older adults live safely and independently in spite of age-related changes.  Making Life More Livable  , by Anne-Marie Ly, lists over 1,000 products for \"living well in the mature years,\" such as bathing and mobility aids, household security devices, ergonomically designed knives and peelers, and faucet valves and knobs for temperature control. Medical

## 2024-05-29 ENCOUNTER — HOSPITAL ENCOUNTER (OUTPATIENT)
Age: 74
Discharge: HOME | End: 2024-05-31
Payer: MEDICARE

## 2024-05-29 ENCOUNTER — HOSPITAL ENCOUNTER (OUTPATIENT)
Dept: GENERAL RADIOLOGY | Age: 74
Discharge: HOME | End: 2024-05-31
Payer: MEDICARE

## 2024-05-29 DIAGNOSIS — R42 DIZZINESS: ICD-10-CM

## 2024-05-29 PROCEDURE — 72040 X-RAY EXAM NECK SPINE 2-3 VW: CPT

## 2024-05-31 ENCOUNTER — HOSPITAL ENCOUNTER (OUTPATIENT)
Dept: CT IMAGING | Facility: CLINIC | Age: 74
End: 2024-05-31
Payer: MEDICARE

## 2024-05-31 DIAGNOSIS — R42 DIZZINESS: ICD-10-CM

## 2024-05-31 DIAGNOSIS — E11.9 TYPE 2 DIABETES MELLITUS WITHOUT COMPLICATION, WITHOUT LONG-TERM CURRENT USE OF INSULIN (HCC): Primary | ICD-10-CM

## 2024-05-31 LAB — HBA1C MFR BLD: 9.1 %

## 2024-05-31 PROCEDURE — 70450 CT HEAD/BRAIN W/O DYE: CPT

## 2024-05-31 PROCEDURE — 83036 HEMOGLOBIN GLYCOSYLATED A1C: CPT | Performed by: FAMILY MEDICINE

## 2024-06-03 ENCOUNTER — HOSPITAL ENCOUNTER (OUTPATIENT)
Age: 74
Discharge: HOME | End: 2024-06-03
Payer: MEDICARE

## 2024-06-03 DIAGNOSIS — E11.9 TYPE 2 DIABETES MELLITUS WITHOUT COMPLICATION, WITHOUT LONG-TERM CURRENT USE OF INSULIN (HCC): ICD-10-CM

## 2024-06-03 DIAGNOSIS — E11.65 TYPE 2 DIABETES MELLITUS WITH HYPERGLYCEMIA, WITHOUT LONG-TERM CURRENT USE OF INSULIN (HCC): ICD-10-CM

## 2024-06-03 LAB
ANION GAP SERPL CALCULATED.3IONS-SCNC: 11 MMOL/L (ref 9–16)
BASOPHILS # BLD: 0.02 K/UL (ref 0–0.2)
BASOPHILS NFR BLD: 1 % (ref 0–2)
BUN SERPL-MCNC: 13 MG/DL (ref 8–23)
CALCIUM SERPL-MCNC: 8.8 MG/DL (ref 8.6–10.4)
CHLORIDE SERPL-SCNC: 101 MMOL/L (ref 98–107)
CHOLEST SERPL-MCNC: 151 MG/DL (ref 0–199)
CHOLESTEROL/HDL RATIO: 4
CO2 SERPL-SCNC: 25 MMOL/L (ref 20–31)
CREAT SERPL-MCNC: 0.8 MG/DL (ref 0.5–0.9)
CREAT UR-MCNC: 30.8 MG/DL (ref 28–217)
EOSINOPHIL # BLD: 0.07 K/UL (ref 0–0.44)
EOSINOPHILS RELATIVE PERCENT: 3 % (ref 1–4)
ERYTHROCYTE [DISTWIDTH] IN BLOOD BY AUTOMATED COUNT: 14.1 % (ref 11.8–14.4)
GFR, ESTIMATED: 78 ML/MIN/1.73M2
GLUCOSE SERPL-MCNC: 156 MG/DL (ref 74–99)
HCT VFR BLD AUTO: 37.2 % (ref 36.3–47.1)
HDLC SERPL-MCNC: 35 MG/DL
HGB BLD-MCNC: 11.7 G/DL (ref 11.9–15.1)
IMM GRANULOCYTES # BLD AUTO: 0 K/UL (ref 0–0.3)
IMM GRANULOCYTES NFR BLD: 0 %
LDLC SERPL CALC-MCNC: 65 MG/DL (ref 0–100)
LYMPHOCYTES NFR BLD: 0.59 K/UL (ref 1.1–3.7)
LYMPHOCYTES RELATIVE PERCENT: 27 % (ref 24–43)
MCH RBC QN AUTO: 27 PG (ref 25.2–33.5)
MCHC RBC AUTO-ENTMCNC: 31.5 G/DL (ref 28.4–34.8)
MCV RBC AUTO: 85.9 FL (ref 82.6–102.9)
MICROALBUMIN UR-MCNC: <12 MG/L (ref 0–20)
MICROALBUMIN/CREAT UR-RTO: NORMAL MCG/MG CREAT (ref 0–25)
MONOCYTES NFR BLD: 0.13 K/UL (ref 0.1–1.2)
MONOCYTES NFR BLD: 6 % (ref 3–12)
MORPHOLOGY: NORMAL
NEUTROPHILS NFR BLD: 63 % (ref 36–65)
NEUTS SEG NFR BLD: 1.39 K/UL (ref 1.5–8.1)
NRBC BLD-RTO: 0 PER 100 WBC
PLATELET # BLD AUTO: 90 K/UL (ref 138–453)
PMV BLD AUTO: 11.4 FL (ref 8.1–13.5)
POTASSIUM SERPL-SCNC: 4 MMOL/L (ref 3.7–5.3)
RBC # BLD AUTO: 4.33 M/UL (ref 3.95–5.11)
SODIUM SERPL-SCNC: 137 MMOL/L (ref 136–145)
TRIGL SERPL-MCNC: 254 MG/DL
TSH SERPL DL<=0.05 MIU/L-ACNC: 2.88 UIU/ML (ref 0.27–4.2)
VLDLC SERPL CALC-MCNC: 51 MG/DL
WBC OTHER # BLD: 2.2 K/UL (ref 3.5–11.3)

## 2024-06-03 PROCEDURE — 82570 ASSAY OF URINE CREATININE: CPT

## 2024-06-03 PROCEDURE — 36415 COLL VENOUS BLD VENIPUNCTURE: CPT

## 2024-06-03 PROCEDURE — 85025 COMPLETE CBC W/AUTO DIFF WBC: CPT

## 2024-06-03 PROCEDURE — 82043 UR ALBUMIN QUANTITATIVE: CPT

## 2024-06-03 PROCEDURE — 80061 LIPID PANEL: CPT

## 2024-06-03 PROCEDURE — 80048 BASIC METABOLIC PNL TOTAL CA: CPT

## 2024-06-03 PROCEDURE — 84443 ASSAY THYROID STIM HORMONE: CPT

## 2024-06-11 ENCOUNTER — TELEPHONE (OUTPATIENT)
Dept: FAMILY MEDICINE CLINIC | Age: 74
End: 2024-06-11

## 2024-06-11 DIAGNOSIS — F41.8 SITUATIONAL ANXIETY: Primary | ICD-10-CM

## 2024-06-11 RX ORDER — DIAZEPAM 2 MG/1
2 TABLET ORAL EVERY 8 HOURS PRN
Qty: 4 TABLET | Refills: 0 | Status: SHIPPED | OUTPATIENT
Start: 2024-06-11 | End: 2024-06-18

## 2024-06-11 NOTE — TELEPHONE ENCOUNTER
Patient called has a mri scheduled Monday and is requesting some medication for anxiety. Please advise.

## 2024-06-17 ENCOUNTER — HOSPITAL ENCOUNTER (OUTPATIENT)
Dept: MRI IMAGING | Age: 74
Discharge: HOME | End: 2024-06-19
Attending: INTERNAL MEDICINE
Payer: MEDICARE

## 2024-06-17 ENCOUNTER — HOSPITAL ENCOUNTER (EMERGENCY)
Facility: CLINIC | Age: 74
Discharge: HOME | End: 2024-06-17
Attending: EMERGENCY MEDICINE
Payer: MEDICARE

## 2024-06-17 VITALS
TEMPERATURE: 97.9 F | SYSTOLIC BLOOD PRESSURE: 144 MMHG | DIASTOLIC BLOOD PRESSURE: 71 MMHG | OXYGEN SATURATION: 98 % | HEART RATE: 83 BPM | HEIGHT: 69 IN | RESPIRATION RATE: 16 BRPM | BODY MASS INDEX: 22.96 KG/M2 | WEIGHT: 155 LBS

## 2024-06-17 DIAGNOSIS — S61.012A LACERATION OF LEFT THUMB WITHOUT FOREIGN BODY WITHOUT DAMAGE TO NAIL, INITIAL ENCOUNTER: Primary | ICD-10-CM

## 2024-06-17 DIAGNOSIS — D69.6 THROMBOCYTOPENIA, UNSPECIFIED (HCC): ICD-10-CM

## 2024-06-17 DIAGNOSIS — K75.81 NONALCOHOLIC STEATOHEPATITIS (NASH): ICD-10-CM

## 2024-06-17 DIAGNOSIS — Z17.0 MALIGNANT NEOPLASM OF UPPER-OUTER QUADRANT OF RIGHT BREAST IN FEMALE, ESTROGEN RECEPTOR POSITIVE (HCC): ICD-10-CM

## 2024-06-17 DIAGNOSIS — C50.411 MALIGNANT NEOPLASM OF UPPER-OUTER QUADRANT OF RIGHT BREAST IN FEMALE, ESTROGEN RECEPTOR POSITIVE (HCC): ICD-10-CM

## 2024-06-17 DIAGNOSIS — D70.9 NEUTROPENIA, UNSPECIFIED TYPE (HCC): ICD-10-CM

## 2024-06-17 PROCEDURE — C8908 MRI W/O FOL W/CONT, BREAST,: HCPCS

## 2024-06-17 PROCEDURE — 6360000004 HC RX CONTRAST MEDICATION: Performed by: INTERNAL MEDICINE

## 2024-06-17 PROCEDURE — 99282 EMERGENCY DEPT VISIT SF MDM: CPT

## 2024-06-17 PROCEDURE — A9579 GAD-BASE MR CONTRAST NOS,1ML: HCPCS | Performed by: INTERNAL MEDICINE

## 2024-06-17 PROCEDURE — 12001 RPR S/N/AX/GEN/TRNK 2.5CM/<: CPT

## 2024-06-17 RX ADMIN — GADOTERIDOL 15 ML: 279.3 INJECTION, SOLUTION INTRAVENOUS at 11:35

## 2024-06-17 NOTE — ED NOTES
Md at bedside for wound closure. 1 suture noted to left thumb, vaseline gauze applied and coban to secure.  Pt instructed on wound care and when to follow up. Pt verbalized understanding.

## 2024-06-17 NOTE — ED PROVIDER NOTES
[facility]  [address] [facility] [address]  Phone: [phone]        [facility] ED  EMERGENCY DEPARTMENT ENCOUNTER      Pt Name: Cora Steward  MRN: [id number]  Birthdate 1950  Date of evaluation: 6/17/2024  Provider: Jaspal Lainez DO    CHIEF COMPLAINT       Chief Complaint   Patient presents with    Laceration     Left thumb         HISTORY OF PRESENT ILLNESS   (Location/Symptom, Timing/Onset,Context/Setting, Quality, Duration, Modifying Factors, Severity)  Note limiting factors.   Cora Steward is a 73 y.o. female who presents to the emergency department for the evaluation of a laceration to the dorsal surface of the left thumb.  This happened just a little bit prior to arrival on a sort of 10 can at home.  No numbness or weakness in the left thumb.  She only came in because she can get the bleeding to stop.  No blood thinners.  She is not sure if her tetanus is up-to-date but she would like to hold off today    Nursing Notes were reviewed.    REVIEW OF SYSTEMS    (2-9systems for level 4, 10 or more for level 5)     Review of Systems   Constitutional:  Negative for fever.   Skin:  Positive for wound.   Neurological:  Negative for weakness and numbness.       Except asnoted above the remainder of the review of systems was reviewed and negative.       PAST MEDICAL HISTORY     Past Medical History:   Diagnosis Date    Allergic rhinitis     Arthritis     osteoarthritis    Asthma     exercise induced    Breast cancer (HCC) 12/1994    Dr Cortez Lumpectomy and lymph nodes excised    Breast cancer (HCC) 2015    bilatereal breast cancer    Chemotherapy-induced neuropathy (HCC)     Colon polyps     Eczema     Frequent UTI     GERD (gastroesophageal reflux disease)     Heavy menstrual bleeding     History of bone density study 11/2012    normal    Nonalcoholic steatohepatitis (AGRAWAL) 08/09/2023    Pap test, as part of routine gynecological examination 08/2012    peds speculum

## 2024-06-21 DIAGNOSIS — E11.9 TYPE 2 DIABETES MELLITUS WITHOUT COMPLICATION, WITHOUT LONG-TERM CURRENT USE OF INSULIN (HCC): ICD-10-CM

## 2024-06-21 RX ORDER — GLIMEPIRIDE 4 MG/1
TABLET ORAL
Qty: 90 TABLET | Refills: 3 | Status: SHIPPED | OUTPATIENT
Start: 2024-06-21

## 2024-06-21 NOTE — TELEPHONE ENCOUNTER
Cora Steward is calling to request a refill on the following medication(s):    Last Visit Date (If Applicable):  5/21/2024    Next Visit Date:    Visit date not found    Medication Request:  Requested Prescriptions     Pending Prescriptions Disp Refills    glimepiride (AMARYL) 4 MG tablet 90 tablet 3     Sig: TAKE 1 TABLET BY MOUTH ONCE DAILY.

## 2024-08-07 RX ORDER — CALCIUM CITRATE/VITAMIN D3 200MG-6.25
TABLET ORAL
Qty: 200 STRIP | Refills: 0 | Status: SHIPPED | OUTPATIENT
Start: 2024-08-07

## 2024-08-09 ENCOUNTER — TRANSCRIBE ORDERS (OUTPATIENT)
Dept: ADMINISTRATIVE | Age: 74
End: 2024-08-09

## 2024-08-09 DIAGNOSIS — M79.662 PAIN OF LEFT LOWER LEG: Primary | ICD-10-CM

## 2024-08-10 ENCOUNTER — HOSPITAL ENCOUNTER (OUTPATIENT)
Dept: VASCULAR LAB | Age: 74
End: 2024-08-10
Attending: PODIATRIST
Payer: MEDICARE

## 2024-08-10 DIAGNOSIS — M79.662 PAIN OF LEFT LOWER LEG: ICD-10-CM

## 2024-08-10 PROCEDURE — 93971 EXTREMITY STUDY: CPT | Performed by: SURGERY

## 2024-08-10 PROCEDURE — 93971 EXTREMITY STUDY: CPT

## 2024-08-28 DIAGNOSIS — E11.9 TYPE 2 DIABETES MELLITUS WITHOUT COMPLICATION, WITHOUT LONG-TERM CURRENT USE OF INSULIN (HCC): Primary | ICD-10-CM

## 2024-09-05 NOTE — DISCHARGE SUMMARY
Subjective     Karen Gabehart is a 48 y.o. female who presents to day for yearly follow up  and Hypertension.    CHIEF COMPLIANT  Chief Complaint   Patient presents with    yearly follow up     Hypertension       Active Problems:  Problem List Items Addressed This Visit          Cardiac and Vasculature    Palpitations - Primary    Inappropriate sinus tachycardia   Problem list:     1.  Shortness of breath  2.  Palpitations  3.  Chest tightness  4.  Hypertension  5.  SVT    HPI  HPI  Karen Gabehart is a 48-year-old female patient being followed up today for chronic arterial hypertension and history of supraventricular tachycardia.    Patient does have chronic arterial hypertension, which she has been treated with metoprolol. Today, her blood pressure is 107/71 heart rate is 73.    Patient does have a history of supraventricular tachycardia in which she is on metoprolol.  Patient reports that she does have palpitations that occur intermittently every once in a while.  She says that she will experience palpitations more if she has an elevated temperature or is walking.  Otherwise she seems to do relatively well with her palpitations.    Overall she feels like she is doing well from the cardiovascular standpoint.  She denies any significant angina anginal equivalent symptoms.  Patient denies any chest pain, shortness of breath, lower extremity edema, fatigue, syncope, PND, orthopnea, or strokelike symptoms.  PRIOR MEDS  Current Outpatient Medications on File Prior to Visit   Medication Sig Dispense Refill    gabapentin (NEURONTIN) 600 MG tablet Take 1 tablet by mouth 2 (Two) Times a Day.      HYDROcodone-acetaminophen (NORCO) 5-325 MG per tablet Take 1 tablet by mouth Every 6 (Six) Hours As Needed.      metoprolol succinate XL (TOPROL-XL) 25 MG 24 hr tablet TAKE 1 TABLET BY MOUTH DAILY 90 tablet 3     No current facility-administered medications on file prior to visit.       ALLERGIES  Patient has no known  [] [facility]) - [facility] &  Therapy  [address]  [phone]  F: [phone] [x] [address]  [facility]   [address]  P: [phone]  F: [phone] [] [address] &  Therapy  [address]  P: [phone]  F: [phone] [] [address]  [facility]   [address]   [place]: [phone]  F: [phone]      Physical Therapy Discharge Note    Date: 2020      Patient: Sanam Melgar  :   MRN: [id number]    Physician: Dr. Solomon Hams: Medicare/[facility]  Diagnosis: S/P Arthroscopy of left shoulder  Onset Date: 20                           Next Dr. Aniya Mehta: 2020  Visit# / total visits: 10/12; Progress note for Medicare patient due at visit 10                      Cancels/No Shows:   Date of initial visit: 2020                Date of final visit: 20       Subjective:    Pain:  [x]? Yes  []? No   Location: left shoulder; neck   Pain Rating: (0-10 scale) 1/10 left shoulder; 4/10 (neck)  Pain altered Tx:  [x]? No  []? Yes  Action:  Comments: Pt reports minimal time to exercise due to it being chaotic at home. Pt reports she feels find doing her exercises at home. Pt reports her neck is still sore. Pt notes after we work on the neck, she notes more dizziness and off balance.      Objective:  Re-assessment   Left shoulder PROM  Flexion:165°; 5/5  Abduction:175° continued end range lack of flexion; 5-/5  IR: 55°; 5/5  ER:  62°, 80°, 85°; 4+/5  Supra: 5/5  IR at 90: 5/5  ER at 90: 4+/5  Lats: 5/5  Middle trap 4/5  Rhomboids: 4/5  Lower trap: 5/5     Left shoulder AROM (standing)-  Flexion: 150°  Abduction: 155°  IR: R side of T9  ER: T4     UEFS: 62/80, 77.5% max function       Assessment:  Sandra Steward is 6 weeks "allergies.    HISTORY  Past Medical History:   Diagnosis Date    Angina pectoris     Arrhythmia     Arthritis     Asthma     Cancer, bladder, neck 06/2021    COPD (chronic obstructive pulmonary disease)     Hypertension     Irregular heart beat     pt states tachy at times    SVT (supraventricular tachycardia)        Social History     Socioeconomic History    Marital status:    Tobacco Use    Smoking status: Every Day     Current packs/day: 0.50     Average packs/day: 0.5 packs/day for 30.0 years (15.0 ttl pk-yrs)     Types: Cigarettes    Smokeless tobacco: Never   Vaping Use    Vaping status: Never Used   Substance and Sexual Activity    Alcohol use: Yes     Alcohol/week: 2.0 standard drinks of alcohol     Types: 2 Cans of beer per week     Comment: occasionally    Drug use: No    Sexual activity: Defer       Family History   Problem Relation Age of Onset    Arrhythmia Mother        Review of Systems   Constitutional:  Negative for chills, fatigue and fever.   HENT:  Negative for congestion, rhinorrhea and sore throat.    Eyes:  Negative for visual disturbance.   Respiratory:  Positive for apnea (does not use CPAP). Negative for chest tightness and shortness of breath.    Cardiovascular:  Negative for chest pain, palpitations and leg swelling.   Gastrointestinal:  Negative for constipation, diarrhea and nausea.   Musculoskeletal:  Positive for arthralgias, back pain and neck pain.   Skin:  Negative for rash and wound.   Allergic/Immunologic: Positive for environmental allergies. Negative for food allergies.   Neurological:  Positive for dizziness (bending over vertigo) and light-headedness. Negative for syncope and weakness.   Hematological:  Does not bruise/bleed easily.   Psychiatric/Behavioral:  Negative for sleep disturbance.        Objective     VITALS: /71 (BP Location: Left arm, Patient Position: Sitting, Cuff Size: Adult)   Pulse 73   Ht 170.2 cm (67\")   Wt 62.7 kg (138 lb 3.2 oz)   SpO2 " post-op left shoulder arthroscopy. Pt continues to have pain at end ranges of PROM with all movement and continues to note some pain with AROM of the left shoulder. Pt with improvements in left shoulder strength and scapular strength with continued weakness with abduction and ER. Pt has been advised to continue working on ROM and strength exercises in order to improve pain and functional use of the LUE as she continues to demonstrate improvements. Pt verbalizes understanding.      LEFT SHOULDER   Short term goals: MEET IN 6 VISITS- reassessed 7/6/2020 Status   Pain: Pt will report less than or equal to 1-2/10 left shoulder pain with therapeutic ROM and strengthening exercises in order to improve tolerance to completion of ADLs. MET   ROM: Pt will demonstrate Left shoulder AROM to greater than or equal to 150 degrees elevation with minimal compensations and IR to T7 with 1-2/10 left shoulder pain  in order to improve ability to complete ADLs overhead and behind the back. MET   Strength: Pt will demonstrate 3/5 left shoulder strength with elevation to assist with raising and lowering the LUE to assist with ADLs.   MET   Outcome Score: Pt will score greater than or equal to 50% on the UEFS in order to demonstrate improved function MET   Home Exercise Program: Pt will demonstrate independence with HEP.  MET   Longer term goals: MEET IN 12 VISITS- re-assessed 7/27     Pain: Pt will report less than or equal to 0-1/10 left shoulder pain with raising/lowering the LUE, reaching behind the back or behind the head, and lifting objects in order to assist with ADL completion.  Intermittently met   ROM: Pt will be able to demonstrate left shoulder AROM to greater than or equal to 160 degrees elevation, IR to T5, and ER to T4 without compensations in order to assist with raising/lowering the LUE to complete ADLs and lift objects.  Partially met    Strength: Pt will demonstrate greater than or equal to 4+/5 LUE strength and 4/5 96%   BMI 21.65 kg/m²     LABS:   Lab Results (most recent)       None            IMAGING:   No Images in the past 120 days found..    EXAM:  Physical Exam  Vitals and nursing note reviewed.   Constitutional:       Appearance: She is well-developed.   HENT:      Head: Normocephalic.   Neck:      Thyroid: No thyroid mass.      Vascular: No carotid bruit or JVD.      Trachea: Trachea and phonation normal.   Cardiovascular:      Rate and Rhythm: Normal rate and regular rhythm.      Pulses:           Radial pulses are 2+ on the right side and 2+ on the left side.        Posterior tibial pulses are 2+ on the right side and 2+ on the left side.      Heart sounds: Normal heart sounds. No murmur heard.     No friction rub. No gallop.   Pulmonary:      Effort: Pulmonary effort is normal. No respiratory distress.      Breath sounds: Normal breath sounds. No wheezing or rales.   Musculoskeletal:         General: No swelling. Normal range of motion.      Cervical back: Neck supple.   Skin:     General: Skin is warm and dry.      Capillary Refill: Capillary refill takes less than 2 seconds.      Findings: No rash.   Neurological:      Mental Status: She is alert and oriented to person, place, and time.   Psychiatric:         Speech: Speech normal.         Behavior: Behavior normal.         Thought Content: Thought content normal.         Judgment: Judgment normal.         Procedure     ECG 12 Lead    Date/Time: 9/5/2024 4:14 PM  Performed by: Polo Neville APRN    Authorized by: Polo eNville APRN  Comparison: compared with previous ECG from 5/18/2022  Similar to previous ECG  Rhythm: sinus rhythm  Rate: normal  BPM: 77  QRS axis: normal  Comments: Qtc 423 ms  No acute changes               Assessment & Plan    Diagnosis Plan   1. Palpitations        2. Inappropriate sinus tachycardia        1.  Patient's palpitations seem to be relatively well-controlled.  They mainly only occur when she is overly active or if she has a  temperature.  Will continue to follow at this time.    2.  Overall she feels like she is doing well from the cardiovascular standpoint.  She denies any significant angina anginal equivalent symptoms.  Will continue to follow at this time.    3.  Informed of signs and symptoms of ACS and advised to seek emergent treatment for any new worsening symptoms.  Patient also advised sooner follow-up as needed.  Also advised to follow-up with family doctor as needed  This note is dictated utilizing voice recognition software.  Although this record has been proof read, transcriptional errors may still be present. If questions occur regarding the content of this record please do not hesitate to call our office.  I have reviewed and confirmed the accuracy of the ROS as documented by the MA/EFRAN/RN LEANNE Archibald    Return in about 1 year (around 9/5/2025), or if symptoms worsen or fail to improve.    Diagnoses and all orders for this visit:    1. Palpitations (Primary)    2. Inappropriate sinus tachycardia    Other orders  -     ECG 12 Lead        Karen Gabehart  reports that she has been smoking cigarettes. She has a 15 pack-year smoking history. She has never used smokeless tobacco. I have educated her on the risk of diseases from using tobacco products such as cancer, COPD, and heart disease.     I advised her to quit and she is not willing to quit.    I spent 5.5 minutes counseling the patient.           BMI is within normal parameters. No other follow-up for BMI required.           MEDS ORDERED DURING VISIT:  No orders of the defined types were placed in this encounter.          This document has been electronically signed by LEANNE Archibald Jr.  September 10, 2024 00:42 EDT

## 2024-09-27 ENCOUNTER — HOSPITAL ENCOUNTER (OUTPATIENT)
Age: 74
Setting detail: SPECIMEN
Discharge: HOME | End: 2024-09-27
Payer: MEDICARE

## 2024-09-27 ENCOUNTER — OFFICE VISIT (OUTPATIENT)
Dept: ONCOLOGY | Age: 74
End: 2024-09-27
Payer: MEDICARE

## 2024-09-27 ENCOUNTER — TELEPHONE (OUTPATIENT)
Dept: ONCOLOGY | Age: 74
End: 2024-09-27

## 2024-09-27 VITALS
WEIGHT: 159.4 LBS | SYSTOLIC BLOOD PRESSURE: 132 MMHG | BODY MASS INDEX: 23.88 KG/M2 | HEART RATE: 79 BPM | DIASTOLIC BLOOD PRESSURE: 70 MMHG | TEMPERATURE: 97.2 F | RESPIRATION RATE: 18 BRPM

## 2024-09-27 DIAGNOSIS — C50.411 MALIGNANT NEOPLASM OF UPPER-OUTER QUADRANT OF RIGHT BREAST IN FEMALE, ESTROGEN RECEPTOR POSITIVE (HCC): Primary | ICD-10-CM

## 2024-09-27 DIAGNOSIS — D69.6 THROMBOCYTOPENIA, UNSPECIFIED (HCC): ICD-10-CM

## 2024-09-27 DIAGNOSIS — Z17.0 MALIGNANT NEOPLASM OF UPPER-OUTER QUADRANT OF RIGHT BREAST IN FEMALE, ESTROGEN RECEPTOR POSITIVE (HCC): ICD-10-CM

## 2024-09-27 DIAGNOSIS — Z17.0 MALIGNANT NEOPLASM OF UPPER-OUTER QUADRANT OF RIGHT BREAST IN FEMALE, ESTROGEN RECEPTOR POSITIVE (HCC): Primary | ICD-10-CM

## 2024-09-27 DIAGNOSIS — D70.9 NEUTROPENIA, UNSPECIFIED TYPE (HCC): ICD-10-CM

## 2024-09-27 DIAGNOSIS — C50.411 MALIGNANT NEOPLASM OF UPPER-OUTER QUADRANT OF RIGHT BREAST IN FEMALE, ESTROGEN RECEPTOR POSITIVE (HCC): ICD-10-CM

## 2024-09-27 DIAGNOSIS — K75.81 NONALCOHOLIC STEATOHEPATITIS (NASH): ICD-10-CM

## 2024-09-27 LAB
ALBUMIN SERPL-MCNC: 4 G/DL (ref 3.5–5.2)
ALP SERPL-CCNC: 85 U/L (ref 35–104)
ALT SERPL-CCNC: 23 U/L (ref 5–33)
ANION GAP SERPL CALCULATED.3IONS-SCNC: 12 MMOL/L (ref 9–17)
AST SERPL-CCNC: 25 U/L
BASOPHILS # BLD: 0.02 K/UL (ref 0–0.2)
BASOPHILS NFR BLD: 1 % (ref 0–2)
BILIRUB SERPL-MCNC: 0.8 MG/DL (ref 0.3–1.2)
BUN SERPL-MCNC: 9 MG/DL (ref 8–23)
BUN/CREAT SERPL: 13 (ref 9–20)
CALCIUM SERPL-MCNC: 9.2 MG/DL (ref 8.6–10.4)
CHLORIDE SERPL-SCNC: 105 MMOL/L (ref 98–107)
CO2 SERPL-SCNC: 23 MMOL/L (ref 20–31)
CREAT SERPL-MCNC: 0.7 MG/DL (ref 0.5–0.9)
EOSINOPHIL # BLD: 0.04 K/UL (ref 0–0.44)
EOSINOPHILS RELATIVE PERCENT: 2 % (ref 1–4)
ERYTHROCYTE [DISTWIDTH] IN BLOOD BY AUTOMATED COUNT: 14.1 % (ref 11.8–14.4)
GFR, ESTIMATED: >90 ML/MIN/1.73M2
GLUCOSE SERPL-MCNC: 174 MG/DL (ref 70–99)
HCT VFR BLD AUTO: 35.3 % (ref 36.3–47.1)
HGB BLD-MCNC: 11.3 G/DL (ref 11.9–15.1)
IMM GRANULOCYTES # BLD AUTO: 0 K/UL (ref 0–0.3)
IMM GRANULOCYTES NFR BLD: 0 %
LYMPHOCYTES NFR BLD: 0.57 K/UL (ref 1.1–3.7)
LYMPHOCYTES RELATIVE PERCENT: 27 % (ref 24–43)
MCH RBC QN AUTO: 28.4 PG (ref 25.2–33.5)
MCHC RBC AUTO-ENTMCNC: 32 G/DL (ref 28.4–34.8)
MCV RBC AUTO: 88.7 FL (ref 82.6–102.9)
MONOCYTES NFR BLD: 0.13 K/UL (ref 0.1–1.2)
MONOCYTES NFR BLD: 6 % (ref 3–12)
NEUTROPHILS NFR BLD: 64 % (ref 36–65)
NEUTS SEG NFR BLD: 1.34 K/UL (ref 1.5–8.1)
NRBC BLD-RTO: 0 PER 100 WBC
PLATELET # BLD AUTO: 100 K/UL (ref 138–453)
PMV BLD AUTO: 10.6 FL (ref 8.1–13.5)
POTASSIUM SERPL-SCNC: 4 MMOL/L (ref 3.7–5.3)
PROT SERPL-MCNC: 6.9 G/DL (ref 6.4–8.3)
RBC # BLD AUTO: 3.98 M/UL (ref 3.95–5.11)
SODIUM SERPL-SCNC: 140 MMOL/L (ref 135–144)
WBC OTHER # BLD: 2.1 K/UL (ref 3.5–11.3)

## 2024-09-27 PROCEDURE — 1036F TOBACCO NON-USER: CPT | Performed by: INTERNAL MEDICINE

## 2024-09-27 PROCEDURE — 85025 COMPLETE CBC W/AUTO DIFF WBC: CPT

## 2024-09-27 PROCEDURE — 36415 COLL VENOUS BLD VENIPUNCTURE: CPT

## 2024-09-27 PROCEDURE — 1123F ACP DISCUSS/DSCN MKR DOCD: CPT | Performed by: INTERNAL MEDICINE

## 2024-09-27 PROCEDURE — 1090F PRES/ABSN URINE INCON ASSESS: CPT | Performed by: INTERNAL MEDICINE

## 2024-09-27 PROCEDURE — 99214 OFFICE O/P EST MOD 30 MIN: CPT | Performed by: INTERNAL MEDICINE

## 2024-09-27 PROCEDURE — G8420 CALC BMI NORM PARAMETERS: HCPCS | Performed by: INTERNAL MEDICINE

## 2024-09-27 PROCEDURE — G8399 PT W/DXA RESULTS DOCUMENT: HCPCS | Performed by: INTERNAL MEDICINE

## 2024-09-27 PROCEDURE — 3017F COLORECTAL CA SCREEN DOC REV: CPT | Performed by: INTERNAL MEDICINE

## 2024-09-27 PROCEDURE — 80053 COMPREHEN METABOLIC PANEL: CPT

## 2024-09-27 PROCEDURE — 99211 OFF/OP EST MAY X REQ PHY/QHP: CPT | Performed by: INTERNAL MEDICINE

## 2024-09-27 PROCEDURE — G8427 DOCREV CUR MEDS BY ELIG CLIN: HCPCS | Performed by: INTERNAL MEDICINE

## 2024-09-27 RX ORDER — VITAMIN E 268 MG
400 CAPSULE ORAL DAILY
COMMUNITY

## 2024-09-27 RX ORDER — MOXIFLOXACIN 5 MG/ML
SOLUTION/ DROPS OPHTHALMIC
COMMUNITY
Start: 2024-09-17

## 2024-09-27 RX ORDER — MULTIVIT-MIN/IRON/FOLIC ACID/K 18-600-40
CAPSULE ORAL
COMMUNITY

## 2024-09-27 RX ORDER — PREDNISOLONE ACETATE 10 MG/ML
SUSPENSION/ DROPS OPHTHALMIC
COMMUNITY
Start: 2024-09-17

## 2024-09-30 ENCOUNTER — TELEPHONE (OUTPATIENT)
Dept: INFUSION THERAPY | Facility: MEDICAL CENTER | Age: 74
End: 2024-09-30

## 2024-10-24 ENCOUNTER — HOSPITAL ENCOUNTER (OUTPATIENT)
Dept: ULTRASOUND IMAGING | Age: 74
Discharge: HOME | End: 2024-10-26
Attending: INTERNAL MEDICINE
Payer: MEDICARE

## 2024-10-24 DIAGNOSIS — K75.81 NONALCOHOLIC STEATOHEPATITIS (NASH): ICD-10-CM

## 2024-10-24 PROCEDURE — 76705 ECHO EXAM OF ABDOMEN: CPT

## 2024-11-07 ENCOUNTER — HOSPITAL ENCOUNTER (OUTPATIENT)
Age: 74
Discharge: HOME | End: 2024-11-07
Payer: MEDICARE

## 2024-11-07 LAB
AFP SERPL-MCNC: <1.8 UG/L
ALBUMIN SERPL-MCNC: 4.4 G/DL (ref 3.5–5.2)
ALBUMIN/GLOB SERPL: 1.5 {RATIO} (ref 1–2.5)
ALP SERPL-CCNC: 86 U/L (ref 35–104)
ALT SERPL-CCNC: 22 U/L (ref 10–35)
AST SERPL-CCNC: 24 U/L (ref 10–35)
BILIRUB DIRECT SERPL-MCNC: 0.3 MG/DL (ref 0–0.2)
BILIRUB INDIRECT SERPL-MCNC: 0.4 MG/DL (ref 0–1)
BILIRUB SERPL-MCNC: 0.7 MG/DL (ref 0–1.2)
GLOBULIN SER CALC-MCNC: 2.9 G/DL
INR PPP: 1.1
PROT SERPL-MCNC: 7.3 G/DL (ref 6.6–8.7)
PROTHROMBIN TIME: 14.1 SEC (ref 11.5–14.2)

## 2024-11-07 PROCEDURE — 82105 ALPHA-FETOPROTEIN SERUM: CPT

## 2024-11-07 PROCEDURE — 36415 COLL VENOUS BLD VENIPUNCTURE: CPT

## 2024-11-07 PROCEDURE — 85610 PROTHROMBIN TIME: CPT

## 2024-11-07 PROCEDURE — 80076 HEPATIC FUNCTION PANEL: CPT

## 2024-11-14 DIAGNOSIS — Z17.0 MALIGNANT NEOPLASM OF UPPER-OUTER QUADRANT OF RIGHT BREAST IN FEMALE, ESTROGEN RECEPTOR POSITIVE (HCC): Primary | ICD-10-CM

## 2024-11-14 DIAGNOSIS — C50.411 MALIGNANT NEOPLASM OF UPPER-OUTER QUADRANT OF RIGHT BREAST IN FEMALE, ESTROGEN RECEPTOR POSITIVE (HCC): Primary | ICD-10-CM

## 2024-11-18 ENCOUNTER — HOSPITAL ENCOUNTER (OUTPATIENT)
Age: 74
Setting detail: SPECIMEN
Discharge: HOME | End: 2024-11-18
Payer: MEDICARE

## 2024-11-18 ENCOUNTER — TELEPHONE (OUTPATIENT)
Dept: ONCOLOGY | Age: 74
End: 2024-11-18

## 2024-11-18 ENCOUNTER — OFFICE VISIT (OUTPATIENT)
Dept: ONCOLOGY | Age: 74
End: 2024-11-18
Payer: MEDICARE

## 2024-11-18 VITALS
TEMPERATURE: 97.2 F | DIASTOLIC BLOOD PRESSURE: 62 MMHG | HEART RATE: 80 BPM | WEIGHT: 155.8 LBS | RESPIRATION RATE: 16 BRPM | SYSTOLIC BLOOD PRESSURE: 139 MMHG | BODY MASS INDEX: 23.34 KG/M2

## 2024-11-18 DIAGNOSIS — D70.9 NEUTROPENIA, UNSPECIFIED TYPE (HCC): ICD-10-CM

## 2024-11-18 DIAGNOSIS — Z17.0 MALIGNANT NEOPLASM OF UPPER-OUTER QUADRANT OF RIGHT BREAST IN FEMALE, ESTROGEN RECEPTOR POSITIVE (HCC): Primary | ICD-10-CM

## 2024-11-18 DIAGNOSIS — D69.6 THROMBOCYTOPENIA, UNSPECIFIED (HCC): ICD-10-CM

## 2024-11-18 DIAGNOSIS — C50.411 MALIGNANT NEOPLASM OF UPPER-OUTER QUADRANT OF RIGHT BREAST IN FEMALE, ESTROGEN RECEPTOR POSITIVE (HCC): ICD-10-CM

## 2024-11-18 DIAGNOSIS — C50.411 MALIGNANT NEOPLASM OF UPPER-OUTER QUADRANT OF RIGHT BREAST IN FEMALE, ESTROGEN RECEPTOR POSITIVE (HCC): Primary | ICD-10-CM

## 2024-11-18 DIAGNOSIS — Z17.0 MALIGNANT NEOPLASM OF UPPER-OUTER QUADRANT OF RIGHT BREAST IN FEMALE, ESTROGEN RECEPTOR POSITIVE (HCC): ICD-10-CM

## 2024-11-18 LAB
BASOPHILS # BLD: 0.02 K/UL (ref 0–0.2)
BASOPHILS NFR BLD: 1 %
EOSINOPHIL # BLD: 0.04 K/UL (ref 0–0.4)
EOSINOPHILS RELATIVE PERCENT: 2 % (ref 1–4)
ERYTHROCYTE [DISTWIDTH] IN BLOOD BY AUTOMATED COUNT: 14 % (ref 11.8–14.4)
HCT VFR BLD AUTO: 34.6 % (ref 36.3–47.1)
HGB BLD-MCNC: 11 G/DL (ref 11.9–15.1)
IMM GRANULOCYTES # BLD AUTO: 0.02 K/UL (ref 0–0.3)
IMM GRANULOCYTES NFR BLD: 1 %
LYMPHOCYTES NFR BLD: 0.52 K/UL (ref 1–4.8)
LYMPHOCYTES RELATIVE PERCENT: 26 % (ref 24–44)
MCH RBC QN AUTO: 28.3 PG (ref 25.2–33.5)
MCHC RBC AUTO-ENTMCNC: 31.8 G/DL (ref 28.4–34.8)
MCV RBC AUTO: 88.9 FL (ref 82.6–102.9)
MONOCYTES NFR BLD: 0.14 K/UL (ref 0.2–0.8)
MONOCYTES NFR BLD: 7 % (ref 1–7)
NEUTROPHILS NFR BLD: 63 % (ref 36–66)
NEUTS SEG NFR BLD: 1.26 K/UL (ref 1.8–7.7)
NRBC BLD-RTO: 0 PER 100 WBC
PLATELET # BLD AUTO: 100 K/UL (ref 138–453)
PMV BLD AUTO: 10.5 FL (ref 8.1–13.5)
RBC # BLD AUTO: 3.89 M/UL (ref 3.95–5.11)
WBC OTHER # BLD: 2 K/UL (ref 3.5–11.3)

## 2024-11-18 PROCEDURE — 1123F ACP DISCUSS/DSCN MKR DOCD: CPT | Performed by: INTERNAL MEDICINE

## 2024-11-18 PROCEDURE — G8420 CALC BMI NORM PARAMETERS: HCPCS | Performed by: INTERNAL MEDICINE

## 2024-11-18 PROCEDURE — 1036F TOBACCO NON-USER: CPT | Performed by: INTERNAL MEDICINE

## 2024-11-18 PROCEDURE — 99214 OFFICE O/P EST MOD 30 MIN: CPT | Performed by: INTERNAL MEDICINE

## 2024-11-18 PROCEDURE — G8399 PT W/DXA RESULTS DOCUMENT: HCPCS | Performed by: INTERNAL MEDICINE

## 2024-11-18 PROCEDURE — 85025 COMPLETE CBC W/AUTO DIFF WBC: CPT

## 2024-11-18 PROCEDURE — G8427 DOCREV CUR MEDS BY ELIG CLIN: HCPCS | Performed by: INTERNAL MEDICINE

## 2024-11-18 PROCEDURE — 1090F PRES/ABSN URINE INCON ASSESS: CPT | Performed by: INTERNAL MEDICINE

## 2024-11-18 PROCEDURE — G8484 FLU IMMUNIZE NO ADMIN: HCPCS | Performed by: INTERNAL MEDICINE

## 2024-11-18 PROCEDURE — 1126F AMNT PAIN NOTED NONE PRSNT: CPT | Performed by: INTERNAL MEDICINE

## 2024-11-18 PROCEDURE — 36415 COLL VENOUS BLD VENIPUNCTURE: CPT

## 2024-11-18 PROCEDURE — 1159F MED LIST DOCD IN RCRD: CPT | Performed by: INTERNAL MEDICINE

## 2024-11-18 PROCEDURE — 99211 OFF/OP EST MAY X REQ PHY/QHP: CPT | Performed by: INTERNAL MEDICINE

## 2024-11-18 RX ORDER — KETOROLAC TROMETHAMINE 5 MG/ML
SOLUTION OPHTHALMIC
COMMUNITY
Start: 2024-10-15

## 2024-11-18 NOTE — PROGRESS NOTES
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 ER/FL positive 95% and HER2 positive Ratio 3.5. Associated with high grade DCIS comedo type' biopsy 2/26/2015.   2- final pathology is T2 N0 M0 invasive ductal carcinoma with surrounding DCIS, margins were negative, tumor is triple positive.   3- h/o right breast DCIS in 1994 s/p BCS/EBRT and 7 years of Tamoxifen until 2001  4- incidentally, left-sided DCIS was found during bilateral mastectomy.  Removed with negative margins.   5-pancytopenia developed over the years, likely secondary to liver disease but MDS is not completely excluded    CURRENT THERAPY:  Right-sided lumpectomy, in 1994. followed by radiation and 7 years of tamoxifen, completed 2001  Underwent bilateral mastectomy   Adjuvant chemotherapy with TCH, started August, 2015    Maintenance hercptin, completed 7/2016   Adjuvant  AI for 5 years, completed 2021    BRIEF CASE HISTORY:  This is a 74 y.o. woman with history of right breast DCIS in 1994 treated with BCS / EBRT and Tamoxifen for 7 years until 2001. In February 2015, she had a screening mammogram that showed suspicious findings in the right breast. She underwent a core biopsy that showed infiltrating ductal carcinoma, grade 2, ER/FL positive and HER-2/beth positive with fish ratio 3.5.   Because of previous radiation, breast conservation is not an option so the patient underwent mastectomy and axillary sampling.  Surgery occurred on 6/30 and pathology showed 2.2 cm moderately differentiated carcinoma, associated with intermediate and high-grade ductal carcinoma in situ.  Margins were negative.  Attempt for axillary sampling was not successful because of previous surgery and radiation.  No lymph nodes were appreciated in the specimen.  Contralateral mastectomy was done at the same time and showed high-grade ductal carcinoma in

## 2024-11-18 NOTE — TELEPHONE ENCOUNTER
AMADEO HERE FOR FOLLOW UP   Rv in April, need cbc, cmp prior to RV   MD VISIT: 4/21/25 @ 10:15AM   LABS PRINTED AND GIVEN ON EXIT   AVS PRINTED AND GIVEN ON EXIT

## 2025-01-10 DIAGNOSIS — E11.9 TYPE 2 DIABETES MELLITUS WITHOUT COMPLICATION, WITHOUT LONG-TERM CURRENT USE OF INSULIN (HCC): ICD-10-CM

## 2025-01-12 RX ORDER — METFORMIN HYDROCHLORIDE 500 MG/1
1000 TABLET, EXTENDED RELEASE ORAL 2 TIMES DAILY
Qty: 360 TABLET | Refills: 1 | Status: SHIPPED | OUTPATIENT
Start: 2025-01-12

## 2025-02-20 RX ORDER — CALCIUM CITRATE/VITAMIN D3 200MG-6.25
TABLET ORAL
Qty: 200 STRIP | Refills: 0 | Status: SHIPPED | OUTPATIENT
Start: 2025-02-20

## 2025-02-20 NOTE — TELEPHONE ENCOUNTER
Cora Steward is calling to request a refill on the following medication(s):    Last Visit Date (If Applicable):  5/21/2024    Next Visit Date:    Visit date not found    Medication Request:  Requested Prescriptions     Pending Prescriptions Disp Refills    TRUE METRIX BLOOD GLUCOSE TEST strip [Pharmacy Med Name: TRUE METRIX BLOOD GLUCOSE TEST STRP] 200 strip 0     Sig: USE TO TEST BLOOD SUGAR EVERY DAY

## 2025-04-15 ENCOUNTER — TRANSCRIBE ORDERS (OUTPATIENT)
Dept: ADMINISTRATIVE | Age: 75
End: 2025-04-15

## 2025-04-15 DIAGNOSIS — K74.60 LIVER CIRRHOSIS SECONDARY TO NASH (HCC): Primary | ICD-10-CM

## 2025-04-15 DIAGNOSIS — K75.81 LIVER CIRRHOSIS SECONDARY TO NASH (HCC): Primary | ICD-10-CM

## 2025-04-21 ENCOUNTER — TELEPHONE (OUTPATIENT)
Dept: ONCOLOGY | Age: 75
End: 2025-04-21

## 2025-04-21 ENCOUNTER — OFFICE VISIT (OUTPATIENT)
Dept: ONCOLOGY | Age: 75
End: 2025-04-21
Payer: MEDICARE

## 2025-04-21 ENCOUNTER — HOSPITAL ENCOUNTER (OUTPATIENT)
Age: 75
Setting detail: SPECIMEN
Discharge: HOME | End: 2025-04-21
Payer: MEDICARE

## 2025-04-21 VITALS
BODY MASS INDEX: 23.94 KG/M2 | SYSTOLIC BLOOD PRESSURE: 126 MMHG | TEMPERATURE: 97.2 F | WEIGHT: 159.8 LBS | DIASTOLIC BLOOD PRESSURE: 58 MMHG | HEART RATE: 73 BPM | RESPIRATION RATE: 16 BRPM

## 2025-04-21 DIAGNOSIS — K75.81 NONALCOHOLIC STEATOHEPATITIS (NASH): ICD-10-CM

## 2025-04-21 DIAGNOSIS — Z17.0 MALIGNANT NEOPLASM OF UPPER-OUTER QUADRANT OF RIGHT BREAST IN FEMALE, ESTROGEN RECEPTOR POSITIVE (HCC): ICD-10-CM

## 2025-04-21 DIAGNOSIS — D69.6 THROMBOCYTOPENIA, UNSPECIFIED: ICD-10-CM

## 2025-04-21 DIAGNOSIS — C50.411 MALIGNANT NEOPLASM OF UPPER-OUTER QUADRANT OF RIGHT BREAST IN FEMALE, ESTROGEN RECEPTOR POSITIVE (HCC): Primary | ICD-10-CM

## 2025-04-21 DIAGNOSIS — D70.9 NEUTROPENIA, UNSPECIFIED TYPE: ICD-10-CM

## 2025-04-21 DIAGNOSIS — Z17.0 MALIGNANT NEOPLASM OF UPPER-OUTER QUADRANT OF RIGHT BREAST IN FEMALE, ESTROGEN RECEPTOR POSITIVE (HCC): Primary | ICD-10-CM

## 2025-04-21 DIAGNOSIS — C50.411 MALIGNANT NEOPLASM OF UPPER-OUTER QUADRANT OF RIGHT BREAST IN FEMALE, ESTROGEN RECEPTOR POSITIVE (HCC): ICD-10-CM

## 2025-04-21 LAB
ALBUMIN SERPL-MCNC: 3.9 G/DL (ref 3.5–5.2)
ALBUMIN/GLOB SERPL: 1.4 {RATIO} (ref 1–2.5)
ALP SERPL-CCNC: 80 U/L (ref 35–104)
ALT SERPL-CCNC: 20 U/L (ref 10–35)
ANION GAP SERPL CALCULATED.3IONS-SCNC: 11 MMOL/L (ref 9–16)
AST SERPL-CCNC: 22 U/L (ref 10–35)
BASOPHILS # BLD: 0.02 K/UL (ref 0–0.2)
BASOPHILS NFR BLD: 1 %
BILIRUB SERPL-MCNC: 0.5 MG/DL (ref 0–1.2)
BUN SERPL-MCNC: 11 MG/DL (ref 8–23)
CALCIUM SERPL-MCNC: 9.3 MG/DL (ref 8.8–10.2)
CHLORIDE SERPL-SCNC: 104 MMOL/L (ref 98–107)
CO2 SERPL-SCNC: 24 MMOL/L (ref 20–31)
CREAT SERPL-MCNC: 0.7 MG/DL (ref 0.5–0.9)
EOSINOPHIL # BLD: 0.04 K/UL (ref 0–0.4)
EOSINOPHILS RELATIVE PERCENT: 2 % (ref 1–4)
ERYTHROCYTE [DISTWIDTH] IN BLOOD BY AUTOMATED COUNT: 14 % (ref 11.8–14.4)
GFR, ESTIMATED: 85 ML/MIN/1.73M2
GLUCOSE SERPL-MCNC: 187 MG/DL (ref 82–115)
HCT VFR BLD AUTO: 35.2 % (ref 36.3–47.1)
HGB BLD-MCNC: 11.2 G/DL (ref 11.9–15.1)
IMM GRANULOCYTES # BLD AUTO: 0 K/UL (ref 0–0.3)
IMM GRANULOCYTES NFR BLD: 0 %
LYMPHOCYTES NFR BLD: 0.55 K/UL (ref 1–4.8)
LYMPHOCYTES RELATIVE PERCENT: 25 % (ref 24–44)
MCH RBC QN AUTO: 27.8 PG (ref 25.2–33.5)
MCHC RBC AUTO-ENTMCNC: 31.8 G/DL (ref 28.4–34.8)
MCV RBC AUTO: 87.3 FL (ref 82.6–102.9)
MONOCYTES NFR BLD: 0.15 K/UL (ref 0.2–0.8)
MONOCYTES NFR BLD: 7 % (ref 1–7)
NEUTROPHILS NFR BLD: 65 % (ref 36–66)
NEUTS SEG NFR BLD: 1.44 K/UL (ref 1.8–7.7)
NRBC BLD-RTO: 0 PER 100 WBC
PLATELET # BLD AUTO: 89 K/UL (ref 138–453)
PMV BLD AUTO: 11.3 FL (ref 8.1–13.5)
POTASSIUM SERPL-SCNC: 4.4 MMOL/L (ref 3.7–5.3)
PROT SERPL-MCNC: 6.8 G/DL (ref 6.6–8.7)
RBC # BLD AUTO: 4.03 M/UL (ref 3.95–5.11)
SODIUM SERPL-SCNC: 139 MMOL/L (ref 136–145)
WBC OTHER # BLD: 2.2 K/UL (ref 3.5–11.3)

## 2025-04-21 PROCEDURE — 1123F ACP DISCUSS/DSCN MKR DOCD: CPT | Performed by: INTERNAL MEDICINE

## 2025-04-21 PROCEDURE — G8427 DOCREV CUR MEDS BY ELIG CLIN: HCPCS | Performed by: INTERNAL MEDICINE

## 2025-04-21 PROCEDURE — 1036F TOBACCO NON-USER: CPT | Performed by: INTERNAL MEDICINE

## 2025-04-21 PROCEDURE — 85025 COMPLETE CBC W/AUTO DIFF WBC: CPT

## 2025-04-21 PROCEDURE — 80053 COMPREHEN METABOLIC PANEL: CPT

## 2025-04-21 PROCEDURE — 99214 OFFICE O/P EST MOD 30 MIN: CPT | Performed by: INTERNAL MEDICINE

## 2025-04-21 PROCEDURE — 36415 COLL VENOUS BLD VENIPUNCTURE: CPT

## 2025-04-21 PROCEDURE — 1159F MED LIST DOCD IN RCRD: CPT | Performed by: INTERNAL MEDICINE

## 2025-04-21 PROCEDURE — G8420 CALC BMI NORM PARAMETERS: HCPCS | Performed by: INTERNAL MEDICINE

## 2025-04-21 PROCEDURE — 99211 OFF/OP EST MAY X REQ PHY/QHP: CPT | Performed by: INTERNAL MEDICINE

## 2025-04-21 PROCEDURE — G8399 PT W/DXA RESULTS DOCUMENT: HCPCS | Performed by: INTERNAL MEDICINE

## 2025-04-21 PROCEDURE — 1090F PRES/ABSN URINE INCON ASSESS: CPT | Performed by: INTERNAL MEDICINE

## 2025-04-21 PROCEDURE — 1126F AMNT PAIN NOTED NONE PRSNT: CPT | Performed by: INTERNAL MEDICINE

## 2025-04-21 NOTE — PROGRESS NOTES
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 ER/IL positive 95% and HER2 positive Ratio 3.5. Associated with high grade DCIS comedo type' biopsy 2/26/2015.   2- final pathology is T2 N0 M0 invasive ductal carcinoma with surrounding DCIS, margins were negative, tumor is triple positive.   3- h/o right breast DCIS in 1994 s/p BCS/EBRT and 7 years of Tamoxifen until 2001  4- incidentally, left-sided DCIS was found during bilateral mastectomy.  Removed with negative margins.   5-pancytopenia developed over the years, likely secondary to liver disease but MDS is not completely excluded    CURRENT THERAPY:  Right-sided lumpectomy, in 1994. followed by radiation and 7 years of tamoxifen, completed 2001  Underwent bilateral mastectomy   Adjuvant chemotherapy with TCH, started August, 2015    Maintenance hercptin, completed 7/2016   Adjuvant  AI for 5 years, completed 2021    BRIEF CASE HISTORY:  This is a 74 y.o. woman with history of right breast DCIS in 1994 treated with BCS / EBRT and Tamoxifen for 7 years until 2001. In February 2015, she had a screening mammogram that showed suspicious findings in the right breast. She underwent a core biopsy that showed infiltrating ductal carcinoma, grade 2, ER/IL positive and HER-2/beth positive with fish ratio 3.5.   Because of previous radiation, breast conservation is not an option so the patient underwent mastectomy and axillary sampling.  Surgery occurred on 6/30 and pathology showed 2.2 cm moderately differentiated carcinoma, associated with intermediate and high-grade ductal carcinoma in situ.  Margins were negative.  Attempt for axillary sampling was not successful because of previous surgery and radiation.  No lymph nodes were appreciated in the specimen.  Contralateral mastectomy was done at the same time and showed high-grade ductal carcinoma in

## 2025-04-21 NOTE — TELEPHONE ENCOUNTER
AMADEO HERE FOR MD VISIT  Rv in 12 months with cbc, cmp   LABS CBC CMP 4/27/26  MD VISIT 4/27/26 @ 11AM  AVS PRINTED W/ INSTRUCTIONS AND GIVEN TO PT ON EXIT

## 2025-04-25 ENCOUNTER — HOSPITAL ENCOUNTER (OUTPATIENT)
Dept: ULTRASOUND IMAGING | Age: 75
Discharge: HOME | End: 2025-04-27
Attending: INTERNAL MEDICINE
Payer: MEDICARE

## 2025-04-25 DIAGNOSIS — K74.60 LIVER CIRRHOSIS SECONDARY TO NASH (HCC): ICD-10-CM

## 2025-04-25 DIAGNOSIS — K75.81 LIVER CIRRHOSIS SECONDARY TO NASH (HCC): ICD-10-CM

## 2025-04-25 PROCEDURE — 76705 ECHO EXAM OF ABDOMEN: CPT

## 2025-05-01 ENCOUNTER — HOSPITAL ENCOUNTER (OUTPATIENT)
Age: 75
Setting detail: SPECIMEN
Discharge: HOME | End: 2025-05-01
Payer: MEDICARE

## 2025-05-01 LAB
ALBUMIN SERPL-MCNC: 4 G/DL (ref 3.5–5.2)
ALBUMIN/GLOB SERPL: 1.5 {RATIO} (ref 1–2.5)
ALP SERPL-CCNC: 87 U/L (ref 35–104)
ALT SERPL-CCNC: 18 U/L (ref 10–35)
ANION GAP SERPL CALCULATED.3IONS-SCNC: 10 MMOL/L (ref 9–16)
AST SERPL-CCNC: 21 U/L (ref 10–35)
BILIRUB SERPL-MCNC: 0.4 MG/DL (ref 0–1.2)
BUN SERPL-MCNC: 12 MG/DL (ref 8–23)
CALCIUM SERPL-MCNC: 9.1 MG/DL (ref 8.6–10.4)
CHLORIDE SERPL-SCNC: 107 MMOL/L (ref 98–107)
CO2 SERPL-SCNC: 24 MMOL/L (ref 20–31)
CREAT SERPL-MCNC: 0.7 MG/DL (ref 0.6–0.9)
ERYTHROCYTE [DISTWIDTH] IN BLOOD BY AUTOMATED COUNT: 14 % (ref 11.8–14.4)
GFR, ESTIMATED: 90 ML/MIN/1.73M2
GLUCOSE SERPL-MCNC: 182 MG/DL (ref 74–99)
HCT VFR BLD AUTO: 34.4 % (ref 36.3–47.1)
HGB BLD-MCNC: 10.7 G/DL (ref 11.9–15.1)
INR PPP: 1.1
MCH RBC QN AUTO: 26.8 PG (ref 25.2–33.5)
MCHC RBC AUTO-ENTMCNC: 31.1 G/DL (ref 28.4–34.8)
MCV RBC AUTO: 86.2 FL (ref 82.6–102.9)
NRBC BLD-RTO: 0 PER 100 WBC
PLATELET # BLD AUTO: 92 K/UL (ref 138–453)
PMV BLD AUTO: 12 FL (ref 8.1–13.5)
POTASSIUM SERPL-SCNC: 4.1 MMOL/L (ref 3.7–5.3)
PROT SERPL-MCNC: 6.7 G/DL (ref 6.6–8.7)
PROTHROMBIN TIME: 14.6 SEC (ref 11.5–14.2)
RBC # BLD AUTO: 3.99 M/UL (ref 3.95–5.11)
SODIUM SERPL-SCNC: 141 MMOL/L (ref 136–145)
WBC OTHER # BLD: 1.6 K/UL (ref 3.5–11.3)

## 2025-05-01 PROCEDURE — 85610 PROTHROMBIN TIME: CPT

## 2025-05-01 PROCEDURE — 80053 COMPREHEN METABOLIC PANEL: CPT

## 2025-05-01 PROCEDURE — 36415 COLL VENOUS BLD VENIPUNCTURE: CPT

## 2025-05-01 PROCEDURE — 85027 COMPLETE CBC AUTOMATED: CPT

## 2025-05-22 ENCOUNTER — OFFICE VISIT (OUTPATIENT)
Age: 75
End: 2025-05-22

## 2025-05-22 VITALS
OXYGEN SATURATION: 97 % | SYSTOLIC BLOOD PRESSURE: 111 MMHG | DIASTOLIC BLOOD PRESSURE: 48 MMHG | HEIGHT: 69 IN | HEART RATE: 88 BPM | WEIGHT: 160 LBS | TEMPERATURE: 97.3 F | BODY MASS INDEX: 23.7 KG/M2 | RESPIRATION RATE: 18 BRPM

## 2025-05-22 DIAGNOSIS — N30.01 ACUTE CYSTITIS WITH HEMATURIA: Primary | ICD-10-CM

## 2025-05-22 LAB
BILIRUBIN, POC: NORMAL
BLOOD URINE, POC: 2
CLARITY, POC: NORMAL
COLOR, POC: YELLOW
GLUCOSE URINE, POC: NORMAL MG/DL
KETONES, POC: NORMAL MG/DL
LEUKOCYTE EST, POC: 3
NITRITE, POC: NORMAL
PH, POC: 5
PROTEIN, POC: 1 MG/DL
SPECIFIC GRAVITY, POC: 1.01
UROBILINOGEN, POC: NORMAL MG/DL

## 2025-05-22 RX ORDER — CIPROFLOXACIN 500 MG/1
500 TABLET, FILM COATED ORAL 2 TIMES DAILY
Qty: 10 TABLET | Refills: 0 | Status: SHIPPED | OUTPATIENT
Start: 2025-05-22 | End: 2025-05-27

## 2025-05-22 ASSESSMENT — ENCOUNTER SYMPTOMS
ABDOMINAL PAIN: 0
ABDOMINAL DISTENTION: 0

## 2025-05-22 NOTE — PROGRESS NOTES
[facility]  [address] [place] [id number]  Phone: [phone]  Fax: [phone]      Cora Steward  1950  MRN: [id number]  Date of visit: 2025    Chief Complaint:     Cora Steward (:  1950) is a 74 y.o. female,New patient, here for evaluation of the following chief complaint(s):  Urinary Tract Infection (Since ) and Rash      Allergies   Allergen Reactions    Amoxicillin     Plantain Other (See Comments)     Pt denies    Statins     Tape [Adhesive Tape]     Sulfa Antibiotics Itching and Rash        Current Outpatient Medications   Medication Sig Dispense Refill    ciprofloxacin (CIPRO) 500 MG tablet Take 1 tablet by mouth 2 times daily for 5 days 10 tablet 0    TRUE METRIX BLOOD GLUCOSE TEST strip USE TO TEST BLOOD SUGAR EVERY  strip 0    metFORMIN (GLUCOPHAGE-XR) 500 MG extended release tablet Take 2 tablets by mouth in the morning and at bedtime 360 tablet 1    vitamin E 400 UNIT capsule Take 1 capsule by mouth daily      glimepiride (AMARYL) 4 MG tablet TAKE 1 TABLET BY MOUTH ONCE DAILY. 90 tablet 3    famotidine (PEPCID) 20 MG tablet Take 1 tablet by mouth 2 times daily      BREO ELLIPTA 200-25 MCG/ACT AEPB inhaler       montelukast (SINGULAIR) 10 MG tablet Take 1 tablet by mouth daily 90 tablet 1    fluticasone (FLONASE) 50 MCG/ACT nasal spray 1 spray by Nasal route 2 times daily      cetirizine (ZYRTEC) 10 MG tablet Take 1 tablet by mouth in the morning and at bedtime      lansoprazole (PREVACID) 15 MG delayed release capsule Take 1 capsule by mouth daily as needed (15 MG daily)      albuterol (PROVENTIL HFA;VENTOLIN HFA) 108 (90 BASE) MCG/ACT inhaler Inhale 2 puffs into the lungs every 4 hours as needed      ACETYLCYSTEINE, NUTRIENT, PO Take 900 mg by mouth in the morning and at bedtime OTC vitamin      Cholecalciferol (VITAMIN D) 50 MCG ( UT) CAPS capsule Take by mouth Vitamin D3 (Patient not taking: Reported on

## 2025-06-02 ENCOUNTER — TELEPHONE (OUTPATIENT)
Dept: FAMILY MEDICINE CLINIC | Age: 75
End: 2025-06-02

## 2025-07-09 ENCOUNTER — OFFICE VISIT (OUTPATIENT)
Dept: FAMILY MEDICINE CLINIC | Age: 75
End: 2025-07-09

## 2025-07-09 VITALS
SYSTOLIC BLOOD PRESSURE: 120 MMHG | OXYGEN SATURATION: 97 % | HEART RATE: 80 BPM | BODY MASS INDEX: 23.7 KG/M2 | DIASTOLIC BLOOD PRESSURE: 62 MMHG | HEIGHT: 69 IN | WEIGHT: 160 LBS | TEMPERATURE: 97.3 F

## 2025-07-09 DIAGNOSIS — Z78.0 ASYMPTOMATIC MENOPAUSE: ICD-10-CM

## 2025-07-09 DIAGNOSIS — I85.00 ESOPHAGEAL VARICES WITHOUT BLEEDING, UNSPECIFIED ESOPHAGEAL VARICES TYPE (HCC): ICD-10-CM

## 2025-07-09 DIAGNOSIS — E11.9 TYPE 2 DIABETES MELLITUS WITHOUT COMPLICATION, WITHOUT LONG-TERM CURRENT USE OF INSULIN (HCC): ICD-10-CM

## 2025-07-09 DIAGNOSIS — Z00.00 MEDICARE ANNUAL WELLNESS VISIT, SUBSEQUENT: Primary | ICD-10-CM

## 2025-07-09 LAB — HBA1C MFR BLD: 7.7 %

## 2025-07-09 RX ORDER — GLIMEPIRIDE 4 MG/1
TABLET ORAL
Qty: 90 TABLET | Refills: 3 | Status: SHIPPED | OUTPATIENT
Start: 2025-07-09

## 2025-07-09 RX ORDER — CARVEDILOL 6.25 MG/1
6.25 TABLET ORAL 2 TIMES DAILY WITH MEALS
COMMUNITY

## 2025-07-09 RX ORDER — CALCIUM CITRATE/VITAMIN D3 200MG-6.25
TABLET ORAL
Qty: 200 STRIP | Refills: 0 | Status: SHIPPED | OUTPATIENT
Start: 2025-07-09

## 2025-07-09 RX ORDER — METFORMIN HYDROCHLORIDE 500 MG/1
1000 TABLET, EXTENDED RELEASE ORAL 2 TIMES DAILY
Qty: 360 TABLET | Refills: 1 | Status: SHIPPED | OUTPATIENT
Start: 2025-07-09

## 2025-07-09 SDOH — ECONOMIC STABILITY: FOOD INSECURITY: WITHIN THE PAST 12 MONTHS, THE FOOD YOU BOUGHT JUST DIDN'T LAST AND YOU DIDN'T HAVE MONEY TO GET MORE.: NEVER TRUE

## 2025-07-09 SDOH — ECONOMIC STABILITY: FOOD INSECURITY: WITHIN THE PAST 12 MONTHS, YOU WORRIED THAT YOUR FOOD WOULD RUN OUT BEFORE YOU GOT MONEY TO BUY MORE.: NEVER TRUE

## 2025-07-09 ASSESSMENT — LIFESTYLE VARIABLES
HOW MANY STANDARD DRINKS CONTAINING ALCOHOL DO YOU HAVE ON A TYPICAL DAY: PATIENT DOES NOT DRINK
HOW OFTEN DO YOU HAVE A DRINK CONTAINING ALCOHOL: NEVER

## 2025-07-09 ASSESSMENT — PATIENT HEALTH QUESTIONNAIRE - PHQ9
2. FEELING DOWN, DEPRESSED OR HOPELESS: NOT AT ALL
SUM OF ALL RESPONSES TO PHQ QUESTIONS 1-9: 0
1. LITTLE INTEREST OR PLEASURE IN DOING THINGS: NOT AT ALL
SUM OF ALL RESPONSES TO PHQ QUESTIONS 1-9: 0

## 2025-07-09 NOTE — PROGRESS NOTES
Medicare Annual Wellness Visit    Cora Steward is here for Medicare AWV, Shoulder Pain (Right arm pain shoulder and forearm/), Headache (Earaches ), and Hand Pain (Left thumb)    Assessment & Plan   Medicare annual wellness visit, subsequent  Type 2 diabetes mellitus without complication, without long-term current use of insulin (HCC)  -     glimepiride (AMARYL) 4 MG tablet; TAKE 1 TABLET BY MOUTH ONCE DAILY., Disp-90 tablet, R-3Normal  -     metFORMIN (GLUCOPHAGE-XR) 500 MG extended release tablet; Take 2 tablets by mouth in the morning and at bedtime, Disp-360 tablet, R-1**Patient requests 90 days supply**Normal  -     Albumin/Creatinine Ratio, Urine; Future  -     POCT glycosylated hemoglobin (Hb A1C)  Esophageal varices without bleeding, unspecified esophageal varices type (HCC)  Asymptomatic menopause  -     DEXA BONE DENSITY 2 SITES; Future     The patient is A1c looks excellent.  She changed her diet.  I discussed with her to continue the glimepiride.  Discussed with her to call with any changes or concerns.  I also discussed with her the presence of the esophageal varicosis.  She will continue to follow-up with the gastroenterologist.  DEXA scan ordered.  Call or return to clinic prn if these symptoms worsen or fail to improve as anticipated.  I have reviewed the instructions with the patient, answering all questions to her satisfaction.    Return in about 6 months (around 1/9/2026), or if symptoms worsen or fail to improve, for DM II.     Subjective   The following acute and/or chronic problems were also addressed today:  History of Present Illness  The 74-year-old female patient presents for evaluation of diabetes, esophageal varices, right arm and shoulder pain, balance issues, and bilateral breast cancer.    She has been monitoring her blood sugar levels, which have been elevated recently. She attributes this to her recent meal. Dietary changes have been made, including increased consumption of

## 2025-07-09 NOTE — PATIENT INSTRUCTIONS
your bones are at around age 30, the lower your risk for osteoporosis. But no matter what your age and risk are, your bones still need calcium, vitamin D, and exercise to stay strong. Also avoid smoking, and limit alcohol. Smoking and heavy alcohol use can make your bones thinner.  Talk to your doctor about any special risks you might have, such as having a close relative with osteoporosis or taking a medicine that can weaken bones. Your doctor can tell you the best ways to protect your bones from thinning.  Follow-up care is a key part of your treatment and safety. Be sure to make and go to all appointments, and call your doctor if you are having problems. It's also a good idea to know your test results and keep a list of the medicines you take.  How can you care for yourself at home?  Get enough calcium and vitamin D. The [employer] recommends adults younger than age 51 need 1,000 mg of calcium and 600 IU of vitamin D each day. Women ages 51 to 70 need 1,200 mg of calcium and 600 IU of vitamin D each day. Men ages 51 to 70 need 1,000 mg of calcium and 600 IU of vitamin D each day. Adults 71 and older need 1,200 mg of calcium and 800 IU of vitamin D each day.  Eat foods rich in calcium, like yogurt, cheese, milk, and dark green vegetables.  Eat foods rich in vitamin D, like eggs, fatty fish, cereal, and fortified milk.  Get some sunshine. Your body uses sunshine to make its own vitamin D. The safest time to be out in the sun is before 10 a.m. or after 3 p.m. Avoid getting sunburned. Sunburn can increase your risk of skin cancer.  Talk to your doctor about taking a calcium plus vitamin D supplement. Ask about what type of calcium is right for you, and how much to take at a time. Adults ages 19 to 50 should not get more than 2,500 mg of calcium and 4,000 IU of vitamin D each day, whether it is from supplements and/or food. Adults ages 51 and older should not get more than 2,000 mg of calcium and 4,000 IU

## 2025-08-07 ENCOUNTER — TRANSCRIBE ORDERS (OUTPATIENT)
Dept: ADMINISTRATIVE | Age: 75
End: 2025-08-07

## 2025-08-07 DIAGNOSIS — K75.81 LIVER CIRRHOSIS SECONDARY TO NASH (HCC): Primary | ICD-10-CM

## 2025-08-07 DIAGNOSIS — K74.60 LIVER CIRRHOSIS SECONDARY TO NASH (HCC): Primary | ICD-10-CM

## 2025-08-19 ENCOUNTER — HOSPITAL ENCOUNTER (OUTPATIENT)
Dept: LAB | Age: 75
Discharge: HOME | End: 2025-08-19
Payer: MEDICARE

## 2025-08-19 ENCOUNTER — HOSPITAL ENCOUNTER (OUTPATIENT)
Dept: LAB | Age: 75
Setting detail: SPECIMEN
Discharge: HOME | End: 2025-08-19
Payer: MEDICARE

## 2025-08-19 DIAGNOSIS — E11.9 TYPE 2 DIABETES MELLITUS WITHOUT COMPLICATION, WITHOUT LONG-TERM CURRENT USE OF INSULIN (HCC): ICD-10-CM

## 2025-08-19 LAB
AFP SERPL-MCNC: <1.8 UG/L
ALBUMIN SERPL-MCNC: 4 G/DL (ref 3.5–5.2)
ALBUMIN/GLOB SERPL: 1.4 {RATIO} (ref 1–2.5)
ALP SERPL-CCNC: 77 U/L (ref 35–104)
ALT SERPL-CCNC: 23 U/L (ref 10–35)
AST SERPL-CCNC: 24 U/L (ref 10–35)
BILIRUB DIRECT SERPL-MCNC: 0.2 MG/DL (ref 0–0.2)
BILIRUB INDIRECT SERPL-MCNC: 0.4 MG/DL (ref 0–1)
BILIRUB SERPL-MCNC: 0.6 MG/DL (ref 0–1.2)
CREAT UR-MCNC: 56.9 MG/DL (ref 28–217)
GLOBULIN SER CALC-MCNC: 2.9 G/DL
INR PPP: 1.2
MICROALBUMIN UR-MCNC: <12 MG/L (ref 0–20)
MICROALBUMIN/CREAT UR-RTO: NORMAL MCG/MG CREAT (ref 0–25)
PROT SERPL-MCNC: 6.9 G/DL (ref 6.6–8.7)
PROTHROMBIN TIME: 15.5 SEC (ref 11.5–14.2)

## 2025-08-19 PROCEDURE — 82043 UR ALBUMIN QUANTITATIVE: CPT

## 2025-08-19 PROCEDURE — 85610 PROTHROMBIN TIME: CPT

## 2025-08-19 PROCEDURE — 36415 COLL VENOUS BLD VENIPUNCTURE: CPT

## 2025-08-19 PROCEDURE — 82105 ALPHA-FETOPROTEIN SERUM: CPT

## 2025-08-19 PROCEDURE — 80076 HEPATIC FUNCTION PANEL: CPT

## 2025-08-19 PROCEDURE — 82570 ASSAY OF URINE CREATININE: CPT

## 2025-09-02 ENCOUNTER — HOSPITAL ENCOUNTER (OUTPATIENT)
Dept: MAMMOGRAPHY | Age: 75
Discharge: HOME | End: 2025-09-04
Payer: MEDICARE

## 2025-09-02 DIAGNOSIS — Z78.0 ASYMPTOMATIC MENOPAUSE: ICD-10-CM

## 2025-09-02 PROCEDURE — 77080 DXA BONE DENSITY AXIAL: CPT

## (undated) DEVICE — CHLORAPREP 26ML ORANGE

## (undated) DEVICE — BLOCK BITE 60FR RUBBER ADLT DENTAL

## (undated) DEVICE — GLOVE SURG SZ 85 CRM LTX FREE POLYISOPRENE POLYMER BEAD ANTI

## (undated) DEVICE — CANNULA ARTHSCP L7CM ID8.25MM TRNSLUC THRD FLX W/ NO SQUIRT

## (undated) DEVICE — [STANDARD 12-FLUTE BARREL BUR, ARTHROSCOPIC SHAVER BLADE,  DO NOT RESTERILIZE,  DO NOT USE IF PACKAGE IS DAMAGED,  KEEP DRY,  KEEP AWAY FROM SUNLIGHT]: Brand: FORMULA

## (undated) DEVICE — JELLY,LUBE,STERILE,FLIP TOP,TUBE,2-OZ: Brand: MEDLINE

## (undated) DEVICE — [AGGRESSIVE PLUS CUTTER, ARTHROSCOPIC SHAVER BLADE,  DO NOT RESTERILIZE,  DO NOT USE IF PACKAGE IS DAMAGED,  KEEP DRY,  KEEP AWAY FROM SUNLIGHT]: Brand: FORMULA

## (undated) DEVICE — TUBING, SUCTION, 1/4" X 12', STRAIGHT: Brand: MEDLINE

## (undated) DEVICE — FORCEPS BX L240CM WRK CHN 2.8MM STD CAP W/ NDL MIC MESH

## (undated) DEVICE — Device

## (undated) DEVICE — TAPE, MEDFIX EZ, SELF WOUND, 6"X11YD: Brand: MEDLINE

## (undated) DEVICE — GLOVE SURG 8 11.7IN BEAD CUF LIGHT BRN SENSICARE LTX FREE

## (undated) DEVICE — GOWN,AURORA,NONREINFORCED,LARGE: Brand: MEDLINE

## (undated) DEVICE — COVER,MAYO STAND,XL,STERILE: Brand: MEDLINE

## (undated) DEVICE — TUBING PMP L16FT MAIN DISP FOR AR-6400 AR-6475

## (undated) DEVICE — GAUZE,SPONGE,4"X4",16PLY,STRL,LF,10/TRAY: Brand: MEDLINE

## (undated) DEVICE — DRAPE,U/ SHT,SPLIT,PLAS,STERIL: Brand: MEDLINE

## (undated) DEVICE — BANDAGE COBAN 4 IN COMPR W4INXL5YD FOAM COHESIVE QUIK STK SELF ADH SFT

## (undated) DEVICE — CANNULA ARTHSCP L7CM ID5.75MM CRYS W/ OBT

## (undated) DEVICE — GOWN,AURORA,NON-REINFORCED,2XL: Brand: MEDLINE

## (undated) DEVICE — BASIN EMSIS 700ML GRAPHITE PLAS KID SHP GRAD

## (undated) DEVICE — GLOVE SURG SZ 85 L12IN FNGR THK13MIL WHT ISOLEX POLYISOPRENE

## (undated) DEVICE — SLING TRAC LAT DISP FOR DECUB SHLDR SUSP SYS

## (undated) DEVICE — MEDICINE CUP, GRADUATED, STER: Brand: MEDLINE

## (undated) DEVICE — ADHESIVE SKIN CLSR 0.7ML TOP DERMBND ADV

## (undated) DEVICE — YANKAUER,FLEXIBLE HANDLE,REGLR CAPACITY: Brand: MEDLINE INDUSTRIES, INC.

## (undated) DEVICE — TOWEL,OR,DSP,ST,BLUE,DLX,XR,4/PK,20PK/CS: Brand: MEDLINE

## (undated) DEVICE — GLOVE SURG SZ 8 CRM LTX FREE POLYISOPRENE POLYMER BEAD ANTI

## (undated) DEVICE — GARMENT,MEDLINE,DVT,INT,CALF,MED, GEN2: Brand: MEDLINE

## (undated) DEVICE — 4-PORT MANIFOLD: Brand: NEPTUNE 2

## (undated) DEVICE — INTENDED FOR TISSUE SEPARATION, AND OTHER PROCEDURES THAT REQUIRE A SHARP SURGICAL BLADE TO PUNCTURE OR CUT.: Brand: BARD-PARKER ® CARBON RIB-BACK BLADES

## (undated) DEVICE — Device: Brand: DEFENDO VALVE AND CONNECTOR KIT

## (undated) DEVICE — GOWN,SIRUS,NONRNF,SETINSLV,XL,20/CS: Brand: MEDLINE

## (undated) DEVICE — SYRINGE MED 50ML LUERLOCK TIP

## (undated) DEVICE — TUBING FLD MGMT Y DBL SPIK DUALWAVE

## (undated) DEVICE — DRAPE,REIN 53X77,STERILE: Brand: MEDLINE

## (undated) DEVICE — CO2 CANNULA,SUPERSOFT, ADLT,7'O2,7'CO2: Brand: MEDLINE

## (undated) DEVICE — DISC SUCT FLR DLX QUICKSUITE

## (undated) DEVICE — BLANKET WRM W40.2XL55.9IN IORT LO BODY + MISTRAL AIR

## (undated) DEVICE — SUPER TURBOVAC 90 INTEGRATED CABLE WAND ICW: Brand: COBLATION

## (undated) DEVICE — SLEEVE ARM DISPOSABLE FOR LAT DECUB SHLDR SUSP SYS

## (undated) DEVICE — DRESSING PETRO W3XL8IN OIL EMUL N ADH GZ KNIT IMPREG CELOS